# Patient Record
Sex: FEMALE | Race: BLACK OR AFRICAN AMERICAN | Employment: UNEMPLOYED | URBAN - METROPOLITAN AREA
[De-identification: names, ages, dates, MRNs, and addresses within clinical notes are randomized per-mention and may not be internally consistent; named-entity substitution may affect disease eponyms.]

---
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
## 2020-01-09 LAB
CREATININE URINE: 153 MG/DL (ref 20–320)
GLUCOSE BLD-MCNC: 146 MG/DL (ref 74–99)
PROTEIN/CREAT RATIO: 1.34 MG/MG CREAT (ref 0–0.1)
TOTAL PROTEIN, URINE: 205 MG/DL (ref 5–24)

## 2020-02-20 ENCOUNTER — HOSPITAL ENCOUNTER (EMERGENCY)
Age: 20
Discharge: HOME | End: 2020-02-20
Attending: EMERGENCY MEDICINE
Payer: COMMERCIAL

## 2020-02-20 VITALS
DIASTOLIC BLOOD PRESSURE: 88 MMHG | OXYGEN SATURATION: 99 % | HEIGHT: 68 IN | TEMPERATURE: 98.3 F | WEIGHT: 293 LBS | HEART RATE: 64 BPM | SYSTOLIC BLOOD PRESSURE: 137 MMHG | BODY MASS INDEX: 44.41 KG/M2 | RESPIRATION RATE: 18 BRPM

## 2020-02-20 PROCEDURE — 6370000000 HC RX 637 (ALT 250 FOR IP): Performed by: EMERGENCY MEDICINE

## 2020-02-20 PROCEDURE — 87070 CULTURE OTHR SPECIMN AEROBIC: CPT

## 2020-02-20 PROCEDURE — 87185 SC STD ENZYME DETCJ PER NZM: CPT

## 2020-02-20 PROCEDURE — 99283 EMERGENCY DEPT VISIT LOW MDM: CPT

## 2020-02-20 PROCEDURE — 87075 CULTR BACTERIA EXCEPT BLOOD: CPT

## 2020-02-20 PROCEDURE — 87205 SMEAR GRAM STAIN: CPT

## 2020-02-20 RX ORDER — NAPROXEN 500 MG/1
500 TABLET ORAL ONCE
Status: COMPLETED | OUTPATIENT
Start: 2020-02-20 | End: 2020-02-20

## 2020-02-20 RX ORDER — SULFAMETHOXAZOLE AND TRIMETHOPRIM 800; 160 MG/1; MG/1
1 TABLET ORAL 2 TIMES DAILY
Qty: 14 TABLET | Refills: 0 | Status: SHIPPED | OUTPATIENT
Start: 2020-02-20 | End: 2020-02-27

## 2020-02-20 RX ORDER — NAPROXEN 500 MG/1
500 TABLET ORAL 2 TIMES DAILY PRN
Qty: 20 TABLET | Refills: 0 | Status: SHIPPED | OUTPATIENT
Start: 2020-02-20 | End: 2020-04-14 | Stop reason: ALTCHOICE

## 2020-02-20 RX ADMIN — NAPROXEN 500 MG: 500 TABLET ORAL at 19:18

## 2020-02-20 ASSESSMENT — PAIN DESCRIPTION - LOCATION
LOCATION: ABDOMEN

## 2020-02-20 ASSESSMENT — ENCOUNTER SYMPTOMS
NAUSEA: 0
VOMITING: 0
EYE PAIN: 0
CHEST TIGHTNESS: 0
ABDOMINAL PAIN: 0
SHORTNESS OF BREATH: 0
SORE THROAT: 0

## 2020-02-20 ASSESSMENT — PAIN DESCRIPTION - DESCRIPTORS
DESCRIPTORS: SHARP
DESCRIPTORS: ACHING
DESCRIPTORS: ACHING

## 2020-02-20 ASSESSMENT — PAIN SCALES - GENERAL
PAINLEVEL_OUTOF10: 2
PAINLEVEL_OUTOF10: 2
PAINLEVEL_OUTOF10: 9
PAINLEVEL_OUTOF10: 2

## 2020-02-20 ASSESSMENT — PAIN DESCRIPTION - PAIN TYPE
TYPE: ACUTE PAIN

## 2020-02-20 ASSESSMENT — PAIN DESCRIPTION - FREQUENCY: FREQUENCY: CONTINUOUS

## 2020-02-20 NOTE — ED PROVIDER NOTES
[facility] ED  eMERGENCY dEPARTMENTeNCOUnter      Pt Name: Iraj Chandler  MRN: [id number]  [place] [address]  Date ofevaluation: 2020  Provider: [address]       Chief Complaint   Patient presents with    Post-op Problem     pt states she had a C section on , c/o pain and drainage from incision site         HISTORY OF PRESENT ILLNESS   (Location/Symptom, Timing/Onset,Context/Setting, Quality, Duration, Modifying Factors, Severity)  Note limiting factors. Iraj Chandler is a 23 y.o. female who presents to the emergency department . Patient comes in with infection at her  incision. Patient had the  . She did follow-up with the obstetrician once. Patient states that she put a swab inside and cultured it and then put her on 2 antibiotics. She cannot remember which antibiotics they were but believes 1 of them was Keflex. She does not remember where she picked it up. Patient states that she still has an open wound although smaller than it was. She also feels something sticking out of the wound. HPI    NursingNotes were reviewed. REVIEW OF SYSTEMS    (2-9 systems for level 4, 10 or more for level 5)     Review of Systems   Constitutional: Negative for activity change, appetite change and fatigue. HENT: Negative for congestion and sore throat. Eyes: Negative for pain and visual disturbance. Respiratory: Negative for chest tightness and shortness of breath. Cardiovascular: Negative for chest pain. Gastrointestinal: Negative for abdominal pain, nausea and vomiting. Endocrine: Negative for polydipsia. Genitourinary: Negative for flank pain and urgency. Musculoskeletal: Negative for gait problem and neck stiffness. Skin: Positive for wound. Negative for rash. Neurological: Negative for weakness, light-headedness and headaches. Psychiatric/Behavioral: Negative for confusion and sleep disturbance.        Except as noted above the remainder of the review of systems was reviewed and negative. PAST MEDICAL HISTORY     Past Medical History:   Diagnosis Date    Diabetes mellitus ([facility] [address]     GERD (gastroesophageal reflux disease)     Hypertension     Obesity affecting pregnancy, antepartum, third trimester          SURGICALHISTORY       Past Surgical History:   Procedure Laterality Date    UPPER GASTROINTESTINAL ENDOSCOPY           CURRENT MEDICATIONS       Previous Medications    DOXYLAMINE-PYRIDOXINE (DICLEGIS) 10-10 MG TBEC    Start: 2 tabs p.o. q.h.s.; if symptoms persist after 2 days increase to 1 tab p.o. q.a.m. and 2 tabs p.o. q.h.s.; may increase to 1 tab p.o. q.a.m., 1 tab p.o. q. midafternoon and 2 tabs p.o. q.h.s.  4 tabs per day. If unable to afford, instruct the patient about substituting the OTC components. INSULIN LISPRO (HUMALOG) 100 UNIT/ML INJECTION VIAL    Inject into the skin 3 times daily (before meals)    PRENATAL VIT-FE FUMARATE-FA (PRENATAL PLUS PO)    Take 1 tablet by mouth daily       ALLERGIES     Shellfish-derived products and Benzoyl peroxide    FAMILY HISTORY     History reviewed. No pertinent family history.        SOCIAL HISTORY       Social History     Socioeconomic History    Marital status: Single     Spouse name: None    Number of children: None    Years of education: None    Highest education level: None   Occupational History    None   Social Needs    Financial resource strain: None    Food insecurity:     Worry: None     Inability: None    Transportation needs:     Medical: None     Non-medical: None   Tobacco Use    Smoking status: Never Smoker    Smokeless tobacco: Never Used   Substance and Sexual Activity    Alcohol use: No    Drug use: No    Sexual activity: None   Lifestyle    Physical activity:     Days per week: None     Minutes per session: None    Stress: None   Relationships    Social connections:     Talks on phone: None     Gets together: None

## 2020-02-20 NOTE — ED TRIAGE NOTES
Pt states she had a C section on 1/12, c/o pain and drainage from incision site, Pt is A&OX3, calm, ambulatory, afebrile, breathes are equal and unlabored, last medicated for pain with ibuprofen at 1600 today, Pt also c/o abscesses in her groin

## 2020-02-21 NOTE — ED NOTES
Pt medicated per orders, pancho. Well. Pt a&ox4, skin w/d/tan, pulses palp, msp's intact, 0 distress, 0 N&v, steady gait noted, 0 problems.      Abraham Reese RN  02/20/20 2553

## 2020-02-21 NOTE — ED NOTES
This rn not primary nurse, helping with d/c. Pt c/o not being assessd by doctor appropriately and that she will have to go to different hospital, offered pt to call doctor to come back to room, pt refused it at this time. Pt a&ox4, skin w/d/tan, pulses palp. 0 distress.      Edgar Clements, BUDDY  02/20/20 9534 Length To Time In Minutes Device Was In Place: 10

## 2020-02-22 LAB
ANAEROBIC CULTURE: ABNORMAL
ANAEROBIC CULTURE: ABNORMAL
GRAM STAIN RESULT: ABNORMAL
ORGANISM: ABNORMAL
ORGANISM: ABNORMAL
WOUND/ABSCESS: ABNORMAL

## 2020-02-28 ENCOUNTER — APPOINTMENT (OUTPATIENT)
Dept: GENERAL RADIOLOGY | Age: 20
End: 2020-02-28
Payer: COMMERCIAL

## 2020-02-28 ENCOUNTER — HOSPITAL ENCOUNTER (EMERGENCY)
Age: 20
Discharge: HOME | End: 2020-02-28
Payer: COMMERCIAL

## 2020-02-28 VITALS
HEIGHT: 68 IN | RESPIRATION RATE: 16 BRPM | SYSTOLIC BLOOD PRESSURE: 146 MMHG | OXYGEN SATURATION: 98 % | HEART RATE: 70 BPM | WEIGHT: 290 LBS | DIASTOLIC BLOOD PRESSURE: 87 MMHG | TEMPERATURE: 98.3 F | BODY MASS INDEX: 43.95 KG/M2

## 2020-02-28 PROCEDURE — 10060 I&D ABSCESS SIMPLE/SINGLE: CPT

## 2020-02-28 PROCEDURE — 2500000003 HC RX 250 WO HCPCS: Performed by: NURSE PRACTITIONER

## 2020-02-28 PROCEDURE — 99283 EMERGENCY DEPT VISIT LOW MDM: CPT

## 2020-02-28 PROCEDURE — 6370000000 HC RX 637 (ALT 250 FOR IP): Performed by: NURSE PRACTITIONER

## 2020-02-28 PROCEDURE — 73630 X-RAY EXAM OF FOOT: CPT

## 2020-02-28 RX ORDER — IBUPROFEN 800 MG/1
800 TABLET ORAL EVERY 8 HOURS PRN
Qty: 20 TABLET | Refills: 0 | Status: SHIPPED | OUTPATIENT
Start: 2020-02-28 | End: 2020-10-28 | Stop reason: ALTCHOICE

## 2020-02-28 RX ORDER — LIDOCAINE HYDROCHLORIDE 20 MG/ML
5 INJECTION, SOLUTION INFILTRATION; PERINEURAL ONCE
Status: COMPLETED | OUTPATIENT
Start: 2020-02-28 | End: 2020-02-28

## 2020-02-28 RX ORDER — IBUPROFEN 800 MG/1
800 TABLET ORAL ONCE
Status: COMPLETED | OUTPATIENT
Start: 2020-02-28 | End: 2020-02-28

## 2020-02-28 RX ORDER — OXYCODONE HYDROCHLORIDE AND ACETAMINOPHEN 5; 325 MG/1; MG/1
1 TABLET ORAL ONCE
Status: COMPLETED | OUTPATIENT
Start: 2020-02-28 | End: 2020-02-28

## 2020-02-28 RX ORDER — TRAMADOL HYDROCHLORIDE 50 MG/1
50 TABLET ORAL EVERY 4 HOURS PRN
Qty: 10 TABLET | Refills: 0 | Status: SHIPPED | OUTPATIENT
Start: 2020-02-28 | End: 2020-03-04

## 2020-02-28 RX ORDER — SULFAMETHOXAZOLE AND TRIMETHOPRIM 800; 160 MG/1; MG/1
2 TABLET ORAL 2 TIMES DAILY
Qty: 56 TABLET | Refills: 0 | Status: SHIPPED | OUTPATIENT
Start: 2020-02-28 | End: 2020-03-13

## 2020-02-28 RX ORDER — SULFAMETHOXAZOLE AND TRIMETHOPRIM 800; 160 MG/1; MG/1
2 TABLET ORAL ONCE
Status: COMPLETED | OUTPATIENT
Start: 2020-02-28 | End: 2020-02-28

## 2020-02-28 RX ADMIN — SULFAMETHOXAZOLE AND TRIMETHOPRIM 2 TABLET: 800; 160 TABLET ORAL at 08:41

## 2020-02-28 RX ADMIN — IBUPROFEN 800 MG: 800 TABLET, FILM COATED ORAL at 09:35

## 2020-02-28 RX ADMIN — LIDOCAINE HYDROCHLORIDE 5 ML: 20 INJECTION, SOLUTION INFILTRATION; PERINEURAL at 10:18

## 2020-02-28 RX ADMIN — OXYCODONE HYDROCHLORIDE AND ACETAMINOPHEN 1 TABLET: 5; 325 TABLET ORAL at 10:13

## 2020-02-28 ASSESSMENT — PAIN SCALES - GENERAL
PAINLEVEL_OUTOF10: 10
PAINLEVEL_OUTOF10: 8

## 2020-02-28 ASSESSMENT — ENCOUNTER SYMPTOMS
DIARRHEA: 0
RHINORRHEA: 0
VOMITING: 0
PHOTOPHOBIA: 0
BACK PAIN: 0
EYE PAIN: 0
ABDOMINAL PAIN: 0
COUGH: 0
NAUSEA: 0
SHORTNESS OF BREATH: 0
SORE THROAT: 0

## 2020-02-28 ASSESSMENT — PAIN DESCRIPTION - LOCATION
LOCATION_2: OTHER (COMMENT)
LOCATION: TOE (COMMENT WHICH ONE)

## 2020-02-28 ASSESSMENT — PAIN DESCRIPTION - ORIENTATION
ORIENTATION_2: LEFT
ORIENTATION: RIGHT

## 2020-02-28 ASSESSMENT — PAIN DESCRIPTION - INTENSITY: RATING_2: 8

## 2020-02-28 NOTE — ED NOTES
Pt refusing to sign discharge instructions. PT states that she cannot take the pain. Pt was also advised that she needs to follow up with provider. Pt requests that Trey Bowden return to room.       Naeem Coates RN  02/28/20 BUDDY Bee  02/28/20 BUDDY Bee  02/28/20 8351

## 2020-02-28 NOTE — ED PROVIDER NOTES
[facility] ED  EMERGENCY DEPARTMENT ENCOUNTER      Pt Name: Bernard Zelaya  MRN: [id number]  [place] 2000  Date of evaluation: 2/28/2020  Provider: SHAQUILLE Sugn 6626       Chief Complaint   Patient presents with    Toe Injury     right foot, stubbed 2 toes    Abscess     left axillary         HISTORY OF PRESENT ILLNESS   (Location/Symptom, Timing/Onset,Context/Setting, Quality, Duration, Modifying Factors, Severity)  Note limiting factors. Bernard Zelaya is a 23 y.o. female who presents to the emergency department with complaints of stubbed toes (4&5) several times over the past few weeks. She also admits to recurrent left axillary hidradenitis supportiva. She admits to this axilla having drainage for the past week or so. No fever, change in motor or sensation. Location/Symptom - axilla abscess  Onset - acute on chronic for a week  Context/Setting - as above  Quality - tender, sore  Duration - constant  Modifying Factors - none  Severity - moderate to severe        Nursing Notes were reviewed. REVIEW OF SYSTEMS    (2-9 systems for level 4, 10 or more for level 5)     Review of Systems   Constitutional: Negative for chills, diaphoresis, fatigue and fever. HENT: Negative for congestion, rhinorrhea and sore throat. Eyes: Negative for photophobia and pain. Respiratory: Negative for cough and shortness of breath. Cardiovascular: Negative for chest pain and palpitations. Gastrointestinal: Negative for abdominal pain, diarrhea, nausea and vomiting. Genitourinary: Negative for dysuria and flank pain. Musculoskeletal: Positive for arthralgias. Negative for back pain. Skin: Positive for wound. Negative for rash. Neurological: Negative for dizziness, light-headedness and headaches. Psychiatric/Behavioral: Negative. All other systems reviewed and are negative.       Except as noted above the remainder of the review of systems was reviewed and negative. PAST MEDICAL HISTORY     Past Medical History:   Diagnosis Date    Diabetes mellitus ([address]     GERD (gastroesophageal reflux disease)     Hypertension     Obesity affecting pregnancy, antepartum, third trimester      Past Surgical History:   Procedure Laterality Date    UPPER GASTROINTESTINAL ENDOSCOPY       Social History     Socioeconomic History    Marital status: Single     Spouse name: None    Number of children: None    Years of education: None    Highest education level: None   Occupational History    None   Social Needs    Financial resource strain: None    Food insecurity:     Worry: None     Inability: None    Transportation needs:     Medical: None     Non-medical: None   Tobacco Use    Smoking status: Never Smoker    Smokeless tobacco: Never Used   Substance and Sexual Activity    Alcohol use: No    Drug use: No    Sexual activity: None   Lifestyle    Physical activity:     Days per week: None     Minutes per session: None    Stress: None   Relationships    Social connections:     Talks on phone: None     Gets together: None     Attends Quaker service: None     Active member of club or organization: None     Attends meetings of clubs or organizations: None     Relationship status: None    Intimate partner violence:     Fear of current or ex partner: None     Emotionally abused: None     Physically abused: None     Forced sexual activity: None   Other Topics Concern    None   Social History Narrative    None       SCREENINGS             PHYSICAL EXAM    (up to 7 for level 4, 8 or more for level 5)     ED Triage Vitals [02/28/20 0814]   BP Temp Temp Source Pulse Resp SpO2 Height Weight   (!) 146/87 98.3 °F (36.8 °C) Oral 70 16 98 % 5' 8\" (1.727 m) 290 lb (131.5 kg)       Physical Exam  Vitals signs and nursing note reviewed. Constitutional:       General: She is not in acute distress. Appearance: Normal appearance. She is well-developed.  She is not diaphoretic. HENT:      Head: Normocephalic and atraumatic. Mouth/Throat:      Mouth: Mucous membranes are moist.   Eyes:      General: Lids are normal.      Conjunctiva/sclera: Conjunctivae normal.      Pupils: Pupils are equal, round, and reactive to light. Neck:      Musculoskeletal: Normal range of motion and neck supple. Cardiovascular:      Rate and Rhythm: Normal rate and regular rhythm. Pulses: Normal pulses. Heart sounds: Normal heart sounds. Pulmonary:      Effort: Pulmonary effort is normal.      Breath sounds: Normal breath sounds. Abdominal:      General: Bowel sounds are normal.      Palpations: Abdomen is soft. Tenderness: There is no abdominal tenderness. Musculoskeletal:      Comments: Right foot grossly normal.    Lymphadenopathy:      Cervical: No cervical adenopathy. Skin:     General: Skin is warm and dry. Capillary Refill: Capillary refill takes less than 2 seconds. Findings: No rash. Comments: Left axilla with tenderness. Area of induration roughly 4jai3ty. No erythema. No central fluctuance. Neurological:      Mental Status: She is alert and oriented to person, place, and time. Psychiatric:         Thought Content: Thought content normal.         Judgment: Judgment normal.         RESULTS     EKG: All EKG's are interpreted by the Emergency Department Physician who either signs or Co-signsthis chart in the absence of a cardiologist.        RADIOLOGY:   Non-plain filmimages such as CT, Ultrasound and MRI are read by the radiologist. Plain radiographic images are visualized and preliminarily interpreted by the emergency physician with the below findings:    No fx, no disloc. Interpretation per the Radiologist below, if available at the time ofthis note:    XR FOOT RIGHT (MIN 3 VIEWS)   Final Result                                                                                      No acute osseous abnormality.                   ED then go to [facility] where they \"give me what I want. \"   review demonstrates that she has only been on opiate therapy 3x in past years. I have again assured her that I would provide additional analgesics but need a safe plan to be in place for pt and infant. She uses derogatory and explict language and wants to speak with attending. Pt wishes to later speak. I spoke with patient and father at bedside. Pt repeatedly threatens to provide \" bad survey\" if not provided percocet. I again informed her that she is still provided analgesia which I feel is medically appropriate. Father at bedside expresses understanding and seems to agree with rationale. He will drive pt and infant home to assure safety. CRITICAL CARE TIME       CONSULTS:  None    PROCEDURES:  Unless otherwise noted below, none     Incision/Drainage  Date/Time: 2/28/2020 10:10 AM  Performed by: SHAQUILLE Meza CNP  Authorized by: SHAQUILLE Meza CNP     Consent:     Consent obtained:  Verbal    Consent given by:  Patient    Risks discussed:  Bleeding, damage to other organs, infection, incomplete drainage and pain    Alternatives discussed:  No treatment, delayed treatment, alternative treatment, observation and referral  Location:     Type:  Abscess    Size:  2*3    Location:  Upper extremity    Upper extremity location:  Shoulder    Shoulder location:  L shoulder        FINAL IMPRESSION      1. Injury of toe on right foot, initial encounter    2.  Hidradenitis suppurativa of left axilla          DISPOSITION/PLAN   DISPOSITION Decision To Discharge 02/28/2020 09:24:53 AM      PATIENT REFERRED TO:  Aden Sargent MD  [address]  [address]  [address] [facility]  [phone]    Call in 1 day  For continued evaluation and management    DIGNA Mclain [address] [facility]  [address] [facility]  [phone]    Call in 1 day  For continued evaluation and management      DISCHARGE MEDICATIONS:  New Prescriptions IBUPROFEN (ADVIL;MOTRIN) 800 MG TABLET    Take 1 tablet by mouth every 8 hours as needed for Pain    SULFAMETHOXAZOLE-TRIMETHOPRIM (BACTRIM DS) 800-160 MG PER TABLET    Take 2 tablets by mouth 2 times daily for 14 days    TRAMADOL (ULTRAM) 50 MG TABLET    Take 1 tablet by mouth every 4 hours as needed for Pain for up to 5 days.           (Please notethat portions of this note were completed with a voice recognition program.  Efforts were made to edit the dictations but occasionally words are mis-transcribed.)    SHAQUILLE Chung CNP (electronically signed)  Attending Emergency Physician          SHAQUILLE Chung CNP  02/28/20 4965

## 2020-02-28 NOTE — ED NOTES
Pt on call light requesting pain med again. I asked if her father was coming in, she stated, \"he'll be here in a minute\". Told pt that when he arrives medication will be given. Pt again used several curse words. I explained that we could not medicated if we do not see that she does indeed have a ride home.        Romain Lee RN  02/28/20 8378

## 2020-03-16 ENCOUNTER — HOSPITAL ENCOUNTER (EMERGENCY)
Age: 20
Discharge: HOME | End: 2020-03-16
Attending: EMERGENCY MEDICINE
Payer: COMMERCIAL

## 2020-03-16 VITALS
OXYGEN SATURATION: 97 % | TEMPERATURE: 98.9 F | HEIGHT: 68 IN | BODY MASS INDEX: 43.95 KG/M2 | RESPIRATION RATE: 18 BRPM | HEART RATE: 72 BPM | WEIGHT: 290 LBS | SYSTOLIC BLOOD PRESSURE: 121 MMHG | DIASTOLIC BLOOD PRESSURE: 79 MMHG

## 2020-03-16 LAB
INFLUENZA A BY PCR: NEGATIVE
INFLUENZA B BY PCR: NEGATIVE
STREP GRP A PCR: NEGATIVE

## 2020-03-16 PROCEDURE — 99283 EMERGENCY DEPT VISIT LOW MDM: CPT

## 2020-03-16 PROCEDURE — 87651 STREP A DNA AMP PROBE: CPT

## 2020-03-16 PROCEDURE — 87502 INFLUENZA DNA AMP PROBE: CPT

## 2020-03-16 PROCEDURE — 6370000000 HC RX 637 (ALT 250 FOR IP): Performed by: EMERGENCY MEDICINE

## 2020-03-16 RX ORDER — GUAIFENESIN 600 MG/1
600 TABLET, EXTENDED RELEASE ORAL 2 TIMES DAILY
Status: DISCONTINUED | OUTPATIENT
Start: 2020-03-16 | End: 2020-03-16 | Stop reason: HOSPADM

## 2020-03-16 RX ORDER — ACETAMINOPHEN 500 MG
1000 TABLET ORAL ONCE
Status: COMPLETED | OUTPATIENT
Start: 2020-03-16 | End: 2020-03-16

## 2020-03-16 RX ADMIN — ACETAMINOPHEN 1000 MG: 500 TABLET ORAL at 21:09

## 2020-03-16 RX ADMIN — GUAIFENESIN 600 MG: 600 TABLET, EXTENDED RELEASE ORAL at 21:09

## 2020-03-16 ASSESSMENT — ENCOUNTER SYMPTOMS
SHORTNESS OF BREATH: 0
VOMITING: 0
FACIAL SWELLING: 0
WHEEZING: 0
EYE DISCHARGE: 0
ABDOMINAL DISTENTION: 0
COLOR CHANGE: 0
PHOTOPHOBIA: 0
ABDOMINAL PAIN: 0
RHINORRHEA: 0

## 2020-03-16 ASSESSMENT — PAIN DESCRIPTION - LOCATION: LOCATION: HEAD

## 2020-03-16 ASSESSMENT — PAIN DESCRIPTION - DESCRIPTORS: DESCRIPTORS: ACHING

## 2020-03-16 ASSESSMENT — PAIN DESCRIPTION - ONSET: ONSET: PROGRESSIVE

## 2020-03-16 ASSESSMENT — PAIN SCALES - GENERAL
PAINLEVEL_OUTOF10: 7
PAINLEVEL_OUTOF10: 7

## 2020-03-16 ASSESSMENT — PAIN DESCRIPTION - FREQUENCY: FREQUENCY: CONTINUOUS

## 2020-03-16 ASSESSMENT — PAIN DESCRIPTION - PAIN TYPE: TYPE: ACUTE PAIN

## 2020-03-17 NOTE — ED PROVIDER NOTES
[facility] ED  eMERGENCY dEPARTMENT eNCOUnter      Pt Name: Shahab Escobar  MRN: [id number]  [place] 2000  Date of evaluation: 3/16/2020  Provider: Sarah Mariscal, [address] [place] [address]       Chief Complaint   Patient presents with    Headache         HISTORY OF PRESENT ILLNESS   (Location/Symptom, Timing/Onset,Context/Setting, Quality, Duration, Modifying Factors, Severity)  Note limiting factors. Shahab Escobar is a 23 y.o. female who presents to the emergency department with a chief complaint of a reccuring frontal headache. She states that she gets it sometimes when her blood pressure is elevated and she was worried that her blood pressure was up now. She denies any medications and states she has had a little bit of nasal congestion. She denies any vision change or other complaints    HPI    NursingNotes were reviewed. REVIEW OF SYSTEMS    (2-9 systems for level 4, 10 or more for level 5)     Review of Systems   Constitutional: Negative for activity change and appetite change. HENT: Positive for congestion. Negative for facial swelling and rhinorrhea. Eyes: Negative for photophobia and discharge. Respiratory: Negative for shortness of breath and wheezing. Cardiovascular: Negative for chest pain. Gastrointestinal: Negative for abdominal distention, abdominal pain and vomiting. Endocrine: Negative for polydipsia and polyphagia. Genitourinary: Negative for difficulty urinating, frequency, vaginal bleeding and vaginal discharge. Musculoskeletal: Negative for gait problem. Skin: Negative for color change. Allergic/Immunologic: Negative for immunocompromised state. Neurological: Negative for dizziness, weakness and light-headedness. Hematological: Negative for adenopathy. Psychiatric/Behavioral: Negative for behavioral problems. Except as noted above the remainder of the review of systems was reviewed and negative.        PAST MEDICAL HISTORY     Past Medical History:   Diagnosis Date    Diabetes mellitus ([facility] [address]     GERD (gastroesophageal reflux disease)     Hypertension     Obesity affecting pregnancy, antepartum, third trimester          SURGICALHISTORY       Past Surgical History:   Procedure Laterality Date    UPPER GASTROINTESTINAL ENDOSCOPY           CURRENT MEDICATIONS       Previous Medications    DOXYLAMINE-PYRIDOXINE (DICLEGIS) 10-10 MG TBEC    Start: 2 tabs p.o. q.h.s.; if symptoms persist after 2 days increase to 1 tab p.o. q.a.m. and 2 tabs p.o. q.h.s.; may increase to 1 tab p.o. q.a.m., 1 tab p.o. q. midafternoon and 2 tabs p.o. q.h.s.  4 tabs per day. If unable to afford, instruct the patient about substituting the OTC components. IBUPROFEN (ADVIL;MOTRIN) 800 MG TABLET    Take 1 tablet by mouth every 8 hours as needed for Pain    INSULIN LISPRO (HUMALOG) 100 UNIT/ML INJECTION VIAL    Inject into the skin 3 times daily (before meals)    NAPROXEN (NAPROSYN) 500 MG TABLET    Take 1 tablet by mouth 2 times daily as needed for Pain    PRENATAL VIT-FE FUMARATE-FA (PRENATAL PLUS PO)    Take 1 tablet by mouth daily       ALLERGIES     Shellfish-derived products and Benzoyl peroxide    FAMILY HISTORY     No family history on file.        SOCIAL HISTORY       Social History     Socioeconomic History    Marital status: Single     Spouse name: Not on file    Number of children: Not on file    Years of education: Not on file    Highest education level: Not on file   Occupational History    Not on file   Social Needs    Financial resource strain: Not on file    Food insecurity     Worry: Not on file     Inability: Not on file    Transportation needs     Medical: Not on file     Non-medical: Not on file   Tobacco Use    Smoking status: Never Smoker    Smokeless tobacco: Never Used   Substance and Sexual Activity    Alcohol use: No    Drug use: No    Sexual activity: Not on file   Lifestyle    Physical activity     Days per week: Not on file radiographic images are visualized and preliminarily interpreted by the emergency physician with the below findings:        Interpretation per the Radiologist below, if available at the time ofthis note:    No orders to display         ED BEDSIDE ULTRASOUND:   Performed by ED Physician - none    LABS:  Labs Reviewed   RAPID STREP SCREEN   RAPID INFLUENZA A/B ANTIGENS       All other labs were within normal range or not returned as of this dictation. EMERGENCY DEPARTMENT COURSE and DIFFERENTIAL DIAGNOSIS/MDM:   Vitals:    Vitals:    03/16/20 2018   BP: 121/79   Pulse: 72   Resp: 18   Temp: 98.9 °F (37.2 °C)   TempSrc: Oral   SpO2: 97%   Weight: 290 lb (131.5 kg)   Height: 5' 8\" (1.727 m)       Patient was given mucinex and tylenol. She is discharged home in stable condition with a normal blood pressure. MDM    CRITICAL CARE TIME   Total Critical Care time was 0 minutes, excluding separately reportableprocedures. There was a high probability of clinicallysignificant/life threatening deterioration in the patient's condition which required my urgent intervention. CONSULTS:  None    PROCEDURES:  Unless otherwise noted below, none     Procedures    FINAL IMPRESSION      1. Other headache syndrome          DISPOSITION/PLAN   DISPOSITION Decision To Discharge 03/16/2020 08:57:06 PM      PATIENT REFERRED TO:  No follow-up provider specified.     DISCHARGE MEDICATIONS:  New Prescriptions    No medications on file          (Please note that portions of this note were completed with a voice recognition program.  Efforts were made to edit the dictations but occasionally words are mis-transcribed.)    Shankar Mckenna DO (electronically signed)  Attending Emergency Physician          Shankar Mckenna DO  03/16/20 4620

## 2020-03-17 NOTE — ED NOTES
Discharge instructions given to patient. Patient verbalizes understanding of PRN medications.       Asa Barrera RN  03/16/20 2367

## 2020-04-10 ENCOUNTER — HOSPITAL ENCOUNTER (EMERGENCY)
Age: 20
Discharge: HOME | End: 2020-04-10
Attending: EMERGENCY MEDICINE
Payer: COMMERCIAL

## 2020-04-10 VITALS
BODY MASS INDEX: 42.44 KG/M2 | TEMPERATURE: 99.1 F | SYSTOLIC BLOOD PRESSURE: 139 MMHG | DIASTOLIC BLOOD PRESSURE: 84 MMHG | WEIGHT: 280 LBS | HEART RATE: 77 BPM | RESPIRATION RATE: 16 BRPM | OXYGEN SATURATION: 99 % | HEIGHT: 68 IN

## 2020-04-10 PROCEDURE — 6370000000 HC RX 637 (ALT 250 FOR IP): Performed by: EMERGENCY MEDICINE

## 2020-04-10 PROCEDURE — 99282 EMERGENCY DEPT VISIT SF MDM: CPT

## 2020-04-10 PROCEDURE — 6360000002 HC RX W HCPCS: Performed by: EMERGENCY MEDICINE

## 2020-04-10 PROCEDURE — 96372 THER/PROPH/DIAG INJ SC/IM: CPT

## 2020-04-10 RX ORDER — FLUCONAZOLE 150 MG/1
150 TABLET ORAL ONCE
Qty: 1 TABLET | Refills: 0 | Status: SHIPPED | OUTPATIENT
Start: 2020-04-10 | End: 2020-04-10

## 2020-04-10 RX ORDER — SULFAMETHOXAZOLE AND TRIMETHOPRIM 800; 160 MG/1; MG/1
1 TABLET ORAL ONCE
Status: COMPLETED | OUTPATIENT
Start: 2020-04-10 | End: 2020-04-10

## 2020-04-10 RX ORDER — KETOROLAC TROMETHAMINE 30 MG/ML
30 INJECTION, SOLUTION INTRAMUSCULAR; INTRAVENOUS ONCE
Status: COMPLETED | OUTPATIENT
Start: 2020-04-10 | End: 2020-04-10

## 2020-04-10 RX ORDER — SULFAMETHOXAZOLE AND TRIMETHOPRIM 800; 160 MG/1; MG/1
1 TABLET ORAL 2 TIMES DAILY
Qty: 20 TABLET | Refills: 0 | Status: SHIPPED | OUTPATIENT
Start: 2020-04-10 | End: 2020-04-14 | Stop reason: ALTCHOICE

## 2020-04-10 RX ORDER — OXYCODONE HYDROCHLORIDE AND ACETAMINOPHEN 5; 325 MG/1; MG/1
1 TABLET ORAL EVERY 6 HOURS PRN
Qty: 12 TABLET | Refills: 0 | Status: SHIPPED | OUTPATIENT
Start: 2020-04-10 | End: 2020-04-13

## 2020-04-10 RX ORDER — OXYCODONE HYDROCHLORIDE AND ACETAMINOPHEN 5; 325 MG/1; MG/1
1 TABLET ORAL ONCE
Status: COMPLETED | OUTPATIENT
Start: 2020-04-10 | End: 2020-04-10

## 2020-04-10 RX ADMIN — KETOROLAC TROMETHAMINE 30 MG: 30 INJECTION, SOLUTION INTRAMUSCULAR at 13:45

## 2020-04-10 RX ADMIN — OXYCODONE HYDROCHLORIDE AND ACETAMINOPHEN 1 TABLET: 5; 325 TABLET ORAL at 13:45

## 2020-04-10 RX ADMIN — SULFAMETHOXAZOLE AND TRIMETHOPRIM 1 TABLET: 800; 160 TABLET ORAL at 13:45

## 2020-04-10 ASSESSMENT — PAIN DESCRIPTION - LOCATION: LOCATION: GROIN;LEG

## 2020-04-10 ASSESSMENT — ENCOUNTER SYMPTOMS
COUGH: 0
NAUSEA: 0
VOMITING: 0
BACK PAIN: 0
SORE THROAT: 0
DIARRHEA: 0
SHORTNESS OF BREATH: 0
ABDOMINAL PAIN: 0

## 2020-04-10 ASSESSMENT — PAIN SCALES - GENERAL
PAINLEVEL_OUTOF10: 8
PAINLEVEL_OUTOF10: 10

## 2020-04-10 ASSESSMENT — PAIN DESCRIPTION - PAIN TYPE: TYPE: ACUTE PAIN

## 2020-04-10 NOTE — ED PROVIDER NOTES
[facility] [facility] ED  eMERGENCYdEPARTMENT eNCOUnter      Pt Name: Eyad Galan  MRN: [id number]  [place] 2000  Date of evaluation: 4/10/2020  Lilli Kussmaul, MD    CHIEF COMPLAINT           HPI  Eyad Galan is a 21 y.o. female per chart review has a h/o GERD, DM II, HTN, Hpl, hydradenitis supportiva presents to the ED with arm pit pain, groin pain. Pt notes gradual onset, moderate, constant, throbbing, L arm pit and bilateral groin pain. Pt notes that she has frequent abscesses in her arm pits and groin. Pt denies fever, n/v, cp, sob, dysuria, diarrhea. ROS  Review of Systems   Constitutional: Negative for activity change, chills and fever. HENT: Negative for ear pain and sore throat. Eyes: Negative for visual disturbance. Respiratory: Negative for cough and shortness of breath. Cardiovascular: Negative for chest pain, palpitations and leg swelling. Gastrointestinal: Negative for abdominal pain, diarrhea, nausea and vomiting. Genitourinary: Negative for dysuria. Bilateral groin pain, L arm pain pain   Musculoskeletal: Negative for back pain. Skin: Negative for rash. Neurological: Negative for dizziness and weakness. Except as noted above the remainder of the review of systems was reviewed and negative. PAST MEDICAL HISTORY     Past Medical History:   Diagnosis Date    Diabetes mellitus ([address]     GERD (gastroesophageal reflux disease)     Hypertension     Obesity affecting pregnancy, antepartum, third trimester          SURGICAL HISTORY       Past Surgical History:   Procedure Laterality Date    UPPER GASTROINTESTINAL ENDOSCOPY           CURRENTMEDICATIONS       Previous Medications    DOXYLAMINE-PYRIDOXINE (DICLEGIS) 10-10 MG TBEC    Start: 2 tabs p.o. q.h.s.; if symptoms persist after 2 days increase to 1 tab p.o. q.a.m. and 2 tabs p.o. q.h.s.; may increase to 1 tab p.o. q.a.m., 1 tab p.o. q. midafternoon and 2 tabs p.o. q.h.s.  4 tabs per day.  If

## 2020-04-14 ENCOUNTER — HOSPITAL ENCOUNTER (EMERGENCY)
Age: 20
Discharge: HOME | End: 2020-04-14
Payer: COMMERCIAL

## 2020-04-14 VITALS
SYSTOLIC BLOOD PRESSURE: 138 MMHG | WEIGHT: 280 LBS | HEART RATE: 77 BPM | RESPIRATION RATE: 16 BRPM | OXYGEN SATURATION: 99 % | BODY MASS INDEX: 41.47 KG/M2 | HEIGHT: 69 IN | DIASTOLIC BLOOD PRESSURE: 85 MMHG | TEMPERATURE: 98.3 F

## 2020-04-14 LAB
CHP ED QC CHECK: YES
GLUCOSE BLD-MCNC: 288 MG/DL
GLUCOSE BLD-MCNC: 288 MG/DL (ref 60–115)
PERFORMED ON: ABNORMAL

## 2020-04-14 PROCEDURE — 99283 EMERGENCY DEPT VISIT LOW MDM: CPT

## 2020-04-14 PROCEDURE — 6370000000 HC RX 637 (ALT 250 FOR IP): Performed by: NURSE PRACTITIONER

## 2020-04-14 RX ORDER — OXYCODONE HYDROCHLORIDE AND ACETAMINOPHEN 5; 325 MG/1; MG/1
1 TABLET ORAL EVERY 6 HOURS PRN
Qty: 12 TABLET | Refills: 0 | Status: SHIPPED | OUTPATIENT
Start: 2020-04-14 | End: 2020-04-17

## 2020-04-14 RX ORDER — OXYCODONE HYDROCHLORIDE AND ACETAMINOPHEN 5; 325 MG/1; MG/1
1 TABLET ORAL ONCE
Status: COMPLETED | OUTPATIENT
Start: 2020-04-14 | End: 2020-04-14

## 2020-04-14 RX ORDER — FAMOTIDINE 20 MG/1
20 TABLET, FILM COATED ORAL DAILY
Qty: 30 TABLET | Refills: 0 | Status: SHIPPED | OUTPATIENT
Start: 2020-04-14 | End: 2020-05-27 | Stop reason: SDUPTHER

## 2020-04-14 RX ADMIN — OXYCODONE HYDROCHLORIDE AND ACETAMINOPHEN 1 TABLET: 5; 325 TABLET ORAL at 20:05

## 2020-04-14 ASSESSMENT — ENCOUNTER SYMPTOMS
RHINORRHEA: 0
SORE THROAT: 0
NAUSEA: 0
TROUBLE SWALLOWING: 0
SHORTNESS OF BREATH: 0
ABDOMINAL DISTENTION: 0
VOMITING: 0
CHEST TIGHTNESS: 0
WHEEZING: 0
CONSTIPATION: 0
ABDOMINAL PAIN: 0
DIARRHEA: 0
COUGH: 0
BACK PAIN: 0
COLOR CHANGE: 0

## 2020-04-14 ASSESSMENT — PAIN SCALES - GENERAL
PAINLEVEL_OUTOF10: 8
PAINLEVEL_OUTOF10: 8

## 2020-04-14 ASSESSMENT — PAIN DESCRIPTION - FREQUENCY: FREQUENCY: CONTINUOUS

## 2020-04-14 ASSESSMENT — PAIN DESCRIPTION - DESCRIPTORS: DESCRIPTORS: BURNING

## 2020-04-14 ASSESSMENT — PAIN DESCRIPTION - LOCATION: LOCATION: ARM

## 2020-04-14 ASSESSMENT — PAIN DESCRIPTION - PROGRESSION: CLINICAL_PROGRESSION: GRADUALLY WORSENING

## 2020-04-15 ENCOUNTER — CARE COORDINATION (OUTPATIENT)
Dept: CARE COORDINATION | Age: 20
End: 2020-04-15

## 2020-04-15 NOTE — ED PROVIDER NOTES
Not on file    Food insecurity     Worry: Not on file     Inability: Not on file    Transportation needs     Medical: Not on file     Non-medical: Not on file   Tobacco Use    Smoking status: Never Smoker    Smokeless tobacco: Never Used   Substance and Sexual Activity    Alcohol use: No    Drug use: No    Sexual activity: Not on file   Lifestyle    Physical activity     Days per week: Not on file     Minutes per session: Not on file    Stress: Not on file   Relationships    Social connections     Talks on phone: Not on file     Gets together: Not on file     Attends Hoahaoism service: Not on file     Active member of club or organization: Not on file     Attends meetings of clubs or organizations: Not on file     Relationship status: Not on file    Intimate partner violence     Fear of current or ex partner: Not on file     Emotionally abused: Not on file     Physically abused: Not on file     Forced sexual activity: Not on file   Other Topics Concern    Not on file   Social History Narrative    Not on file       SCREENINGS             PHYSICAL EXAM    (up to 7 for level 4, 8 or more for level 5)     ED Triage Vitals [04/14/20 1924]   BP Temp Temp Source Pulse Resp SpO2 Height Weight   138/85 98.3 °F (36.8 °C) Oral 77 16 99 % 5' 9\" (1.753 m) 280 lb (127 kg)       Physical Exam  Constitutional:       General: She is not in acute distress. Appearance: Normal appearance. She is obese. She is not ill-appearing, toxic-appearing or diaphoretic. HENT:      Head: Normocephalic and atraumatic. Right Ear: External ear normal.      Left Ear: External ear normal.      Nose: Nose normal.      Mouth/Throat:      Mouth: Mucous membranes are moist.   Eyes:      General:         Right eye: No discharge. Left eye: No discharge. Conjunctiva/sclera: Conjunctivae normal.   Neck:      Musculoskeletal: Normal range of motion and neck supple. No neck rigidity or muscular tenderness.    Cardiovascular:

## 2020-04-15 NOTE — CARE COORDINATION
you should use a separate bathroom, if available. Animals: You should restrict contact with pets and other animals while you are sick with COVID-19, just like you would around other people. Although there have not been reports of pets or other animals becoming sick with COVID-19, it is still recommended that people sick with COVID-19 limit contact with animals until more information is known about the virus. When possible, have another member of your household care for your animals while you are sick. If you are sick with COVID-19, avoid contact with your pet, including petting, snuggling, being kissed or licked, and sharing food. If you must care for your pet or be around animals while you are sick, wash your hands before and after you interact with pets and wear a facemask. Call ahead before visiting your doctor  If you have a medical appointment, call the healthcare provider and tell them that you have or may have COVID-19. This will help the healthcare providers office take steps to keep other people from getting infected or exposed. Wear a facemask  You should wear a facemask when you are around other people (e.g., sharing a room or vehicle) or pets and before you enter a healthcare providers office. If you are not able to wear a facemask (for example, because it causes trouble breathing), then people who live with you should not stay in the same room with you, or they should wear a facemask if they enter your room. Cover your coughs and sneezes  Cover your mouth and nose with a tissue when you cough or sneeze. Throw used tissues in a lined trash can. Immediately wash your hands with soap and water for at least 20 seconds or, if soap and water are not available, clean your hands with an alcohol-based hand  that contains at least 60% alcohol.   Clean your hands often  Wash your hands often with soap and water for at least 20 seconds, especially after blowing your nose, coughing, or sneezing; going

## 2020-04-19 ENCOUNTER — HOSPITAL ENCOUNTER (EMERGENCY)
Age: 20
Discharge: HOME | End: 2020-04-19
Payer: COMMERCIAL

## 2020-04-19 VITALS
WEIGHT: 280 LBS | DIASTOLIC BLOOD PRESSURE: 80 MMHG | TEMPERATURE: 98.7 F | BODY MASS INDEX: 42.44 KG/M2 | RESPIRATION RATE: 18 BRPM | OXYGEN SATURATION: 99 % | SYSTOLIC BLOOD PRESSURE: 146 MMHG | HEART RATE: 91 BPM | HEIGHT: 68 IN

## 2020-04-19 PROCEDURE — 6370000000 HC RX 637 (ALT 250 FOR IP): Performed by: PHYSICIAN ASSISTANT

## 2020-04-19 PROCEDURE — 99282 EMERGENCY DEPT VISIT SF MDM: CPT

## 2020-04-19 RX ORDER — HYDROCODONE BITARTRATE AND ACETAMINOPHEN 5; 325 MG/1; MG/1
1 TABLET ORAL EVERY 6 HOURS PRN
Qty: 12 TABLET | Refills: 0 | Status: SHIPPED | OUTPATIENT
Start: 2020-04-19 | End: 2020-04-21 | Stop reason: ALTCHOICE

## 2020-04-19 RX ORDER — HYDROCODONE BITARTRATE AND ACETAMINOPHEN 5; 325 MG/1; MG/1
1 TABLET ORAL ONCE
Status: COMPLETED | OUTPATIENT
Start: 2020-04-19 | End: 2020-04-19

## 2020-04-19 RX ADMIN — HYDROCODONE BITARTRATE AND ACETAMINOPHEN 1 TABLET: 5; 325 TABLET ORAL at 15:26

## 2020-04-19 ASSESSMENT — PAIN SCALES - GENERAL
PAINLEVEL_OUTOF10: 9
PAINLEVEL_OUTOF10: 6

## 2020-04-19 ASSESSMENT — PAIN DESCRIPTION - ORIENTATION: ORIENTATION: LEFT;RIGHT

## 2020-04-19 ASSESSMENT — PAIN DESCRIPTION - PAIN TYPE: TYPE: ACUTE PAIN

## 2020-04-19 ASSESSMENT — PAIN DESCRIPTION - FREQUENCY: FREQUENCY: CONTINUOUS

## 2020-04-19 ASSESSMENT — PAIN DESCRIPTION - DESCRIPTORS: DESCRIPTORS: CONSTANT;DISCOMFORT

## 2020-04-19 NOTE — ED PROVIDER NOTES
MG TABLET    Take 1 tablet by mouth daily    IBUPROFEN (ADVIL;MOTRIN) 800 MG TABLET    Take 1 tablet by mouth every 8 hours as needed for Pain    INSULIN LISPRO (HUMALOG) 100 UNIT/ML INJECTION VIAL    Inject into the skin 3 times daily (before meals)       ALLERGIES     Shellfish-derived products and Benzoyl peroxide    FAMILY HISTORY     History reviewed. No pertinent family history. SOCIAL HISTORY       Social History     Socioeconomic History    Marital status: Single     Spouse name: None    Number of children: None    Years of education: None    Highest education level: None   Occupational History    None   Social Needs    Financial resource strain: None    Food insecurity     Worry: None     Inability: None    Transportation needs     Medical: None     Non-medical: None   Tobacco Use    Smoking status: Never Smoker    Smokeless tobacco: Never Used   Substance and Sexual Activity    Alcohol use: No    Drug use: No    Sexual activity: None   Lifestyle    Physical activity     Days per week: None     Minutes per session: None    Stress: None   Relationships    Social connections     Talks on phone: None     Gets together: None     Attends Restoration service: None     Active member of club or organization: None     Attends meetings of clubs or organizations: None     Relationship status: None    Intimate partner violence     Fear of current or ex partner: None     Emotionally abused: None     Physically abused: None     Forced sexual activity: None   Other Topics Concern    None   Social History Narrative    None       SCREENINGS              PHYSICAL EXAM    (up to 7 for level 4, 8 or more for level 5)     ED Triage Vitals [04/19/20 1430]   BP Temp Temp Source Pulse Resp SpO2 Height Weight   (!) 146/80 98.7 °F (37.1 °C) Oral 91 18 99 % 5' 8\" (1.727 m) 280 lb (127 kg)       Physical Exam  Vitals signs and nursing note reviewed.    Constitutional:       General: She is not in acute with the below findings:        Interpretation per the Radiologist below, if available at the time ofthis note:    No orders to display         ED BEDSIDE ULTRASOUND:   Performed by ED Physician - none    LABS:  Labs Reviewed - No data to display    All other labs were within normal range or not returned as of this dictation. EMERGENCY DEPARTMENT COURSE and DIFFERENTIAL DIAGNOSIS/MDM:   Vitals:    Vitals:    04/19/20 1430   BP: (!) 146/80   Pulse: 91   Resp: 18   Temp: 98.7 °F (37.1 °C)   TempSrc: Oral   SpO2: 99%   Weight: 280 lb (127 kg)   Height: 5' 8\" (1.727 m)       20-year of age female being seen in this ED for the third time since the 10th for this same problem. I examined the patient and feel it is not prudent to I&D any of these vesicles. As this may cause an increased recurrence risk of infection. I believe the root of this problem is patient's uncontrolled sugar for which she has yet to follow-up with a primary care physician for. Will medicate patient, discharge patient home in stable condition with close follow-up with an endocrinologist and a family physician. I explained the importance of following up to the patient she understood. Patient is agreeable to this plan. MDM  Number of Diagnoses or Management Options  Folliculitis: minor  Hidradenitis: minor  Personal history of noncompliance with medical treatment: established and worsening        REASSESSMENT              CONSULTS:  None    PROCEDURES:  Unless otherwise noted below, none     Procedures    FINAL IMPRESSION      1. Folliculitis    2. Hidradenitis    3. Personal history of noncompliance with medical treatment          DISPOSITION/PLAN   DISPOSITION Decision To Discharge 04/19/2020 03:07:47 PM      PATIENT REFERREDTO:  Jessica Motley MD  [address] [place] [address]  [phone]    Call in 1 day      Yousif Garcia MD  [place] [address]  [phone]    Call in 1 day        DISCHARGEMEDICATIONS:  New Prescriptions    HYDROCODONE-ACETAMINOPHEN (NORCO) 5-325 MG PER TABLET    Take 1 tablet by mouth every 6 hours as needed for Pain for up to 3 days. Intended supply: 3 days. Take lowest dose possible to manage pain     Controlled Substances Monitoring:     No flowsheet data found.     (Please note that portions of this note were completed with a voice recognition program.  Efforts were made to edit the dictations but occasionally words are mis-transcribed.)    Lucia Knott PA-C (electronically signed)  Attending Emergency Physician         Lucia Knott PA-C  04/19/20 8115

## 2020-04-21 ENCOUNTER — OFFICE VISIT (OUTPATIENT)
Dept: SURGERY | Age: 20
End: 2020-04-21
Payer: COMMERCIAL

## 2020-04-21 VITALS
SYSTOLIC BLOOD PRESSURE: 128 MMHG | BODY MASS INDEX: 44.41 KG/M2 | TEMPERATURE: 97.5 F | WEIGHT: 293 LBS | DIASTOLIC BLOOD PRESSURE: 74 MMHG | HEIGHT: 68 IN

## 2020-04-21 PROCEDURE — G8417 CALC BMI ABV UP PARAM F/U: HCPCS | Performed by: COLON & RECTAL SURGERY

## 2020-04-21 PROCEDURE — 99213 OFFICE O/P EST LOW 20 MIN: CPT | Performed by: COLON & RECTAL SURGERY

## 2020-04-21 PROCEDURE — 1036F TOBACCO NON-USER: CPT | Performed by: COLON & RECTAL SURGERY

## 2020-04-21 PROCEDURE — G8428 CUR MEDS NOT DOCUMENT: HCPCS | Performed by: COLON & RECTAL SURGERY

## 2020-04-21 RX ORDER — BUPROPION HYDROCHLORIDE 150 MG/1
TABLET ORAL
COMMUNITY
Start: 2020-03-19

## 2020-04-21 ASSESSMENT — ENCOUNTER SYMPTOMS
COLOR CHANGE: 0
CHOKING: 0
ABDOMINAL PAIN: 0
SHORTNESS OF BREATH: 0
NAUSEA: 0

## 2020-04-21 NOTE — PROGRESS NOTES
Subjective:      Patient ID: Akash Ambrocio is a 21 y.o. female who presents for:  Chief Complaint   Patient presents with    Other     axillary and groin hidranitis       This is a 70-year-old female who I believe I have seen in the past although records are not present. She says I drained an abscess in her groin in the past.  I could not find those records. She has complaints regarding hidradenitis in her armpits in her groin and under her pannus. She is here because she wants all these areas excised to prevent future attacks. I have reviewed what was present in her chart. I believe I remember draining the small abscess in her right groin in the past.  She was quite upset that during her pregnancy I would not prescribe narcotics for her. I cannot find that note readily available so I cannot be sure that this is accurate.       Past Medical History:   Diagnosis Date    Diabetes mellitus ([facility] [address]     GERD (gastroesophageal reflux disease)     Hypertension     Obesity affecting pregnancy, antepartum, third trimester      Past Surgical History:   Procedure Laterality Date    UPPER GASTROINTESTINAL ENDOSCOPY       Social History     Socioeconomic History    Marital status: Single     Spouse name: Not on file    Number of children: Not on file    Years of education: Not on file    Highest education level: Not on file   Occupational History    Not on file   Social Needs    Financial resource strain: Not on file    Food insecurity     Worry: Not on file     Inability: Not on file    Transportation needs     Medical: Not on file     Non-medical: Not on file   Tobacco Use    Smoking status: Never Smoker    Smokeless tobacco: Never Used   Substance and Sexual Activity    Alcohol use: No    Drug use: No    Sexual activity: Not on file   Lifestyle    Physical activity     Days per week: Not on file     Minutes per session: Not on file    Stress: Not on file   Relationships    Social connections Talks on phone: Not on file     Gets together: Not on file     Attends Moravian service: Not on file     Active member of club or organization: Not on file     Attends meetings of clubs or organizations: Not on file     Relationship status: Not on file    Intimate partner violence     Fear of current or ex partner: Not on file     Emotionally abused: Not on file     Physically abused: Not on file     Forced sexual activity: Not on file   Other Topics Concern    Not on file   Social History Narrative    Not on file     No family history on file. Allergies:  Shellfish-derived products and Benzoyl peroxide    Review of Systems   Constitutional: Negative for activity change, appetite change and unexpected weight change. HENT: Negative for congestion. Respiratory: Negative for choking and shortness of breath. Cardiovascular: Negative for chest pain. Gastrointestinal: Negative for abdominal pain and nausea. Genitourinary: Negative for difficulty urinating. Skin: Negative for color change. Neurological: Negative for dizziness and headaches. Hematological: Does not bruise/bleed easily. Objective:    /74   Temp 97.5 °F (36.4 °C) (Temporal)   Ht 5' 8\" (1.727 m)   Wt (!) 301 lb (136.5 kg)   LMP  (LMP Unknown) Comment: no normal period since delivery in January  BMI 45.77 kg/m²     Physical Exam  Constitutional:       Appearance: She is well-developed. HENT:      Head: Normocephalic and atraumatic. Eyes:      Pupils: Pupils are equal, round, and reactive to light. Neck:      Musculoskeletal: Normal range of motion and neck supple. Cardiovascular:      Rate and Rhythm: Normal rate and regular rhythm. Heart sounds: Normal heart sounds. Pulmonary:      Effort: Pulmonary effort is normal. No respiratory distress. Breath sounds: Normal breath sounds. No wheezing. Abdominal:      General: There is no distension. Palpations: There is no mass. Tenderness:  There is no abdominal tenderness. There is no guarding or rebound. Musculoskeletal: Normal range of motion. Skin:     General: Skin is warm and dry. Coloration: Skin is not pale. Findings: No erythema or rash. Comments: She has areas of hidradenitis throughout the right left groin as well as under her pannus. There are areas of hidradenitis in both armpits as well as between her breasts. There are no active abscesses or current draining infections. Neurological:      Mental Status: She is alert and oriented to person, place, and time. Psychiatric:         Behavior: Behavior normal.         Judgment: Judgment normal.              Assessment/Plan:          Diagnosis Orders   1. Hidradenitis       I do not have the ability to excise all the areas of hidradenitis that she wishes to be excised. This is not something I regularly do in my practice. I explained all those issues with her. I discussed a referral to a plastic surgeon who might be of assistance. I discussed referral to a dermatologist who might have some nonoperative techniques to assist with her chronic hidradenitis. I talked about weight loss strategies that could be of use especially in the pannus and the groin areas. She is not interested in something long-term but says she just wants something quick to have all these areas cut out. I could not offer her these for her. I have made recommendations for referral.  She does not wish to have those referrals placed. I am not going to be helpful in her expectations. I would ask if she has still has issues she can take me up on those referrals or try another general surgeon as a second opinion. Please note this report has beenpartially produced using speech recognition software and may cause contain errors related to that system including grammar, punctuation and spelling as well as words and phrases that may seem inappropriate.  If there arequestions or concerns please

## 2020-04-28 ENCOUNTER — HOSPITAL ENCOUNTER (EMERGENCY)
Age: 20
Discharge: HOME | End: 2020-04-28
Payer: COMMERCIAL

## 2020-04-28 VITALS
DIASTOLIC BLOOD PRESSURE: 82 MMHG | OXYGEN SATURATION: 98 % | HEART RATE: 89 BPM | HEIGHT: 68 IN | TEMPERATURE: 99 F | SYSTOLIC BLOOD PRESSURE: 128 MMHG | RESPIRATION RATE: 18 BRPM | WEIGHT: 293 LBS | BODY MASS INDEX: 44.41 KG/M2

## 2020-04-28 PROCEDURE — 99283 EMERGENCY DEPT VISIT LOW MDM: CPT

## 2020-04-28 RX ORDER — TRAMADOL HYDROCHLORIDE 50 MG/1
50 TABLET ORAL EVERY 4 HOURS PRN
Qty: 10 TABLET | Refills: 0 | Status: SHIPPED | OUTPATIENT
Start: 2020-04-28 | End: 2020-05-01

## 2020-04-28 RX ORDER — FLUCONAZOLE 150 MG/1
150 TABLET ORAL ONCE
Qty: 2 TABLET | Refills: 0 | Status: SHIPPED | OUTPATIENT
Start: 2020-04-28 | End: 2020-04-28

## 2020-04-28 ASSESSMENT — ENCOUNTER SYMPTOMS
ABDOMINAL PAIN: 0
COUGH: 0
SHORTNESS OF BREATH: 0
BACK PAIN: 0

## 2020-04-28 ASSESSMENT — PAIN DESCRIPTION - LOCATION: LOCATION: OTHER (COMMENT)

## 2020-04-28 ASSESSMENT — PAIN DESCRIPTION - ORIENTATION: ORIENTATION: RIGHT

## 2020-04-28 ASSESSMENT — PAIN SCALES - GENERAL: PAINLEVEL_OUTOF10: 9

## 2020-04-28 ASSESSMENT — PAIN DESCRIPTION - PAIN TYPE: TYPE: ACUTE PAIN

## 2020-04-28 NOTE — ED PROVIDER NOTES
level 4, 8 or more for level 5)     ED Triage Vitals [04/28/20 1409]   BP Temp Temp Source Pulse Resp SpO2 Height Weight   128/82 99 °F (37.2 °C) Oral 89 18 98 % 5' 8\" (1.727 m) 300 lb (136.1 kg)       Physical Exam  Vitals signs and nursing note reviewed. Constitutional:       Appearance: She is well-developed. HENT:      Head: Normocephalic and atraumatic. Right Ear: External ear normal.      Left Ear: External ear normal.   Eyes:      Conjunctiva/sclera: Conjunctivae normal.      Pupils: Pupils are equal, round, and reactive to light. Neck:      Musculoskeletal: Normal range of motion and neck supple. Cardiovascular:      Rate and Rhythm: Normal rate and regular rhythm. Pulmonary:      Effort: Pulmonary effort is normal.      Breath sounds: Normal breath sounds. Abdominal:      General: Bowel sounds are normal. There is no distension. Palpations: Abdomen is soft. Tenderness: There is no abdominal tenderness. Musculoskeletal: Normal range of motion. Skin:     General: Skin is warm and dry. Neurological:      Mental Status: She is alert and oriented to person, place, and time. Deep Tendon Reflexes: Reflexes are normal and symmetric. Psychiatric:         Judgment: Judgment normal.           All other labs were within normal range or not returned as of this dictation. EMERGENCY DEPARTMENT COURSE and DIFFERENTIALDIAGNOSIS/MDM:   Vitals:    Vitals:    04/28/20 1409   BP: 128/82   Pulse: 89   Resp: 18   Temp: 99 °F (37.2 °C)   TempSrc: Oral   SpO2: 98%   Weight: 300 lb (136.1 kg)   Height: 5' 8\" (1.727 m)            21 yr old female with hidradenitis suppurativa and vaginal yeast infection. Prescriptions for Diflucan and Ultram were given to patient. F/U with dermatology as discussed. Patient verbalizes understadnigm      PROCEDURES:  Unless otherwise noted below, none     Procedures      FINAL IMPRESSION      1. Hidradenitis suppurativa    2.  Vaginal yeast infection DISPOSITION/PLAN   DISPOSITION Decision To Discharge 04/28/2020 02:42:42 PM          SHAQUILLE Gamboa CNP (electronically signed)  Attending Emergency Physician     SHAQUILLE Gamboa CNP  04/28/20 7926

## 2020-04-28 NOTE — ED NOTES
Pt still not in room. Checked bathroom as well. Pt does not appear to be here anymore. Attempted to call phone and it went right to voicemail.      Bautista Márquez RN  04/28/20 2637

## 2020-04-29 ENCOUNTER — CARE COORDINATION (OUTPATIENT)
Dept: CARE COORDINATION | Age: 20
End: 2020-04-29

## 2020-05-01 ENCOUNTER — OFFICE VISIT (OUTPATIENT)
Dept: PRIMARY CARE CLINIC | Age: 20
End: 2020-05-01
Payer: COMMERCIAL

## 2020-05-01 ENCOUNTER — TELEMEDICINE (OUTPATIENT)
Dept: FAMILY MEDICINE CLINIC | Age: 20
End: 2020-05-01
Payer: COMMERCIAL

## 2020-05-01 VITALS
OXYGEN SATURATION: 96 % | BODY MASS INDEX: 44.41 KG/M2 | WEIGHT: 293 LBS | TEMPERATURE: 98.3 F | HEART RATE: 78 BPM | HEIGHT: 68 IN

## 2020-05-01 PROBLEM — Z79.4 LONG TERM (CURRENT) USE OF INSULIN (HCC): Status: ACTIVE | Noted: 2018-11-03

## 2020-05-01 PROBLEM — N64.4 MASTODYNIA: Status: ACTIVE | Noted: 2018-10-19

## 2020-05-01 PROBLEM — Z22.39 CARRIER OF MDR ACINETOBACTER BAUMANNII: Status: ACTIVE | Noted: 2018-06-01

## 2020-05-01 PROBLEM — I51.7 LEFT VENTRICULAR HYPERTROPHY: Status: ACTIVE | Noted: 2020-05-01

## 2020-05-01 PROBLEM — F32.A DEPRESSIVE DISORDER: Status: ACTIVE | Noted: 2018-08-10

## 2020-05-01 PROBLEM — F17.200 NICOTINE DEPENDENCE, UNSPECIFIED, UNCOMPLICATED: Status: ACTIVE | Noted: 2018-10-19

## 2020-05-01 PROBLEM — E66.01 MORBID OBESITY (HCC): Status: ACTIVE | Noted: 2018-05-30

## 2020-05-01 PROBLEM — B35.4 TINEA CORPORIS: Status: ACTIVE | Noted: 2020-05-01

## 2020-05-01 PROBLEM — G56.00 CARPAL TUNNEL SYNDROME: Status: ACTIVE | Noted: 2020-05-01

## 2020-05-01 PROBLEM — F43.10 PTSD (POST-TRAUMATIC STRESS DISORDER): Status: ACTIVE | Noted: 2018-08-10

## 2020-05-01 PROBLEM — N18.1 STAGE 1 CHRONIC KIDNEY DISEASE: Status: ACTIVE | Noted: 2020-05-01

## 2020-05-01 PROBLEM — F41.9 ANXIETY: Status: ACTIVE | Noted: 2018-08-10

## 2020-05-01 PROBLEM — N92.6 IRREGULAR PERIODS: Status: ACTIVE | Noted: 2020-05-01

## 2020-05-01 PROBLEM — Z91.51 H/O: ATTEMPTED SUICIDE: Status: ACTIVE | Noted: 2020-05-01

## 2020-05-01 PROCEDURE — 99203 OFFICE O/P NEW LOW 30 MIN: CPT | Performed by: NURSE PRACTITIONER

## 2020-05-01 PROCEDURE — 1036F TOBACCO NON-USER: CPT | Performed by: NURSE PRACTITIONER

## 2020-05-01 PROCEDURE — 87880 STREP A ASSAY W/OPTIC: CPT | Performed by: NURSE PRACTITIONER

## 2020-05-01 PROCEDURE — 3046F HEMOGLOBIN A1C LEVEL >9.0%: CPT | Performed by: NURSE PRACTITIONER

## 2020-05-01 PROCEDURE — 2022F DILAT RTA XM EVC RTNOPTHY: CPT | Performed by: NURSE PRACTITIONER

## 2020-05-01 PROCEDURE — G8417 CALC BMI ABV UP PARAM F/U: HCPCS | Performed by: NURSE PRACTITIONER

## 2020-05-01 PROCEDURE — G8427 DOCREV CUR MEDS BY ELIG CLIN: HCPCS | Performed by: NURSE PRACTITIONER

## 2020-05-01 PROCEDURE — 99213 OFFICE O/P EST LOW 20 MIN: CPT | Performed by: INTERNAL MEDICINE

## 2020-05-01 RX ORDER — GLUCOSAMINE HCL/CHONDROITIN SU 500-400 MG
CAPSULE ORAL
Qty: 100 STRIP | Refills: 0 | Status: SHIPPED | OUTPATIENT
Start: 2020-05-01 | End: 2021-01-19 | Stop reason: SDUPTHER

## 2020-05-01 RX ORDER — INSULIN GLARGINE 100 [IU]/ML
10 INJECTION, SOLUTION SUBCUTANEOUS NIGHTLY
Qty: 5 PEN | Refills: 0 | Status: SHIPPED | OUTPATIENT
Start: 2020-05-01 | End: 2021-01-19 | Stop reason: SDUPTHER

## 2020-05-01 RX ORDER — INSULIN LISPRO 100 [IU]/ML
5 INJECTION, SOLUTION INTRAVENOUS; SUBCUTANEOUS
Qty: 1 PEN | Refills: 5 | Status: SHIPPED | OUTPATIENT
Start: 2020-05-01 | End: 2021-01-19 | Stop reason: SDUPTHER

## 2020-05-01 RX ORDER — ALBUTEROL SULFATE 90 UG/1
2 AEROSOL, METERED RESPIRATORY (INHALATION) EVERY 4 HOURS PRN
Qty: 18 G | Refills: 1 | Status: SHIPPED | OUTPATIENT
Start: 2020-05-01 | End: 2020-07-24 | Stop reason: SDUPTHER

## 2020-05-01 RX ORDER — LANCETS 30 GAUGE
EACH MISCELLANEOUS
Qty: 1 EACH | Refills: 5 | Status: SHIPPED | OUTPATIENT
Start: 2020-05-01

## 2020-05-01 RX ORDER — METHYLPREDNISOLONE 4 MG/1
TABLET ORAL
Qty: 1 KIT | Refills: 0 | Status: SHIPPED | OUTPATIENT
Start: 2020-05-01 | End: 2021-06-07

## 2020-05-01 RX ORDER — AZITHROMYCIN 250 MG/1
250 TABLET, FILM COATED ORAL SEE ADMIN INSTRUCTIONS
Qty: 6 TABLET | Refills: 0 | Status: SHIPPED | OUTPATIENT
Start: 2020-05-01 | End: 2020-05-06

## 2020-05-01 RX ORDER — FLUCONAZOLE 150 MG/1
150 TABLET ORAL DAILY
Qty: 3 TABLET | Refills: 0 | Status: SHIPPED | OUTPATIENT
Start: 2020-05-01 | End: 2020-05-04

## 2020-05-01 RX ORDER — BLOOD-GLUCOSE METER
1 KIT MISCELLANEOUS DAILY
Qty: 1 KIT | Refills: 0 | Status: SHIPPED | OUTPATIENT
Start: 2020-05-01 | End: 2020-07-02

## 2020-05-01 ASSESSMENT — ENCOUNTER SYMPTOMS
COUGH: 0
RECTAL PAIN: 0
PHOTOPHOBIA: 0
COLOR CHANGE: 0
VOMITING: 0
ABDOMINAL DISTENTION: 0
VOICE CHANGE: 0
NAUSEA: 0
EYE REDNESS: 0
WHEEZING: 0
SORE THROAT: 0
ABDOMINAL PAIN: 0
SHORTNESS OF BREATH: 0
CONSTIPATION: 0
RHINORRHEA: 0
SINUS PAIN: 0
TROUBLE SWALLOWING: 0
SINUS PRESSURE: 0
CHEST TIGHTNESS: 0
BLOOD IN STOOL: 0
DIARRHEA: 0
EYE ITCHING: 0
APNEA: 0
EYE DISCHARGE: 0
EYE PAIN: 0
BACK PAIN: 0
FACIAL SWELLING: 0

## 2020-05-01 ASSESSMENT — VISUAL ACUITY: OU: 1

## 2020-05-01 NOTE — PROGRESS NOTES
Bernardino Mortensen  2000    Chief Complaint   Patient presents with    Cough     x 3 days    Pharyngitis     x 3 days     Head Congestion     x 3 days     Generalized Body Aches     x 3 days        Respiratory Symptoms:  Patient complains of 3 day(s) history of fever: low grade, headache and ear congestion: bilaterally. Symptoms have been worsening with time. She denies nasal congestion. Patient reports Headache, sinus drainage, sore throat, cough, wheezing, abdominal pain, vomiting. NO body aches, chills, fever. Patient reports that she has been to the ER 6 times in the last month for her hidradenitis  She had a baby in January 2020, emergency C/S  She has a history of hypertension and diabetes and has not been on her medication since the birth  She does not have a PCP and needs to establish care  She has not seen an endocrinologist since her pregnancy    Smoking history:  She  reports that she has never smoked. She has never used smokeless tobacco.     She has had no known ill contacts. Treatment to date: none. Travel screen completed:  Yes    BPA for COVID triggered: No      Vitals:    05/01/20 0956   Pulse: 78   Temp: 98.3 °F (36.8 °C)   TempSrc: Oral   SpO2: 96%   Weight: 300 lb (136.1 kg)   Height: 5' 8\" (1.727 m)      Physical Exam  Constitutional:       General: She is not in acute distress. Appearance: Normal appearance. She is ill-appearing. HENT:      Head: Normocephalic and atraumatic. Mouth/Throat:      Lips: Pink. Mouth: Mucous membranes are moist.      Pharynx: Uvula midline. Posterior oropharyngeal erythema present. No oropharyngeal exudate. Eyes:      General: Lids are normal. Vision grossly intact. Extraocular Movements: Extraocular movements intact. Conjunctiva/sclera: Conjunctivae normal.      Pupils: Pupils are equal, round, and reactive to light. Cardiovascular:      Rate and Rhythm: Normal rate and regular rhythm.       Heart sounds: Normal

## 2020-05-01 NOTE — PROGRESS NOTES
2020    Constanza Khan (:  2000) is a 21 y.o. female, here for evaluation of the following medical concerns:    HPI  72-year-old type II diabetic presents to establish continuity with me as her primary care doctor. The patient has not been checking her blood sugars. The patient also reports a rash under her arms. She was evaluated today in the flu clinic and assessed for possible coronavirus. Review of Systems   Constitutional: Negative for chills, diaphoresis, fatigue and fever. HENT: Negative for congestion, dental problem, drooling, ear discharge, ear pain, facial swelling, hearing loss, mouth sores, nosebleeds, postnasal drip, rhinorrhea, sinus pressure, sinus pain, sneezing, sore throat, tinnitus, trouble swallowing and voice change. Eyes: Negative for photophobia, pain, discharge, redness, itching and visual disturbance. Respiratory: Negative for apnea, cough, chest tightness, shortness of breath and wheezing. Cardiovascular: Negative for chest pain, palpitations and leg swelling. Gastrointestinal: Negative for abdominal distention, abdominal pain, blood in stool, constipation, diarrhea, nausea, rectal pain and vomiting. Endocrine: Negative for cold intolerance, heat intolerance, polydipsia, polyphagia and polyuria. Genitourinary: Negative for decreased urine volume, difficulty urinating, dysuria, flank pain, frequency, genital sores, hematuria and urgency. Musculoskeletal: Negative for arthralgias, back pain, gait problem, joint swelling, myalgias, neck pain and neck stiffness. Skin: Negative for color change, rash and wound. Allergic/Immunologic: Negative for environmental allergies and food allergies. Neurological: Negative for dizziness, tremors, seizures, syncope, facial asymmetry, speech difficulty, weakness, light-headedness, numbness and headaches. Hematological: Negative for adenopathy. Does not bruise/bleed easily.    Psychiatric/Behavioral: Negative on file        No family history on file. There were no vitals filed for this visit. Estimated body mass index is 45.61 kg/m² as calculated from the following:    Height as of an earlier encounter on 5/1/20: 5' 8\" (1.727 m). Weight as of an earlier encounter on 5/1/20: 300 lb (136.1 kg). Physical Exam    ASSESSMENT/PLAN:  1. Type 2 diabetes mellitus without complication, with long-term current use of insulin ([facility] [address]         Return in about 3 days (around 5/4/2020). An  electronic signature was used to authenticate this note.     --Declan Fernandez MD on 5/1/2020 at 3:00 PM

## 2020-05-02 ENCOUNTER — HOSPITAL ENCOUNTER (INPATIENT)
Age: 20
LOS: 1 days | Discharge: LEFT BEFORE DISCHARGE | DRG: 638 | End: 2020-05-02
Attending: EMERGENCY MEDICINE | Admitting: INTERNAL MEDICINE
Payer: COMMERCIAL

## 2020-05-02 VITALS
SYSTOLIC BLOOD PRESSURE: 116 MMHG | TEMPERATURE: 98.3 F | RESPIRATION RATE: 18 BRPM | WEIGHT: 293 LBS | OXYGEN SATURATION: 100 % | HEIGHT: 68 IN | HEART RATE: 79 BPM | BODY MASS INDEX: 44.41 KG/M2 | DIASTOLIC BLOOD PRESSURE: 67 MMHG

## 2020-05-02 PROBLEM — R73.9 HYPERGLYCEMIA: Status: ACTIVE | Noted: 2020-05-02

## 2020-05-02 LAB
ALBUMIN SERPL-MCNC: 4.2 G/DL (ref 3.5–4.6)
ALP BLD-CCNC: 103 U/L (ref 40–130)
ALT SERPL-CCNC: 23 U/L (ref 0–33)
AMPHETAMINE SCREEN, URINE: ABNORMAL
ANION GAP SERPL CALCULATED.3IONS-SCNC: 20 MEQ/L (ref 9–15)
AST SERPL-CCNC: 14 U/L (ref 0–35)
BACTERIA: ABNORMAL /HPF
BARBITURATE SCREEN URINE: ABNORMAL
BASOPHILS ABSOLUTE: 0.1 K/UL (ref 0–0.2)
BASOPHILS RELATIVE PERCENT: 0.7 %
BENZODIAZEPINE SCREEN, URINE: ABNORMAL
BETA-HYDROXYBUTYRATE: 12.7 MG/DL (ref 0.2–2.8)
BILIRUB SERPL-MCNC: 0.8 MG/DL (ref 0.2–0.7)
BUN BLDV-MCNC: 11 MG/DL (ref 6–20)
CALCIUM SERPL-MCNC: 9.7 MG/DL (ref 8.5–9.9)
CANNABINOID SCREEN URINE: POSITIVE
CHLORIDE BLD-SCNC: 87 MEQ/L (ref 95–107)
CHP ED QC CHECK: NORMAL
CO2: 20 MEQ/L (ref 20–31)
COCAINE METABOLITE SCREEN URINE: ABNORMAL
CREAT SERPL-MCNC: 0.61 MG/DL (ref 0.5–0.9)
EOSINOPHILS ABSOLUTE: 0.1 K/UL (ref 0–0.7)
EOSINOPHILS RELATIVE PERCENT: 0.4 %
EPITHELIAL CELLS, UA: ABNORMAL /HPF (ref 0–5)
GFR AFRICAN AMERICAN: >60
GFR NON-AFRICAN AMERICAN: >60
GLOBULIN: 4.2 G/DL (ref 2.3–3.5)
GLUCOSE BLD-MCNC: 210 MG/DL
GLUCOSE BLD-MCNC: 210 MG/DL (ref 60–115)
GLUCOSE BLD-MCNC: 298 MG/DL
GLUCOSE BLD-MCNC: 298 MG/DL (ref 60–115)
GLUCOSE BLD-MCNC: 358 MG/DL
GLUCOSE BLD-MCNC: 358 MG/DL (ref 60–115)
GLUCOSE BLD-MCNC: 538 MG/DL
GLUCOSE BLD-MCNC: 538 MG/DL (ref 60–115)
GLUCOSE BLD-MCNC: 550 MG/DL (ref 70–99)
HCT VFR BLD CALC: 42.5 % (ref 37–47)
HEMOGLOBIN: 14.4 G/DL (ref 12–16)
HYALINE CASTS: ABNORMAL /HPF (ref 0–5)
LYMPHOCYTES ABSOLUTE: 1.8 K/UL (ref 1–4.8)
LYMPHOCYTES RELATIVE PERCENT: 13.8 %
Lab: ABNORMAL
MCH RBC QN AUTO: 28.2 PG (ref 27–31.3)
MCHC RBC AUTO-ENTMCNC: 33.9 % (ref 33–37)
MCV RBC AUTO: 83.4 FL (ref 82–100)
METHADONE SCREEN, URINE: ABNORMAL
MONOCYTES ABSOLUTE: 0.8 K/UL (ref 0.2–0.8)
MONOCYTES RELATIVE PERCENT: 5.8 %
NEUTROPHILS ABSOLUTE: 10.2 K/UL (ref 1.4–6.5)
NEUTROPHILS RELATIVE PERCENT: 79.3 %
OPIATE SCREEN URINE: ABNORMAL
OXYCODONE URINE: ABNORMAL
PDW BLD-RTO: 14.4 % (ref 11.5–14.5)
PERFORMED ON: ABNORMAL
PHENCYCLIDINE SCREEN URINE: ABNORMAL
PLATELET # BLD: 330 K/UL (ref 130–400)
POTASSIUM SERPL-SCNC: 4.4 MEQ/L (ref 3.4–4.9)
PREGNANCY TEST URINE, POC: NORMAL
PROPOXYPHENE SCREEN: ABNORMAL
RBC # BLD: 5.1 M/UL (ref 4.2–5.4)
RBC UA: ABNORMAL /HPF (ref 0–5)
SARS-COV-2: NOT DETECTED
SODIUM BLD-SCNC: 127 MEQ/L (ref 135–144)
SOURCE: NORMAL
TOTAL PROTEIN: 8.4 G/DL (ref 6.3–8)
WBC # BLD: 12.9 K/UL (ref 4.5–11)
WBC UA: ABNORMAL /HPF (ref 0–5)

## 2020-05-02 PROCEDURE — 85025 COMPLETE CBC W/AUTO DIFF WBC: CPT

## 2020-05-02 PROCEDURE — 1210000000 HC MED SURG R&B

## 2020-05-02 PROCEDURE — 87086 URINE CULTURE/COLONY COUNT: CPT

## 2020-05-02 PROCEDURE — 2580000003 HC RX 258: Performed by: EMERGENCY MEDICINE

## 2020-05-02 PROCEDURE — 82010 KETONE BODYS QUAN: CPT

## 2020-05-02 PROCEDURE — 81001 URINALYSIS AUTO W/SCOPE: CPT

## 2020-05-02 PROCEDURE — 80307 DRUG TEST PRSMV CHEM ANLYZR: CPT

## 2020-05-02 PROCEDURE — 80053 COMPREHEN METABOLIC PANEL: CPT

## 2020-05-02 PROCEDURE — 96376 TX/PRO/DX INJ SAME DRUG ADON: CPT

## 2020-05-02 PROCEDURE — 6370000000 HC RX 637 (ALT 250 FOR IP): Performed by: EMERGENCY MEDICINE

## 2020-05-02 PROCEDURE — 36415 COLL VENOUS BLD VENIPUNCTURE: CPT

## 2020-05-02 PROCEDURE — 99285 EMERGENCY DEPT VISIT HI MDM: CPT

## 2020-05-02 PROCEDURE — G0378 HOSPITAL OBSERVATION PER HR: HCPCS

## 2020-05-02 PROCEDURE — 96374 THER/PROPH/DIAG INJ IV PUSH: CPT

## 2020-05-02 RX ORDER — SODIUM CHLORIDE 0.9 % (FLUSH) 0.9 %
10 SYRINGE (ML) INJECTION PRN
Status: DISCONTINUED | OUTPATIENT
Start: 2020-05-02 | End: 2020-05-02 | Stop reason: HOSPADM

## 2020-05-02 RX ORDER — ACETAMINOPHEN 650 MG/1
650 SUPPOSITORY RECTAL EVERY 6 HOURS PRN
Status: DISCONTINUED | OUTPATIENT
Start: 2020-05-02 | End: 2020-05-02 | Stop reason: HOSPADM

## 2020-05-02 RX ORDER — INSULIN GLARGINE 100 [IU]/ML
20 INJECTION, SOLUTION SUBCUTANEOUS NIGHTLY
Status: DISCONTINUED | OUTPATIENT
Start: 2020-05-02 | End: 2020-05-02 | Stop reason: HOSPADM

## 2020-05-02 RX ORDER — PROMETHAZINE HYDROCHLORIDE 12.5 MG/1
12.5 TABLET ORAL EVERY 6 HOURS PRN
Status: DISCONTINUED | OUTPATIENT
Start: 2020-05-02 | End: 2020-05-02 | Stop reason: HOSPADM

## 2020-05-02 RX ORDER — POLYETHYLENE GLYCOL 3350 17 G/17G
17 POWDER, FOR SOLUTION ORAL DAILY PRN
Status: DISCONTINUED | OUTPATIENT
Start: 2020-05-02 | End: 2020-05-02 | Stop reason: HOSPADM

## 2020-05-02 RX ORDER — ACETAMINOPHEN 325 MG/1
650 TABLET ORAL EVERY 6 HOURS PRN
Status: DISCONTINUED | OUTPATIENT
Start: 2020-05-02 | End: 2020-05-02 | Stop reason: HOSPADM

## 2020-05-02 RX ORDER — NICOTINE POLACRILEX 4 MG
15 LOZENGE BUCCAL PRN
Status: DISCONTINUED | OUTPATIENT
Start: 2020-05-02 | End: 2020-05-02 | Stop reason: HOSPADM

## 2020-05-02 RX ORDER — SODIUM CHLORIDE 0.9 % (FLUSH) 0.9 %
10 SYRINGE (ML) INJECTION EVERY 12 HOURS SCHEDULED
Status: DISCONTINUED | OUTPATIENT
Start: 2020-05-02 | End: 2020-05-02 | Stop reason: HOSPADM

## 2020-05-02 RX ORDER — 0.9 % SODIUM CHLORIDE 0.9 %
1000 INTRAVENOUS SOLUTION INTRAVENOUS ONCE
Status: COMPLETED | OUTPATIENT
Start: 2020-05-02 | End: 2020-05-02

## 2020-05-02 RX ORDER — SODIUM CHLORIDE 9 MG/ML
INJECTION, SOLUTION INTRAVENOUS CONTINUOUS
Status: DISCONTINUED | OUTPATIENT
Start: 2020-05-02 | End: 2020-05-02 | Stop reason: HOSPADM

## 2020-05-02 RX ORDER — DEXTROSE MONOHYDRATE 25 G/50ML
12.5 INJECTION, SOLUTION INTRAVENOUS PRN
Status: DISCONTINUED | OUTPATIENT
Start: 2020-05-02 | End: 2020-05-02 | Stop reason: HOSPADM

## 2020-05-02 RX ORDER — ONDANSETRON 2 MG/ML
4 INJECTION INTRAMUSCULAR; INTRAVENOUS EVERY 6 HOURS PRN
Status: DISCONTINUED | OUTPATIENT
Start: 2020-05-02 | End: 2020-05-02 | Stop reason: HOSPADM

## 2020-05-02 RX ORDER — DEXTROSE MONOHYDRATE 50 MG/ML
100 INJECTION, SOLUTION INTRAVENOUS PRN
Status: DISCONTINUED | OUTPATIENT
Start: 2020-05-02 | End: 2020-05-02 | Stop reason: HOSPADM

## 2020-05-02 RX ADMIN — INSULIN HUMAN 15 UNITS: 100 INJECTION, SOLUTION PARENTERAL at 16:18

## 2020-05-02 RX ADMIN — SODIUM CHLORIDE 1000 ML: 9 INJECTION, SOLUTION INTRAVENOUS at 16:17

## 2020-05-02 RX ADMIN — INSULIN HUMAN 12 UNITS: 100 INJECTION, SOLUTION PARENTERAL at 18:02

## 2020-05-02 RX ADMIN — SODIUM CHLORIDE 1000 ML: 9 INJECTION, SOLUTION INTRAVENOUS at 15:55

## 2020-05-02 RX ADMIN — INSULIN HUMAN 12 UNITS: 100 INJECTION, SOLUTION PARENTERAL at 16:58

## 2020-05-02 ASSESSMENT — ENCOUNTER SYMPTOMS
EYE DISCHARGE: 0
BACK PAIN: 0
COUGH: 0
NAUSEA: 1
DIARRHEA: 0
STRIDOR: 0
PHOTOPHOBIA: 0
ABDOMINAL PAIN: 0
VOMITING: 1
BLOOD IN STOOL: 0
SORE THROAT: 0
SHORTNESS OF BREATH: 0
EYE REDNESS: 0
EYE PAIN: 0
WHEEZING: 0
CONSTIPATION: 0

## 2020-05-02 NOTE — ED PROVIDER NOTES
vomiting. Negative for abdominal pain, blood in stool, constipation and diarrhea. Endocrine: Negative for polydipsia. Genitourinary: Positive for dysuria. Negative for flank pain, hematuria and urgency. Musculoskeletal: Negative for back pain, myalgias and neck pain. Skin: Negative for rash. Allergic/Immunologic: Negative for environmental allergies. Neurological: Negative for dizziness, tremors, seizures, weakness and headaches. Hematological: Does not bruise/bleed easily. Psychiatric/Behavioral: Negative for hallucinations and suicidal ideas. The patient is not nervous/anxious. Except as noted above the remainder of the review of systems was reviewed and negative. PAST MEDICAL HISTORY     Past Medical History:   Diagnosis Date    Diabetes mellitus ([facility] [address]     GERD (gastroesophageal reflux disease)     Hidradenitis suppurativa     Hypertension     Obesity affecting pregnancy, antepartum, third trimester          SURGICAL HISTORY       Past Surgical History:   Procedure Laterality Date    UPPER GASTROINTESTINAL ENDOSCOPY           CURRENT MEDICATIONS       Previous Medications    ALBUTEROL SULFATE  (90 BASE) MCG/ACT INHALER    Inhale 2 puffs into the lungs every 4 hours as needed for Wheezing    AZITHROMYCIN (ZITHROMAX) 250 MG TABLET    Take 1 tablet by mouth See Admin Instructions for 5 days 500mg on day 1 followed by 250mg on days 2 - 5    BLOOD GLUCOSE MONITOR STRIPS    Test 3 times a day & as needed for symptoms of irregular blood glucose. BUPROPION (WELLBUTRIN XL) 150 MG EXTENDED RELEASE TABLET    TAKE 1 TABLET BY MOUTH ONCE DAILY IN THE MORNING    DOXYLAMINE-PYRIDOXINE (DICLEGIS) 10-10 MG TBEC    Start: 2 tabs p.o. q.h.s.; if symptoms persist after 2 days increase to 1 tab p.o. q.a.m. and 2 tabs p.o. q.h.s.; may increase to 1 tab p.o. q.a.m., 1 tab p.o. q. midafternoon and 2 tabs p.o. q.h.s.  4 tabs per day.  If unable to afford, instruct the patient about substituting the OTC components. FAMOTIDINE (PEPCID) 20 MG TABLET    Take 1 tablet by mouth daily    FLUCONAZOLE (DIFLUCAN) 150 MG TABLET    Take 1 tablet by mouth daily for 3 days    GLUCOSE MONITORING KIT (FREESTYLE) MONITORING KIT    1 kit by Does not apply route daily    IBUPROFEN (ADVIL;MOTRIN) 800 MG TABLET    Take 1 tablet by mouth every 8 hours as needed for Pain    INSULIN GLARGINE (LANTUS SOLOSTAR) 100 UNIT/ML INJECTION PEN    Inject 10 Units into the skin nightly    INSULIN LISPRO, 1 UNIT DIAL, (HUMALOG KWIKPEN) 100 UNIT/ML SOPN    Inject 5 Units into the skin 3 times daily (before meals)    LANCETS MISC    DX: diabetes mellitus. Use 1-3 time(s) daily - Ok to substitute per insurance (1 each = 1 box)    METHYLPREDNISOLONE (MEDROL, MANNY,) 4 MG TABLET    Take by mouth. ALLERGIES     Shellfish-derived products and Benzoyl peroxide    FAMILY HISTORY     History reviewed. No pertinent family history.        SOCIAL HISTORY       Social History     Socioeconomic History    Marital status: Single     Spouse name: None    Number of children: None    Years of education: None    Highest education level: None   Occupational History    None   Social Needs    Financial resource strain: None    Food insecurity     Worry: None     Inability: None    Transportation needs     Medical: None     Non-medical: None   Tobacco Use    Smoking status: Never Smoker    Smokeless tobacco: Never Used   Substance and Sexual Activity    Alcohol use: No    Drug use: No    Sexual activity: None   Lifestyle    Physical activity     Days per week: None     Minutes per session: None    Stress: None   Relationships    Social connections     Talks on phone: None     Gets together: None     Attends Sikhism service: None     Active member of club or organization: None     Attends meetings of clubs or organizations: None     Relationship status: None    Intimate partner violence     Fear of current or ex partner: None Notable for the following components:    Glucose, Ur >=1000 (*)     Ketones, Urine 15 (*)     Blood, Urine TRACE (*)     Protein,  (*)     All other components within normal limits   BETA-HYDROXYBUTYRATE - Abnormal; Notable for the following components:    Beta-Hydroxybutyrate 12.7 (*)     All other components within normal limits   MICROSCOPIC URINALYSIS - Abnormal; Notable for the following components:    Bacteria, UA FEW (*)     All other components within normal limits   POCT GLUCOSE - Abnormal; Notable for the following components:    POC Glucose 538 (*)     All other components within normal limits   POCT GLUCOSE - Abnormal; Notable for the following components:    POC Glucose 358 (*)     All other components within normal limits   POCT GLUCOSE - Abnormal; Notable for the following components:    POC Glucose 298 (*)     All other components within normal limits   POCT GLUCOSE - Abnormal; Notable for the following components:    POC Glucose 210 (*)     All other components within normal limits   POCT GLUCOSE - Normal   POCT URINE PREGNANCY - Normal   POCT GLUCOSE - Normal   POCT GLUCOSE - Normal   POCT GLUCOSE - Normal   CULTURE, URINE       All other labs were within normal range or not returned as of this dictation. EMERGENCY DEPARTMENT COURSE and DIFFERENTIAL DIAGNOSIS/MDM:   Vitals:    Vitals:    05/02/20 1506 05/02/20 1637 05/02/20 1730 05/02/20 1830   BP: 136/87 (!) 123/57 137/81 116/67   Pulse: 90 101 80 79   Resp: 20 20 18 18   Temp: 98.3 °F (36.8 °C)      TempSrc: Oral      SpO2: 97% 100% 100% 100%   Weight: 300 lb (136.1 kg)      Height: 5' 8\" (1.727 m)          His blood sugar was managed in the emergency department with close to 40 units of regular insulin IV. But brought her blood sugar down from 550 upon admission to 90. The patient does have ketones and does have cannabis in her drug screen.   The patient's past treatment would be admission to the hospital.  I gave the hospitalist call

## 2020-05-02 NOTE — ED NOTES
Explained to pt that I talked with security and they are not willing to  Escort her outside but if a family member would like to get what she wants from her car, we can make sure that she gets it      April [address] Andrea Thomas RN  05/02/20 1910

## 2020-05-02 NOTE — ED TRIAGE NOTES
Pt comes to er with c/o hyperglycemia. Pt  States that her meter just read high this morning. She then took 5 units of lantus.   Pt stopped  Taking insulin in January and this  Is her first day beginning  The insulin again

## 2020-05-02 NOTE — ED NOTES
Pt asking if she can  \"step outside for a minute. \"  Explained to pt that that really is not the safest practice or what we do. she asked \"even if she wore a mask? \" explained to pt that we do not have her covid test results back that she had done yesterday  And that I would talk to  Others about her need to go get something out of her car but it really isn't the safest practice to  Allow her to be moving around and she has an IV in her arm     Iman Colby RN  05/02/20 4379

## 2020-05-02 NOTE — H&P
sulfate  (90 Base) MCG/ACT inhaler Inhale 2 puffs into the lungs every 4 hours as needed for Wheezing 5/1/20 5/15/20  SHAQUILLE Timmons CNP   fluconazole (DIFLUCAN) 150 MG tablet Take 1 tablet by mouth daily for 3 days 5/1/20 5/4/20  SHAQUILLE Timmons CNP   insulin glargine (LANTUS SOLOSTAR) 100 UNIT/ML injection pen Inject 10 Units into the skin nightly 5/1/20   Ingrid Adame MD   glucose monitoring kit (FREESTYLE) monitoring kit 1 kit by Does not apply route daily 5/1/20   Ingrid Adame MD   Lancets MISC DX: diabetes mellitus. Use 1-3 time(s) daily - Ok to substitute per insurance (1 each = 1 box) 5/1/20   Ingrid Adame MD   blood glucose monitor strips Test 3 times a day & as needed for symptoms of irregular blood glucose. 5/1/20   Ingrid Adame MD   insulin lispro, 1 Unit Dial, ([facility]) 100 UNIT/ML SOPN Inject 5 Units into the skin 3 times daily (before meals) 5/1/20   Ingrid Adame MD   buPROPion (WELLBUTRIN XL) 150 MG extended release tablet TAKE 1 TABLET BY MOUTH ONCE DAILY IN THE MORNING 3/19/20   Historical Provider, MD   famotidine (PEPCID) 20 MG tablet Take 1 tablet by mouth daily 4/14/20   SHAQUILLE Hernandez CNP   ibuprofen (ADVIL;MOTRIN) 800 MG tablet Take 1 tablet by mouth every 8 hours as needed for Pain 2/28/20   SHAQUILLE Mccauley CNP   doxylamine-pyridoxine (DICLEGIS) 10-10 MG TBEC Start: 2 tabs p.o. q.h.s.; if symptoms persist after 2 days increase to 1 tab p.o. q.a.m. and 2 tabs p.o. q.h.s.; may increase to 1 tab p.o. q.a.m., 1 tab p.o. q. midafternoon and 2 tabs p.o. q.h.s.  4 tabs per day. If unable to afford, instruct the patient about substituting the OTC components. 8/1/19   Pradeep Archuleta PA-C       Allergies:  Shellfish-derived products and Benzoyl peroxide    Social History:      The patient currently lives@ home     TOBACCO:   reports that she has never smoked.  She has never used smokeless tobacco.  ETOH:   reports no history of alcohol use. Family History:       Reviewed in detail and negative for DM, CAD, Cancer, CVA. Positive as follows:    History reviewed. No pertinent family history. REVIEW OF SYSTEMS:   Pertinent positives as noted in the HPI. All other systems reviewed and negative. PHYSICAL EXAM:    /67   Pulse 79   Temp 98.3 °F (36.8 °C) (Oral)   Resp 18   Ht 5' 8\" (1.727 m)   Wt 300 lb (136.1 kg)   LMP  (LMP Unknown) Comment: no normal period since delivery in January  SpO2 100%   BMI 45.61 kg/m²     General appearance:  No apparent distress, appears stated age and cooperative. HEENT:  Normal cephalic, atraumatic without obvious deformity. Pupils equal, round, and reactive to light. Extra ocular muscles intact. Conjunctivae/corneas clear. Neck: Supple, with full range of motion. No jugular venous distention. Trachea midline. Respiratory:  Normal respiratory effort. Clear to auscultation, bilaterally without Rales/Wheezes/Rhonchi. Cardiovascular:  Regular rate and rhythm with normal S1/S2 without murmurs, rubs or gallops. Abdomen: Soft, non-tender, non-distended with normal bowel sounds. Musculoskeletal:  No clubbing, cyanosis or edema bilaterally. Full range of motion without deformity. Skin: Skin color, texture, turgor normal.  No rashes or lesions. Neurologic:  Neurovascularly intact without any focal sensory/motor deficits.  Cranial nerves: II-XII intact, grossly non-focal.  Psychiatric:  Alert and oriented, thought content appropriate, normal insight  Capillary Refill: Brisk,< 3 seconds   Peripheral Pulses: +2 palpable, equal bilaterally       Labs:     Recent Labs     05/01/20  1059 05/02/20  1545   WBC 8.0 12.9*   HGB 14.4 14.4   HCT 43.3 42.5    330     Recent Labs     05/01/20  1059 05/02/20  1545   * 127*   K 4.2 4.4   CL 92* 87*   CO2 21 20   BUN 6 11   CREATININE 0.51 0.61   CALCIUM 9.5 9.7     Recent Labs     05/01/20  1059 05/02/20  1545   AST 17 14   ALT 21

## 2020-05-03 ENCOUNTER — HOSPITAL ENCOUNTER (EMERGENCY)
Age: 20
Discharge: HOME | End: 2020-05-03
Attending: EMERGENCY MEDICINE
Payer: COMMERCIAL

## 2020-05-03 VITALS
WEIGHT: 293 LBS | SYSTOLIC BLOOD PRESSURE: 142 MMHG | BODY MASS INDEX: 45.61 KG/M2 | TEMPERATURE: 97.6 F | DIASTOLIC BLOOD PRESSURE: 83 MMHG | HEART RATE: 92 BPM | OXYGEN SATURATION: 99 % | RESPIRATION RATE: 19 BRPM

## 2020-05-03 LAB
ALBUMIN SERPL-MCNC: 3.8 G/DL (ref 3.5–4.6)
ALP BLD-CCNC: 97 U/L (ref 40–130)
ALT SERPL-CCNC: 28 U/L (ref 0–33)
ANION GAP SERPL CALCULATED.3IONS-SCNC: 14 MEQ/L (ref 9–15)
AST SERPL-CCNC: 24 U/L (ref 0–35)
BASE EXCESS VENOUS: -1 (ref -3–3)
BASOPHILS ABSOLUTE: 0.1 K/UL (ref 0–0.2)
BASOPHILS RELATIVE PERCENT: 1 %
BETA-HYDROXYBUTYRATE: 1.4 MG/DL (ref 0.2–2.8)
BILIRUB SERPL-MCNC: 0.5 MG/DL (ref 0.2–0.7)
BUN BLDV-MCNC: 10 MG/DL (ref 6–20)
CALCIUM IONIZED: 1.18 MMOL/L (ref 1.12–1.32)
CALCIUM SERPL-MCNC: 9.2 MG/DL (ref 8.5–9.9)
CHLORIDE BLD-SCNC: 95 MEQ/L (ref 95–107)
CHP ED QC CHECK: YES
CO2: 22 MEQ/L (ref 20–31)
CREAT SERPL-MCNC: 0.65 MG/DL (ref 0.5–0.9)
EOSINOPHILS ABSOLUTE: 0.2 K/UL (ref 0–0.7)
EOSINOPHILS RELATIVE PERCENT: 2.3 %
GFR AFRICAN AMERICAN: >60
GFR AFRICAN AMERICAN: >60
GFR NON-AFRICAN AMERICAN: >60
GFR NON-AFRICAN AMERICAN: >60
GLOBULIN: 3.6 G/DL (ref 2.3–3.5)
GLUCOSE BLD-MCNC: 217 MG/DL
GLUCOSE BLD-MCNC: 217 MG/DL (ref 60–115)
GLUCOSE BLD-MCNC: 440 MG/DL (ref 70–99)
GLUCOSE BLD-MCNC: 504 MG/DL (ref 60–115)
HCO3 VENOUS: 23.9 MMOL/L (ref 23–29)
HCT VFR BLD CALC: 40.2 % (ref 37–47)
HEMOGLOBIN: 13.7 G/DL (ref 12–16)
HEMOGLOBIN: 14.9 GM/DL (ref 12–16)
LACTATE: 1.97 MMOL/L (ref 0.4–2)
LYMPHOCYTES ABSOLUTE: 2.9 K/UL (ref 1–4.8)
LYMPHOCYTES RELATIVE PERCENT: 33.9 %
MCH RBC QN AUTO: 28.4 PG (ref 27–31.3)
MCHC RBC AUTO-ENTMCNC: 34 % (ref 33–37)
MCV RBC AUTO: 83.6 FL (ref 82–100)
MONOCYTES ABSOLUTE: 0.6 K/UL (ref 0.2–0.8)
MONOCYTES RELATIVE PERCENT: 7 %
NEUTROPHILS ABSOLUTE: 4.8 K/UL (ref 1.4–6.5)
NEUTROPHILS RELATIVE PERCENT: 55.8 %
O2 SAT, VEN: 91 %
PCO2, VEN: 39.1 MM HG (ref 40–50)
PDW BLD-RTO: 14.5 % (ref 11.5–14.5)
PERFORMED ON: ABNORMAL
PERFORMED ON: ABNORMAL
PH VENOUS: 7.39 (ref 7.35–7.45)
PLATELET # BLD: 317 K/UL (ref 130–400)
PO2, VEN: 62 MM HG
POC CHLORIDE: 99 MEQ/L (ref 99–110)
POC CREATININE: 0.8 MG/DL (ref 0.6–1.1)
POC HEMATOCRIT: 44 % (ref 36–48)
POC POTASSIUM: 3.8 MEQ/L (ref 3.5–5.1)
POC SAMPLE TYPE: ABNORMAL
POC SODIUM: 132 MEQ/L (ref 136–145)
POTASSIUM SERPL-SCNC: 4 MEQ/L (ref 3.4–4.9)
RBC # BLD: 4.81 M/UL (ref 4.2–5.4)
SODIUM BLD-SCNC: 131 MEQ/L (ref 135–144)
TCO2 CALC VENOUS: 25 MMOL/L
TOTAL PROTEIN: 7.4 G/DL (ref 6.3–8)
WBC # BLD: 8.7 K/UL (ref 4.5–11)

## 2020-05-03 PROCEDURE — 85014 HEMATOCRIT: CPT

## 2020-05-03 PROCEDURE — 82330 ASSAY OF CALCIUM: CPT

## 2020-05-03 PROCEDURE — 6370000000 HC RX 637 (ALT 250 FOR IP): Performed by: EMERGENCY MEDICINE

## 2020-05-03 PROCEDURE — 82010 KETONE BODYS QUAN: CPT

## 2020-05-03 PROCEDURE — 80053 COMPREHEN METABOLIC PANEL: CPT

## 2020-05-03 PROCEDURE — 85025 COMPLETE CBC W/AUTO DIFF WBC: CPT

## 2020-05-03 PROCEDURE — 82565 ASSAY OF CREATININE: CPT

## 2020-05-03 PROCEDURE — 36600 WITHDRAWAL OF ARTERIAL BLOOD: CPT

## 2020-05-03 PROCEDURE — 82948 REAGENT STRIP/BLOOD GLUCOSE: CPT

## 2020-05-03 PROCEDURE — 82803 BLOOD GASES ANY COMBINATION: CPT

## 2020-05-03 PROCEDURE — 2580000003 HC RX 258: Performed by: EMERGENCY MEDICINE

## 2020-05-03 PROCEDURE — 82435 ASSAY OF BLOOD CHLORIDE: CPT

## 2020-05-03 PROCEDURE — 99285 EMERGENCY DEPT VISIT HI MDM: CPT

## 2020-05-03 PROCEDURE — 36415 COLL VENOUS BLD VENIPUNCTURE: CPT

## 2020-05-03 PROCEDURE — 84132 ASSAY OF SERUM POTASSIUM: CPT

## 2020-05-03 PROCEDURE — 84295 ASSAY OF SERUM SODIUM: CPT

## 2020-05-03 PROCEDURE — 83605 ASSAY OF LACTIC ACID: CPT

## 2020-05-03 PROCEDURE — 96374 THER/PROPH/DIAG INJ IV PUSH: CPT

## 2020-05-03 RX ORDER — 0.9 % SODIUM CHLORIDE 0.9 %
2000 INTRAVENOUS SOLUTION INTRAVENOUS ONCE
Status: COMPLETED | OUTPATIENT
Start: 2020-05-03 | End: 2020-05-03

## 2020-05-03 RX ORDER — SULFAMETHOXAZOLE AND TRIMETHOPRIM 800; 160 MG/1; MG/1
1 TABLET ORAL 2 TIMES DAILY
Qty: 20 TABLET | Refills: 0 | Status: SHIPPED | OUTPATIENT
Start: 2020-05-03 | End: 2020-05-13

## 2020-05-03 RX ADMIN — INSULIN HUMAN 14 UNITS: 100 INJECTION, SOLUTION PARENTERAL at 17:13

## 2020-05-03 RX ADMIN — SODIUM CHLORIDE 2000 ML: 9 INJECTION, SOLUTION INTRAVENOUS at 15:36

## 2020-05-03 ASSESSMENT — ENCOUNTER SYMPTOMS
SHORTNESS OF BREATH: 0
VOMITING: 0
SORE THROAT: 0
NAUSEA: 0
COUGH: 0
DIARRHEA: 0
ABDOMINAL PAIN: 0
BACK PAIN: 0

## 2020-05-03 NOTE — ED NOTES
Patient requesting Dr Neli Sherman to [facility] at a bump\" in her groin. In room with Dr Neli Sherman during examination.       [place] Conor, BUDDY  05/03/20 7001

## 2020-05-03 NOTE — ED PROVIDER NOTES
TABLET    Take 1 tablet by mouth See Admin Instructions for 5 days 500mg on day 1 followed by 250mg on days 2 - 5    BLOOD GLUCOSE MONITOR STRIPS    Test 3 times a day & as needed for symptoms of irregular blood glucose. BUPROPION (WELLBUTRIN XL) 150 MG EXTENDED RELEASE TABLET    TAKE 1 TABLET BY MOUTH ONCE DAILY IN THE MORNING    DOXYLAMINE-PYRIDOXINE (DICLEGIS) 10-10 MG TBEC    Start: 2 tabs p.o. q.h.s.; if symptoms persist after 2 days increase to 1 tab p.o. q.a.m. and 2 tabs p.o. q.h.s.; may increase to 1 tab p.o. q.a.m., 1 tab p.o. q. midafternoon and 2 tabs p.o. q.h.s.  4 tabs per day. If unable to afford, instruct the patient about substituting the OTC components. FAMOTIDINE (PEPCID) 20 MG TABLET    Take 1 tablet by mouth daily    FLUCONAZOLE (DIFLUCAN) 150 MG TABLET    Take 1 tablet by mouth daily for 3 days    GLUCOSE MONITORING KIT (FREESTYLE) MONITORING KIT    1 kit by Does not apply route daily    IBUPROFEN (ADVIL;MOTRIN) 800 MG TABLET    Take 1 tablet by mouth every 8 hours as needed for Pain    INSULIN GLARGINE (LANTUS SOLOSTAR) 100 UNIT/ML INJECTION PEN    Inject 10 Units into the skin nightly    INSULIN LISPRO, 1 UNIT DIAL, (HUMALOG KWIKPEN) 100 UNIT/ML SOPN    Inject 5 Units into the skin 3 times daily (before meals)    LANCETS MISC    DX: diabetes mellitus. Use 1-3 time(s) daily - Ok to substitute per insurance (1 each = 1 box)    METHYLPREDNISOLONE (MEDROL, MANNY,) 4 MG TABLET    Take by mouth. ALLERGIES     Shellfish-derived products and Benzoyl peroxide    FAMILY HISTORY     History reviewed. No pertinent family history.        SOCIAL HISTORY       Social History     Socioeconomic History    Marital status: Single     Spouse name: None    Number of children: None    Years of education: None    Highest education level: None   Occupational History    None   Social Needs    Financial resource strain: None    Food insecurity     Worry: None     Inability: None    Transportation needs     Medical: None     Non-medical: None   Tobacco Use    Smoking status: Never Smoker    Smokeless tobacco: Never Used   Substance and Sexual Activity    Alcohol use: No    Drug use: No    Sexual activity: None   Lifestyle    Physical activity     Days per week: None     Minutes per session: None    Stress: None   Relationships    Social connections     Talks on phone: None     Gets together: None     Attends Rastafarian service: None     Active member of club or organization: None     Attends meetings of clubs or organizations: None     Relationship status: None    Intimate partner violence     Fear of current or ex partner: None     Emotionally abused: None     Physically abused: None     Forced sexual activity: None   Other Topics Concern    None   Social History Narrative    None         PHYSICAL EXAM       ED Triage Vitals [05/03/20 1510]   BP Temp Temp Source Pulse Resp SpO2 Height Weight   (!) 142/83 97.6 °F (36.4 °C) Temporal 92 19 99 % -- 300 lb (136.1 kg)       Physical Exam  Vitals signs and nursing note reviewed. Constitutional:       Appearance: She is well-developed. HENT:      Head: Normocephalic. Right Ear: External ear normal.      Left Ear: External ear normal.   Eyes:      Conjunctiva/sclera: Conjunctivae normal.      Pupils: Pupils are equal, round, and reactive to light. Neck:      Musculoskeletal: Normal range of motion and neck supple. Cardiovascular:      Rate and Rhythm: Normal rate and regular rhythm. Heart sounds: Normal heart sounds. Pulmonary:      Effort: Pulmonary effort is normal.      Breath sounds: Normal breath sounds. Abdominal:      General: Bowel sounds are normal. There is no distension. Palpations: Abdomen is soft. Tenderness: There is no abdominal tenderness. Musculoskeletal: Normal range of motion. Skin:     General: Skin is warm and dry.    Neurological:      Mental Status: She is alert and oriented to person, place, and

## 2020-05-04 LAB
BILIRUBIN URINE: NEGATIVE
BLOOD, URINE: ABNORMAL
CLARITY: CLEAR
COLOR: YELLOW
GLUCOSE URINE: >=1000 MG/DL
KETONES, URINE: 15 MG/DL
LEUKOCYTE ESTERASE, URINE: NEGATIVE
NITRITE, URINE: NEGATIVE
PH UA: 5.5 (ref 5–9)
PROTEIN UA: 100 MG/DL
SPECIFIC GRAVITY UA: 1.03 (ref 1–1.03)
URINE CULTURE, ROUTINE: NORMAL
URINE REFLEX TO CULTURE: ABNORMAL
UROBILINOGEN, URINE: 0.2 E.U./DL

## 2020-05-21 ENCOUNTER — TELEPHONE (OUTPATIENT)
Dept: FAMILY MEDICINE CLINIC | Age: 20
End: 2020-05-21

## 2020-05-27 ENCOUNTER — HOSPITAL ENCOUNTER (EMERGENCY)
Age: 20
Discharge: HOME | End: 2020-05-27
Attending: STUDENT IN AN ORGANIZED HEALTH CARE EDUCATION/TRAINING PROGRAM
Payer: COMMERCIAL

## 2020-05-27 VITALS
BODY MASS INDEX: 44.41 KG/M2 | OXYGEN SATURATION: 96 % | TEMPERATURE: 98.3 F | RESPIRATION RATE: 19 BRPM | HEIGHT: 68 IN | HEART RATE: 88 BPM | WEIGHT: 293 LBS | SYSTOLIC BLOOD PRESSURE: 128 MMHG | DIASTOLIC BLOOD PRESSURE: 78 MMHG

## 2020-05-27 LAB
CHP ED QC CHECK: YES
GLUCOSE BLD-MCNC: 468 MG/DL
GLUCOSE BLD-MCNC: 468 MG/DL (ref 60–115)
PERFORMED ON: ABNORMAL

## 2020-05-27 PROCEDURE — 87077 CULTURE AEROBIC IDENTIFY: CPT

## 2020-05-27 PROCEDURE — 6370000000 HC RX 637 (ALT 250 FOR IP): Performed by: PHYSICIAN ASSISTANT

## 2020-05-27 PROCEDURE — 64450 NJX AA&/STRD OTHER PN/BRANCH: CPT

## 2020-05-27 PROCEDURE — 87075 CULTR BACTERIA EXCEPT BLOOD: CPT

## 2020-05-27 PROCEDURE — 87205 SMEAR GRAM STAIN: CPT

## 2020-05-27 PROCEDURE — 99283 EMERGENCY DEPT VISIT LOW MDM: CPT

## 2020-05-27 PROCEDURE — 2500000003 HC RX 250 WO HCPCS: Performed by: PHYSICIAN ASSISTANT

## 2020-05-27 PROCEDURE — 87185 SC STD ENZYME DETCJ PER NZM: CPT

## 2020-05-27 PROCEDURE — 87070 CULTURE OTHR SPECIMN AEROBIC: CPT

## 2020-05-27 RX ORDER — CLINDAMYCIN HYDROCHLORIDE 300 MG/1
300 CAPSULE ORAL 4 TIMES DAILY
Qty: 40 CAPSULE | Refills: 0 | Status: SHIPPED | OUTPATIENT
Start: 2020-05-27 | End: 2020-06-06

## 2020-05-27 RX ORDER — FAMOTIDINE 20 MG/1
20 TABLET, FILM COATED ORAL DAILY
Qty: 30 TABLET | Refills: 0 | Status: SHIPPED | OUTPATIENT
Start: 2020-05-27 | End: 2022-04-17

## 2020-05-27 RX ORDER — LIDOCAINE HYDROCHLORIDE 10 MG/ML
5 INJECTION, SOLUTION EPIDURAL; INFILTRATION; INTRACAUDAL; PERINEURAL ONCE
Status: COMPLETED | OUTPATIENT
Start: 2020-05-27 | End: 2020-05-27

## 2020-05-27 RX ORDER — HYDROCODONE BITARTRATE AND ACETAMINOPHEN 5; 325 MG/1; MG/1
1 TABLET ORAL EVERY 6 HOURS PRN
Qty: 10 TABLET | Refills: 0 | Status: SHIPPED | OUTPATIENT
Start: 2020-05-27 | End: 2020-05-30

## 2020-05-27 RX ORDER — HYDROCODONE BITARTRATE AND ACETAMINOPHEN 5; 325 MG/1; MG/1
1 TABLET ORAL ONCE
Status: COMPLETED | OUTPATIENT
Start: 2020-05-27 | End: 2020-05-27

## 2020-05-27 RX ADMIN — HYDROCODONE BITARTRATE AND ACETAMINOPHEN 1 TABLET: 5; 325 TABLET ORAL at 19:37

## 2020-05-27 RX ADMIN — LIDOCAINE HYDROCHLORIDE 5 ML: 10 INJECTION, SOLUTION EPIDURAL; INFILTRATION; INTRACAUDAL; PERINEURAL at 19:40

## 2020-05-27 ASSESSMENT — ENCOUNTER SYMPTOMS
PHOTOPHOBIA: 0
VOICE CHANGE: 0
COUGH: 0
SHORTNESS OF BREATH: 0
ABDOMINAL DISTENTION: 0
EYE DISCHARGE: 0
APNEA: 0
COLOR CHANGE: 1
ANAL BLEEDING: 0

## 2020-05-27 ASSESSMENT — PAIN DESCRIPTION - ORIENTATION: ORIENTATION: RIGHT

## 2020-05-27 ASSESSMENT — PAIN DESCRIPTION - LOCATION: LOCATION: GROIN

## 2020-05-27 ASSESSMENT — PAIN SCALES - GENERAL
PAINLEVEL_OUTOF10: 9
PAINLEVEL_OUTOF10: 9

## 2020-05-27 NOTE — ED PROVIDER NOTES
[address] [facility] ED  eMERGENCY dEPARTMENT eNCOUnter      Pt Name: Ramon Hatfield  MRN: [id number]  [place] 2000  Date of evaluation: 5/27/2020  Provider: Ale Gonzalez Dr       Chief Complaint   Patient presents with    Groin Pain     right side         HISTORY OF PRESENT ILLNESS   (Location/Symptom, Timing/Onset,Context/Setting, Quality, Duration, Modifying Factors, Severity)  Note limiting factors. Ramon Hatfield is a 21 y.o. female who presents to the emergency department presents with flare of her hidradenitis in her right groin notes there is a large fluid-filled lesion. She also has some nauseousness states her blood sugars out of control. She denies fever chills vomiting chest pain. She does have another lesion in the middle of her abdomen underneath her pannus. She does have 2-week history of vaginal bleeding states she was on Depo Provera as of 12 January and has not had another dose. Symptoms moderate severity nothing improves symptoms touch or motion worsens symptoms    HPI    NursingNotes were reviewed. REVIEW OF SYSTEMS    (2-9 systems for level 4, 10 or more for level 5)     Review of Systems   Constitutional: Negative for activity change, appetite change, chills, fever and unexpected weight change. HENT: Negative for ear discharge, nosebleeds and voice change. Eyes: Negative for photophobia and discharge. Respiratory: Negative for apnea, cough and shortness of breath. Cardiovascular: Negative for chest pain. Gastrointestinal: Negative for abdominal distention and anal bleeding. Endocrine: Negative for cold intolerance, heat intolerance and polyphagia. Genitourinary: Positive for vaginal bleeding. Negative for dysuria and genital sores. Musculoskeletal: Negative for joint swelling. Skin: Positive for color change. Negative for pallor. Allergic/Immunologic: Negative for immunocompromised state.    Neurological: Negative for seizures refill    4. Nausea    5. Vaginal bleeding    6. History of noncompliance with medical treatment, presenting hazards to health          DISPOSITION/PLAN   DISPOSITION        PATIENT REFERRED TO:  Eileen Coleman MD  [id number] [phone]    Call in 1 day      Rosemarie Judge MD  [facility] [address] [facility]    Call in 1 day      [facility]) ED  [address]  [phone]    If symptoms worsen      DISCHARGE MEDICATIONS:  New Prescriptions    CLINDAMYCIN (CLEOCIN) 300 MG CAPSULE    Take 1 capsule by mouth 4 times daily for 10 days    HYDROCODONE-ACETAMINOPHEN (NORCO) 5-325 MG PER TABLET    Take 1 tablet by mouth every 6 hours as needed for Pain for up to 3 days.     INSULIN PEN NEEDLE 30G X 8 MM MISC    1 each by Does not apply route daily          (Please note that portions of this note were completed with a voice recognition program.  Efforts were made to edit the dictations but occasionally words are mis-transcribed.)    Jett Cabrera PA-C (electronically signed)  Attending Emergency Physician       Jett Cabrera PA-C  05/27/20 2102       Jett Cabrera PA-C  05/27/20 2105

## 2020-05-29 RX ORDER — TRAMADOL HYDROCHLORIDE 50 MG/1
50 TABLET ORAL EVERY 8 HOURS PRN
Qty: 21 TABLET | Refills: 0 | Status: SHIPPED | OUTPATIENT
Start: 2020-05-29 | End: 2020-06-05

## 2020-05-30 LAB
ANAEROBIC CULTURE: ABNORMAL
GRAM STAIN RESULT: ABNORMAL
ORGANISM: ABNORMAL
ORGANISM: ABNORMAL
WOUND/ABSCESS: ABNORMAL
WOUND/ABSCESS: ABNORMAL

## 2020-05-31 ENCOUNTER — HOSPITAL ENCOUNTER (EMERGENCY)
Age: 20
Discharge: HOME | End: 2020-05-31
Payer: COMMERCIAL

## 2020-05-31 VITALS
OXYGEN SATURATION: 98 % | TEMPERATURE: 98.3 F | HEIGHT: 68 IN | WEIGHT: 293 LBS | HEART RATE: 78 BPM | DIASTOLIC BLOOD PRESSURE: 81 MMHG | BODY MASS INDEX: 44.41 KG/M2 | SYSTOLIC BLOOD PRESSURE: 130 MMHG | RESPIRATION RATE: 18 BRPM

## 2020-05-31 LAB
ALBUMIN SERPL-MCNC: 4 G/DL (ref 3.5–4.6)
ALP BLD-CCNC: 133 U/L (ref 40–130)
ALT SERPL-CCNC: 19 U/L (ref 0–33)
ANION GAP SERPL CALCULATED.3IONS-SCNC: 14 MEQ/L (ref 9–15)
AST SERPL-CCNC: 19 U/L (ref 0–35)
BASOPHILS ABSOLUTE: 0.1 K/UL (ref 0–0.2)
BASOPHILS RELATIVE PERCENT: 1.5 %
BETA-HYDROXYBUTYRATE: 2.6 MG/DL (ref 0.2–2.8)
BILIRUB SERPL-MCNC: 0.4 MG/DL (ref 0.2–0.7)
BUN BLDV-MCNC: 13 MG/DL (ref 6–20)
CALCIUM SERPL-MCNC: 9.8 MG/DL (ref 8.5–9.9)
CHLORIDE BLD-SCNC: 92 MEQ/L (ref 95–107)
CO2: 23 MEQ/L (ref 20–31)
CREAT SERPL-MCNC: 0.66 MG/DL (ref 0.5–0.9)
EOSINOPHILS ABSOLUTE: 0.2 K/UL (ref 0–0.7)
EOSINOPHILS RELATIVE PERCENT: 2.6 %
GFR AFRICAN AMERICAN: >60
GFR NON-AFRICAN AMERICAN: >60
GLOBULIN: 3.1 G/DL (ref 2.3–3.5)
GLUCOSE BLD-MCNC: 389 MG/DL (ref 60–115)
GLUCOSE BLD-MCNC: 597 MG/DL (ref 60–115)
GLUCOSE BLD-MCNC: 627 MG/DL (ref 70–99)
HBA1C MFR BLD: 12.9 % (ref 4.8–5.9)
HCT VFR BLD CALC: 41.7 % (ref 37–47)
HEMOGLOBIN: 14.2 G/DL (ref 12–16)
LYMPHOCYTES ABSOLUTE: 2.8 K/UL (ref 1–4.8)
LYMPHOCYTES RELATIVE PERCENT: 37.7 %
MCH RBC QN AUTO: 29 PG (ref 27–31.3)
MCHC RBC AUTO-ENTMCNC: 34.1 % (ref 33–37)
MCV RBC AUTO: 85.1 FL (ref 82–100)
MONOCYTES ABSOLUTE: 0.5 K/UL (ref 0.2–0.8)
MONOCYTES RELATIVE PERCENT: 6.6 %
NEUTROPHILS ABSOLUTE: 3.9 K/UL (ref 1.4–6.5)
NEUTROPHILS RELATIVE PERCENT: 51.6 %
PDW BLD-RTO: 13.6 % (ref 11.5–14.5)
PERFORMED ON: ABNORMAL
PERFORMED ON: ABNORMAL
PLATELET # BLD: 293 K/UL (ref 130–400)
POTASSIUM SERPL-SCNC: 4.4 MEQ/L (ref 3.4–4.9)
RBC # BLD: 4.9 M/UL (ref 4.2–5.4)
SODIUM BLD-SCNC: 129 MEQ/L (ref 135–144)
TOTAL PROTEIN: 7.1 G/DL (ref 6.3–8)
WBC # BLD: 7.5 K/UL (ref 4.5–11)

## 2020-05-31 PROCEDURE — 6370000000 HC RX 637 (ALT 250 FOR IP): Performed by: PHYSICIAN ASSISTANT

## 2020-05-31 PROCEDURE — 10060 I&D ABSCESS SIMPLE/SINGLE: CPT

## 2020-05-31 PROCEDURE — 87075 CULTR BACTERIA EXCEPT BLOOD: CPT

## 2020-05-31 PROCEDURE — 36415 COLL VENOUS BLD VENIPUNCTURE: CPT

## 2020-05-31 PROCEDURE — 85025 COMPLETE CBC W/AUTO DIFF WBC: CPT

## 2020-05-31 PROCEDURE — 87070 CULTURE OTHR SPECIMN AEROBIC: CPT

## 2020-05-31 PROCEDURE — 80053 COMPREHEN METABOLIC PANEL: CPT

## 2020-05-31 PROCEDURE — 83036 HEMOGLOBIN GLYCOSYLATED A1C: CPT

## 2020-05-31 PROCEDURE — 99284 EMERGENCY DEPT VISIT MOD MDM: CPT

## 2020-05-31 PROCEDURE — 96361 HYDRATE IV INFUSION ADD-ON: CPT

## 2020-05-31 PROCEDURE — 82010 KETONE BODYS QUAN: CPT

## 2020-05-31 PROCEDURE — 87205 SMEAR GRAM STAIN: CPT

## 2020-05-31 PROCEDURE — 96374 THER/PROPH/DIAG INJ IV PUSH: CPT

## 2020-05-31 PROCEDURE — 2580000003 HC RX 258: Performed by: PHYSICIAN ASSISTANT

## 2020-05-31 RX ORDER — 0.9 % SODIUM CHLORIDE 0.9 %
1000 INTRAVENOUS SOLUTION INTRAVENOUS ONCE
Status: DISCONTINUED | OUTPATIENT
Start: 2020-05-31 | End: 2020-05-31

## 2020-05-31 RX ORDER — 0.9 % SODIUM CHLORIDE 0.9 %
1000 INTRAVENOUS SOLUTION INTRAVENOUS ONCE
Status: COMPLETED | OUTPATIENT
Start: 2020-05-31 | End: 2020-05-31

## 2020-05-31 RX ADMIN — SODIUM CHLORIDE 1000 ML: 9 INJECTION, SOLUTION INTRAVENOUS at 13:01

## 2020-05-31 RX ADMIN — INSULIN HUMAN 10 UNITS: 100 INJECTION, SOLUTION PARENTERAL at 13:01

## 2020-05-31 ASSESSMENT — PAIN DESCRIPTION - ORIENTATION: ORIENTATION: LEFT

## 2020-05-31 ASSESSMENT — ENCOUNTER SYMPTOMS
SORE THROAT: 0
EYE DISCHARGE: 0
RHINORRHEA: 0
DIARRHEA: 0
SHORTNESS OF BREATH: 0
COLOR CHANGE: 0
NAUSEA: 0
ABDOMINAL PAIN: 0
CONSTIPATION: 0
VOMITING: 0
ABDOMINAL DISTENTION: 0

## 2020-05-31 ASSESSMENT — PAIN SCALES - GENERAL: PAINLEVEL_OUTOF10: 8

## 2020-05-31 NOTE — ED NOTES
Dc instructions to pt states understands. Pt discharged awake alert oriented x 3 ambulatory status improved. Pain decreased under left axilla.       Sukhi Thacker RN  05/31/20 8781

## 2020-05-31 NOTE — ED PROVIDER NOTES
ONCE DAILY IN THE MORNING    CLINDAMYCIN (CLEOCIN) 300 MG CAPSULE    Take 1 capsule by mouth 4 times daily for 10 days    DOXYLAMINE-PYRIDOXINE (DICLEGIS) 10-10 MG TBEC    Start: 2 tabs p.o. q.h.s.; if symptoms persist after 2 days increase to 1 tab p.o. q.a.m. and 2 tabs p.o. q.h.s.; may increase to 1 tab p.o. q.a.m., 1 tab p.o. q. midafternoon and 2 tabs p.o. q.h.s.  4 tabs per day. If unable to afford, instruct the patient about substituting the OTC components. FAMOTIDINE (PEPCID) 20 MG TABLET    Take 1 tablet by mouth daily    GLUCOSE MONITORING KIT (FREESTYLE) MONITORING KIT    1 kit by Does not apply route daily    IBUPROFEN (ADVIL;MOTRIN) 800 MG TABLET    Take 1 tablet by mouth every 8 hours as needed for Pain    INSULIN GLARGINE (LANTUS SOLOSTAR) 100 UNIT/ML INJECTION PEN    Inject 10 Units into the skin nightly    INSULIN LISPRO, 1 UNIT DIAL, (HUMALOG KWIKPEN) 100 UNIT/ML SOPN    Inject 5 Units into the skin 3 times daily (before meals)    INSULIN PEN NEEDLE 30G X 8 MM MISC    1 each by Does not apply route daily    LANCETS MISC    DX: diabetes mellitus. Use 1-3 time(s) daily - Ok to substitute per insurance (1 each = 1 box)    METHYLPREDNISOLONE (MEDROL, MANNY,) 4 MG TABLET    Take by mouth. TRAMADOL (ULTRAM) 50 MG TABLET    Take 1 tablet by mouth every 8 hours as needed for Pain for up to 7 days. Intended supply: 7 days. Take lowest dose possible to manage pain       ALLERGIES     Shellfish-derived products and Benzoyl peroxide    FAMILY HISTORY     History reviewed. No pertinent family history.        SOCIAL HISTORY       Social History     Socioeconomic History    Marital status: Single     Spouse name: None    Number of children: None    Years of education: None    Highest education level: None   Occupational History    None   Social Needs    Financial resource strain: None    Food insecurity     Worry: None     Inability: None    Transportation needs     Medical: None     Non-medical: None dictation. EMERGENCY DEPARTMENT COURSE and DIFFERENTIAL DIAGNOSIS/MDM:   Vitals:    Vitals:    05/31/20 1207 05/31/20 1421   BP: 123/70 130/81   Pulse: 85 78   Resp: 19 18   Temp: 98.3 °F (36.8 °C)    TempSrc: Oral    SpO2: 97% 98%   Weight: 300 lb (136.1 kg)    Height: 5' 8\" (1.727 m)             MDM  Number of Diagnoses or Management Options  H/O noncompliance with medical treatment, presenting hazards to health:   Hidradenitis suppurativa of left axilla:   Hyperglycemia:   Diagnosis management comments: Patient presented to the ED with a complaint of elevated blood glucose level as well as abscess to her left axilla, she was seen in the emergency department 5/27/2020 for the same issues. She signed out [address] and did not want to be treated for her high blood sugars at that time. She was given a prescription for clindamycin for her abscess, as well as Norco for pain control. She had also contacted her regular family physician yesterday Dr. Nerissa Araiza and advised him that the [address] [facility] that she was using from her previous ER visit was causing swelling in her lips, she was then given a prescription for(21) Ultram tablets on 5/30/2020. After patient was given IV fluids, and insulin, her blood sugars did decline to approximately 389 mg/dL. Did discuss with the patient in length the need for strict compliance with her insulin medications as she seems to be not monitoring it well and managing it well, her blood sugars constantly run high and she has multiple visits to the emergency department for the same. She was given follow-up recommendation to Dr. Yonas Elise of endocrinology, advised to reestablish her evening dose of insulin, and monitor blood sugars at least once daily. She does currently have clindamycin which she was given on a previous visit for her abscess in her axilla, she was advised to continue this, she was given a prescription for [place] on her visit of 5/27/2020.   She had contacted

## 2020-06-01 RX ORDER — OXYCODONE HYDROCHLORIDE AND ACETAMINOPHEN 5; 325 MG/1; MG/1
1 TABLET ORAL EVERY 6 HOURS PRN
Qty: 20 TABLET | Refills: 0 | Status: SHIPPED | OUTPATIENT
Start: 2020-06-01 | End: 2020-06-06

## 2020-06-02 ENCOUNTER — VIRTUAL VISIT (OUTPATIENT)
Dept: FAMILY MEDICINE CLINIC | Age: 20
End: 2020-06-02
Payer: COMMERCIAL

## 2020-06-02 LAB
ANAEROBIC CULTURE: ABNORMAL
GRAM STAIN RESULT: ABNORMAL
ORGANISM: ABNORMAL
ORGANISM: ABNORMAL
WOUND/ABSCESS: ABNORMAL

## 2020-06-02 PROCEDURE — 3046F HEMOGLOBIN A1C LEVEL >9.0%: CPT | Performed by: INTERNAL MEDICINE

## 2020-06-02 PROCEDURE — 99213 OFFICE O/P EST LOW 20 MIN: CPT | Performed by: INTERNAL MEDICINE

## 2020-06-02 PROCEDURE — 2022F DILAT RTA XM EVC RTNOPTHY: CPT | Performed by: INTERNAL MEDICINE

## 2020-06-02 PROCEDURE — G8428 CUR MEDS NOT DOCUMENT: HCPCS | Performed by: INTERNAL MEDICINE

## 2020-06-02 ASSESSMENT — ENCOUNTER SYMPTOMS
EYE DISCHARGE: 0
FACIAL SWELLING: 0
DIARRHEA: 0
ABDOMINAL DISTENTION: 0
RHINORRHEA: 0
RECTAL PAIN: 0
EYE ITCHING: 0
PHOTOPHOBIA: 0
BACK PAIN: 0
BLOOD IN STOOL: 0
COUGH: 0
SORE THROAT: 0
SHORTNESS OF BREATH: 0
SINUS PAIN: 0
VOMITING: 0
ABDOMINAL PAIN: 0
EYE REDNESS: 0
TROUBLE SWALLOWING: 0
NAUSEA: 0
WHEEZING: 0
COLOR CHANGE: 0
CONSTIPATION: 0
VOICE CHANGE: 0
SINUS PRESSURE: 0
EYE PAIN: 0
CHEST TIGHTNESS: 0
APNEA: 0

## 2020-06-02 NOTE — PROGRESS NOTES
patient's risk of exposure to COVID-19 and provide necessary medical care. The patient (and/or legal guardian) has also been advised to contact this office for worsening conditions or problems, and seek emergency medical treatment and/or call 911 if deemed necessary. Services were provided through a video synchronous discussion virtually to substitute for in-person clinic visit. Type of encounter was _x_ Doxy __ MyChart ___Facetime    Patient was located at their home. Provider was located at their ___x home or        ____ office. --Aide Martinez MD on 6/4/2020 at 2:00 PM    An electronic signature was used to authenticate this note. An  electronic signature was used to authenticate this note.     --Aide Martinez MD on 6/2/2020 at 10:25 PM

## 2020-06-08 RX ORDER — OXYCODONE HYDROCHLORIDE AND ACETAMINOPHEN 5; 325 MG/1; MG/1
1 TABLET ORAL EVERY 8 HOURS PRN
Qty: 15 TABLET | Refills: 0 | Status: SHIPPED | OUTPATIENT
Start: 2020-06-08 | End: 2020-06-12 | Stop reason: SDUPTHER

## 2020-06-08 RX ORDER — GABAPENTIN 300 MG/1
300 CAPSULE ORAL 3 TIMES DAILY
Qty: 15 CAPSULE | Refills: 0 | Status: SHIPPED | OUTPATIENT
Start: 2020-06-08 | End: 2020-06-08 | Stop reason: ALTCHOICE

## 2020-06-12 RX ORDER — OXYCODONE HYDROCHLORIDE AND ACETAMINOPHEN 5; 325 MG/1; MG/1
1 TABLET ORAL EVERY 4 HOURS PRN
Qty: 30 TABLET | Refills: 0 | Status: SHIPPED | OUTPATIENT
Start: 2020-06-12 | End: 2020-06-16 | Stop reason: SDUPTHER

## 2020-06-13 ENCOUNTER — HOSPITAL ENCOUNTER (EMERGENCY)
Age: 20
Discharge: HOME | End: 2020-06-13
Attending: EMERGENCY MEDICINE
Payer: COMMERCIAL

## 2020-06-13 VITALS
OXYGEN SATURATION: 96 % | RESPIRATION RATE: 16 BRPM | BODY MASS INDEX: 43.35 KG/M2 | DIASTOLIC BLOOD PRESSURE: 88 MMHG | SYSTOLIC BLOOD PRESSURE: 127 MMHG | HEIGHT: 68 IN | HEART RATE: 85 BPM | WEIGHT: 286 LBS | TEMPERATURE: 97.6 F

## 2020-06-13 PROCEDURE — 10060 I&D ABSCESS SIMPLE/SINGLE: CPT

## 2020-06-13 PROCEDURE — 99282 EMERGENCY DEPT VISIT SF MDM: CPT

## 2020-06-13 PROCEDURE — 96372 THER/PROPH/DIAG INJ SC/IM: CPT

## 2020-06-13 PROCEDURE — 6370000000 HC RX 637 (ALT 250 FOR IP): Performed by: EMERGENCY MEDICINE

## 2020-06-13 PROCEDURE — 6360000002 HC RX W HCPCS: Performed by: EMERGENCY MEDICINE

## 2020-06-13 RX ORDER — SULFAMETHOXAZOLE AND TRIMETHOPRIM 800; 160 MG/1; MG/1
1 TABLET ORAL ONCE
Status: COMPLETED | OUTPATIENT
Start: 2020-06-13 | End: 2020-06-13

## 2020-06-13 RX ORDER — OXYCODONE HYDROCHLORIDE AND ACETAMINOPHEN 5; 325 MG/1; MG/1
1 TABLET ORAL ONCE
Status: COMPLETED | OUTPATIENT
Start: 2020-06-13 | End: 2020-06-13

## 2020-06-13 RX ORDER — SULFAMETHOXAZOLE AND TRIMETHOPRIM 800; 160 MG/1; MG/1
1 TABLET ORAL 2 TIMES DAILY
Qty: 20 TABLET | Refills: 0 | Status: SHIPPED | OUTPATIENT
Start: 2020-06-13 | End: 2020-06-23

## 2020-06-13 RX ORDER — KETOROLAC TROMETHAMINE 30 MG/ML
30 INJECTION, SOLUTION INTRAMUSCULAR; INTRAVENOUS ONCE
Status: COMPLETED | OUTPATIENT
Start: 2020-06-13 | End: 2020-06-13

## 2020-06-13 RX ADMIN — SULFAMETHOXAZOLE AND TRIMETHOPRIM 1 TABLET: 800; 160 TABLET ORAL at 07:57

## 2020-06-13 RX ADMIN — OXYCODONE HYDROCHLORIDE AND ACETAMINOPHEN 1 TABLET: 5; 325 TABLET ORAL at 07:57

## 2020-06-13 RX ADMIN — KETOROLAC TROMETHAMINE 30 MG: 30 INJECTION, SOLUTION INTRAMUSCULAR at 07:57

## 2020-06-13 ASSESSMENT — ENCOUNTER SYMPTOMS
COUGH: 0
SHORTNESS OF BREATH: 0
DIARRHEA: 0
SORE THROAT: 0
NAUSEA: 0
ABDOMINAL PAIN: 0
VOMITING: 0
BACK PAIN: 0

## 2020-06-13 ASSESSMENT — PAIN SCALES - GENERAL
PAINLEVEL_OUTOF10: 10
PAINLEVEL_OUTOF10: 10

## 2020-06-13 ASSESSMENT — PAIN DESCRIPTION - LOCATION: LOCATION: ABDOMEN;GROIN

## 2020-06-13 ASSESSMENT — PAIN DESCRIPTION - PAIN TYPE: TYPE: ACUTE PAIN
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
## 2020-06-16 RX ORDER — OXYCODONE HYDROCHLORIDE AND ACETAMINOPHEN 5; 325 MG/1; MG/1
1 TABLET ORAL EVERY 4 HOURS PRN
Qty: 42 TABLET | Refills: 0 | Status: SHIPPED | OUTPATIENT
Start: 2020-06-16 | End: 2020-06-23

## 2020-06-19 RX ORDER — PREDNISONE 10 MG/1
TABLET ORAL
Qty: 20 TABLET | Refills: 0 | Status: SHIPPED | OUTPATIENT
Start: 2020-06-19 | End: 2020-07-26

## 2020-06-19 RX ORDER — FLUCONAZOLE 150 MG/1
TABLET ORAL
Qty: 2 TABLET | Refills: 0 | Status: SHIPPED | OUTPATIENT
Start: 2020-06-19 | End: 2020-07-31

## 2020-06-20 ENCOUNTER — HOSPITAL ENCOUNTER (EMERGENCY)
Age: 20
Discharge: HOME | End: 2020-06-20
Payer: COMMERCIAL

## 2020-06-20 ENCOUNTER — APPOINTMENT (OUTPATIENT)
Dept: GENERAL RADIOLOGY | Age: 20
End: 2020-06-20
Payer: COMMERCIAL

## 2020-06-20 ENCOUNTER — HOSPITAL ENCOUNTER (EMERGENCY)
Age: 20
Discharge: HOME | End: 2020-06-20
Attending: EMERGENCY MEDICINE
Payer: COMMERCIAL

## 2020-06-20 ENCOUNTER — APPOINTMENT (OUTPATIENT)
Dept: CT IMAGING | Age: 20
End: 2020-06-20
Payer: COMMERCIAL

## 2020-06-20 VITALS
TEMPERATURE: 98.6 F | RESPIRATION RATE: 18 BRPM | BODY MASS INDEX: 43.65 KG/M2 | SYSTOLIC BLOOD PRESSURE: 101 MMHG | HEART RATE: 94 BPM | OXYGEN SATURATION: 98 % | HEIGHT: 68 IN | DIASTOLIC BLOOD PRESSURE: 57 MMHG | WEIGHT: 288 LBS

## 2020-06-20 VITALS
OXYGEN SATURATION: 98 % | BODY MASS INDEX: 42.44 KG/M2 | HEIGHT: 68 IN | RESPIRATION RATE: 18 BRPM | SYSTOLIC BLOOD PRESSURE: 130 MMHG | TEMPERATURE: 98.1 F | DIASTOLIC BLOOD PRESSURE: 76 MMHG | HEART RATE: 80 BPM | WEIGHT: 280 LBS

## 2020-06-20 LAB — GLUCOSE BLD-MCNC: NORMAL MG/DL

## 2020-06-20 PROCEDURE — 99283 EMERGENCY DEPT VISIT LOW MDM: CPT

## 2020-06-20 PROCEDURE — 72125 CT NECK SPINE W/O DYE: CPT

## 2020-06-20 PROCEDURE — 6370000000 HC RX 637 (ALT 250 FOR IP): Performed by: EMERGENCY MEDICINE

## 2020-06-20 PROCEDURE — 99284 EMERGENCY DEPT VISIT MOD MDM: CPT

## 2020-06-20 PROCEDURE — 73610 X-RAY EXAM OF ANKLE: CPT

## 2020-06-20 PROCEDURE — 6370000000 HC RX 637 (ALT 250 FOR IP): Performed by: STUDENT IN AN ORGANIZED HEALTH CARE EDUCATION/TRAINING PROGRAM

## 2020-06-20 PROCEDURE — 71046 X-RAY EXAM CHEST 2 VIEWS: CPT

## 2020-06-20 PROCEDURE — 70450 CT HEAD/BRAIN W/O DYE: CPT

## 2020-06-20 RX ORDER — CLINDAMYCIN HYDROCHLORIDE 300 MG/1
300 CAPSULE ORAL 3 TIMES DAILY
Qty: 15 CAPSULE | Refills: 0 | Status: SHIPPED | OUTPATIENT
Start: 2020-06-20 | End: 2020-06-25

## 2020-06-20 RX ORDER — NAPROXEN 500 MG/1
500 TABLET ORAL 2 TIMES DAILY WITH MEALS
Qty: 30 TABLET | Refills: 0 | Status: SHIPPED | OUTPATIENT
Start: 2020-06-20 | End: 2020-11-02 | Stop reason: ALTCHOICE

## 2020-06-20 RX ORDER — ACETAMINOPHEN 500 MG
1000 TABLET ORAL ONCE
Status: DISCONTINUED | OUTPATIENT
Start: 2020-06-20 | End: 2020-06-20

## 2020-06-20 RX ORDER — CYCLOBENZAPRINE HCL 5 MG
10 TABLET ORAL 2 TIMES DAILY PRN
Qty: 10 TABLET | Refills: 0 | Status: SHIPPED | OUTPATIENT
Start: 2020-06-20 | End: 2020-06-24

## 2020-06-20 RX ORDER — OXYCODONE HYDROCHLORIDE AND ACETAMINOPHEN 5; 325 MG/1; MG/1
1 TABLET ORAL ONCE
Status: COMPLETED | OUTPATIENT
Start: 2020-06-20 | End: 2020-06-20

## 2020-06-20 RX ORDER — NAPROXEN 500 MG/1
500 TABLET ORAL ONCE
Status: COMPLETED | OUTPATIENT
Start: 2020-06-20 | End: 2020-06-20

## 2020-06-20 RX ADMIN — NAPROXEN 500 MG: 500 TABLET ORAL at 10:22

## 2020-06-20 RX ADMIN — OXYCODONE HYDROCHLORIDE AND ACETAMINOPHEN 1 TABLET: 5; 325 TABLET ORAL at 16:18

## 2020-06-20 ASSESSMENT — PAIN SCALES - GENERAL
PAINLEVEL_OUTOF10: 9
PAINLEVEL_OUTOF10: 10
PAINLEVEL_OUTOF10: 8
PAINLEVEL_OUTOF10: 9

## 2020-06-20 ASSESSMENT — ENCOUNTER SYMPTOMS
SORE THROAT: 0
NAUSEA: 0
EYE PAIN: 0
ABDOMINAL PAIN: 0
SHORTNESS OF BREATH: 0
ABDOMINAL PAIN: 0
SHORTNESS OF BREATH: 0
VOMITING: 0
VOMITING: 0
WHEEZING: 0
COUGH: 0
NAUSEA: 0
CHEST TIGHTNESS: 0
PHOTOPHOBIA: 0

## 2020-06-20 ASSESSMENT — PAIN DESCRIPTION - DESCRIPTORS
DESCRIPTORS: ACHING
DESCRIPTORS: ACHING

## 2020-06-20 ASSESSMENT — PAIN DESCRIPTION - LOCATION
LOCATION: GROIN
LOCATION: BACK;NECK;ANKLE

## 2020-06-20 ASSESSMENT — PAIN DESCRIPTION - ORIENTATION
ORIENTATION: LEFT
ORIENTATION: RIGHT;LEFT

## 2020-06-20 ASSESSMENT — PAIN DESCRIPTION - PAIN TYPE
TYPE: ACUTE PAIN
TYPE: ACUTE PAIN

## 2020-06-20 ASSESSMENT — PAIN DESCRIPTION - FREQUENCY: FREQUENCY: CONTINUOUS

## 2020-06-20 NOTE — ED PROVIDER NOTES
mouth daily, Disp-30 tablet, R-0Print      Insulin Pen Needle 30G X 8 MM MISC DAILY Starting Wed 5/27/2020, Disp-100 each, R-0, Print      methylPREDNISolone (MEDROL, MANNY,) 4 MG tablet Take by mouth., Disp-1 kit, R-0Normal      albuterol sulfate  (90 Base) MCG/ACT inhaler Inhale 2 puffs into the lungs every 4 hours as needed for Wheezing, Disp-18 g, R-1Normal      insulin glargine (LANTUS SOLOSTAR) 100 UNIT/ML injection pen Inject 10 Units into the skin nightly, Disp-5 pen, R-0Normal      glucose monitoring kit (FREESTYLE) monitoring kit DAILY Starting Fri 5/1/2020, Disp-1 kit, R-0, Normal      Lancets MISC Disp-1 each, R-5, NormalDX: diabetes mellitus. Use 1-3 time(s) daily - Ok to substitute per insurance (1 each = 1 box)      blood glucose monitor strips Test 3 times a day & as needed for symptoms of irregular blood glucose., Disp-100 strip, R-0, Normal      insulin lispro, 1 Unit Dial, (HUMALOG KWIKPEN) 100 UNIT/ML SOPN Inject 5 Units into the skin 3 times daily (before meals), Disp-1 pen, R-5Normal      buPROPion (WELLBUTRIN XL) 150 MG extended release tablet TAKE 1 TABLET BY MOUTH ONCE DAILY IN THE MORNINGHistorical Med      ibuprofen (ADVIL;MOTRIN) 800 MG tablet Take 1 tablet by mouth every 8 hours as needed for Pain, Disp-20 tablet, R-0Print      doxylamine-pyridoxine (DICLEGIS) 10-10 MG TBEC Start: 2 tabs p.o. q.h.s.; if symptoms persist after 2 days increase to 1 tab p.o. q.a.m. and 2 tabs p.o. q.h.s.; may increase to 1 tab p.o. q.a.m., 1 tab p.o. q. midafternoon and 2 tabs p.o. q.h.s.  4 tabs per day. If unable to afford, instruct the vipin ent about substituting the OTC components. , Disp-60 tablet, R-1Print             ALLERGIES     Shellfish-derived products and Benzoyl peroxide    FAMILY HISTORY     History reviewed. No pertinent family history.        SOCIAL HISTORY       Social History     Socioeconomic History    Marital status: Single     Spouse name: None    Number of children: None   

## 2020-06-20 NOTE — ED PROVIDER NOTES
oriented to person, place, and time. Cranial Nerves: No cranial nerve deficit. Psychiatric:         Behavior: Behavior normal.         DIAGNOSTIC RESULTS     EKG: All EKG's are interpreted by the Emergency Department Physician who either signs or Co-signsthis chart in the absence of a cardiologist.        RADIOLOGY:   Spero Lime such as CT, Ultrasound and MRI are read by the radiologist. Plain radiographic images are visualized and preliminarily interpreted by the emergency physician with the below findings:        Interpretation per the Radiologist below, if available at the time ofthis note:    No orders to display         ED BEDSIDE ULTRASOUND:   Performed by ED Physician - none    LABS:  Labs Reviewed   POCT GLUCOSE - Normal       All other labs were within normal range or not returned as of this dictation. EMERGENCY DEPARTMENT COURSE and DIFFERENTIAL DIAGNOSIS/MDM:   Vitals:    Vitals:    06/20/20 0952   BP: 130/76   Pulse: 80   Resp: 18   Temp: 98.1 °F (36.7 °C)   TempSrc: Oral   SpO2: 98%   Weight: 280 lb (127 kg)   Height: 5' 8\" (1.727 m)       Patient came in for evaluation of an abscess. Abscess is already drained and there is nothing more to incise. She does not have a fever. She refused any glucose testing because she knows it will be high and she did not want us to keep her. She is noncompliant with her medication and her diet. Patient will be put on 5 days of clindamycin and told to follow-up with her doctor    MDM      [address] time was 0 minutes, excluding separatelyreportable procedures. There was a high probability ofclinically significant/life threatening deterioration in the patient's condition which required my urgent intervention. CONSULTS:  None    PROCEDURES:  Unless otherwise noted below, none     Procedures    FINAL IMPRESSION      1. Hidradenitis suppurativa    2.  Uncontrolled type 2 diabetes mellitus with

## 2020-06-21 ENCOUNTER — HOSPITAL ENCOUNTER (EMERGENCY)
Age: 20
Discharge: HOME | End: 2020-06-21
Attending: EMERGENCY MEDICINE
Payer: COMMERCIAL

## 2020-06-21 VITALS
RESPIRATION RATE: 16 BRPM | BODY MASS INDEX: 43.65 KG/M2 | SYSTOLIC BLOOD PRESSURE: 118 MMHG | DIASTOLIC BLOOD PRESSURE: 67 MMHG | WEIGHT: 288 LBS | HEIGHT: 68 IN | OXYGEN SATURATION: 98 % | TEMPERATURE: 98 F | HEART RATE: 75 BPM

## 2020-06-21 LAB
ALBUMIN SERPL-MCNC: 3.9 G/DL (ref 3.5–4.6)
ALP BLD-CCNC: 128 U/L (ref 40–130)
ALT SERPL-CCNC: 20 U/L (ref 0–33)
ANION GAP SERPL CALCULATED.3IONS-SCNC: 12 MEQ/L (ref 9–15)
AST SERPL-CCNC: 17 U/L (ref 0–35)
BASOPHILS ABSOLUTE: 0.1 K/UL (ref 0–0.2)
BASOPHILS RELATIVE PERCENT: 0.9 %
BILIRUB SERPL-MCNC: 0.5 MG/DL (ref 0.2–0.7)
BUN BLDV-MCNC: 12 MG/DL (ref 6–20)
CALCIUM SERPL-MCNC: 9 MG/DL (ref 8.5–9.9)
CHLORIDE BLD-SCNC: 96 MEQ/L (ref 95–107)
CO2: 20 MEQ/L (ref 20–31)
CREAT SERPL-MCNC: 0.59 MG/DL (ref 0.5–0.9)
EKG ATRIAL RATE: 73 BPM
EKG P AXIS: 48 DEGREES
EKG P-R INTERVAL: 182 MS
EKG Q-T INTERVAL: 368 MS
EKG QRS DURATION: 98 MS
EKG QTC CALCULATION (BAZETT): 405 MS
EKG R AXIS: 51 DEGREES
EKG T AXIS: 30 DEGREES
EKG VENTRICULAR RATE: 73 BPM
EOSINOPHILS ABSOLUTE: 0.2 K/UL (ref 0–0.7)
EOSINOPHILS RELATIVE PERCENT: 3 %
GFR AFRICAN AMERICAN: >60
GFR NON-AFRICAN AMERICAN: >60
GLOBULIN: 3.1 G/DL (ref 2.3–3.5)
GLUCOSE BLD-MCNC: 581 MG/DL (ref 70–99)
HCT VFR BLD CALC: 41.6 % (ref 37–47)
HEMOGLOBIN: 14.1 G/DL (ref 12–16)
LYMPHOCYTES ABSOLUTE: 1.9 K/UL (ref 1–4.8)
LYMPHOCYTES RELATIVE PERCENT: 30.4 %
MAGNESIUM: 2 MG/DL (ref 1.7–2.4)
MCH RBC QN AUTO: 29.1 PG (ref 27–31.3)
MCHC RBC AUTO-ENTMCNC: 33.8 % (ref 33–37)
MCV RBC AUTO: 85.9 FL (ref 82–100)
MONOCYTES ABSOLUTE: 0.5 K/UL (ref 0.2–0.8)
MONOCYTES RELATIVE PERCENT: 7.4 %
NEUTROPHILS ABSOLUTE: 3.7 K/UL (ref 1.4–6.5)
NEUTROPHILS RELATIVE PERCENT: 58.3 %
PDW BLD-RTO: 13.3 % (ref 11.5–14.5)
PLATELET # BLD: 258 K/UL (ref 130–400)
POTASSIUM SERPL-SCNC: 4.8 MEQ/L (ref 3.4–4.9)
RBC # BLD: 4.84 M/UL (ref 4.2–5.4)
SODIUM BLD-SCNC: 128 MEQ/L (ref 135–144)
TOTAL PROTEIN: 7 G/DL (ref 6.3–8)
TROPONIN: <0.01 NG/ML (ref 0–0.01)
WBC # BLD: 6.3 K/UL (ref 4.5–11)

## 2020-06-21 PROCEDURE — 99285 EMERGENCY DEPT VISIT HI MDM: CPT

## 2020-06-21 PROCEDURE — 80053 COMPREHEN METABOLIC PANEL: CPT

## 2020-06-21 PROCEDURE — 85025 COMPLETE CBC W/AUTO DIFF WBC: CPT

## 2020-06-21 PROCEDURE — 6360000002 HC RX W HCPCS: Performed by: EMERGENCY MEDICINE

## 2020-06-21 PROCEDURE — 36415 COLL VENOUS BLD VENIPUNCTURE: CPT

## 2020-06-21 PROCEDURE — 6370000000 HC RX 637 (ALT 250 FOR IP): Performed by: EMERGENCY MEDICINE

## 2020-06-21 PROCEDURE — 93005 ELECTROCARDIOGRAM TRACING: CPT | Performed by: EMERGENCY MEDICINE

## 2020-06-21 PROCEDURE — 84484 ASSAY OF TROPONIN QUANT: CPT

## 2020-06-21 PROCEDURE — 96374 THER/PROPH/DIAG INJ IV PUSH: CPT

## 2020-06-21 PROCEDURE — 83735 ASSAY OF MAGNESIUM: CPT

## 2020-06-21 RX ORDER — KETOROLAC TROMETHAMINE 30 MG/ML
30 INJECTION, SOLUTION INTRAMUSCULAR; INTRAVENOUS ONCE
Status: COMPLETED | OUTPATIENT
Start: 2020-06-21 | End: 2020-06-21

## 2020-06-21 RX ORDER — OXYCODONE HYDROCHLORIDE AND ACETAMINOPHEN 5; 325 MG/1; MG/1
1 TABLET ORAL ONCE
Status: COMPLETED | OUTPATIENT
Start: 2020-06-21 | End: 2020-06-21

## 2020-06-21 RX ADMIN — INSULIN HUMAN 20 UNITS: 100 INJECTION, SOLUTION PARENTERAL at 13:18

## 2020-06-21 RX ADMIN — OXYCODONE HYDROCHLORIDE AND ACETAMINOPHEN 1 TABLET: 5; 325 TABLET ORAL at 11:00

## 2020-06-21 RX ADMIN — KETOROLAC TROMETHAMINE 30 MG: 30 INJECTION, SOLUTION INTRAMUSCULAR at 11:01

## 2020-06-21 ASSESSMENT — ENCOUNTER SYMPTOMS
SORE THROAT: 0
DIARRHEA: 0
NAUSEA: 0
VOMITING: 0
COUGH: 0
BACK PAIN: 0
SHORTNESS OF BREATH: 0
ABDOMINAL PAIN: 0

## 2020-06-21 ASSESSMENT — PAIN SCALES - GENERAL
PAINLEVEL_OUTOF10: 9
PAINLEVEL_OUTOF10: 10
PAINLEVEL_OUTOF10: 9

## 2020-06-21 ASSESSMENT — PAIN DESCRIPTION - LOCATION: LOCATION: CHEST;ANKLE

## 2020-06-21 ASSESSMENT — PAIN DESCRIPTION - PAIN TYPE: TYPE: ACUTE PAIN

## 2020-06-21 ASSESSMENT — PAIN DESCRIPTION - ORIENTATION: ORIENTATION: LEFT
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
## 2020-06-23 ENCOUNTER — VIRTUAL VISIT (OUTPATIENT)
Dept: FAMILY MEDICINE CLINIC | Age: 20
End: 2020-06-23
Payer: COMMERCIAL

## 2020-06-23 PROCEDURE — 99214 OFFICE O/P EST MOD 30 MIN: CPT | Performed by: INTERNAL MEDICINE

## 2020-06-23 PROCEDURE — 93010 ELECTROCARDIOGRAM REPORT: CPT | Performed by: INTERNAL MEDICINE

## 2020-06-23 RX ORDER — OXYCODONE HYDROCHLORIDE AND ACETAMINOPHEN 5; 325 MG/1; MG/1
1 TABLET ORAL EVERY 6 HOURS PRN
Qty: 28 TABLET | Refills: 0 | Status: SHIPPED | OUTPATIENT
Start: 2020-06-23 | End: 2020-06-30 | Stop reason: SDUPTHER

## 2020-06-23 ASSESSMENT — ENCOUNTER SYMPTOMS
SINUS PAIN: 0
EYE DISCHARGE: 0
PHOTOPHOBIA: 0
BACK PAIN: 0
CONSTIPATION: 0
VOICE CHANGE: 0
RHINORRHEA: 0
ABDOMINAL PAIN: 0
VOMITING: 0
RECTAL PAIN: 0
EYE REDNESS: 0
SINUS PRESSURE: 0
COLOR CHANGE: 0
SORE THROAT: 0
FACIAL SWELLING: 0
DIARRHEA: 0
NAUSEA: 0
SHORTNESS OF BREATH: 0
BLOOD IN STOOL: 0
COUGH: 0
WHEEZING: 0
ABDOMINAL DISTENTION: 0
APNEA: 0
EYE ITCHING: 0
EYE PAIN: 0
TROUBLE SWALLOWING: 0
CHEST TIGHTNESS: 0

## 2020-06-23 NOTE — PROGRESS NOTES
(FREESTYLE) monitoring kit 1 kit by Does not apply route daily  Micheal Chamberlain MD   Lancets MISC DX: diabetes mellitus. Use 1-3 time(s) daily - Ok to substitute per insurance (1 each = 1 box)  Micheal Chamberlain MD   blood glucose monitor strips Test 3 times a day & as needed for symptoms of irregular blood glucose. Micheal Chamberlain MD   insulin lispro, 1 Unit Dial, (HUMALOG KWIKPEN) 100 UNIT/ML SOPN Inject 5 Units into the skin 3 times daily (before meals)  Micheal Chamberlain MD   buPROPion (WELLBUTRIN XL) 150 MG extended release tablet TAKE 1 TABLET BY MOUTH ONCE DAILY IN THE MORNING  Historical Provider, MD   ibuprofen (ADVIL;MOTRIN) 800 MG tablet Take 1 tablet by mouth every 8 hours as needed for Pain  SHAQUILLE Stockton - CNP   doxylamine-pyridoxine (DICLEGIS) 10-10 MG TBEC Start: 2 tabs p.o. q.h.s.; if symptoms persist after 2 days increase to 1 tab p.o. q.a.m. and 2 tabs p.o. q.h.s.; may increase to 1 tab p.o. q.a.m., 1 tab p.o. q. midafternoon and 2 tabs p.o. q.h.s.  4 tabs per day. If unable to afford, instruct the patient about substituting the OTC components.   Omer Hickey PA-C        Allergies   Allergen Reactions    Shellfish-Derived Products Anaphylaxis    Benzoyl Peroxide Rash       Past Medical History:   Diagnosis Date    Diabetes mellitus ([address]     GERD (gastroesophageal reflux disease)     Hidradenitis suppurativa     Hypertension     Obesity affecting pregnancy, antepartum, third trimester        Past Surgical History:   Procedure Laterality Date    UPPER GASTROINTESTINAL ENDOSCOPY         Social History     Socioeconomic History    Marital status: Single     Spouse name: Not on file    Number of children: Not on file    Years of education: Not on file    Highest education level: Not on file   Occupational History    Not on file   Social Needs    Financial resource strain: Not on file    Food insecurity     Worry: Not on file     Inability: Not on file   Forgame needs     Medical: Not on file     Non-medical: Not on file   Tobacco Use    Smoking status: Current Every Day Smoker     Packs/day: 0.50    Smokeless tobacco: Never Used   Substance and Sexual Activity    Alcohol use: No    Drug use: No    Sexual activity: Not on file   Lifestyle    Physical activity     Days per week: Not on file     Minutes per session: Not on file    Stress: Not on file   Relationships    Social connections     Talks on phone: Not on file     Gets together: Not on file     Attends Bahai service: Not on file     Active member of club or organization: Not on file     Attends meetings of clubs or organizations: Not on file     Relationship status: Not on file    Intimate partner violence     Fear of current or ex partner: Not on file     Emotionally abused: Not on file     Physically abused: Not on file     Forced sexual activity: Not on file   Other Topics Concern    Not on file   Social History Narrative    Not on file        No family history on file. There were no vitals filed for this visit. Estimated body mass index is 43.79 kg/m² as calculated from the following:    Height as of 6/21/20: 5' 8\" (1.727 m). Weight as of 6/21/20: 288 lb (130.6 kg). Physical Exam    ASSESSMENT/PLAN:  1. Type 2 diabetes mellitus without complication, with long-term current use of insulin ([address]  I have encouraged the patient to begin to comply with her anti-hyperglycemics to include Lantus and Humalog    2. Hidradenitis suppurativa/abscess- Percocet as needed for pain. I referred the patient to dermatology however she has had difficulty scheduling appointment. I have encouraged her to obtain a dermatology appointment. No follow-ups on file. TELEHEALTH EVALUATION -- Audio/Visual (During GSNVR-17 public health emergency)    -   Hannah Ludwig is a 21 y.o. female being evaluated by a Virtual Visit (video visit) encounter to address concerns as mentioned above.   A caregiver was hours.     Patient identification was verified at the start of the visit: Yes    Total Time: minutes: 21-30 minutes    Note: not billable if this call serves to triage the patient into an appointment for the relevant concern      [employer]

## 2020-06-24 RX ORDER — OXYCODONE HYDROCHLORIDE AND ACETAMINOPHEN 5; 325 MG/1; MG/1
1 TABLET ORAL EVERY 4 HOURS PRN
Qty: 42 TABLET | Refills: 0 | OUTPATIENT
Start: 2020-06-24 | End: 2020-07-01

## 2020-06-30 ENCOUNTER — VIRTUAL VISIT (OUTPATIENT)
Dept: FAMILY MEDICINE CLINIC | Age: 20
End: 2020-06-30
Payer: COMMERCIAL

## 2020-06-30 ENCOUNTER — HOSPITAL ENCOUNTER (EMERGENCY)
Age: 20
Discharge: LEFT BEFORE DISCHARGE | End: 2020-06-30
Attending: STUDENT IN AN ORGANIZED HEALTH CARE EDUCATION/TRAINING PROGRAM
Payer: COMMERCIAL

## 2020-06-30 VITALS
DIASTOLIC BLOOD PRESSURE: 78 MMHG | BODY MASS INDEX: 43.33 KG/M2 | TEMPERATURE: 97.3 F | WEIGHT: 285 LBS | RESPIRATION RATE: 19 BRPM | SYSTOLIC BLOOD PRESSURE: 125 MMHG | OXYGEN SATURATION: 97 % | HEART RATE: 99 BPM

## 2020-06-30 LAB
BACTERIA: NEGATIVE /HPF
BILIRUBIN URINE: NEGATIVE
BLOOD, URINE: ABNORMAL
CLARITY: ABNORMAL
COLOR: ABNORMAL
EPITHELIAL CELLS, UA: ABNORMAL /HPF (ref 0–5)
GLUCOSE URINE: >=1000 MG/DL
HCG, URINE, POC: NEGATIVE
HYALINE CASTS: ABNORMAL /HPF (ref 0–5)
KETONES, URINE: 15 MG/DL
LEUKOCYTE ESTERASE, URINE: ABNORMAL
Lab: NORMAL
NEGATIVE QC PASS/FAIL: NORMAL
NITRITE, URINE: NEGATIVE
PH UA: 5.5 (ref 5–9)
POSITIVE QC PASS/FAIL: NORMAL
PROTEIN UA: 100 MG/DL
RBC UA: >100 /HPF (ref 0–5)
SPECIFIC GRAVITY UA: 1.04 (ref 1–1.03)
URINE REFLEX TO CULTURE: ABNORMAL
UROBILINOGEN, URINE: 0.2 E.U./DL
WBC UA: ABNORMAL /HPF (ref 0–5)

## 2020-06-30 PROCEDURE — 2500000003 HC RX 250 WO HCPCS: Performed by: STUDENT IN AN ORGANIZED HEALTH CARE EDUCATION/TRAINING PROGRAM

## 2020-06-30 PROCEDURE — 6370000000 HC RX 637 (ALT 250 FOR IP): Performed by: STUDENT IN AN ORGANIZED HEALTH CARE EDUCATION/TRAINING PROGRAM

## 2020-06-30 PROCEDURE — G8428 CUR MEDS NOT DOCUMENT: HCPCS | Performed by: INTERNAL MEDICINE

## 2020-06-30 PROCEDURE — 87075 CULTR BACTERIA EXCEPT BLOOD: CPT

## 2020-06-30 PROCEDURE — 81001 URINALYSIS AUTO W/SCOPE: CPT

## 2020-06-30 PROCEDURE — 3046F HEMOGLOBIN A1C LEVEL >9.0%: CPT | Performed by: INTERNAL MEDICINE

## 2020-06-30 PROCEDURE — 10061 I&D ABSCESS COMP/MULTIPLE: CPT

## 2020-06-30 PROCEDURE — 87070 CULTURE OTHR SPECIMN AEROBIC: CPT

## 2020-06-30 PROCEDURE — 2022F DILAT RTA XM EVC RTNOPTHY: CPT | Performed by: INTERNAL MEDICINE

## 2020-06-30 PROCEDURE — 87205 SMEAR GRAM STAIN: CPT

## 2020-06-30 PROCEDURE — 99213 OFFICE O/P EST LOW 20 MIN: CPT | Performed by: INTERNAL MEDICINE

## 2020-06-30 PROCEDURE — 99283 EMERGENCY DEPT VISIT LOW MDM: CPT

## 2020-06-30 RX ORDER — OXYCODONE HYDROCHLORIDE AND ACETAMINOPHEN 5; 325 MG/1; MG/1
1 TABLET ORAL EVERY 8 HOURS PRN
Qty: 21 TABLET | Refills: 0 | Status: SHIPPED | OUTPATIENT
Start: 2020-06-30 | End: 2020-07-07

## 2020-06-30 RX ORDER — ACETAMINOPHEN 500 MG
1000 TABLET ORAL ONCE
Status: COMPLETED | OUTPATIENT
Start: 2020-06-30 | End: 2020-06-30

## 2020-06-30 RX ORDER — CLINDAMYCIN HYDROCHLORIDE 300 MG/1
300 CAPSULE ORAL 3 TIMES DAILY
Qty: 30 CAPSULE | Refills: 0 | Status: SHIPPED | OUTPATIENT
Start: 2020-06-30 | End: 2020-07-10

## 2020-06-30 RX ORDER — LIDOCAINE HYDROCHLORIDE AND EPINEPHRINE 10; 10 MG/ML; UG/ML
20 INJECTION, SOLUTION INFILTRATION; PERINEURAL ONCE
Status: COMPLETED | OUTPATIENT
Start: 2020-06-30 | End: 2020-06-30

## 2020-06-30 RX ORDER — CLINDAMYCIN HYDROCHLORIDE 150 MG/1
300 CAPSULE ORAL ONCE
Status: DISCONTINUED | OUTPATIENT
Start: 2020-06-30 | End: 2020-06-30 | Stop reason: HOSPADM

## 2020-06-30 RX ORDER — TRAMADOL HYDROCHLORIDE 50 MG/1
100 TABLET ORAL ONCE
Status: COMPLETED | OUTPATIENT
Start: 2020-06-30 | End: 2020-06-30

## 2020-06-30 RX ADMIN — LIDOCAINE HYDROCHLORIDE,EPINEPHRINE BITARTRATE 20 ML: 10; .01 INJECTION, SOLUTION INFILTRATION; PERINEURAL at 16:26

## 2020-06-30 RX ADMIN — TRAMADOL HYDROCHLORIDE 100 MG: 50 TABLET, FILM COATED ORAL at 16:25

## 2020-06-30 RX ADMIN — ACETAMINOPHEN 1000 MG: 500 TABLET ORAL at 15:25

## 2020-06-30 ASSESSMENT — ENCOUNTER SYMPTOMS
RECTAL PAIN: 0
EYE REDNESS: 0
DIARRHEA: 0
VOMITING: 0
APNEA: 0
BACK PAIN: 0
TROUBLE SWALLOWING: 0
SINUS PRESSURE: 0
SORE THROAT: 0
ABDOMINAL DISTENTION: 0
WHEEZING: 0
COLOR CHANGE: 0
EYE ITCHING: 0
ABDOMINAL PAIN: 0
SINUS PAIN: 0
DIARRHEA: 0
VOICE CHANGE: 0
FACIAL SWELLING: 0
RHINORRHEA: 0
NAUSEA: 0
CHEST TIGHTNESS: 0
COUGH: 0
SHORTNESS OF BREATH: 0
ABDOMINAL PAIN: 0
EYE PAIN: 0
EYE DISCHARGE: 0
PHOTOPHOBIA: 0
BLOOD IN STOOL: 0
VOMITING: 0
SHORTNESS OF BREATH: 0
SINUS PRESSURE: 0
TROUBLE SWALLOWING: 0
CHEST TIGHTNESS: 0
COUGH: 0
BACK PAIN: 0
CONSTIPATION: 0

## 2020-06-30 ASSESSMENT — PAIN DESCRIPTION - LOCATION: LOCATION: GROIN

## 2020-06-30 ASSESSMENT — PAIN SCALES - GENERAL: PAINLEVEL_OUTOF10: 10

## 2020-06-30 ASSESSMENT — PAIN DESCRIPTION - ORIENTATION: ORIENTATION: LEFT

## 2020-06-30 NOTE — ED NOTES
In room with Dr Amilcar Betancur to incise and drain abscess.      Aly Grace, RN  06/30/20 3442 [facility], RN  06/30/20 3575

## 2020-06-30 NOTE — ED NOTES
Patient refusing POCT glucose. Patient states \"I know it is high and you guys will try to keep me here. \"  Explained to patient that the doctor was attending to a critical patient and he would be in as soon as possible. Patient provided with a warm blanket.       Keira Anthony, RN  06/30/20 90 [facility] Yasir, BUDDY  06/30/20 1534

## 2020-06-30 NOTE — ED PROVIDER NOTES
[facility] [facility] ED  eMERGENCY dEPARTMENT eNCOUnter      Pt Name: Anastasiya Márquez  MRN: [id number]  [place] 2000  Date of evaluation: 6/30/2020  Provider: Luisa Renee, [address] [place] [address]       Chief Complaint   Patient presents with    Abscess     groin and left thigh         HISTORY OF PRESENT ILLNESS   (Location/Symptom, Timing/Onset,Context/Setting, Quality, Duration, Modifying Factors, Severity)  Note limiting factors. Anastasiya Márquez is a 21 y.o. female who presents to the emergency department with complaints of abscess to the left upper thigh. Patient denies any fever, chills or cough. Patient states is been there for 3 to 4 days. Patient states that she had a virtual visit with her family doctor and she decided to come to the emergency room so that she can have this drained. Patient states that she had pain medicine prescribed her via tele-audio conference with her family doctor. Patient states that her blood sugars tend to run high. The ED attending ordered an Accu-Chek but the patient refused stating I am not going to be admitted to the hospital when the sugar is high. Patient's last A1c was 12.9. She states that she had 1 done today but it has not resulted yet. Patient states that she only injects in a couple spots in her upper thigh. The ED attending explained that she needs deeper site rotation to prevent her sugars from going high. Patient denies any modifying factors making the abscess to her left medial thigh better. States touch or movement makes it worse. HPI    NursingNotes were reviewed. REVIEW OF SYSTEMS    (2-9 systems for level 4, 10 or more for level 5)     Review of Systems   Constitutional: Negative for activity change, appetite change, chills, fever and unexpected weight change. HENT: Negative for drooling, ear pain, nosebleeds, sinus pressure and trouble swallowing. Respiratory: Negative for cough, chest tightness and shortness of breath. Cardiovascular: Negative for chest pain and leg swelling. Gastrointestinal: Negative for abdominal pain, diarrhea and vomiting. Endocrine: Negative for polydipsia and polyphagia. Genitourinary: Negative for dysuria, flank pain and frequency. Musculoskeletal: Negative for back pain and myalgias. Skin: Positive for rash ( Abscess). Negative for pallor. Neurological: Negative for syncope, weakness and headaches. Hematological: Does not bruise/bleed easily. All other systems reviewed and are negative. Except as noted above the remainder of the review of systems was reviewed and negative. PAST MEDICAL HISTORY     Past Medical History:   Diagnosis Date    Diabetes mellitus [address]     GERD (gastroesophageal reflux disease)     Hidradenitis suppurativa     Hypertension     Obesity affecting pregnancy, antepartum, third trimester          SURGICALHISTORY       Past Surgical History:   Procedure Laterality Date    UPPER GASTROINTESTINAL ENDOSCOPY           CURRENT MEDICATIONS       Previous Medications    ALBUTEROL SULFATE  (90 BASE) MCG/ACT INHALER    Inhale 2 puffs into the lungs every 4 hours as needed for Wheezing    BLOOD GLUCOSE MONITOR STRIPS    Test 3 times a day & as needed for symptoms of irregular blood glucose. BUPROPION (WELLBUTRIN XL) 150 MG EXTENDED RELEASE TABLET    TAKE 1 TABLET BY MOUTH ONCE DAILY IN THE MORNING    DOXYLAMINE-PYRIDOXINE (DICLEGIS) 10-10 MG TBEC    Start: 2 tabs p.o. q.h.s.; if symptoms persist after 2 days increase to 1 tab p.o. q.a.m. and 2 tabs p.o. q.h.s.; may increase to 1 tab p.o. q.a.m., 1 tab p.o. q. midafternoon and 2 tabs p.o. q.h.s.  4 tabs per day. If unable to afford, instruct the patient about substituting the OTC components. FAMOTIDINE (PEPCID) 20 MG TABLET    Take 1 tablet by mouth daily    FLUCONAZOLE (DIFLUCAN) 150 MG TABLET    Take 1 tablet orally once. Then repeat in 3 days.     GLUCOSE MONITORING KIT (FREESTYLE) MONITORING KIT session: None    Stress: None   Relationships    Social connections     Talks on phone: None     Gets together: None     Attends Moravian service: None     Active member of club or organization: None     Attends meetings of clubs or organizations: None     Relationship status: None    Intimate partner violence     Fear of current or ex partner: None     Emotionally abused: None     Physically abused: None     Forced sexual activity: None   Other Topics Concern    None   Social History Narrative    None       SCREENINGS      @FLOW([id number])@      PHYSICAL EXAM    (up to 7 for level 4, 8 or more for level 5)     ED Triage Vitals [06/30/20 1415]   BP Temp Temp Source Pulse Resp SpO2 Height Weight   125/78 97.3 °F (36.3 °C) Temporal 99 19 97 % -- 285 lb (129.3 kg)       Physical Exam  Vitals signs and nursing note reviewed. Constitutional:       General: She is awake. She is not in acute distress. Appearance: She is well-developed. She is morbidly obese. She is not ill-appearing, toxic-appearing or diaphoretic. HENT:      Head: Normocephalic and atraumatic. No Gonzalez's sign. Right Ear: External ear normal.      Left Ear: External ear normal.      Nose: Nose normal.      Mouth/Throat:      Pharynx: No oropharyngeal exudate. Eyes:      General: No scleral icterus. Right eye: No foreign body. Conjunctiva/sclera: Conjunctivae normal.      Left eye: No exudate. Pupils: Pupils are equal, round, and reactive to light. Neck:      Musculoskeletal: Normal range of motion and neck supple. No neck rigidity. Vascular: No JVD. Trachea: No tracheal deviation. Cardiovascular:      Rate and Rhythm: Normal rate and regular rhythm. Pulses: Normal pulses. Heart sounds: Normal heart sounds. Heart sounds not distant. No murmur. No friction rub. No gallop. Pulmonary:      Effort: Pulmonary effort is normal. No respiratory distress. Breath sounds: Normal breath sounds.  No deloculated    Drainage:  Purulent    Drainage amount:  Scant    Packing materials:  1/2 in iodoform gauze    Amount 1/2\" iodoform:  4 cm  Post-procedure details:     Patient tolerance of procedure: Tolerated with difficulty  Comments:      She was given 2 tablets of Ultram per her request for pain medicine. FINAL IMPRESSION      1. Abscess of left thigh    2. Hyperglycemia due to type 1 diabetes mellitus [facility])          DISPOSITION/PLAN   DISPOSITION Discharge - Pending Orders Complete 06/30/2020 04:31:38 PM      PATIENT REFERRED TO:  Osmar Spring MD  [address] [facility] [address] [facility]  [phone]    Call in 1 day      [address]  [address] [facility]  [phone]  Call in 1 day  Call . [address] [facility] [facility] care center for follow up. They can do dressing changes and wound evaluation.       DISCHARGE MEDICATIONS:  New Prescriptions    CLINDAMYCIN (CLEOCIN) 300 MG CAPSULE    Take 1 capsule by mouth 3 times daily for 10 days          (Please note that portions of this note were completed with a voice recognition program.  Efforts were made to edit the dictations but occasionally words are mis-transcribed.)    Shaan Bergeron DO (electronically signed)  Attending Emergency Physician          Shaan Bergeron DO  06/30/20 5500

## 2020-06-30 NOTE — ED TRIAGE NOTES
Pt to ER with c/o abscess to left groin and thigh, pt states pain is 10/10, pt a&ox4, resp even and unlabored

## 2020-06-30 NOTE — ED NOTES
Tylenol brought to patient. Patient states \" I have been here an hour and my pain is a 10/10 and you're up here bringing me Tylenol. \" Attempted to explain to patient the reason POC glucose was needed and patient states \"just get the doctor in here so he can drain my abscess and I can go. \"     Gelacio Urbina RN  06/30/20 88 [facility] BUDDY Rosenberg  06/30/20 7116

## 2020-06-30 NOTE — ED NOTES
In room to give patient ordered antibiotic and discharge instructions.  Patient and patients belongings not in room     Jose Manuel Cotton RN  06/30/20 2116

## 2020-06-30 NOTE — PROGRESS NOTES
2020    Brad Aburto (:  2000) is a 21 y.o. female, here for evaluation of the following medical concerns:    HPI  57-year-old type II diabetic presents for follow-up visit. .  She continues to experience several micro abscesses and pain associated with hidradenitis suppurativa/abscess. The pain is tolerable via Percocet. At present he denies polyuria,  Polydipsia, constitutional, sinus, visual, cardiopulmonary, urologic, gastrointestinal, immunologic/hematologic, musculoskeletal, neurologic, additional dermatologic, or psychiatric complaints. Review of Systems   Constitutional: Negative for chills, diaphoresis, fatigue and fever. HENT: Negative for congestion, dental problem, drooling, ear discharge, ear pain, facial swelling, hearing loss, mouth sores, nosebleeds, postnasal drip, rhinorrhea, sinus pressure, sinus pain, sneezing, sore throat, tinnitus, trouble swallowing and voice change. Eyes: Negative for photophobia, pain, discharge, redness, itching and visual disturbance. Respiratory: Negative for apnea, cough, chest tightness, shortness of breath and wheezing. Cardiovascular: Negative for chest pain, palpitations and leg swelling. Gastrointestinal: Negative for abdominal distention, abdominal pain, blood in stool, constipation, diarrhea, nausea, rectal pain and vomiting. Endocrine: Negative for cold intolerance, heat intolerance, polydipsia, polyphagia and polyuria. Genitourinary: Negative for decreased urine volume, difficulty urinating, dysuria, flank pain, frequency, genital sores, hematuria and urgency. Musculoskeletal: Negative for arthralgias, back pain, gait problem, joint swelling, myalgias, neck pain and neck stiffness. Skin: Negative for color change, rash and wound. Allergic/Immunologic: Negative for environmental allergies and food allergies.    Neurological: Negative for dizziness, tremors, seizures, syncope, facial asymmetry, speech difficulty, weakness, light-headedness, numbness and headaches. Hematological: Negative for adenopathy. Does not bruise/bleed easily. Psychiatric/Behavioral: Negative for agitation, confusion, decreased concentration, hallucinations, self-injury, sleep disturbance and suicidal ideas. The patient is not nervous/anxious. Prior to Visit Medications    Medication Sig Taking? Authorizing Provider   oxyCODONE-acetaminophen (PERCOCET) 5-325 MG per tablet Take 1 tablet by mouth every 8 hours as needed for Pain for up to 7 days. Intended supply: 7 days. Yes Zack Willams MD   naproxen (NAPROSYN) 500 MG tablet Take 1 tablet by mouth 2 times daily (with meals)  Roxanne Vega,    predniSONE (DELTASONE) 10 MG tablet Take 4 tabs po daily for 2 days then 2 tabs po daily for 2 days then 1 tab po daily for 2 days then 0.5 tabs po daily for 2 days then stop. Zack Willams MD   Zinc Gluconate 15 MG TABS Take 3 tabs daily  Zack Willams MD   fluconazole (DIFLUCAN) 150 MG tablet Take 1 tablet orally once. Then repeat in 3 days. Zack Willams MD   famotidine (PEPCID) 20 MG tablet Take 1 tablet by mouth daily  Luz Marina Muñoz PA-C   Insulin Pen Needle 30G X 8 MM MISC 1 each by Does not apply route daily  Luz Marina Muñoz PA-C   methylPREDNISolone (MEDROL, MANNY,) 4 MG tablet Take by mouth. SHAQUILLE Farr CNP   albuterol sulfate  (90 Base) MCG/ACT inhaler Inhale 2 puffs into the lungs every 4 hours as needed for Wheezing  SHAQUILLE Farr CNP   insulin glargine (LANTUS SOLOSTAR) 100 UNIT/ML injection pen Inject 10 Units into the skin nightly  Zack Willams MD   glucose monitoring kit (FREESTYLE) monitoring kit 1 kit by Does not apply route daily  Zack Willams MD   Lancets MISC DX: diabetes mellitus.  Use 1-3 time(s) daily - Ok to substitute per insurance (1 each = 1 box)  Zack Willams MD   blood glucose monitor strips Test 3 times a day & as needed for symptoms of irregular blood glucose. Diann Colorado MD   insulin lispro, 1 Unit Dial, (HUMALOG KWIKPEN) 100 UNIT/ML SOPN Inject 5 Units into the skin 3 times daily (before meals)  Diann Colorado MD   buPROPion (WELLBUTRIN XL) 150 MG extended release tablet TAKE 1 TABLET BY MOUTH ONCE DAILY IN THE MORNING  Historical Provider, MD   ibuprofen (ADVIL;MOTRIN) 800 MG tablet Take 1 tablet by mouth every 8 hours as needed for Pain  SHAQUILLE Jean-Baptiste - CNP   doxylamine-pyridoxine (DICLEGIS) 10-10 MG TBEC Start: 2 tabs p.o. q.h.s.; if symptoms persist after 2 days increase to 1 tab p.o. q.a.m. and 2 tabs p.o. q.h.s.; may increase to 1 tab p.o. q.a.m., 1 tab p.o. q. midafternoon and 2 tabs p.o. q.h.s.  4 tabs per day. If unable to afford, instruct the patient about substituting the OTC components.   Omer Magdaleno PA-C        Allergies   Allergen Reactions    Shellfish-Derived Products Anaphylaxis    Benzoyl Peroxide Rash       Past Medical History:   Diagnosis Date    [address] mellitus ([facility] [address]     GERD (gastroesophageal reflux disease)     Hidradenitis suppurativa     Hypertension     Obesity affecting pregnancy, antepartum, third trimester        Past Surgical History:   Procedure Laterality Date    UPPER GASTROINTESTINAL ENDOSCOPY         Social History     Socioeconomic History    Marital status: Single     Spouse name: Not on file    Number of children: Not on file    Years of education: Not on file    Highest education level: Not on file   Occupational History    Not on file   Social Needs    Financial resource strain: Not on file    Food insecurity     Worry: Not on file     Inability: Not on file    Transportation needs     Medical: Not on file     Non-medical: Not on file   Tobacco Use    Smoking status: Current Every Day Smoker     Packs/day: 0.50    Smokeless tobacco: Never Used   Substance and Sexual Activity    Alcohol use: No    Drug use: No    Sexual activity: Not on file   Lifestyle    Physical activity National Emergencies Act, [facility] waiver authority and the [employer] Act, this Virtual Visit was conducted with patient's (and/or legal guardian's) consent, to reduce the patient's risk of exposure to COVID-19 and provide necessary medical care. The patient (and/or legal guardian) has also been advised to contact this office for worsening conditions or problems, and seek emergency medical treatment and/or call 911 if deemed necessary. Services were provided through a video synchronous discussion virtually to substitute for in-person clinic visit. Type of encounter was _x_ Doxy __ MyChart ___Facetime    Patient was located at their home. Provider was located at their ___x home or        ____ office. --Mae Agarwal MD on 6/30/2020 at 1:02 PM    An electronic signature was used to authenticate this note. An  electronic signature was used to authenticate this note. Peter Roebrts is a 21 y.o. female evaluated via telephone on 6/30/2020. Consent:  She and/or health care decision maker is aware that that she may receive a bill for this telephone service, depending on her insurance coverage, and has provided verbal consent to proceed: Yes      Documentation:  I communicated with the patient and/or health care decision maker about hidraadenitis suppurativa and type 2 diabetes. Details of this discussion including any medical advice provided: please refer to note fermín       I affirm this is a Patient Initiated Episode with a Patient who has not had a related appointment within my department in the past 7 days or scheduled within the next 24 hours.     Patient identification was verified at the start of the visit: Yes    Total Time: minutes: 21-30 minutes    Note: not billable if this call serves to triage the patient into an appointment for the relevant concern      Mae Agarwal

## 2020-06-30 NOTE — ED NOTES
Patient states \"I have never had these drained without pain medication. \" When asked what she is normally given patient states \"norco, vicodin or percocet. \" Dr Jesus Schwartz in room trying to explain procedure.       Hansel Jaimes RN  06/30/20 8589

## 2020-06-30 NOTE — ED NOTES
Patient states she is giving us \"15 minutes to get in here and drain this bump\" or she is leaving.       Eder Buckley RN  06/30/20 9730

## 2020-07-02 ENCOUNTER — HOSPITAL ENCOUNTER (EMERGENCY)
Age: 20
Discharge: HOME | End: 2020-07-03
Payer: COMMERCIAL

## 2020-07-02 VITALS
WEIGHT: 285 LBS | SYSTOLIC BLOOD PRESSURE: 141 MMHG | BODY MASS INDEX: 43.19 KG/M2 | DIASTOLIC BLOOD PRESSURE: 71 MMHG | HEIGHT: 68 IN | OXYGEN SATURATION: 99 % | HEART RATE: 96 BPM | TEMPERATURE: 98.6 F | RESPIRATION RATE: 18 BRPM

## 2020-07-02 LAB
ALBUMIN SERPL-MCNC: 3.9 G/DL (ref 3.5–4.6)
ALP BLD-CCNC: 153 U/L (ref 40–130)
ALT SERPL-CCNC: 17 U/L (ref 0–33)
ANION GAP SERPL CALCULATED.3IONS-SCNC: 16 MEQ/L (ref 9–15)
AST SERPL-CCNC: 23 U/L (ref 0–35)
BACTERIA: NEGATIVE /HPF
BASOPHILS ABSOLUTE: 0.1 K/UL (ref 0–0.2)
BASOPHILS RELATIVE PERCENT: 1 %
BILIRUB SERPL-MCNC: 0.3 MG/DL (ref 0.2–0.7)
BILIRUBIN URINE: ABNORMAL
BLOOD, URINE: ABNORMAL
BUN BLDV-MCNC: 11 MG/DL (ref 6–20)
CALCIUM SERPL-MCNC: 9.3 MG/DL (ref 8.5–9.9)
CHLORIDE BLD-SCNC: 90 MEQ/L (ref 95–107)
CLARITY: ABNORMAL
CO2: 22 MEQ/L (ref 20–31)
COLOR: ABNORMAL
CREAT SERPL-MCNC: 0.78 MG/DL (ref 0.5–0.9)
EOSINOPHILS ABSOLUTE: 0.1 K/UL (ref 0–0.7)
EOSINOPHILS RELATIVE PERCENT: 1.8 %
EPITHELIAL CELLS, UA: ABNORMAL /HPF (ref 0–5)
GFR AFRICAN AMERICAN: >60
GFR NON-AFRICAN AMERICAN: >60
GLOBULIN: 3.7 G/DL (ref 2.3–3.5)
GLUCOSE BLD-MCNC: 745 MG/DL (ref 70–99)
GLUCOSE URINE: >=1000 MG/DL
HCG, URINE, POC: NEGATIVE
HCT VFR BLD CALC: 41.7 % (ref 37–47)
HEMOGLOBIN: 13.8 G/DL (ref 12–16)
KETONES, URINE: NEGATIVE MG/DL
LEUKOCYTE ESTERASE, URINE: ABNORMAL
LYMPHOCYTES ABSOLUTE: 2.8 K/UL (ref 1–4.8)
LYMPHOCYTES RELATIVE PERCENT: 33.8 %
Lab: NORMAL
MCH RBC QN AUTO: 29 PG (ref 27–31.3)
MCHC RBC AUTO-ENTMCNC: 33.2 % (ref 33–37)
MCV RBC AUTO: 87.4 FL (ref 82–100)
MONOCYTES ABSOLUTE: 0.6 K/UL (ref 0.2–0.8)
MONOCYTES RELATIVE PERCENT: 7.3 %
NEGATIVE QC PASS/FAIL: NORMAL
NEUTROPHILS ABSOLUTE: 4.6 K/UL (ref 1.4–6.5)
NEUTROPHILS RELATIVE PERCENT: 56.1 %
NITRITE, URINE: POSITIVE
PDW BLD-RTO: 12.9 % (ref 11.5–14.5)
PH UA: 5.5 (ref 5–9)
PLATELET # BLD: 305 K/UL (ref 130–400)
POSITIVE QC PASS/FAIL: NORMAL
POTASSIUM SERPL-SCNC: 4.9 MEQ/L (ref 3.4–4.9)
PROTEIN UA: 100 MG/DL
RBC # BLD: 4.77 M/UL (ref 4.2–5.4)
RBC UA: >100 /HPF (ref 0–2)
SODIUM BLD-SCNC: 128 MEQ/L (ref 135–144)
SPECIFIC GRAVITY UA: 1.03 (ref 1–1.03)
STREP GRP A PCR: NEGATIVE
TOTAL PROTEIN: 7.6 G/DL (ref 6.3–8)
URINE REFLEX TO CULTURE: YES
UROBILINOGEN, URINE: 0.2 E.U./DL
WBC # BLD: 8.2 K/UL (ref 4.5–11)
WBC UA: ABNORMAL /HPF (ref 0–5)

## 2020-07-02 PROCEDURE — 81001 URINALYSIS AUTO W/SCOPE: CPT

## 2020-07-02 PROCEDURE — 2580000003 HC RX 258: Performed by: PERSONAL EMERGENCY RESPONSE ATTENDANT

## 2020-07-02 PROCEDURE — 96374 THER/PROPH/DIAG INJ IV PUSH: CPT

## 2020-07-02 PROCEDURE — 87651 STREP A DNA AMP PROBE: CPT

## 2020-07-02 PROCEDURE — 80053 COMPREHEN METABOLIC PANEL: CPT

## 2020-07-02 PROCEDURE — 6370000000 HC RX 637 (ALT 250 FOR IP): Performed by: PERSONAL EMERGENCY RESPONSE ATTENDANT

## 2020-07-02 PROCEDURE — 36415 COLL VENOUS BLD VENIPUNCTURE: CPT

## 2020-07-02 PROCEDURE — 87086 URINE CULTURE/COLONY COUNT: CPT

## 2020-07-02 PROCEDURE — 85025 COMPLETE CBC W/AUTO DIFF WBC: CPT

## 2020-07-02 PROCEDURE — 6360000002 HC RX W HCPCS: Performed by: PERSONAL EMERGENCY RESPONSE ATTENDANT

## 2020-07-02 PROCEDURE — 99283 EMERGENCY DEPT VISIT LOW MDM: CPT

## 2020-07-02 RX ORDER — SULFAMETHOXAZOLE AND TRIMETHOPRIM 800; 160 MG/1; MG/1
1 TABLET ORAL 2 TIMES DAILY
Qty: 6 TABLET | Refills: 0 | Status: SHIPPED | OUTPATIENT
Start: 2020-07-02 | End: 2020-07-02

## 2020-07-02 RX ORDER — 0.9 % SODIUM CHLORIDE 0.9 %
2000 INTRAVENOUS SOLUTION INTRAVENOUS ONCE
Status: COMPLETED | OUTPATIENT
Start: 2020-07-02 | End: 2020-07-03

## 2020-07-02 RX ORDER — CLOTRIMAZOLE 1 %
CREAM (GRAM) TOPICAL
Qty: 1 TUBE | Refills: 0 | Status: SHIPPED | OUTPATIENT
Start: 2020-07-02

## 2020-07-02 RX ORDER — 0.9 % SODIUM CHLORIDE 0.9 %
1000 INTRAVENOUS SOLUTION INTRAVENOUS ONCE
Status: DISCONTINUED | OUTPATIENT
Start: 2020-07-02 | End: 2020-07-02

## 2020-07-02 RX ORDER — BLOOD-GLUCOSE METER
1 KIT MISCELLANEOUS DAILY
Qty: 1 KIT | Refills: 0 | Status: SHIPPED | OUTPATIENT
Start: 2020-07-02 | End: 2022-06-25 | Stop reason: SDUPTHER

## 2020-07-02 RX ADMIN — INSULIN HUMAN 15 UNITS: 100 INJECTION, SOLUTION PARENTERAL at 23:05

## 2020-07-02 RX ADMIN — SODIUM CHLORIDE 2000 ML: 9 INJECTION, SOLUTION INTRAVENOUS at 23:05

## 2020-07-02 ASSESSMENT — ENCOUNTER SYMPTOMS
BLOOD IN STOOL: 0
CONSTIPATION: 1
DIARRHEA: 0
COUGH: 0
COLOR CHANGE: 0
VOMITING: 0
NAUSEA: 0
ABDOMINAL PAIN: 1
RHINORRHEA: 0
SHORTNESS OF BREATH: 0
SORE THROAT: 1

## 2020-07-03 LAB
ANAEROBIC CULTURE: ABNORMAL
GLUCOSE BLD-MCNC: 299 MG/DL (ref 60–115)
GLUCOSE BLD-MCNC: 380 MG/DL (ref 60–115)
GRAM STAIN RESULT: ABNORMAL
ORGANISM: ABNORMAL
PERFORMED ON: ABNORMAL
PERFORMED ON: ABNORMAL
WOUND/ABSCESS: ABNORMAL

## 2020-07-03 PROCEDURE — 6370000000 HC RX 637 (ALT 250 FOR IP): Performed by: PERSONAL EMERGENCY RESPONSE ATTENDANT

## 2020-07-03 PROCEDURE — 96376 TX/PRO/DX INJ SAME DRUG ADON: CPT

## 2020-07-03 RX ORDER — IBUPROFEN 600 MG/1
600 TABLET ORAL EVERY 6 HOURS PRN
Qty: 30 TABLET | Refills: 0 | Status: SHIPPED | OUTPATIENT
Start: 2020-07-03 | End: 2020-10-28 | Stop reason: ALTCHOICE

## 2020-07-03 RX ADMIN — INSULIN HUMAN 8 UNITS: 100 INJECTION, SOLUTION PARENTERAL at 00:10

## 2020-07-03 NOTE — ED PROVIDER NOTES
Surgical History:   Procedure Laterality Date    UPPER GASTROINTESTINAL ENDOSCOPY           CURRENT MEDICATIONS       Previous Medications    ALBUTEROL SULFATE  (90 BASE) MCG/ACT INHALER    Inhale 2 puffs into the lungs every 4 hours as needed for Wheezing    BLOOD GLUCOSE MONITOR STRIPS    Test 3 times a day & as needed for symptoms of irregular blood glucose. BUPROPION (WELLBUTRIN XL) 150 MG EXTENDED RELEASE TABLET    TAKE 1 TABLET BY MOUTH ONCE DAILY IN THE MORNING    CLINDAMYCIN (CLEOCIN) 300 MG CAPSULE    Take 1 capsule by mouth 3 times daily for 10 days    DOXYLAMINE-PYRIDOXINE (DICLEGIS) 10-10 MG TBEC    Start: 2 tabs p.o. q.h.s.; if symptoms persist after 2 days increase to 1 tab p.o. q.a.m. and 2 tabs p.o. q.h.s.; may increase to 1 tab p.o. q.a.m., 1 tab p.o. q. midafternoon and 2 tabs p.o. q.h.s.  4 tabs per day. If unable to afford, instruct the patient about substituting the OTC components. FAMOTIDINE (PEPCID) 20 MG TABLET    Take 1 tablet by mouth daily    FLUCONAZOLE (DIFLUCAN) 150 MG TABLET    Take 1 tablet orally once. Then repeat in 3 days. IBUPROFEN (ADVIL;MOTRIN) 800 MG TABLET    Take 1 tablet by mouth every 8 hours as needed for Pain    INSULIN GLARGINE (LANTUS SOLOSTAR) 100 UNIT/ML INJECTION PEN    Inject 10 Units into the skin nightly    INSULIN LISPRO, 1 UNIT DIAL, (HUMALOG KWIKPEN) 100 UNIT/ML SOPN    Inject 5 Units into the skin 3 times daily (before meals)    INSULIN PEN NEEDLE 30G X 8 MM MISC    1 each by Does not apply route daily    LANCETS MISC    DX: diabetes mellitus. Use 1-3 time(s) daily - Ok to substitute per insurance (1 each = 1 box)    METHYLPREDNISOLONE (MEDROL, MANNY,) 4 MG TABLET    Take by mouth. NAPROXEN (NAPROSYN) 500 MG TABLET    Take 1 tablet by mouth 2 times daily (with meals)    OXYCODONE-ACETAMINOPHEN (PERCOCET) 5-325 MG PER TABLET    Take 1 tablet by mouth every 8 hours as needed for Pain for up to 7 days. Intended supply: 7 days.     PREDNISONE Head: Normocephalic and atraumatic. Mouth/Throat:      Pharynx: No oropharyngeal exudate or posterior oropharyngeal erythema. Eyes:      Conjunctiva/sclera: Conjunctivae normal.      Pupils: Pupils are equal, round, and reactive to light. Neck:      Musculoskeletal: Normal range of motion and neck supple. Trachea: No tracheal deviation. Cardiovascular:      Heart sounds: Normal heart sounds. Pulmonary:      Effort: Pulmonary effort is normal. No respiratory distress. Breath sounds: Normal breath sounds. No stridor. Abdominal:      General: Bowel sounds are normal. There is no distension. Palpations: Abdomen is soft. There is no mass. Tenderness: There is no abdominal tenderness. There is no guarding or rebound. Genitourinary:     Comments: Erythema and excoriation throughout perineal and vaginal area externally. Musculoskeletal: Normal range of motion. Skin:     General: Skin is warm and dry. Capillary Refill: Capillary refill takes less than 2 seconds. Findings: No rash. Neurological:      Mental Status: She is alert and oriented to person, place, and time. Deep Tendon Reflexes: Reflexes are normal and symmetric. Psychiatric:         Behavior: Behavior normal.         Thought Content:  Thought content normal.         Judgment: Judgment normal.         DIAGNOSTIC RESULTS     EKG:All EKG's are interpreted by the Emergency Department Physician who either signs or Co-signs this chart in the absence of a cardiologist.        RADIOLOGY:   Non-plain film images such as CT, Ultrasound and MRI are read by theradiologist. Plain radiographic images are visualized and preliminarily interpreted by the emergency physician with the below findings:    Interpretation per theRadiologist below, if available at the time of this note:    No orders to display           LABS:  Labs Reviewed   URINE RT REFLEX TO CULTURE - Abnormal; Notable for the following components: Result Value    Color, UA RED (*)     Clarity, UA TURBID (*)     Glucose, Ur >=1000 (*)     Bilirubin Urine SMALL (*)     Blood, Urine LARGE (*)     Protein,  (*)     Nitrite, Urine POSITIVE (*)     Leukocyte Esterase, Urine MODERATE (*)     All other components within normal limits   COMPREHENSIVE METABOLIC PANEL - Abnormal; Notable for the following components:    Sodium 128 (*)     Chloride 90 (*)     Anion Gap 16 (*)     Glucose 745 (*)     Alkaline Phosphatase 153 (*)     Globulin 3.7 (*)     All other components within normal limits    Narrative:     CALL  [facility]  [facility] tel. [phone],  glucose results called to and read back by nils kaur, 07/02/2020 22:45, by  [address] - Abnormal; Notable for the following components:    RBC, UA >100 (*)     WBC, UA 10-20 (*)     All other components within normal limits   RAPID STREP SCREEN   CULTURE, URINE   CBC WITH AUTO DIFFERENTIAL   POC PREGNANCY UR-QUAL       All other labs were within normal range or not returned as of this dictation. EMERGENCY DEPARTMENT COURSE and DIFFERENTIAL DIAGNOSIS/MDM:   Vitals:    Vitals:    07/02/20 2149 07/02/20 2153   BP: (!) 141/71    Pulse: 96    Resp: 18    Temp: 98.6 °F (37 °C)    TempSrc: Oral    SpO2: 99%    Weight:  285 lb (129.3 kg)   Height:  5' 8\" (1.727 m)         MDM    Blood work reveals sodium 128, chloride 90, anion gap 16, glucose 745. Patient does have positive nitrites and moderate leukocytes in urine. Negative bacteria. Pt is on her menstrual cycle and does still need to  abx at pharmacy. Strep negative. Patient was given 2 L IV fluids and insulin IV for hyperglycemia. She will be sent home with clotrimazole and glucometer prescription. Standard anticipatory guidance given to patient upon discharge. Have given them a specific time frame in which to follow-up and who to follow-up with.  I have also advised them that they should return to the emergency department if they get worse, or not getting better or develop any new or concerning symptoms. Patient demonstrates understanding. CRITICAL CARE TIME   Total Critical Caretime was 0 minutes, excluding separately reportable procedures. There was a high probability of clinically significant/life threatening deterioration in the patient's condition which required my urgent intervention. Procedures    FINAL IMPRESSION      1. Yeast vaginitis    2. Hyperglycemia    3. Acute cystitis without hematuria          DISPOSITION/PLAN   DISPOSITION Discharge - Pending Orders Complete 07/02/2020 11:04:41 PM      PATIENT REFERRED TO:  Robert Suero MD  [address] [place] [address]  [phone]    In 1 week        DISCHARGE MEDICATIONS:  New Prescriptions    CLOTRIMAZOLE (LOTRIMIN) 1 % CREAM    Apply topically 2 times daily to lesions on skin for 3-4 weeks or for 1 week after lesion clears    GLUCOSE MONITORING KIT (FREESTYLE) MONITORING KIT    1 kit by Does not apply route daily          (Please notethat portions of this note were completed with a voice recognition program.  Efforts were made to edit the dictations but occasionally words are mis-transcribed. )    LOLY Jacobson (electronically signed)  Emergency Physician Assistant         [place], [place]  07/02/20 8787

## 2020-07-03 NOTE — ED NOTES
Patient given discharge instructions, medications, and follow-up care. Patient verbalized understanding. Patient does not have any questions or concerns at this time. Patient is a&o x4. Respirations are even and unlabored. Skin is warm, pink, and dry. Patient is ambulatory.        Elva Lopez RN  07/03/20 1855

## 2020-07-03 NOTE — ED NOTES
Patients POC glucose rechecked. Glucose is 380. Aly SALCIDO notified.      Keri Conrad RN  07/03/20 0272

## 2020-07-03 NOTE — ED TRIAGE NOTES
Pt presents to the ED from home with c/o sore throat and UTI. Pt states she has pus pockets in the back of her throat and also dysuria. Upon assessment, pt A/Ox4, skin p/w/d, resp even and unlabored, msp's intact.

## 2020-07-03 NOTE — ED NOTES
Patient states she feels like she might have a UTI. Patient ambulated to restroom and given a urine specimen cup.  Tolerated activity well     Tonja Yun RN  07/02/20 3838

## 2020-07-04 LAB — URINE CULTURE, ROUTINE: NORMAL

## 2020-07-05 ENCOUNTER — CARE COORDINATION (OUTPATIENT)
Dept: CARE COORDINATION | Age: 20
End: 2020-07-05

## 2020-07-05 NOTE — CARE COORDINATION
Patient contacted regarding Alejandra Artis. Discussed COVID-19 related testing which was available at this time. Test results were negative. Patient informed of results, if available? Completed 2020    Care Transition Nurse/ Ambulatory Care Manager contacted the patient by telephone to perform post discharge assessment. Verified name and  with patient as identifiers. Provided introduction to self, and explanation of the CTN/ACM role, and reason for call due to risk factors for infection and/or exposure to COVID-19. Symptoms reviewed with patient who verbalized the following symptoms: fatigue, no new symptoms and no worsening symptoms. Due to no new or worsening symptoms encounter was not routed to provider for escalation. Discussed follow-up appointments. If no appointment was previously scheduled, appointment scheduling offered: Yes  Maggie Nahid Quach follow up appointment(s): No future appointments. Non-[facility] follow up appointment(s):      Patient has following risk factors of: diabetes. CTN/ACM reviewed discharge instructions, medical action plan and red flags such as increased shortness of breath, increasing fever and signs of decompensation with patient who verbalized understanding. Discussed exposure protocols and quarantine with CDC Guidelines What to do if you are sick with coronavirus disease .  Patient was given an opportunity for questions and concerns. The patient agrees to contact the [facility] exposure line [phone], local [facility] department [place] Department of Health: ([phone]) and PCP office for questions related to their healthcare. CTN/ACM provided contact information for future needs. Reviewed and educated patient on any new and changed medications related to discharge diagnosis     Patient/family/caregiver given information for Inder Borrego and agrees to enroll yes  Patient's preferred e-mail: [email or web address]  Patient's preferred phone number: [phone]  Based on Loop alert triggers, patient will be contacted by nurse care manager for worsening symptoms. Pt will be further monitored by COVID Loop Team based on severity of symptoms and risk factors. Woody Tobias says that she is about the same. She has not picked up her medications ordered in the ER but she will try to get them picked up today. I have reviewed all symptoms related to COVID along with preventative protocols. I have provided her with the phone numbers for [address] [place] [address] for Flu Screening.   I discussed the [employer] Symptom Tracker and she is interested in this program.  She verbalizes understanding of the information discussed,

## 2020-07-08 RX ORDER — OXYCODONE HYDROCHLORIDE AND ACETAMINOPHEN 5; 325 MG/1; MG/1
1 TABLET ORAL EVERY 8 HOURS PRN
Qty: 28 TABLET | Refills: 0 | Status: SHIPPED | OUTPATIENT
Start: 2020-07-08 | End: 2020-07-15

## 2020-07-24 ENCOUNTER — HOSPITAL ENCOUNTER (EMERGENCY)
Age: 20
Discharge: HOME | End: 2020-07-24
Attending: EMERGENCY MEDICINE
Payer: COMMERCIAL

## 2020-07-24 VITALS
WEIGHT: 280 LBS | HEIGHT: 68 IN | RESPIRATION RATE: 20 BRPM | BODY MASS INDEX: 42.44 KG/M2 | HEART RATE: 108 BPM | TEMPERATURE: 99.4 F | OXYGEN SATURATION: 98 % | SYSTOLIC BLOOD PRESSURE: 162 MMHG | DIASTOLIC BLOOD PRESSURE: 91 MMHG

## 2020-07-24 PROCEDURE — 6370000000 HC RX 637 (ALT 250 FOR IP): Performed by: EMERGENCY MEDICINE

## 2020-07-24 PROCEDURE — U0003 INFECTIOUS AGENT DETECTION BY NUCLEIC ACID (DNA OR RNA); SEVERE ACUTE RESPIRATORY SYNDROME CORONAVIRUS 2 (SARS-COV-2) (CORONAVIRUS DISEASE [COVID-19]), AMPLIFIED PROBE TECHNIQUE, MAKING USE OF HIGH THROUGHPUT TECHNOLOGIES AS DESCRIBED BY CMS-2020-01-R: HCPCS

## 2020-07-24 PROCEDURE — 99283 EMERGENCY DEPT VISIT LOW MDM: CPT

## 2020-07-24 RX ORDER — ALBUTEROL SULFATE 90 UG/1
2 AEROSOL, METERED RESPIRATORY (INHALATION) EVERY 4 HOURS PRN
Qty: 18 G | Refills: 1 | Status: SHIPPED | OUTPATIENT
Start: 2020-07-24 | End: 2021-06-07 | Stop reason: SDUPTHER

## 2020-07-24 RX ORDER — ONDANSETRON 4 MG/1
4 TABLET, ORALLY DISINTEGRATING ORAL ONCE
Status: COMPLETED | OUTPATIENT
Start: 2020-07-24 | End: 2020-07-24

## 2020-07-24 RX ORDER — GUAIFENESIN AND CODEINE PHOSPHATE 100; 10 MG/5ML; MG/5ML
5 SOLUTION ORAL 3 TIMES DAILY PRN
Qty: 1 BOTTLE | Refills: 0 | Status: SHIPPED | OUTPATIENT
Start: 2020-07-24 | End: 2020-07-29

## 2020-07-24 RX ORDER — IBUPROFEN 800 MG/1
800 TABLET ORAL ONCE
Status: COMPLETED | OUTPATIENT
Start: 2020-07-24 | End: 2020-07-24

## 2020-07-24 RX ORDER — AZITHROMYCIN 250 MG/1
TABLET, FILM COATED ORAL
Qty: 1 PACKET | Refills: 0 | Status: SHIPPED | OUTPATIENT
Start: 2020-07-24 | End: 2020-08-03

## 2020-07-24 RX ADMIN — ONDANSETRON 4 MG: 4 TABLET, ORALLY DISINTEGRATING ORAL at 21:01

## 2020-07-24 RX ADMIN — IBUPROFEN 800 MG: 800 TABLET, FILM COATED ORAL at 21:01

## 2020-07-24 ASSESSMENT — PAIN SCALES - GENERAL
PAINLEVEL_OUTOF10: 7
PAINLEVEL_OUTOF10: 7

## 2020-07-24 ASSESSMENT — PAIN DESCRIPTION - FREQUENCY: FREQUENCY: CONTINUOUS

## 2020-07-24 ASSESSMENT — PAIN DESCRIPTION - PAIN TYPE: TYPE: ACUTE PAIN

## 2020-07-24 ASSESSMENT — PAIN DESCRIPTION - LOCATION: LOCATION: HEAD

## 2020-07-25 ENCOUNTER — CARE COORDINATION (OUTPATIENT)
Dept: CARE COORDINATION | Age: 20
End: 2020-07-25

## 2020-07-25 RX ORDER — DEXTROMETHORPHAN HYDROBROMIDE AND PROMETHAZINE HYDROCHLORIDE 15; 6.25 MG/5ML; MG/5ML
5 SYRUP ORAL 4 TIMES DAILY PRN
Qty: 120 ML | Refills: 0 | Status: SHIPPED | OUTPATIENT
Start: 2020-07-25 | End: 2020-07-31

## 2020-07-25 NOTE — ED PROVIDER NOTES
HPI:  7/24/20, Time: 8:26 PM EDT         Charlotte Saldivar is a 21 y.o. female presenting to the ED for cough, beginning one week ago. The complaint has been intermittent, mild in severity, and worsened by nothing. Cough is been productive of yellow mucus there has been shortness of breath and wheezing she has a history of asthma she has been using her inhaler which helps. No fever no chills. No body aches. No nausea no vomiting    ROS:   Pertinent positives and negatives are stated within HPI, all other systems reviewed and are negative.  --------------------------------------------- PAST HISTORY ---------------------------------------------  Past Medical History:  has a past medical history of Diabetes mellitus ([address] GERD (gastroesophageal reflux disease), Hidradenitis suppurativa, Hypertension, and Obesity affecting pregnancy, antepartum, third trimester. Past Surgical History:  has a past surgical history that includes Upper gastrointestinal endoscopy. Social History:  reports that she has been smoking. She has been smoking about 0.50 packs per day. She has never used smokeless tobacco. She reports that she does not drink alcohol or use drugs. Family History: family history is not on file. The patients home medications have been reviewed.     Allergies: Shellfish-derived products and Benzoyl peroxide    ---------------------------------------------------PHYSICAL EXAM--------------------------------------     Constitutional/General: Alert and oriented x3, well appearing, non toxic in NAD  Head: Normocephalic and atraumatic  Eyes: PERRL, EOMI  Ears reveal no erythema no effusion  Nares reveal nasal congestion  Mouth: Oropharynx clear, handling secretions, no trismus no erythema no exudate  Neck: Supple, full ROM, non tender to palpation in the midline, no stridor, no crepitus, no meningeal signs  Pulmonary: Lungs revealed diminished breath sounds with end expiratory wheezing bilaterally that clears with coughing. No rales no rhonchi   cardiovascular:  Regular rate. Regular rhythm. No murmurs, gallops, or rubs. 2+ distal pulses  Chest: no chest wall tenderness  Abdomen: Soft. Non tender. Non distended. +BS. No rebound, guarding, or rigidity. No pulsatile masses appreciated. Musculoskeletal: Moves all extremities x 4. Warm and well perfused, no clubbing, cyanosis, or edema. Capillary refill <3 seconds  Skin: warm and dry. No rashes. Neurologic: GCS 15, CN 2-12 grossly intact, no focal deficits, symmetric strength 5/5 in the upper and lower extremities bilaterally  Psych: Normal Affect    -------------------------------------------------- RESULTS -------------------------------------------------  I have personally reviewed all laboratory and imaging results for this patient. Results are listed below. LABS:  No results found for this visit on 07/24/20. RADIOLOGY:  Interpreted by Radiologist.  No orders to display           ------------------------- NURSING NOTES AND VITALS REVIEWED ---------------------------   The nursing notes within the ED encounter and vital signs as below have been reviewed by myself. BP (!) 162/91   Pulse 108   Temp 99.4 °F (37.4 °C)   Resp 20   Ht 5' 8\" (1.727 m)   Wt 280 lb (127 kg)   SpO2 98%   BMI 42.57 kg/m²   Oxygen Saturation Interpretation: Normal    The patients available past medical records and past encounters were reviewed. ------------------------------ ED COURSE/MEDICAL DECISION MAKING----------------------  Medications - No data to display          Medical Decision Making:    The patient has a history of diabetes so I could not put her on any steroids because she states she has poor glycemic control she was given a pro-air inhaler Zithromax and guaifenesin with codeine  Patient educated about their test results which include potential coronavirus.   Given that the patient has fever/chills, cough, n/v, myalgias, sob coronavirus was listed as one of her potential etiologies. Patient informed that the current [place] [facility] recommendations are that if you symptoms are mild to go home and self quarantine for 2 weeks. Patient understands this and is in agreement of this plan. Patient given prescription for Z-[place], given infection warning signs and will go home and self quarantine. Follow up with pcp. This patient's ED course included: a personal history and physicial eaxmination    This patient has remained hemodynamically stable during their ED course. Counseling: The emergency provider has spoken with the patient and discussed todays results, in addition to providing specific details for the plan of care and counseling regarding the diagnosis and prognosis. Questions are answered at this time and they are agreeable with the plan.       --------------------------------- IMPRESSION AND DISPOSITION ---------------------------------    IMPRESSION  1. Bronchitis    2. Suspected COVID-19 virus infection        DISPOSITION  Disposition: Discharge to home  Patient condition is good        NOTE: This report was transcribed using voice recognition software.  Every effort was made to ensure accuracy; however, inadvertent computerized transcription errors may be present          Erica Hernandez MD  07/24/20 2042

## 2020-07-25 NOTE — ED PROVIDER NOTES
Special notation on patient recent recent visit of 7/24/2020. Patient was unable to fill prescription of antitussive agents as written by Dr. Joyce Finn. I did review the chart. I do have the patient's original prescription and will destroy it. Have provided patient with prescription for Promethazine DM. Please see remainder of doctor's charting for medical care and treatment strategy.      Erik Willams MD  07/25/20 4355

## 2020-07-25 NOTE — CARE COORDINATION
Patient contacted regarding COVID-19 risk and screening. Discussed COVID-19 related testing which was pending at this time. Test results were pending. Patient informed of results, if available? Pending results. Care Transition Nurse/ Ambulatory Care Manager contacted the patient by telephone to perform follow-up assessment. Verified name and  with patient as identifiers. Patient has following risk factors of: asthma, diabetes and chronic kidney disease. Symptoms reviewed with patient who verbalized the following symptoms: fever, pain or aching joints, cough, shortness of breath, chills or shaking, nausea and vomiting. Due to new onset of symptoms encounter was routed to provider for escalation. Education provided regarding infection prevention, and signs and symptoms of COVID-19 and when to seek medical attention with patient who verbalized understanding. Discussed exposure protocols and quarantine from [address] you at higher risk for severe illness  and given an opportunity for questions and concerns. From CDC: Are you at higher risk for severe illness?  Wash your hands often.  Avoid close contact (6 feet, which is about two arm lengths) with people who are sick.  Put distance between yourself and other people if COVID-19 is spreading in your community.  Clean and disinfect frequently touched surfaces.  Avoid all cruise travel and non-essential air travel.  Call your healthcare professional if you have concerns about COVID-19 and your underlying condition or if you are sick. For more information on steps you can take to protect yourself, see CDC's How to Protect Yourself      Pt will be further monitored by COVID Loop Team based on severity of symptoms and risk factors. Call to sriram due to recent er visit. She reports symptoms as yesterday with no improvement. She has dropped off rx for antibiotic and was not able to leave rx for cough medicine.  She

## 2020-07-26 ENCOUNTER — HOSPITAL ENCOUNTER (EMERGENCY)
Age: 20
Discharge: HOME | End: 2020-07-26
Payer: COMMERCIAL

## 2020-07-26 ENCOUNTER — APPOINTMENT (OUTPATIENT)
Dept: GENERAL RADIOLOGY | Age: 20
End: 2020-07-26
Payer: COMMERCIAL

## 2020-07-26 VITALS
SYSTOLIC BLOOD PRESSURE: 123 MMHG | RESPIRATION RATE: 18 BRPM | DIASTOLIC BLOOD PRESSURE: 99 MMHG | OXYGEN SATURATION: 99 % | BODY MASS INDEX: 41.47 KG/M2 | HEART RATE: 72 BPM | HEIGHT: 69 IN | WEIGHT: 280 LBS | TEMPERATURE: 97.8 F

## 2020-07-26 LAB
CHP ED QC CHECK: NORMAL
GLUCOSE BLD-MCNC: 426 MG/DL
GLUCOSE BLD-MCNC: 426 MG/DL (ref 60–115)
PERFORMED ON: ABNORMAL
SARS-COV-2: NOT DETECTED
SOURCE: NORMAL

## 2020-07-26 PROCEDURE — 94761 N-INVAS EAR/PLS OXIMETRY MLT: CPT

## 2020-07-26 PROCEDURE — 6370000000 HC RX 637 (ALT 250 FOR IP): Performed by: PHYSICIAN ASSISTANT

## 2020-07-26 PROCEDURE — 99284 EMERGENCY DEPT VISIT MOD MDM: CPT

## 2020-07-26 PROCEDURE — 71046 X-RAY EXAM CHEST 2 VIEWS: CPT

## 2020-07-26 PROCEDURE — 94640 AIRWAY INHALATION TREATMENT: CPT

## 2020-07-26 RX ORDER — PROMETHAZINE HYDROCHLORIDE AND CODEINE PHOSPHATE 6.25; 1 MG/5ML; MG/5ML
5 SYRUP ORAL 4 TIMES DAILY PRN
Qty: 120 ML | Refills: 0 | Status: SHIPPED | OUTPATIENT
Start: 2020-07-26 | End: 2020-07-31

## 2020-07-26 RX ORDER — IPRATROPIUM BROMIDE AND ALBUTEROL SULFATE 2.5; .5 MG/3ML; MG/3ML
1 SOLUTION RESPIRATORY (INHALATION)
Status: DISCONTINUED | OUTPATIENT
Start: 2020-07-26 | End: 2020-07-26 | Stop reason: HOSPADM

## 2020-07-26 RX ORDER — ALBUTEROL SULFATE 2.5 MG/3ML
2.5 SOLUTION RESPIRATORY (INHALATION) EVERY 6 HOURS PRN
Qty: 120 EACH | Refills: 1 | Status: SHIPPED | OUTPATIENT
Start: 2020-07-26

## 2020-07-26 RX ORDER — PREDNISONE 20 MG/1
20 TABLET ORAL DAILY
Qty: 5 TABLET | Refills: 0 | Status: SHIPPED | OUTPATIENT
Start: 2020-07-26 | End: 2020-07-31

## 2020-07-26 RX ADMIN — IPRATROPIUM BROMIDE AND ALBUTEROL SULFATE 1 AMPULE: .5; 3 SOLUTION RESPIRATORY (INHALATION) at 07:14

## 2020-07-26 RX ADMIN — IPRATROPIUM BROMIDE AND ALBUTEROL SULFATE 1 AMPULE: .5; 3 SOLUTION RESPIRATORY (INHALATION) at 07:19

## 2020-07-26 ASSESSMENT — ENCOUNTER SYMPTOMS
COUGH: 1
ABDOMINAL DISTENTION: 0
ABDOMINAL PAIN: 0
VOMITING: 0
SHORTNESS OF BREATH: 1
WHEEZING: 1
CONSTIPATION: 0
RHINORRHEA: 0
DIARRHEA: 0
NAUSEA: 0
EYE DISCHARGE: 0
SORE THROAT: 0
COLOR CHANGE: 0

## 2020-07-26 NOTE — ED NOTES
One touch 426 reported to juan gallardo. Pt has insp and exp wheezing throughout ,   juan aware.   Pt awake alert has occasional dry cough     Josiah Moore RN  07/26/20 5688

## 2020-07-26 NOTE — ED NOTES
Dc instructions to pt with prescription and nebulizer machine states understands Pt discharged awake alert oriented x 3 ambulatory status slightly improved.         De Johnson RN  07/26/20 6398

## 2020-07-26 NOTE — ED PROVIDER NOTES
[facility] ED  eMERGENCY dEPARTMENT eNCOUnter      Pt Name: Tate Naidu  MRN: [id number]  [place] 2000  Date of evaluation: 7/26/2020  Provider: Kimberly Nicolas PA-C    CHIEF COMPLAINT       Chief Complaint   Patient presents with    Other     bronchitis is not getting better. medications is not working per patient. HISTORY OF PRESENT ILLNESS   (Location/Symptom, Timing/Onset,Context/Setting, Quality, Duration, Modifying Factors, Severity)  Note limiting factors. Tate Naidu is a 21 y.o. female who presents to the emergency department with a complaint of cough, and chest tightness which patient states been ongoing for approximately 1 week. She states she was seen by her regular family physician, as well as seen in the emerge department yesterday 7/25/2020, she was given a prescription for Zithromax, promethazine with codeine, and albuterol inhaler. She states she is using these medications without significant relief. Denies any past medical history of respiratory issues. No fevers, cough is dry nonproductive. No recent contact with anybody known to have the coronavirus. HPI    NursingNotes were reviewed. REVIEW OF SYSTEMS    (2-9 systems for level 4, 10 or more for level 5)     Review of Systems   Constitutional: Negative for activity change and appetite change. HENT: Negative for congestion, ear discharge, ear pain, nosebleeds, rhinorrhea and sore throat. Eyes: Negative for discharge. Respiratory: Positive for cough, shortness of breath and wheezing. Cardiovascular: Negative for chest pain, palpitations and leg swelling. Gastrointestinal: Negative for abdominal distention, abdominal pain, constipation, diarrhea, nausea and vomiting. Genitourinary: Negative for difficulty urinating and dysuria. Musculoskeletal: Negative for arthralgias. Skin: Negative for color change, pallor, rash and wound.    Neurological: Negative for dizziness, tremors, syncope, needed for Cough for up to 5 days. IBUPROFEN (ADVIL;MOTRIN) 600 MG TABLET    Take 1 tablet by mouth every 6 hours as needed for Pain    IBUPROFEN (ADVIL;MOTRIN) 800 MG TABLET    Take 1 tablet by mouth every 8 hours as needed for Pain    INSULIN GLARGINE (LANTUS SOLOSTAR) 100 UNIT/ML INJECTION PEN    Inject 10 Units into the skin nightly    INSULIN LISPRO, 1 UNIT DIAL, (HUMALOG KWIKPEN) 100 UNIT/ML SOPN    Inject 5 Units into the skin 3 times daily (before meals)    INSULIN PEN NEEDLE 30G X 8 MM MISC    1 each by Does not apply route daily    LANCETS MISC    DX: diabetes mellitus. Use 1-3 time(s) daily - Ok to substitute per insurance (1 each = 1 box)    METHYLPREDNISOLONE (MEDROL, MANNY,) 4 MG TABLET    Take by mouth. NAPROXEN (NAPROSYN) 500 MG TABLET    Take 1 tablet by mouth 2 times daily (with meals)    PROMETHAZINE-DEXTROMETHORPHAN (PROMETHAZINE-DM) 6.25-15 MG/5ML SYRUP    Take 5 mLs by mouth 4 times daily as needed for Cough    ZINC GLUCONATE 15 MG TABS    Take 3 tabs daily       ALLERGIES     Shellfish-derived products and Benzoyl peroxide    FAMILY HISTORY     History reviewed. No pertinent family history.        SOCIAL HISTORY       Social History     Socioeconomic History    Marital status: Single     Spouse name: None    Number of children: None    Years of education: None    Highest education level: None   Occupational History    None   Social Needs    Financial resource strain: None    Food insecurity     Worry: None     Inability: None    Transportation needs     Medical: None     Non-medical: None   Tobacco Use    Smoking status: Current Every Day Smoker     Packs/day: 0.50    Smokeless tobacco: Never Used   Substance and Sexual Activity    Alcohol use: No    Drug use: No    Sexual activity: None   Lifestyle    Physical activity     Days per week: None     Minutes per session: None    Stress: None   Relationships    Social connections     Talks on phone: None     Gets together: None Attends Jainism service: None     Active member of club or organization: None     Attends meetings of clubs or organizations: None     Relationship status: None    Intimate partner violence     Fear of current or ex partner: None     Emotionally abused: None     Physically abused: None     Forced sexual activity: None   Other Topics Concern    None   Social History Narrative    None       SCREENINGS      @FLOW([id number])@      PHYSICAL EXAM    (up to 7 for level 4, 8 or more for level 5)     ED Triage Vitals   BP Temp Temp src Pulse Resp SpO2 Height Weight   -- -- -- -- -- -- -- --       Physical Exam  Vitals signs and nursing note reviewed. Constitutional:       General: She is not in acute distress. Appearance: She is well-developed. She is not ill-appearing, toxic-appearing or diaphoretic. HENT:      Head: Normocephalic. Right Ear: Tympanic membrane normal.      Left Ear: Tympanic membrane normal.      Nose: No congestion. Mouth/Throat:      Mouth: Mucous membranes are moist.      Pharynx: No oropharyngeal exudate or posterior oropharyngeal erythema. Eyes:      Extraocular Movements: Extraocular movements intact. Conjunctiva/sclera: Conjunctivae normal.      Pupils: Pupils are equal, round, and reactive to light. Neck:      Musculoskeletal: Normal range of motion and neck supple. No neck rigidity. Vascular: No JVD. Trachea: No tracheal deviation. Cardiovascular:      Rate and Rhythm: Normal rate. Pulses: Normal pulses. Heart sounds: Normal heart sounds. No murmur. No friction rub. No gallop. Pulmonary:      Effort: Pulmonary effort is normal. No tachypnea, accessory muscle usage, respiratory distress or retractions. Breath sounds: No stridor. No wheezing, rhonchi or rales.       Comments: Patient has expiratory wheezes noted in left lower lobe, and right upper lobe, no rales or rhonchi, no excessive muscle use, no retractions, patient appears in no acute distress. Chest:      Chest wall: No tenderness. Abdominal:      General: Abdomen is flat. Bowel sounds are normal. There is no distension or abdominal bruit. Palpations: There is no shifting dullness, fluid wave, hepatomegaly, splenomegaly, mass or pulsatile mass. Tenderness: There is no abdominal tenderness. There is no right CVA tenderness, left CVA tenderness, guarding or rebound. Negative signs include Martinez's sign, Rovsing's sign and McBurney's sign. Musculoskeletal: Normal range of motion. General: No deformity. Skin:     General: Skin is warm and dry. Capillary Refill: Capillary refill takes less than 2 seconds. Coloration: Skin is not jaundiced. Neurological:      General: No focal deficit present. Mental Status: She is alert and oriented to person, place, and time. Mental status is at baseline. Cranial Nerves: No cranial nerve deficit. Sensory: No sensory deficit. Motor: No weakness. Coordination: Coordination normal.   Psychiatric:         Mood and Affect: Mood normal.         DIAGNOSTIC RESULTS     EKG: All EKG's are interpreted by the Emergency Department Physician who either signs or Co-signsthis chart in the absence of a cardiologist.        RADIOLOGY:   Caryn Heir such as CT, Ultrasound and MRI are read by the radiologist. Plain radiographic images are visualized and preliminarily interpreted by the emergency physician with the below findings:    Chest x-ray shows no acute pulmonary process    Interpretation per the Radiologist below, if available at the time ofthis note:    XR CHEST (2 VW)    (Results Pending)         ED BEDSIDE ULTRASOUND:   Performed by ED Physician - none    LABS:  Labs Reviewed   POCT GLUCOSE - Normal       All other labs were within normal range or not returned as of this dictation.     EMERGENCY DEPARTMENT COURSE and DIFFERENTIAL DIAGNOSIS/MDM:   Vitals:    Vitals:    07/26/20 0708 07/26/20 4775 07/26/20 0716 07/26/20 0813   BP:  (!) 123/99     Pulse: 64   72   Resp: 18   18   Temp: 97.8 °F (36.6 °C)      TempSrc: Oral      SpO2: 99%  99% 99%   Weight: 280 lb (127 kg)      Height: 5' 9\" (1.753 m)             MDM  Number of Diagnoses or Management Options  Bronchitis:   Wheezing:   Diagnosis management comments: Patient presents emergency department with complaint of cough, shortness of breath, and difficulty sleeping due to ongoing cough. She was seen in the emerge department yesterday for the same thing. She was given a prescription for Zithromax, albuterol metered-dose inhaler, and cough medications. She is wheezy on examination, with normal chest x-ray. She was given a respiratory treatment in the ER, and seems more comfortable at this time. She was advised to continue the use of the Zithromax Z-MANNY, and was dispensed a albuterol nebulizer from the emergency department, as well as given prescriptions for albuterol. She was given a prescription for prednisone, and advised to monitor her glucose levels closely as she states they are poorly controlled. She was advised if her blood sugars continue to run high she should discontinue the use of the prednisone. She was also given a prescription for Phenergan with codeine to help suppress her cough, and allow her to get some rest.  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 Patient is aware of the plan of care, and advised to follow-up with her regular family physician in the next 48 hours for reevaluation. She is also advised if she has any worsening or changes symptoms return to the ER. CRITICAL CARE TIME   Total Critical Care time was 0 minutes, excluding separately reportableprocedures. There was a high probability of clinicallysignificant/life threatening deterioration in the patient's condition which required my urgent intervention.       CONSULTS:  None    PROCEDURES:  Unless otherwise noted below, none     Procedures    FINAL IMPRESSION      1. Bronchitis    2. Wheezing          DISPOSITION/PLAN   DISPOSITION Decision To Discharge 07/26/2020 07:57:47 AM      PATIENT REFERRED TO:  Trevor Caceres MD  [address] [place] [address]  [phone]    In 2 days        DISCHARGE MEDICATIONS:  New Prescriptions    ALBUTEROL (PROVENTIL) (2.5 MG/3ML) 0.083% NEBULIZER SOLUTION    Take 3 mLs by nebulization every 6 hours as needed for Wheezing    PREDNISONE (DELTASONE) 20 MG TABLET    Take 1 tablet by mouth daily for 5 doses    PROMETHAZINE-CODEINE (PHENERGAN WITH CODEINE) 6.25-10 MG/5ML SYRUP    Take 5 mLs by mouth 4 times daily as needed for Cough for up to 7 days.           (Please note that portions of this note were completed with a voice recognition program.  Efforts were made to edit the dictations but occasionally words are mis-transcribed.)    Gay Mtz PA-C (electronically signed)  Attending Emergency Physician         Gay Mtz PA-C  07/26/20 9414

## 2020-07-26 NOTE — ED TRIAGE NOTES
Patient was seen a couple of days ago for bronchitis. She had filled the medication but does not feel that the medication is working. She is alert and orientated x 4. Pink, warm and dry. Abc's are intact. VSS. She does have a a cough. Will continue to monitor.

## 2020-07-31 ENCOUNTER — TELEPHONE (OUTPATIENT)
Dept: FAMILY MEDICINE CLINIC | Age: 20
End: 2020-07-31

## 2020-07-31 ENCOUNTER — VIRTUAL VISIT (OUTPATIENT)
Dept: FAMILY MEDICINE CLINIC | Age: 20
End: 2020-07-31
Payer: COMMERCIAL

## 2020-07-31 PROCEDURE — 99443 PR PHYS/QHP TELEPHONE EVALUATION 21-30 MIN: CPT | Performed by: NURSE PRACTITIONER

## 2020-07-31 RX ORDER — BENZONATATE 100 MG/1
100 CAPSULE ORAL 3 TIMES DAILY PRN
Qty: 30 CAPSULE | Refills: 0 | Status: SHIPPED | OUTPATIENT
Start: 2020-07-31 | End: 2020-08-10

## 2020-07-31 RX ORDER — IPRATROPIUM BROMIDE AND ALBUTEROL SULFATE 2.5; .5 MG/3ML; MG/3ML
1 SOLUTION RESPIRATORY (INHALATION) EVERY 6 HOURS PRN
Qty: 360 ML | Refills: 0 | Status: SHIPPED | OUTPATIENT
Start: 2020-07-31 | End: 2022-10-27

## 2020-07-31 RX ORDER — FLUCONAZOLE 150 MG/1
150 TABLET ORAL ONCE
Qty: 2 TABLET | Refills: 0 | Status: SHIPPED | OUTPATIENT
Start: 2020-07-31 | End: 2020-07-31

## 2020-07-31 RX ORDER — PROMETHAZINE HYDROCHLORIDE AND CODEINE PHOSPHATE 6.25; 1 MG/5ML; MG/5ML
5 SYRUP ORAL 4 TIMES DAILY PRN
Qty: 100 ML | Refills: 0 | Status: SHIPPED | OUTPATIENT
Start: 2020-07-31 | End: 2020-07-31 | Stop reason: SDUPTHER

## 2020-07-31 RX ORDER — LEVOFLOXACIN 500 MG/1
500 TABLET, FILM COATED ORAL DAILY
Qty: 10 TABLET | Refills: 0 | Status: SHIPPED | OUTPATIENT
Start: 2020-07-31 | End: 2020-08-10

## 2020-07-31 ASSESSMENT — ENCOUNTER SYMPTOMS
SORE THROAT: 0
WHEEZING: 1
COUGH: 1
SHORTNESS OF BREATH: 1
CHEST TIGHTNESS: 1
RHINORRHEA: 0

## 2020-07-31 NOTE — PATIENT INSTRUCTIONS
Patient Education        Asthma in Adults: Care Instructions  Your Care Instructions     During an asthma attack, your airways swell and narrow as a reaction to certain things (triggers). This makes it hard to breathe. You may be able to prevent asthma attacks if you avoid the things that set off your asthma symptoms. Keeping your asthma under control and treating symptoms before they get bad can help you avoid severe attacks. If you can control your asthma, you may be able to do all of your normal daily activities. You may also avoid asthma attacks and trips to the hospital.  Follow-up care is a key part of your treatment and safety. Be sure to make and go to all appointments, and call your doctor if you are having problems. It's also a good idea to know your test results and keep a list of the medicines you take. How can you care for yourself at home? · Follow your asthma action plan so you can manage your symptoms at home. An asthma action plan will help you prevent and control airway reactions and will tell you what to do during an asthma attack. If you do not have an asthma action plan, work with your doctor to build one. · Take your asthma medicine exactly as prescribed. Medicine plays an important role in controlling asthma. Talk to your doctor right away if you have any questions about what to take and how to take it. ? Use your quick-relief medicine when you have symptoms of an attack. Quick-relief medicine often is an albuterol inhaler. Some people need to use quick-relief medicine before they exercise. ? Take your controller medicine every day, not just when you have symptoms. Controller medicine is usually an inhaled corticosteroid. The goal is to prevent problems before they occur. Do not use your controller medicine to try to treat an attack that has already started. It does not work fast enough to help.   ? If your doctor prescribed corticosteroid pills to use during an attack, take them as directed. They may take hours to work, but they may shorten the attack and help you breathe better. ? Keep your quick-relief medicine with you at all times. · Talk to your doctor before using other medicines. Some medicines, such as aspirin, can cause asthma attacks in some people. · Check yourself for asthma symptoms to know which step to follow in your action plan. Watch for things like being short of breath, having chest tightness, coughing, and wheezing. Also notice if symptoms wake you up at night or if you get tired quickly when you exercise. · If you have a peak flow meter, use it to check how well you are breathing. This can help you predict when an asthma attack is going to occur. Then you can take medicine to prevent the asthma attack or make it less severe. · See your doctor regularly. These visits will help you learn more about asthma and what you can do to control it. Your doctor will monitor your treatment to make sure the medicine is helping you. · Keep track of your asthma attacks and your treatment. After you have had an attack, write down what triggered it, what helped end it, and any concerns you have about your asthma action plan. Take your diary when you see your doctor. You can then review your asthma action plan and decide if it is working. · Do not smoke or allow others to smoke around you. Avoid smoky places. Smoking makes asthma worse. If you need help quitting, talk to your doctor about stop-smoking programs and medicines. These can increase your chances of quitting for good. · Learn what triggers an asthma attack for you, and avoid the triggers when you can. Common triggers include colds, smoke, air pollution, dust, pollen, mold, pets, cockroaches, stress, and cold air. · Avoid colds and the flu. Get a pneumococcal vaccine shot. If you have had one before, ask your doctor whether you need a second dose. Get a flu vaccine every fall.  If you must be around people with colds or the flu, wash your hands often. When should you call for help? [id number] anytime you think you may need emergency care. For example, call if:  · You have severe trouble breathing. Call your doctor now or seek immediate medical care if:  · Your symptoms do not get better after you have followed your asthma action plan. · You cough up yellow, dark brown, or bloody mucus (sputum). Watch closely for changes in your health, and be sure to contact your doctor if:  · Your coughing and wheezing get worse. · You need to use quick-relief medicine on more than 2 days a week (unless it is just for exercise). · You need help figuring out what is triggering your asthma attacks. Where can you learn more? Go to https://[employer]. org and sign in to your [employer] account. Enter P597 in the [employer] box to learn more about \"Asthma in Adults: Care Instructions. \"     If you do not have an account, please click on the \"Sign Up Now\" link. Current as of: February 24, 2020               Content Version: 12.5  © 0970-8745 [employer]wise, Incorporated. Care instructions adapted under license by [facility] ([facility]). If you have questions about a medical condition or this instruction, always ask your healthcare professional. [address] any warranty or liability for your use of this information.

## 2020-07-31 NOTE — TELEPHONE ENCOUNTER
Patient states she has a appt with you today. she was prescribed cough syrup and soon as she took it she threw it all up. She is requesting to have a something else that will help with her cough.      Pharmacy: [address]

## 2020-07-31 NOTE — PROGRESS NOTES
Drug use: No    Sexual activity: Not on file   Lifestyle    Physical activity     Days per week: Not on file     Minutes per session: Not on file    Stress: Not on file   Relationships    Social connections     Talks on phone: Not on file     Gets together: Not on file     Attends Anabaptist service: Not on file     Active member of club or organization: Not on file     Attends meetings of clubs or organizations: Not on file     Relationship status: Not on file    Intimate partner violence     Fear of current or ex partner: Not on file     Emotionally abused: Not on file     Physically abused: Not on file     Forced sexual activity: Not on file   Other Topics Concern    Not on file   Social History Narrative    Not on file     Current Outpatient Medications on File Prior to Visit   Medication Sig Dispense Refill    albuterol (PROVENTIL) (2.5 MG/3ML) 0.083% nebulizer solution Take 3 mLs by nebulization every 6 hours as needed for Wheezing 120 each 1    predniSONE (DELTASONE) 20 MG tablet Take 1 tablet by mouth daily for 5 doses 5 tablet 0    azithromycin (ZITHROMAX Z-MANNY) 250 MG tablet TAKE 500MG PO DAY ONE. ..  250MG PO DAY TWO THROUGH FIVE   DISPENSE 6 TABS  NO REFILLS 1 packet 0    albuterol sulfate  (90 Base) MCG/ACT inhaler Inhale 2 puffs into the lungs every 4 hours as needed for Wheezing 18 g 1    ibuprofen (ADVIL;MOTRIN) 600 MG tablet Take 1 tablet by mouth every 6 hours as needed for Pain 30 tablet 0    clotrimazole (LOTRIMIN) 1 % cream Apply topically 2 times daily to lesions on skin for 3-4 weeks or for 1 week after lesion clears 1 Tube 0    glucose monitoring kit (FREESTYLE) monitoring kit 1 kit by Does not apply route daily 1 kit 0    naproxen (NAPROSYN) 500 MG tablet Take 1 tablet by mouth 2 times daily (with meals) 30 tablet 0    Zinc Gluconate 15 MG TABS Take 3 tabs daily 30 tablet 0    famotidine (PEPCID) 20 MG tablet Take 1 tablet by mouth daily 30 tablet 0    Insulin Pen sentences without sounding winded  Neurological:      Mental Status: She is alert. Psychiatric:         Mood and Affect: Mood normal.         Speech: Speech normal.         Behavior: Behavior is cooperative. Assessment:          Diagnosis Orders   1. Exacerbation of asthma, unspecified asthma severity, unspecified whether persistent  ipratropium-albuterol (DUONEB) 0.5-2.5 (3) MG/3ML SOLN nebulizer solution    levoFLOXacin (LEVAQUIN) 500 MG tablet   2. Cough  promethazine-codeine (PHENERGAN WITH CODEINE) 6.25-10 MG/5ML syrup   3. Antibiotic-induced yeast infection  fluconazole (DIFLUCAN) 150 MG tablet   4. Bronchitis  ipratropium-albuterol (DUONEB) 0.5-2.5 (3) MG/3ML SOLN nebulizer solution    levoFLOXacin (LEVAQUIN) 500 MG tablet    promethazine-codeine (PHENERGAN WITH CODEINE) 6.25-10 MG/5ML syrup       Plan:      No orders of the defined types were placed in this encounter. Orders Placed This Encounter   Medications    ipratropium-albuterol (DUONEB) 0.5-2.5 (3) MG/3ML SOLN nebulizer solution     Sig: Inhale 3 mLs into the lungs every 6 hours as needed for Shortness of Breath     Dispense:  360 mL     Refill:  0    levoFLOXacin (LEVAQUIN) 500 MG tablet     Sig: Take 1 tablet by mouth daily for 10 days     Dispense:  10 tablet     Refill:  0    promethazine-codeine (PHENERGAN WITH CODEINE) 6.25-10 MG/5ML syrup     Sig: Take 5 mLs by mouth 4 times daily as needed for Cough for up to 5 days. Dispense:  100 mL     Refill:  0     Reduce doses taken as pain becomes manageable    fluconazole (DIFLUCAN) 150 MG tablet     Sig: Take 1 tablet by mouth once for 1 dose May repeat in 3 days if symptoms persist     Dispense:  2 tablet     Refill:  0       Return in about 1 week (around 8/7/2020) for evaluation with PCP. Approximately 22minutes spent reviewing previous visits to Er, imaging and blood work along with treatment and discussing with patient.     Discussed that this assessment was limited, but per review of most recent ER visits and imaging I would start treatment as prescribed and f/u if not improving. Any worsening she should go to Er as she is not tolerating prednisone well outpatient and may need some IV medications and closer monitoring. Encouraged plenty of fluids and rest. She was agreeable. Reviewed with the patient: current clinicalstatus, medications, activities and diet. Side effects, adverse effects of the medication prescribedtoday, as well as treatment plan/ rationale and result expectations have been discussedwith the patient who expresses understanding and desires to proceed. Close follow upto evaluate treatment results and for coordination of care. I have reviewedthe patient's medical history in detail and updated the computerized patient record. Annie Mazariegos is a 21 y.o. female evaluated via telephone on 7/31/2020. Consent:  She and/or health care decision maker is aware that that she may receive a bill for this telephone service, depending on her insurance coverage, and has provided verbal consent to proceed: Yes      This visit was a telephone encounter. Patient was located at their home. Provider was located at their _X__ home or        ____ office. I affirm this is a Patient Initiated Episode with an Established Patient who has not had a related appointment within my department in the past 7 days or scheduled within the next 24 hours.     Total Time: minutes: 21-30 minutes    Note: not billable if this call serves to triage the patient into an appointment for the relevant concern        Carissa Quijano, SHAQUILLE - CNP

## 2020-08-03 ENCOUNTER — TELEPHONE (OUTPATIENT)
Dept: FAMILY MEDICINE CLINIC | Age: 20
End: 2020-08-03

## 2020-08-03 RX ORDER — PROMETHAZINE HYDROCHLORIDE AND CODEINE PHOSPHATE 6.25; 1 MG/5ML; MG/5ML
5 SYRUP ORAL 4 TIMES DAILY PRN
Qty: 100 ML | Refills: 0 | Status: SHIPPED | OUTPATIENT
Start: 2020-08-03 | End: 2020-08-08

## 2020-08-03 RX ORDER — PROMETHAZINE HYDROCHLORIDE AND CODEINE PHOSPHATE 6.25; 1 MG/5ML; MG/5ML
5 SYRUP ORAL 4 TIMES DAILY PRN
Qty: 100 ML | Refills: 0 | Status: CANCELLED | OUTPATIENT
Start: 2020-08-03 | End: 2020-08-08

## 2020-08-31 ENCOUNTER — HOSPITAL ENCOUNTER (EMERGENCY)
Age: 20
Discharge: HOME | End: 2020-08-31
Payer: COMMERCIAL

## 2020-08-31 VITALS
RESPIRATION RATE: 18 BRPM | BODY MASS INDEX: 43.95 KG/M2 | OXYGEN SATURATION: 96 % | TEMPERATURE: 97.5 F | WEIGHT: 290 LBS | SYSTOLIC BLOOD PRESSURE: 122 MMHG | DIASTOLIC BLOOD PRESSURE: 78 MMHG | HEART RATE: 95 BPM | HEIGHT: 68 IN

## 2020-08-31 PROCEDURE — 99282 EMERGENCY DEPT VISIT SF MDM: CPT

## 2020-08-31 RX ORDER — OXYCODONE HYDROCHLORIDE AND ACETAMINOPHEN 5; 325 MG/1; MG/1
1 TABLET ORAL EVERY 6 HOURS PRN
Qty: 12 TABLET | Refills: 0 | Status: SHIPPED | OUTPATIENT
Start: 2020-08-31 | End: 2020-09-03

## 2020-08-31 ASSESSMENT — ENCOUNTER SYMPTOMS
ABDOMINAL PAIN: 0
VOMITING: 0
SHORTNESS OF BREATH: 0
PHOTOPHOBIA: 0
COUGH: 0
WHEEZING: 0
NAUSEA: 0

## 2020-08-31 ASSESSMENT — PAIN SCALES - GENERAL: PAINLEVEL_OUTOF10: 10

## 2020-08-31 ASSESSMENT — PAIN DESCRIPTION - LOCATION: LOCATION: GROIN

## 2020-08-31 NOTE — ED PROVIDER NOTES
[address] [facility] ED  EMERGENCY DEPARTMENT ENCOUNTER      Pt Name: Surya Gautam  MRN: [id number]  [place] [address]  Date of evaluation: 8/31/2020  Provider: Pari Locke PA-C    [address] [place] [address]       Chief Complaint   Patient presents with    Abscess     in pubic area         HISTORY OF PRESENT ILLNESS   (Location/Symptom, Timing/Onset, Context/Setting, Quality, Duration, Modifying Factors, Severity)  Note limiting factors. Surya Gautam is a 21 y.o. female who per chart review has pmhx of diabetes, GERD, hidradenitis suppurativa, HTN, stage 1 CKD presents to the emergency department with abscesses to the R groin x several months. Per patient, she has hydradenitis suppurativa and current sx are similar. She was seen in our ED on the 27th and given pain medication and bactrim. She has been compliant with the bactrim. She is using warm compresses with some relief as well. There was drainage yesterday. Pt states overall improvement but continued pain. She came to the ED today for pain relief only. She has a dermatologist and has an appt with them in September. She denies abd pain, nausea, vomiting, fever, chills, warmth, streaking. HPI    Nursing Notes were reviewed. REVIEW OF SYSTEMS    (2-9 systems for level 4, 10 or more for level 5)     Review of Systems   Constitutional: Negative for chills and fever. HENT: Negative for congestion. Eyes: Negative for photophobia. Respiratory: Negative for cough, shortness of breath and wheezing. Cardiovascular: Negative for chest pain and palpitations. Gastrointestinal: Negative for abdominal pain, nausea and vomiting. Genitourinary: Negative for dysuria, frequency and hematuria. Musculoskeletal: Negative for myalgias. Skin: Positive for wound. Allergic/Immunologic: Negative for immunocompromised state. Neurological: Negative for dizziness, weakness and headaches. All other systems reviewed and are negative.       Except as noted above the remainder of the review of systems was reviewed and negative.        PAST MEDICAL HISTORY     Past Medical History:   Diagnosis Date    Diabetes mellitus ([facility] [address]     GERD (gastroesophageal reflux disease)     Hidradenitis suppurativa     Hypertension     Obesity affecting pregnancy, antepartum, third trimester          SURGICAL HISTORY       Past Surgical History:   Procedure Laterality Date    UPPER GASTROINTESTINAL ENDOSCOPY           CURRENT MEDICATIONS       Discharge Medication List as of 8/31/2020  4:49 PM      CONTINUE these medications which have NOT CHANGED    Details   ipratropium-albuterol (DUONEB) 0.5-2.5 (3) MG/3ML SOLN nebulizer solution Inhale 3 mLs into the lungs every 6 hours as needed for Shortness of Breath, Disp-360 mL,R-0Normal      albuterol (PROVENTIL) (2.5 MG/3ML) 0.083% nebulizer solution Take 3 mLs by nebulization every 6 hours as needed for Wheezing, Disp-120 each,R-1Print      albuterol sulfate  (90 Base) MCG/ACT inhaler Inhale 2 puffs into the lungs every 4 hours as needed for Wheezing, Disp-18 g,R-1Print      !! ibuprofen (ADVIL;MOTRIN) 600 MG tablet Take 1 tablet by mouth every 6 hours as needed for Pain, Disp-30 tablet, R-0Print      clotrimazole (LOTRIMIN) 1 % cream Apply topically 2 times daily to lesions on skin for 3-4 weeks or for 1 week after lesion clears, Disp-1 Tube, R-0, Print      glucose monitoring kit (FREESTYLE) monitoring kit DAILY Starting Thu 7/2/2020, Disp-1 kit, R-0, Print      naproxen (NAPROSYN) 500 MG tablet Take 1 tablet by mouth 2 times daily (with meals), Disp-30 tablet, R-0Print      Zinc Gluconate 15 MG TABS Take 3 tabs daily, Disp-30 tablet, R-0Normal      famotidine (PEPCID) 20 MG tablet Take 1 tablet by mouth daily, Disp-30 tablet, R-0Print      Insulin Pen Needle 30G X 8 MM MISC DAILY Starting Wed 5/27/2020, Disp-100 each, R-0, Print      methylPREDNISolone (MEDROL, MANNY,) 4 MG tablet Take by mouth., Disp-1 kit, R-0Normal      insulin glargine (LANTUS SOLOSTAR) 100 UNIT/ML injection pen Inject 10 Units into the skin nightly, Disp-5 pen, R-0Normal      Lancets MISC Disp-1 each, R-5, NormalDX: diabetes mellitus. Use 1-3 time(s) daily - Ok to substitute per insurance (1 each = 1 box)      blood glucose monitor strips Test 3 times a day & as needed for symptoms of irregular blood glucose., Disp-100 strip, R-0, Normal      insulin lispro, 1 Unit Dial, (HUMALOG KWIKPEN) 100 UNIT/ML SOPN Inject 5 Units into the skin 3 times daily (before meals), Disp-1 pen, R-5Normal      buPROPion (WELLBUTRIN XL) 150 MG extended release tablet TAKE 1 TABLET BY MOUTH ONCE DAILY IN THE MORNINGHistorical Med      !! ibuprofen (ADVIL;MOTRIN) 800 MG tablet Take 1 tablet by mouth every 8 hours as needed for Pain, Disp-20 tablet, R-0Print      doxylamine-pyridoxine (DICLEGIS) 10-10 MG TBEC Start: 2 tabs p.o. q.h.s.; if symptoms persist after 2 days increase to 1 tab p.o. q.a.m. and 2 tabs p.o. q.h.s.; may increase to 1 tab p.o. q.a.m., 1 tab p.o. q. midafternoon and 2 tabs p.o. q.h.s.  4 tabs per day. If unable to afford, instruct the vipin ent about substituting the OTC components. , Disp-60 tablet, R-1Print       !! - Potential duplicate medications found. Please discuss with provider. ALLERGIES     Shellfish-derived products and Benzoyl peroxide    FAMILY HISTORY     History reviewed. No pertinent family history.        SOCIAL HISTORY       Social History     Socioeconomic History    Marital status: Single     Spouse name: None    Number of children: None    Years of education: None    Highest education level: None   Occupational History    None   Social Needs    Financial resource strain: None    Food insecurity     Worry: None     Inability: None    Transportation needs     Medical: None     Non-medical: None   Tobacco Use    Smoking status: Current Every Day Smoker     Packs/day: 0.50    Smokeless tobacco: Never Used   Substance and Sexual Activity    Alcohol use: No    Drug use: No    Sexual activity: None   Lifestyle    Physical activity     Days per week: None     Minutes per session: None    Stress: None   Relationships    Social connections     Talks on phone: None     Gets together: None     Attends Restorationist service: None     Active member of club or organization: None     Attends meetings of clubs or organizations: None     Relationship status: None    Intimate partner violence     Fear of current or ex partner: None     Emotionally abused: None     Physically abused: None     Forced sexual activity: None   Other Topics Concern    None   Social History Narrative    None       SCREENINGS                        PHYSICAL EXAM    (up to 7 for level 4, 8 or more for level 5)     ED Triage Vitals [08/31/20 1623]   BP Temp Temp Source Pulse Resp SpO2 Height Weight   122/78 97.5 °F (36.4 °C) Temporal 95 18 96 % 5' 8\" (1.727 m) 290 lb (131.5 kg)       Physical Exam  Constitutional:       General: She is not in acute distress. Appearance: She is well-developed. She is not toxic-appearing. HENT:      Head: Normocephalic and atraumatic. Nose: Nose normal.      Mouth/Throat:      Mouth: Mucous membranes are moist.   Eyes:      Pupils: Pupils are equal, round, and reactive to light. Neck:      Musculoskeletal: Normal range of motion. Cardiovascular:      Rate and Rhythm: Normal rate and regular rhythm. Heart sounds: No murmur. No friction rub. No gallop. Pulmonary:      Effort: Pulmonary effort is normal.      Breath sounds: Normal breath sounds. No wheezing, rhonchi or rales. Abdominal:      General: Bowel sounds are normal. There is no distension. Palpations: Abdomen is soft. Tenderness: There is no abdominal tenderness. Musculoskeletal:         General: No swelling. Skin:     General: Skin is warm and dry. Capillary Refill: Capillary refill takes less than 2 seconds.              Comments: Several non fluctuant indurated abscesses of the R groin. No drainage, warmth, streaking. Femoral pulses 2+. Neurological:      General: No focal deficit present. Mental Status: She is alert and oriented to person, place, and time. DIAGNOSTIC RESULTS     EKG: All EKG's are interpreted by the Emergency Department Physician who either signs or Co-signs this chart in the absence of a cardiologist.        RADIOLOGY:   Non-plain film images such as CT, Ultrasound and MRI are read by the radiologist. Plain radiographic images are visualized and preliminarily interpreted by the emergency physician with the below findings:        Interpretation per the Radiologist below, if available at the time of this note:    No orders to display         ED BEDSIDE ULTRASOUND:   Performed by ED Physician - none    LABS:  Labs Reviewed - No data to display    All other labs were within normal range or not returned as of this dictation. EMERGENCY DEPARTMENT COURSE and DIFFERENTIAL DIAGNOSIS/MDM:   Vitals:    Vitals:    08/31/20 1623   BP: 122/78   Pulse: 95   Resp: 18   Temp: 97.5 °F (36.4 °C)   TempSrc: Temporal   SpO2: 96%   Weight: 290 lb (131.5 kg)   Height: 5' 8\" (1.727 m)         MDM     Pt is a 21year old F who presents to the ED for pain relief from chronic hydradenitis suppurativa. She is afebrile and hemodynamically stable. On exam, pt with several non fluctuant abscesses to the R groin. None requiring drainage in the ED today. Appear to have opened and drained on their own yesterday. No evidence of streaking, warmth. Neurovascularly intact. Pt non toxic appearing and stable for discharge. Given script for percocet. She will follow up with her dermatologist in 1 day or return to the ED for worsening sx. Encouraged to take full course of abx and continue with warm compresses. Given infection warning signs. Pt understands and agrees to plan, all questions answered.        REASSESSMENT          CRITICAL CARE TIME   Total Critical Care

## 2020-08-31 NOTE — ED TRIAGE NOTES
Pt to ER with c/o abscess to groin area, pt states its under her abd above her vagina, pt states pain is 10/10, pt a&ox4, resp even and unlabored

## 2020-09-03 ENCOUNTER — HOSPITAL ENCOUNTER (EMERGENCY)
Age: 20
Discharge: HOME | End: 2020-09-03
Payer: COMMERCIAL

## 2020-09-03 VITALS
WEIGHT: 280 LBS | DIASTOLIC BLOOD PRESSURE: 71 MMHG | BODY MASS INDEX: 42.44 KG/M2 | RESPIRATION RATE: 16 BRPM | SYSTOLIC BLOOD PRESSURE: 120 MMHG | TEMPERATURE: 98.7 F | HEART RATE: 90 BPM | HEIGHT: 68 IN | OXYGEN SATURATION: 100 %

## 2020-09-03 PROCEDURE — 99282 EMERGENCY DEPT VISIT SF MDM: CPT

## 2020-09-03 PROCEDURE — 96372 THER/PROPH/DIAG INJ SC/IM: CPT

## 2020-09-03 RX ORDER — TRAMADOL HYDROCHLORIDE 50 MG/1
50 TABLET ORAL 2 TIMES DAILY PRN
Qty: 4 TABLET | Refills: 0 | Status: SHIPPED | OUTPATIENT
Start: 2020-09-03 | End: 2020-09-06

## 2020-09-03 RX ORDER — KETOROLAC TROMETHAMINE 30 MG/ML
60 INJECTION, SOLUTION INTRAMUSCULAR; INTRAVENOUS ONCE
Status: DISCONTINUED | OUTPATIENT
Start: 2020-09-03 | End: 2020-09-03 | Stop reason: HOSPADM

## 2020-09-03 RX ORDER — IBUPROFEN 800 MG/1
800 TABLET ORAL EVERY 8 HOURS PRN
Qty: 20 TABLET | Refills: 0 | Status: SHIPPED | OUTPATIENT
Start: 2020-09-03 | End: 2020-10-28 | Stop reason: ALTCHOICE

## 2020-09-03 RX ORDER — CLINDAMYCIN HYDROCHLORIDE 300 MG/1
300 CAPSULE ORAL 2 TIMES DAILY
Qty: 14 CAPSULE | Refills: 0 | Status: SHIPPED | OUTPATIENT
Start: 2020-09-03 | End: 2020-09-10

## 2020-09-03 RX ORDER — OXYCODONE HYDROCHLORIDE AND ACETAMINOPHEN 5; 325 MG/1; MG/1
1 TABLET ORAL ONCE
Status: DISCONTINUED | OUTPATIENT
Start: 2020-09-03 | End: 2020-09-03

## 2020-09-03 ASSESSMENT — ENCOUNTER SYMPTOMS
EYE PAIN: 0
DIARRHEA: 0
NAUSEA: 0
RHINORRHEA: 0
PHOTOPHOBIA: 0
COUGH: 0
ABDOMINAL PAIN: 0
SHORTNESS OF BREATH: 0
VOMITING: 0
SORE THROAT: 0
BACK PAIN: 0

## 2020-09-03 ASSESSMENT — PAIN DESCRIPTION - FREQUENCY
FREQUENCY: CONTINUOUS
FREQUENCY: CONTINUOUS

## 2020-09-03 ASSESSMENT — PAIN DESCRIPTION - DESCRIPTORS: DESCRIPTORS: ACHING

## 2020-09-03 ASSESSMENT — PAIN DESCRIPTION - PAIN TYPE
TYPE: ACUTE PAIN
TYPE: ACUTE PAIN

## 2020-09-03 ASSESSMENT — PAIN DESCRIPTION - LOCATION
LOCATION: ABDOMEN
LOCATION: GENERALIZED

## 2020-09-03 NOTE — ED NOTES
This RN made aware by Nurse Maynor Juárez that pt is not satisfied with her treatment, refusing to depart the ED until she received percocets in the ED and request this RN to speak with the pt. . The pt is in the room with an infant child and is escalating. This RN notifies [facility] PD for situation awareness ans ask for their presence in the vacinity but to maintain distance due to child be present in the room and a desire to not upset patient or make a scene in front of child. . This RN inquires with the pt regarding her concerns and refusal of d/c. Pt states \" I always get percocets when I come here. Why would I come here to get something I can buy over the counter. I'm not leaving until I see the doctor. I want to see Dr Cady Gray" . This RN agrees to notify Dr Hue Yo regarding the matter. This RN speaks with Dr Hue Yo. and PA [facility]. Dr Hue Yo aware that pt is requesting to see her and requesting percocets prior to d/c. Dr Hue Yo advises this RN that she is in agreement with LOLY Salas's plan of care and feels no need to see the pt and the pt has a [facility] report that indicates report. The pt needs to follow up with Dr Jessee Robledo. Information was then relayed to the pt. Pt encouraged to follow up with Dr Jessee Robledo. Pt states she has an appt with dermatology but not nay time this week and has attempted to contact Dr Jessee Robledo but Bernice Eduardo doesn't answer\" This RN inquired with the pt as to how she was contacted Dr Jessee Robledo. Pt states that she leave s him text messages and has his personal cell phone number but he does not call back. Pt encouraged to call the office and questioned if she has the number to Dr Jessee Robledo' office. Pt states she does. Have the number. This RN ask the patient is there is anything else she can do for her. Pt states no. Pt asked if she wants her d/c paper (thery are on the floor). Pt states she not she doesn't need them and departs. As pt departs she states she is just going to keep signing back in. Genie Junior RN  09/03/20 [id number]

## 2020-09-03 NOTE — ED TRIAGE NOTES
Pt c/o multiple abscesses in her inner thighs and under her ABD, Pt was recently seen and treated here for the same symptoms with no improvement, unable to follow up with her PCP,

## 2020-09-03 NOTE — ED PROVIDER NOTES
[address] [facility] ED  EMERGENCY DEPARTMENT ENCOUNTER      Pt Name: Cyndra Aase  MRN: [id number]  [place] [address]  Date of evaluation: 9/3/2020  Provider: Baljinder Kumar PA-C      HISTORY OF PRESENT ILLNESS    Cyndra Aase is a 21 y.o. female who presents to the Emergency Department with multiple abscesses. Patient states that she has had this for months but the pain is unbearable she sees her dermatologist in 1 week. She finishes antibiotics tomorrow she thinks Keflex. She has drainage on its own. Patient tells me she is just here for pain control. Denies fevers chills nausea vomiting. REVIEW OF SYSTEMS       Review of Systems   Constitutional: Negative for chills, diaphoresis, fatigue and fever. HENT: Negative for congestion, rhinorrhea and sore throat. Eyes: Negative for photophobia and pain. Respiratory: Negative for cough and shortness of breath. Cardiovascular: Negative for chest pain and palpitations. Gastrointestinal: Negative for abdominal pain, diarrhea, nausea and vomiting. Genitourinary: Negative for dysuria and flank pain. Musculoskeletal: Negative for back pain. Skin: Positive for wound. Negative for rash. Neurological: Negative for dizziness, light-headedness and headaches. Psychiatric/Behavioral: Negative. All other systems reviewed and are negative.         PAST MEDICAL HISTORY     Past Medical History:   Diagnosis Date    Diabetes mellitus ([address]     GERD (gastroesophageal reflux disease)     Hidradenitis suppurativa     Hypertension     Obesity affecting pregnancy, antepartum, third trimester          SURGICAL HISTORY       Past Surgical History:   Procedure Laterality Date    UPPER GASTROINTESTINAL ENDOSCOPY           CURRENT MEDICATIONS       Previous Medications    ALBUTEROL (PROVENTIL) (2.5 MG/3ML) 0.083% NEBULIZER SOLUTION    Take 3 mLs by nebulization every 6 hours as needed for Wheezing    ALBUTEROL SULFATE  (90 BASE) MCG/ACT INHALER    Inhale 2 puffs into the lungs every 4 hours as needed for Wheezing    BLOOD GLUCOSE MONITOR STRIPS    Test 3 times a day & as needed for symptoms of irregular blood glucose. BUPROPION (WELLBUTRIN XL) 150 MG EXTENDED RELEASE TABLET    TAKE 1 TABLET BY MOUTH ONCE DAILY IN THE MORNING    CLOTRIMAZOLE (LOTRIMIN) 1 % CREAM    Apply topically 2 times daily to lesions on skin for 3-4 weeks or for 1 week after lesion clears    DOXYLAMINE-PYRIDOXINE (DICLEGIS) 10-10 MG TBEC    Start: 2 tabs p.o. q.h.s.; if symptoms persist after 2 days increase to 1 tab p.o. q.a.m. and 2 tabs p.o. q.h.s.; may increase to 1 tab p.o. q.a.m., 1 tab p.o. q. midafternoon and 2 tabs p.o. q.h.s.  4 tabs per day. If unable to afford, instruct the patient about substituting the OTC components. FAMOTIDINE (PEPCID) 20 MG TABLET    Take 1 tablet by mouth daily    GLUCOSE MONITORING KIT (FREESTYLE) MONITORING KIT    1 kit by Does not apply route daily    IBUPROFEN (ADVIL;MOTRIN) 600 MG TABLET    Take 1 tablet by mouth every 6 hours as needed for Pain    IBUPROFEN (ADVIL;MOTRIN) 800 MG TABLET    Take 1 tablet by mouth every 8 hours as needed for Pain    INSULIN GLARGINE (LANTUS SOLOSTAR) 100 UNIT/ML INJECTION PEN    Inject 10 Units into the skin nightly    INSULIN LISPRO, 1 UNIT DIAL, (HUMALOG KWIKPEN) 100 UNIT/ML SOPN    Inject 5 Units into the skin 3 times daily (before meals)    INSULIN PEN NEEDLE 30G X 8 MM MISC    1 each by Does not apply route daily    IPRATROPIUM-ALBUTEROL (DUONEB) 0.5-2.5 (3) MG/3ML SOLN NEBULIZER SOLUTION    Inhale 3 mLs into the lungs every 6 hours as needed for Shortness of Breath    LANCETS MISC    DX: diabetes mellitus. Use 1-3 time(s) daily - Ok to substitute per insurance (1 each = 1 box)    METHYLPREDNISOLONE (MEDROL, MANNY,) 4 MG TABLET    Take by mouth.     NAPROXEN (NAPROSYN) 500 MG TABLET    Take 1 tablet by mouth 2 times daily (with meals)    OXYCODONE-ACETAMINOPHEN (PERCOCET) 5-325 MG PER TABLET    Take 1 tablet by mouth every 6 hours as needed for Pain for up to 3 days. Intended supply: 3 days. Take lowest dose possible to manage pain    ZINC GLUCONATE 15 MG TABS    Take 3 tabs daily       ALLERGIES     Shellfish-derived products and Benzoyl peroxide    FAMILY HISTORY     History reviewed. No pertinent family history. SOCIAL HISTORY       Social History     Socioeconomic History    Marital status: Single     Spouse name: None    Number of children: None    Years of education: None    Highest education level: None   Occupational History    None   Social Needs    Financial resource strain: None    Food insecurity     Worry: None     Inability: None    Transportation needs     Medical: None     Non-medical: None   Tobacco Use    Smoking status: Current Every Day Smoker     Packs/day: 0.50    Smokeless tobacco: Never Used   Substance and Sexual Activity    Alcohol use: No    Drug use: No    Sexual activity: None   Lifestyle    Physical activity     Days per week: None     Minutes per session: None    Stress: None   Relationships    Social connections     Talks on phone: None     Gets together: None     Attends Congregation service: None     Active member of club or organization: None     Attends meetings of clubs or organizations: None     Relationship status: None    Intimate partner violence     Fear of current or ex partner: None     Emotionally abused: None     Physically abused: None     Forced sexual activity: None   Other Topics Concern    None   Social History Narrative    None       SCREENINGS             PHYSICAL EXAM    (up to 7 for level 4, 8 or more for level 5)     ED Triage Vitals [09/03/20 1404]   BP Temp Temp Source Pulse Resp SpO2 Height Weight   120/71 98.7 °F (37.1 °C) Oral 90 16 100 % 5' 8\" (1.727 m) 280 lb (127 kg)       Physical Exam  Vitals signs and nursing note reviewed. Constitutional:       General: She is not in acute distress. Appearance: Normal appearance.  She is well-developed. She is not diaphoretic. HENT:      Head: Normocephalic and atraumatic. Eyes:      General: Lids are normal.      Conjunctiva/sclera: Conjunctivae normal.   Neck:      Musculoskeletal: Normal range of motion and neck supple. Cardiovascular:      Rate and Rhythm: Normal rate and regular rhythm. Pulses: Normal pulses. Heart sounds: Normal heart sounds. Pulmonary:      Effort: Pulmonary effort is normal.      Breath sounds: Normal breath sounds. Abdominal:      General: Bowel sounds are normal.      Palpations: Abdomen is soft. Tenderness: There is no abdominal tenderness. Lymphadenopathy:      Cervical: No cervical adenopathy. Skin:     General: Skin is warm and dry. Capillary Refill: Capillary refill takes less than 2 seconds. Findings: No rash. Comments: Obese abdomen with multiple small indurated abscess in fold under pannus. One is draining yellow fluid. Neurological:      Mental Status: She is alert and oriented to person, place, and time. Psychiatric:         Thought Content: Thought content normal.         Judgment: Judgment normal.           All other labs were within normal range or not returned as of this dictation. EMERGENCY DEPARTMENT COURSE and DIFFERENTIALDIAGNOSIS/MDM:   Vitals:    Vitals:    09/03/20 1404   BP: 120/71   Pulse: 90   Resp: 16   Temp: 98.7 °F (37.1 °C)   TempSrc: Oral   SpO2: 100%   Weight: 280 lb (127 kg)   Height: 5' 8\" (1.727 m)            Patient presents as described. He is afebrile and hemodynamically stable. She has multiple small indurated abscess with no fluctuance to indicate for I&D. She has appointment with derm in 1 week. At length discussion on supportive care for HS. She has had multiple narcotic scripts in the last few months. I offered to call her dermatologist to start their recommendation but patient states that she just wanted something for pain and to leave.  Pt drove her with her child so I explained that I could not provide her with anything stronger than tylenol or toradol but she declined this. Pt was given script for tramadol outpatient and instructed to f/u with her pcp and dermatologist.  Pt became very upset. I went to speak to the patient and she asked to be treated by someone else, I advised Dr. Perry Liz of this. She is aware and does not want to alter treatment plan. Nursing supervisor Jeronimo Montelongo RN in to speak with patient. Patient was instructed to follow-up with her family physician and scheduled appointment with dermatology and to return to the ED for any new worsening or concerning symptoms. PROCEDURES:  Unless otherwise noted below, none     Procedures      FINAL IMPRESSION      1.  Hidradenitis suppurativa          DISPOSITION/PLAN   DISPOSITION Decision To Discharge 09/03/2020 02:42:07 PM          Melissa Simon PA-C (electronically signed)  Attending Emergency Physician       Melissa Simon PA-C  09/03/20 0860 [address]  09/03/20 4067

## 2020-09-03 NOTE — ED NOTES
Pt refusing toradol and D/C instructions to she sees another provider, Boni SALCIDO and solo charge nurse aware.      Leopold Eye, RN  09/03/20 [id number]

## 2020-09-03 NOTE — ED NOTES
Pt given D/C instructions, refusing to leave ER, Sathish Carney ER supervisor and [facility] PD informed.      Joann Malcolm RN  09/03/20 3202

## 2020-09-03 NOTE — ED NOTES
Maritza SALCIDO had ordered percocet for pt. It was understood that pt had driven here and had a baby with her. Maritza SALCIDO had ordered IM toradol. Pt upset that she was not given anything for pain to take home with her. She asked what she is supposed to do until she follows up with her doctor. I had explained to her the antibiotic should help clear up infection, which would help with the pain. She states she has already been on clindamycin for 10 days now. Maritza SALCIDO aware.      Laure Thorne RN  09/03/20 7874

## 2020-09-08 ENCOUNTER — HOSPITAL ENCOUNTER (EMERGENCY)
Age: 20
Discharge: HOME | End: 2020-09-08
Attending: STUDENT IN AN ORGANIZED HEALTH CARE EDUCATION/TRAINING PROGRAM
Payer: COMMERCIAL

## 2020-09-08 ENCOUNTER — TELEPHONE (OUTPATIENT)
Dept: FAMILY MEDICINE CLINIC | Age: 20
End: 2020-09-08

## 2020-09-08 VITALS
HEART RATE: 78 BPM | TEMPERATURE: 98.5 F | BODY MASS INDEX: 42.44 KG/M2 | DIASTOLIC BLOOD PRESSURE: 84 MMHG | SYSTOLIC BLOOD PRESSURE: 129 MMHG | RESPIRATION RATE: 18 BRPM | WEIGHT: 280 LBS | HEIGHT: 68 IN | OXYGEN SATURATION: 100 %

## 2020-09-08 PROCEDURE — 6370000000 HC RX 637 (ALT 250 FOR IP): Performed by: PHYSICIAN ASSISTANT

## 2020-09-08 PROCEDURE — 2500000003 HC RX 250 WO HCPCS: Performed by: PHYSICIAN ASSISTANT

## 2020-09-08 PROCEDURE — 10060 I&D ABSCESS SIMPLE/SINGLE: CPT

## 2020-09-08 PROCEDURE — 99282 EMERGENCY DEPT VISIT SF MDM: CPT

## 2020-09-08 RX ORDER — LIDOCAINE HYDROCHLORIDE AND EPINEPHRINE 10; 10 MG/ML; UG/ML
20 INJECTION, SOLUTION INFILTRATION; PERINEURAL ONCE
Status: COMPLETED | OUTPATIENT
Start: 2020-09-08 | End: 2020-09-08

## 2020-09-08 RX ORDER — SULFAMETHOXAZOLE AND TRIMETHOPRIM 800; 160 MG/1; MG/1
1 TABLET ORAL ONCE
Status: COMPLETED | OUTPATIENT
Start: 2020-09-08 | End: 2020-09-08

## 2020-09-08 RX ORDER — SULFAMETHOXAZOLE AND TRIMETHOPRIM 800; 160 MG/1; MG/1
1 TABLET ORAL 2 TIMES DAILY
Qty: 20 TABLET | Refills: 0 | Status: SHIPPED | OUTPATIENT
Start: 2020-09-08 | End: 2020-09-16

## 2020-09-08 RX ORDER — HYDROCODONE BITARTRATE AND ACETAMINOPHEN 5; 325 MG/1; MG/1
1 TABLET ORAL ONCE
Status: COMPLETED | OUTPATIENT
Start: 2020-09-08 | End: 2020-09-08

## 2020-09-08 RX ORDER — HYDROCODONE BITARTRATE AND ACETAMINOPHEN 5; 325 MG/1; MG/1
1 TABLET ORAL EVERY 6 HOURS PRN
Qty: 10 TABLET | Refills: 0 | Status: SHIPPED | OUTPATIENT
Start: 2020-09-08 | End: 2020-09-11

## 2020-09-08 RX ADMIN — LIDOCAINE HYDROCHLORIDE,EPINEPHRINE BITARTRATE 20 ML: 10; .01 INJECTION, SOLUTION INFILTRATION; PERINEURAL at 10:43

## 2020-09-08 RX ADMIN — SULFAMETHOXAZOLE AND TRIMETHOPRIM 1 TABLET: 800; 160 TABLET ORAL at 11:12

## 2020-09-08 RX ADMIN — HYDROCODONE BITARTRATE AND ACETAMINOPHEN 1 TABLET: 5; 325 TABLET ORAL at 11:12

## 2020-09-08 ASSESSMENT — ENCOUNTER SYMPTOMS
SHORTNESS OF BREATH: 0
COLOR CHANGE: 0
TROUBLE SWALLOWING: 0
ALLERGIC/IMMUNOLOGIC NEGATIVE: 1
ABDOMINAL PAIN: 0
APNEA: 0
EYE PAIN: 0

## 2020-09-08 ASSESSMENT — PAIN DESCRIPTION - LOCATION: LOCATION: LEG

## 2020-09-08 ASSESSMENT — PAIN DESCRIPTION - ORIENTATION: ORIENTATION: UPPER

## 2020-09-08 ASSESSMENT — PAIN SCALES - GENERAL
PAINLEVEL_OUTOF10: 10

## 2020-09-08 ASSESSMENT — PAIN DESCRIPTION - PAIN TYPE: TYPE: ACUTE PAIN

## 2020-09-08 NOTE — TELEPHONE ENCOUNTER
Patient was just discharged from the hospital. She had an abscess drained and they gave her [facility] for the patient. She sts that this medication does not do much for pain for her. She is asking if there is something else that she could get. Please advise.

## 2020-09-08 NOTE — ED PROVIDER NOTES
[facility] ED  eMERGENCYdEPARTMENT eNCOUnter      Pt Name: Payam Velasquez  MRN: [id number]  [place] [address]  Date of evaluation: 9/8/2020  Provider:Charanjit Santamaria PA-C    CHIEF COMPLAINT       Chief Complaint   Patient presents with    Abscess         HISTORY OF PRESENT ILLNESS  (Location/Symptom, Timing/Onset, Context/Setting, Quality, Duration, Modifying Factors, Severity.)   Payam Velasquez is a 21 y.o. female who presents to the emergency department with complaints of right medial thigh abscess that is been present for the past 2 to 3 days. Patient has a history of hidradenitis with frequent abscess formations in the past.  Patient is scheduled to see dermatologist tomorrow however she states that she can no longer take the pain. Patient denies any drainage from the area. Patient denies any recent fevers. Patient rates the pain at a 10 out of 10    HPI    Nursing Notes were reviewed and I agree. REVIEW OF SYSTEMS    (2-9 systems for level 4, 10 or more for level 5)     Review of Systems   Constitutional: Negative for diaphoresis and fever. HENT: Negative for hearing loss and trouble swallowing. Eyes: Negative for pain. Respiratory: Negative for apnea and shortness of breath. Cardiovascular: Negative for chest pain. Gastrointestinal: Negative for abdominal pain. Endocrine: Negative. Genitourinary: Negative for hematuria. Musculoskeletal: Negative for neck pain and neck stiffness. Skin: Positive for wound. Negative for color change. Allergic/Immunologic: Negative. Neurological: Negative for dizziness and numbness. Hematological: Negative. Psychiatric/Behavioral: Negative. All other systems reviewed and are negative. Except as noted above the remainder of the review of systems was reviewed and negative.        PAST MEDICAL HISTORY     Past Medical History:   Diagnosis Date    Diabetes mellitus ([address]     GERD (gastroesophageal reflux disease)     Hidradenitis suppurativa     Hypertension     Obesity affecting pregnancy, antepartum, third trimester          SURGICAL HISTORY       Past Surgical History:   Procedure Laterality Date    UPPER GASTROINTESTINAL ENDOSCOPY           CURRENT MEDICATIONS       Previous Medications    ALBUTEROL (PROVENTIL) (2.5 MG/3ML) 0.083% NEBULIZER SOLUTION    Take 3 mLs by nebulization every 6 hours as needed for Wheezing    ALBUTEROL SULFATE  (90 BASE) MCG/ACT INHALER    Inhale 2 puffs into the lungs every 4 hours as needed for Wheezing    BLOOD GLUCOSE MONITOR STRIPS    Test 3 times a day & as needed for symptoms of irregular blood glucose. BUPROPION (WELLBUTRIN XL) 150 MG EXTENDED RELEASE TABLET    TAKE 1 TABLET BY MOUTH ONCE DAILY IN THE MORNING    CLINDAMYCIN (CLEOCIN) 300 MG CAPSULE    Take 1 capsule by mouth 2 times daily for 7 days May sub Generic and 150 mg capsules and 2x the amount    CLOTRIMAZOLE (LOTRIMIN) 1 % CREAM    Apply topically 2 times daily to lesions on skin for 3-4 weeks or for 1 week after lesion clears    DOXYLAMINE-PYRIDOXINE (DICLEGIS) 10-10 MG TBEC    Start: 2 tabs p.o. q.h.s.; if symptoms persist after 2 days increase to 1 tab p.o. q.a.m. and 2 tabs p.o. q.h.s.; may increase to 1 tab p.o. q.a.m., 1 tab p.o. q. midafternoon and 2 tabs p.o. q.h.s.  4 tabs per day. If unable to afford, instruct the patient about substituting the OTC components.     FAMOTIDINE (PEPCID) 20 MG TABLET    Take 1 tablet by mouth daily    GLUCOSE MONITORING KIT (FREESTYLE) MONITORING KIT    1 kit by Does not apply route daily    IBUPROFEN (ADVIL;MOTRIN) 600 MG TABLET    Take 1 tablet by mouth every 6 hours as needed for Pain    IBUPROFEN (ADVIL;MOTRIN) 800 MG TABLET    Take 1 tablet by mouth every 8 hours as needed for Pain    IBUPROFEN (ADVIL;MOTRIN) 800 MG TABLET    Take 1 tablet by mouth every 8 hours as needed for Pain    INSULIN GLARGINE (LANTUS SOLOSTAR) 100 UNIT/ML INJECTION PEN    Inject 10 Units into the skin nightly    INSULIN LISPRO, 1 UNIT DIAL, (HUMALOG KWIKPEN) 100 UNIT/ML SOPN    Inject 5 Units into the skin 3 times daily (before meals)    INSULIN PEN NEEDLE 30G X 8 MM MISC    1 each by Does not apply route daily    IPRATROPIUM-ALBUTEROL (DUONEB) 0.5-2.5 (3) MG/3ML SOLN NEBULIZER SOLUTION    Inhale 3 mLs into the lungs every 6 hours as needed for Shortness of Breath    LANCETS MISC    DX: diabetes mellitus. Use 1-3 time(s) daily - Ok to substitute per insurance (1 each = 1 box)    METHYLPREDNISOLONE (MEDROL, MANNY,) 4 MG TABLET    Take by mouth. NAPROXEN (NAPROSYN) 500 MG TABLET    Take 1 tablet by mouth 2 times daily (with meals)    ZINC GLUCONATE 15 MG TABS    Take 3 tabs daily       ALLERGIES     Shellfish-derived products and Benzoyl peroxide    FAMILY HISTORY     History reviewed. No pertinent family history.        SOCIAL HISTORY       Social History     Socioeconomic History    Marital status: Single     Spouse name: None    Number of children: None    Years of education: None    Highest education level: None   Occupational History    None   Social Needs    Financial resource strain: None    Food insecurity     Worry: None     Inability: None    Transportation needs     Medical: None     Non-medical: None   Tobacco Use    Smoking status: Current Every Day Smoker     Packs/day: 0.50    Smokeless tobacco: Never Used   Substance and Sexual Activity    Alcohol use: No    Drug use: No    Sexual activity: None   Lifestyle    Physical activity     Days per week: None     Minutes per session: None    Stress: None   Relationships    Social connections     Talks on phone: None     Gets together: None     Attends Synagogue service: None     Active member of club or organization: None     Attends meetings of clubs or organizations: None     Relationship status: None    Intimate partner violence     Fear of current or ex partner: None     Emotionally abused: None     Physically abused: None     Forced sexual activity: None   Other Topics Concern    None   Social History Narrative    None       SCREENINGS    [place] Coma Scale  Eye Opening: Spontaneous  Best Verbal Response: Oriented  Best Motor Response: Obeys commands  [place] Coma Scale Score: 15      PHYSICAL EXAM    (up to 7 forlevel 4, 8 or more for level 5)     ED Triage Vitals [09/08/20 0927]   BP Temp Temp Source Pulse Resp SpO2 Height Weight   137/87 98.5 °F (36.9 °C) Oral 76 16 99 % 5' 8\" (1.727 m) 280 lb (127 kg)       Physical Exam  Vitals signs and nursing note reviewed. Constitutional:       General: She is not in acute distress. Appearance: She is well-developed. She is not diaphoretic. HENT:      Head: Normocephalic and atraumatic. Mouth/Throat:      Pharynx: No oropharyngeal exudate. Eyes:      General: No scleral icterus. Conjunctiva/sclera: Conjunctivae normal.      Pupils: Pupils are equal, round, and reactive to light. Neck:      Musculoskeletal: Normal range of motion and neck supple. Trachea: No tracheal deviation. Cardiovascular:      Rate and Rhythm: Normal rate. Heart sounds: Normal heart sounds. Pulmonary:      Effort: Pulmonary effort is normal. No respiratory distress. Breath sounds: Normal breath sounds. Abdominal:      General: Bowel sounds are normal. There is no distension. Palpations: Abdomen is soft. Musculoskeletal: Normal range of motion. Skin:     General: Skin is warm and dry. Findings: Abscess present. No erythema or rash. Neurological:      Mental Status: She is alert and oriented to person, place, and time. Cranial Nerves: No cranial nerve deficit. Motor: No abnormal muscle tone. Psychiatric:         Behavior: Behavior normal.         Thought Content:  Thought content normal.         Judgment: Judgment normal.           DIAGNOSTIC RESULTS     RADIOLOGY:   Non-plain film images such as CT, Ultrasound and MRI are read by the radiologist. Negrito Webber radiographic images are visualized and preliminarilyinterpreted by Chino Haley PA-C with the below findings:      Interpretation per the Radiologist below, if available at the time of this note:    No orders to display       LABS:  Labs Reviewed - No data to display    All other labs were within normal range or not returnedas of this dictation. EMERGENCYDEPARTMENT COURSE and DIFFERENTIAL DIAGNOSIS/MDM:   Vitals:    Vitals:    09/08/20 0927 09/08/20 1040   BP: 137/87 127/85   Pulse: 76 80   Resp: 16 18   Temp: 98.5 °F (36.9 °C)    TempSrc: Oral    SpO2: 99% 100%   Weight: 280 lb (127 kg)    Height: 5' 8\" (1.727 m)        REASSESSMENT          MDM    PROCEDURES:    Incision/Drainage    Date/Time: 9/8/2020 11:05 AM  Performed by: Chino Haley PA-C  Authorized by: Jose Enrique Sargent DO     Consent:     Consent obtained:  Verbal    Consent given by:  Patient    Risks discussed:  Incomplete drainage, pain and bleeding  Location:     Type:  Abscess    Size:  1.5    Location:  Lower extremity    Lower extremity location:  Leg    Leg location:  R upper leg  Pre-procedure details:     Skin preparation:  Chloraprep  Anesthesia (see MAR for exact dosages): Anesthesia method:  Local infiltration    Local anesthetic:  Lidocaine 1% WITH epi  Procedure type:     Complexity:  Simple  Procedure details:     Incision types:  Single straight    Incision depth:  Subcutaneous    Scalpel blade:  11    Drainage:  Purulent and bloody    Drainage amount: Moderate    Wound treatment:  Wound left open    Packing materials:  None  Post-procedure details:     Patient tolerance of procedure: Tolerated well, no immediate complications          FINAL IMPRESSION      1.  Abscess          DISPOSITION/PLAN   DISPOSITION Decision To Discharge 09/08/2020 11:06:21 AM      PATIENT REFERRED TO:  Ivan Lozano MD  [id number] [id number]  [phone]            DISCHARGE MEDICATIONS:  New Prescriptions    HYDROCODONE-ACETAMINOPHEN (NORCO) 5-325 MG PER TABLET    Take 1 tablet by mouth every 6 hours as needed for Pain for up to 3 days. Intended supply: 3 days.  Take lowest dose possible to manage pain    SULFAMETHOXAZOLE-TRIMETHOPRIM (BACTRIM DS) 800-160 MG PER TABLET    Take 1 tablet by mouth 2 times daily for 10 days       (Please note that portions of this note were completed with a voice recognition program.  Efforts were made to edit the dictations but occasionally words are mis-transcribed.)    DIGNA Molina PA-C  09/08/20 1106

## 2020-09-08 NOTE — ED TRIAGE NOTES
Pt presents to the ER with complaints of right upper thigh abscess  Patient states that she gets abscesses frequently  Denies fever  Afebrile now

## 2020-09-14 ENCOUNTER — HOSPITAL ENCOUNTER (EMERGENCY)
Age: 20
Discharge: HOME | End: 2020-09-14
Attending: EMERGENCY MEDICINE
Payer: COMMERCIAL

## 2020-09-14 VITALS
SYSTOLIC BLOOD PRESSURE: 111 MMHG | HEIGHT: 68 IN | OXYGEN SATURATION: 100 % | TEMPERATURE: 98.5 F | WEIGHT: 280 LBS | BODY MASS INDEX: 42.44 KG/M2 | DIASTOLIC BLOOD PRESSURE: 62 MMHG | RESPIRATION RATE: 20 BRPM | HEART RATE: 82 BPM

## 2020-09-14 LAB
HCG, URINE, POC: NEGATIVE
Lab: NORMAL
NEGATIVE QC PASS/FAIL: NORMAL
POSITIVE QC PASS/FAIL: NORMAL

## 2020-09-14 PROCEDURE — 10060 I&D ABSCESS SIMPLE/SINGLE: CPT

## 2020-09-14 PROCEDURE — 2500000003 HC RX 250 WO HCPCS: Performed by: EMERGENCY MEDICINE

## 2020-09-14 PROCEDURE — 99283 EMERGENCY DEPT VISIT LOW MDM: CPT

## 2020-09-14 PROCEDURE — 6370000000 HC RX 637 (ALT 250 FOR IP): Performed by: EMERGENCY MEDICINE

## 2020-09-14 RX ORDER — CEPHALEXIN 500 MG/1
500 CAPSULE ORAL 4 TIMES DAILY
Qty: 28 CAPSULE | Refills: 0 | Status: SHIPPED | OUTPATIENT
Start: 2020-09-14 | End: 2020-09-21

## 2020-09-14 RX ORDER — OXYCODONE HYDROCHLORIDE AND ACETAMINOPHEN 5; 325 MG/1; MG/1
1-2 TABLET ORAL EVERY 6 HOURS PRN
Qty: 10 TABLET | Refills: 0 | Status: SHIPPED | OUTPATIENT
Start: 2020-09-14 | End: 2020-09-17

## 2020-09-14 RX ORDER — OXYCODONE HYDROCHLORIDE AND ACETAMINOPHEN 5; 325 MG/1; MG/1
1 TABLET ORAL ONCE
Status: COMPLETED | OUTPATIENT
Start: 2020-09-14 | End: 2020-09-14

## 2020-09-14 RX ORDER — LIDOCAINE HYDROCHLORIDE 20 MG/ML
5 INJECTION, SOLUTION INFILTRATION; PERINEURAL ONCE
Status: COMPLETED | OUTPATIENT
Start: 2020-09-14 | End: 2020-09-14

## 2020-09-14 RX ORDER — FLUCONAZOLE 150 MG/1
150 TABLET ORAL ONCE
Qty: 1 TABLET | Refills: 2 | Status: SHIPPED | OUTPATIENT
Start: 2020-09-14 | End: 2020-09-14

## 2020-09-14 RX ADMIN — OXYCODONE HYDROCHLORIDE AND ACETAMINOPHEN 1 TABLET: 5; 325 TABLET ORAL at 14:06

## 2020-09-14 RX ADMIN — LIDOCAINE HYDROCHLORIDE 5 ML: 20 INJECTION, SOLUTION INFILTRATION; PERINEURAL at 14:06

## 2020-09-14 ASSESSMENT — PAIN DESCRIPTION - ONSET: ONSET: ON-GOING

## 2020-09-14 ASSESSMENT — PAIN DESCRIPTION - PAIN TYPE: TYPE: ACUTE PAIN

## 2020-09-14 ASSESSMENT — PAIN SCALES - GENERAL
PAINLEVEL_OUTOF10: 7
PAINLEVEL_OUTOF10: 7

## 2020-09-14 ASSESSMENT — PAIN DESCRIPTION - PROGRESSION: CLINICAL_PROGRESSION: GRADUALLY WORSENING

## 2020-09-14 ASSESSMENT — PAIN DESCRIPTION - LOCATION: LOCATION: OTHER (COMMENT)

## 2020-09-14 ASSESSMENT — PAIN DESCRIPTION - FREQUENCY: FREQUENCY: INTERMITTENT

## 2020-09-14 NOTE — ED PROVIDER NOTES
[address] [facility] ED  eMERGENCY dEPARTMENT eNCOUnter      Pt Name: Anjelica Irizarry  MRN: [id number]  [place] [address]  Date of evaluation: 9/14/2020  Provider: Silvino Ward [address] [place] [address]       Chief Complaint   Patient presents with    Abscess     1-2 days under right arm pit         HISTORY OF PRESENT ILLNESS   (Location/Symptom, Timing/Onset,Context/Setting, Quality, Duration, Modifying Factors, Severity)  Note limiting factors. Anjelica Irizarry is a 21 y.o. female who presents to the emergency department with a chief complaint of her hidradenitis suppurativa acting up under her right armpit. She denies any fevers or chills. She has not been on antibiotics for this though she has been on many courses of antibiotics in the past for similar. She has been using hot compresses but the pain and swelling was too intense to bear today    HPI    NursingNotes were reviewed. REVIEW OF SYSTEMS    (2-9 systems for level 4, 10 or more for level 5)     Review of Systems   Constitutional: Negative for activity change and appetite change. HENT: Negative for congestion, facial swelling and rhinorrhea. Eyes: Negative for photophobia and discharge. Respiratory: Negative for shortness of breath and wheezing. Cardiovascular: Negative for chest pain. Gastrointestinal: Negative for abdominal distention, abdominal pain and vomiting. Endocrine: Negative for polydipsia and polyphagia. Genitourinary: Negative for difficulty urinating, frequency, vaginal bleeding and vaginal discharge. Musculoskeletal: Negative for gait problem. Skin: Positive for wound. Negative for color change. Allergic/Immunologic: Negative for immunocompromised state. Neurological: Negative for dizziness, weakness and light-headedness. Hematological: Negative for adenopathy. Psychiatric/Behavioral: Negative for behavioral problems.        Except as noted above the remainder of the review of systems was reviewed and negative. PAST MEDICAL HISTORY     Past Medical History:   Diagnosis Date    Diabetes mellitus ([facility] [address]     GERD (gastroesophageal reflux disease)     Hidradenitis suppurativa     Hypertension     Obesity affecting pregnancy, antepartum, third trimester          SURGICALHISTORY       Past Surgical History:   Procedure Laterality Date    UPPER GASTROINTESTINAL ENDOSCOPY           CURRENT MEDICATIONS       Previous Medications    ALBUTEROL (PROVENTIL) (2.5 MG/3ML) 0.083% NEBULIZER SOLUTION    Take 3 mLs by nebulization every 6 hours as needed for Wheezing    ALBUTEROL SULFATE  (90 BASE) MCG/ACT INHALER    Inhale 2 puffs into the lungs every 4 hours as needed for Wheezing    BLOOD GLUCOSE MONITOR STRIPS    Test 3 times a day & as needed for symptoms of irregular blood glucose. BUPROPION (WELLBUTRIN XL) 150 MG EXTENDED RELEASE TABLET    TAKE 1 TABLET BY MOUTH ONCE DAILY IN THE MORNING    CLOTRIMAZOLE (LOTRIMIN) 1 % CREAM    Apply topically 2 times daily to lesions on skin for 3-4 weeks or for 1 week after lesion clears    DOXYLAMINE-PYRIDOXINE (DICLEGIS) 10-10 MG TBEC    Start: 2 tabs p.o. q.h.s.; if symptoms persist after 2 days increase to 1 tab p.o. q.a.m. and 2 tabs p.o. q.h.s.; may increase to 1 tab p.o. q.a.m., 1 tab p.o. q. midafternoon and 2 tabs p.o. q.h.s.  4 tabs per day. If unable to afford, instruct the patient about substituting the OTC components.     FAMOTIDINE (PEPCID) 20 MG TABLET    Take 1 tablet by mouth daily    GLUCOSE MONITORING KIT (FREESTYLE) MONITORING KIT    1 kit by Does not apply route daily    IBUPROFEN (ADVIL;MOTRIN) 600 MG TABLET    Take 1 tablet by mouth every 6 hours as needed for Pain    IBUPROFEN (ADVIL;MOTRIN) 800 MG TABLET    Take 1 tablet by mouth every 8 hours as needed for Pain    IBUPROFEN (ADVIL;MOTRIN) 800 MG TABLET    Take 1 tablet by mouth every 8 hours as needed for Pain    INSULIN GLARGINE (LANTUS SOLOSTAR) 100 UNIT/ML INJECTION PEN    Inject 10 Units into the skin nightly    INSULIN LISPRO, 1 UNIT DIAL, (HUMALOG KWIKPEN) 100 UNIT/ML SOPN    Inject 5 Units into the skin 3 times daily (before meals)    INSULIN PEN NEEDLE 30G X 8 MM MISC    1 each by Does not apply route daily    IPRATROPIUM-ALBUTEROL (DUONEB) 0.5-2.5 (3) MG/3ML SOLN NEBULIZER SOLUTION    Inhale 3 mLs into the lungs every 6 hours as needed for Shortness of Breath    LANCETS MISC    DX: diabetes mellitus. Use 1-3 time(s) daily - Ok to substitute per insurance (1 each = 1 box)    METHYLPREDNISOLONE (MEDROL, MANNY,) 4 MG TABLET    Take by mouth. NAPROXEN (NAPROSYN) 500 MG TABLET    Take 1 tablet by mouth 2 times daily (with meals)    SULFAMETHOXAZOLE-TRIMETHOPRIM (BACTRIM DS) 800-160 MG PER TABLET    Take 1 tablet by mouth 2 times daily for 10 days    ZINC GLUCONATE 15 MG TABS    Take 3 tabs daily       ALLERGIES     Shellfish-derived products and Benzoyl peroxide    FAMILY HISTORY     History reviewed. No pertinent family history.        SOCIAL HISTORY       Social History     Socioeconomic History    Marital status: Single     Spouse name: None    Number of children: None    Years of education: None    Highest education level: None   Occupational History    None   Social Needs    Financial resource strain: None    Food insecurity     Worry: None     Inability: None    Transportation needs     Medical: None     Non-medical: None   Tobacco Use    Smoking status: Current Every Day Smoker     Packs/day: 0.50    Smokeless tobacco: Never Used   Substance and Sexual Activity    Alcohol use: No    Drug use: No    Sexual activity: None   Lifestyle    Physical activity     Days per week: None     Minutes per session: None    Stress: None   Relationships    Social connections     Talks on phone: None     Gets together: None     Attends Christian service: None     Active member of club or organization: None     Attends meetings of clubs or organizations: None     Relationship status: None    the time ofthis note:    No orders to display         ED BEDSIDE ULTRASOUND:   Performed by ED Physician - none    LABS:  Labs Reviewed   POC PREGNANCY UR-QUAL       All other labs were within normal range or not returned as of this dictation. EMERGENCY DEPARTMENT COURSE and DIFFERENTIAL DIAGNOSIS/MDM:   Vitals:    Vitals:    09/14/20 1315   BP: 111/62   Pulse: 82   Resp: 20   Temp: 98.5 °F (36.9 °C)   TempSrc: Oral   SpO2: 100%   Weight: 280 lb (127 kg)   Height: 5' 8\" (1.727 m)       Patient has an obvious abscess and tolerates drainage well. She prefers not to take an antibiotic as she has had so many courses and they always give her a yeast infection. I give her a prescription in case she wants to fill it as well as Diflucan. She is instructed to use hot compresses and given verbal instructions for pain control. She is discharged home in stable condition with a referral to general surgery on-call. Signs and symptoms which should prompt a return to the emergency department are reviewed and patient states her understanding of these at time of disposition. MDM    CRITICAL CARE TIME   Total Critical Care time was 0 minutes, excluding separately reportableprocedures. There was a high probability of clinicallysignificant/life threatening deterioration in the patient's condition which required my urgent intervention.       CONSULTS:  None    PROCEDURES:  Unless otherwise noted below, none     Incision/Drainage    Date/Time: 9/21/2020 1:50 PM  Performed by: [facility],   Authorized by: [facility],      Consent:     Consent obtained:  Verbal    Consent given by:  Patient    Risks discussed:  Bleeding and infection    Alternatives discussed:  No treatment  Location:     Type:  Abscess    Size:  3cm    Location:  Upper extremity    Upper extremity location:  Arm    Arm location:  R upper arm  Pre-procedure details:     Skin preparation:  Chloraprep  Anesthesia (see MAR for exact dosages): Anesthesia method:  Local infiltration    Local anesthetic:  Lidocaine 1% WITH epi  Procedure details:     Needle aspiration: no      Incision types:  Cruciate    Incision depth:  Subcutaneous    Scalpel blade:  11    Wound management:  Probed and deloculated and irrigated with saline    Drainage:  Bloody and purulent    Drainage amount: Moderate    Wound treatment:  Wound left open    Packing materials:  None  Post-procedure details:     Patient tolerance of procedure: Tolerated well, no immediate complications        FINAL IMPRESSION      1. Abscess          DISPOSITION/PLAN   DISPOSITION Decision To Discharge 09/14/2020 03:10:01 PM      PATIENT REFERRED TO:  Abdi Villafuerte MD  [address]  [phone]      As needed      DISCHARGE MEDICATIONS:  New Prescriptions    CEPHALEXIN (KEFLEX) 500 MG CAPSULE    Take 1 capsule by mouth 4 times daily for 7 days    FLUCONAZOLE (DIFLUCAN) 150 MG TABLET    Take 1 tablet by mouth once for 1 dose    OXYCODONE-ACETAMINOPHEN (PERCOCET) 5-325 MG PER TABLET    Take 1-2 tablets by mouth every 6 hours as needed for Pain for up to 3 days. WARNING:  May cause drowsiness. May impair ability to operate vehicles or machinery. Do not use in combination with alcohol.           (Please note that portions of this note were completed with a voice recognition program.  Efforts were made to edit the dictations but occasionally words are mis-transcribed.)    Hakan Robison DO (electronically signed)  Attending Emergency Physician          Hakan Robison DO  09/21/20 [id number]

## 2020-09-16 ENCOUNTER — APPOINTMENT (OUTPATIENT)
Dept: GENERAL RADIOLOGY | Age: 20
End: 2020-09-16
Payer: COMMERCIAL

## 2020-09-16 ENCOUNTER — HOSPITAL ENCOUNTER (EMERGENCY)
Age: 20
Discharge: HOME | End: 2020-09-16
Attending: EMERGENCY MEDICINE
Payer: COMMERCIAL

## 2020-09-16 VITALS
DIASTOLIC BLOOD PRESSURE: 90 MMHG | WEIGHT: 280 LBS | HEART RATE: 96 BPM | HEIGHT: 69 IN | OXYGEN SATURATION: 98 % | TEMPERATURE: 98.7 F | RESPIRATION RATE: 16 BRPM | BODY MASS INDEX: 41.47 KG/M2 | SYSTOLIC BLOOD PRESSURE: 136 MMHG

## 2020-09-16 LAB
BACTERIA: ABNORMAL /HPF
BILIRUBIN URINE: NEGATIVE
BLOOD, URINE: NEGATIVE
CLARITY: ABNORMAL
COLOR: YELLOW
EPITHELIAL CELLS, UA: ABNORMAL /HPF (ref 0–5)
GLUCOSE URINE: >=1000 MG/DL
HCG, URINE, POC: NEGATIVE
HYALINE CASTS: ABNORMAL /HPF (ref 0–5)
KETONES, URINE: NEGATIVE MG/DL
LEUKOCYTE ESTERASE, URINE: NEGATIVE
Lab: NORMAL
NEGATIVE QC PASS/FAIL: NORMAL
NITRITE, URINE: NEGATIVE
PH UA: 6 (ref 5–9)
POSITIVE QC PASS/FAIL: NORMAL
PROTEIN UA: >=300 MG/DL
RBC UA: ABNORMAL /HPF (ref 0–2)
SPECIFIC GRAVITY UA: 1.04 (ref 1–1.03)
URINE REFLEX TO CULTURE: YES
UROBILINOGEN, URINE: 1 E.U./DL
WBC UA: ABNORMAL /HPF (ref 0–5)
YEAST: PRESENT /HPF

## 2020-09-16 PROCEDURE — 81001 URINALYSIS AUTO W/SCOPE: CPT

## 2020-09-16 PROCEDURE — 6370000000 HC RX 637 (ALT 250 FOR IP): Performed by: EMERGENCY MEDICINE

## 2020-09-16 PROCEDURE — 72110 X-RAY EXAM L-2 SPINE 4/>VWS: CPT

## 2020-09-16 PROCEDURE — 99284 EMERGENCY DEPT VISIT MOD MDM: CPT

## 2020-09-16 PROCEDURE — 87086 URINE CULTURE/COLONY COUNT: CPT

## 2020-09-16 RX ORDER — IBUPROFEN 800 MG/1
800 TABLET ORAL ONCE
Status: COMPLETED | OUTPATIENT
Start: 2020-09-16 | End: 2020-09-16

## 2020-09-16 RX ORDER — HYDROCODONE BITARTRATE AND ACETAMINOPHEN 5; 325 MG/1; MG/1
1 TABLET ORAL ONCE
Status: COMPLETED | OUTPATIENT
Start: 2020-09-16 | End: 2020-09-16

## 2020-09-16 RX ORDER — FLUCONAZOLE 150 MG/1
150 TABLET ORAL ONCE
Qty: 1 TABLET | Refills: 0 | Status: SHIPPED | OUTPATIENT
Start: 2020-09-16 | End: 2020-09-16

## 2020-09-16 RX ORDER — CIPROFLOXACIN 500 MG/1
500 TABLET, FILM COATED ORAL ONCE
Status: COMPLETED | OUTPATIENT
Start: 2020-09-16 | End: 2020-09-16

## 2020-09-16 RX ORDER — CIPROFLOXACIN 500 MG/1
500 TABLET, FILM COATED ORAL 2 TIMES DAILY
Qty: 14 TABLET | Refills: 0 | Status: SHIPPED | OUTPATIENT
Start: 2020-09-16 | End: 2020-09-23

## 2020-09-16 RX ORDER — FLUCONAZOLE 100 MG/1
200 TABLET ORAL ONCE
Status: COMPLETED | OUTPATIENT
Start: 2020-09-16 | End: 2020-09-16

## 2020-09-16 RX ORDER — CYCLOBENZAPRINE HCL 10 MG
10 TABLET ORAL 3 TIMES DAILY PRN
Qty: 21 TABLET | Refills: 0 | Status: SHIPPED | OUTPATIENT
Start: 2020-09-16 | End: 2020-09-26

## 2020-09-16 RX ADMIN — IBUPROFEN 800 MG: 800 TABLET, FILM COATED ORAL at 20:35

## 2020-09-16 RX ADMIN — CIPROFLOXACIN 500 MG: 500 TABLET, FILM COATED ORAL at 22:08

## 2020-09-16 RX ADMIN — HYDROCODONE BITARTRATE AND ACETAMINOPHEN 1 TABLET: 5; 325 TABLET ORAL at 22:08

## 2020-09-16 RX ADMIN — FLUCONAZOLE 200 MG: 100 TABLET ORAL at 22:09

## 2020-09-16 ASSESSMENT — ENCOUNTER SYMPTOMS
WHEEZING: 0
BACK PAIN: 1
PHOTOPHOBIA: 0
EYE DISCHARGE: 0
CHEST TIGHTNESS: 0
VOMITING: 0
ABDOMINAL PAIN: 0
SHORTNESS OF BREATH: 0
ABDOMINAL DISTENTION: 0
COUGH: 0
SORE THROAT: 0
NAUSEA: 0

## 2020-09-16 ASSESSMENT — PAIN SCALES - GENERAL
PAINLEVEL_OUTOF10: 9
PAINLEVEL_OUTOF10: 10
PAINLEVEL_OUTOF10: 10

## 2020-09-16 ASSESSMENT — PAIN DESCRIPTION - ORIENTATION: ORIENTATION: MID

## 2020-09-16 ASSESSMENT — PAIN DESCRIPTION - FREQUENCY: FREQUENCY: CONTINUOUS

## 2020-09-16 ASSESSMENT — PAIN DESCRIPTION - LOCATION: LOCATION: BACK

## 2020-09-16 ASSESSMENT — PAIN DESCRIPTION - DESCRIPTORS: DESCRIPTORS: ACHING

## 2020-09-16 ASSESSMENT — PAIN DESCRIPTION - PAIN TYPE: TYPE: ACUTE PAIN
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
## 2020-09-17 ENCOUNTER — TELEPHONE (OUTPATIENT)
Dept: FAMILY MEDICINE CLINIC | Age: 20
End: 2020-09-17

## 2020-09-17 ENCOUNTER — VIRTUAL VISIT (OUTPATIENT)
Dept: FAMILY MEDICINE CLINIC | Age: 20
End: 2020-09-17
Payer: COMMERCIAL

## 2020-09-17 ENCOUNTER — HOSPITAL ENCOUNTER (EMERGENCY)
Age: 20
Discharge: HOME | End: 2020-09-17
Payer: COMMERCIAL

## 2020-09-17 VITALS
TEMPERATURE: 99.2 F | RESPIRATION RATE: 16 BRPM | DIASTOLIC BLOOD PRESSURE: 98 MMHG | WEIGHT: 293 LBS | HEART RATE: 91 BPM | HEIGHT: 69 IN | BODY MASS INDEX: 43.4 KG/M2 | OXYGEN SATURATION: 100 % | SYSTOLIC BLOOD PRESSURE: 141 MMHG

## 2020-09-17 PROCEDURE — 99282 EMERGENCY DEPT VISIT SF MDM: CPT

## 2020-09-17 PROCEDURE — 4004F PT TOBACCO SCREEN RCVD TLK: CPT | Performed by: INTERNAL MEDICINE

## 2020-09-17 PROCEDURE — 6360000002 HC RX W HCPCS: Performed by: NURSE PRACTITIONER

## 2020-09-17 PROCEDURE — 6370000000 HC RX 637 (ALT 250 FOR IP): Performed by: NURSE PRACTITIONER

## 2020-09-17 PROCEDURE — 2580000003 HC RX 258

## 2020-09-17 PROCEDURE — G8428 CUR MEDS NOT DOCUMENT: HCPCS | Performed by: INTERNAL MEDICINE

## 2020-09-17 PROCEDURE — 99213 OFFICE O/P EST LOW 20 MIN: CPT | Performed by: INTERNAL MEDICINE

## 2020-09-17 PROCEDURE — 96372 THER/PROPH/DIAG INJ SC/IM: CPT

## 2020-09-17 PROCEDURE — G8417 CALC BMI ABV UP PARAM F/U: HCPCS | Performed by: INTERNAL MEDICINE

## 2020-09-17 RX ORDER — OXYCODONE HYDROCHLORIDE AND ACETAMINOPHEN 5; 325 MG/1; MG/1
1 TABLET ORAL EVERY 6 HOURS PRN
Qty: 28 TABLET | Refills: 0 | Status: SHIPPED | OUTPATIENT
Start: 2020-09-17 | End: 2020-09-24

## 2020-09-17 RX ORDER — NITROFURANTOIN 25; 75 MG/1; MG/1
100 CAPSULE ORAL 2 TIMES DAILY
Qty: 10 CAPSULE | Refills: 0 | Status: SHIPPED | OUTPATIENT
Start: 2020-09-17 | End: 2020-09-22

## 2020-09-17 RX ORDER — KETOROLAC TROMETHAMINE 10 MG/1
10 TABLET, FILM COATED ORAL EVERY 6 HOURS PRN
Qty: 20 TABLET | Refills: 0 | Status: SHIPPED | OUTPATIENT
Start: 2020-09-17 | End: 2020-11-02 | Stop reason: ALTCHOICE

## 2020-09-17 RX ORDER — METRONIDAZOLE 500 MG/1
2000 TABLET ORAL ONCE
Status: COMPLETED | OUTPATIENT
Start: 2020-09-17 | End: 2020-09-17

## 2020-09-17 RX ORDER — ACYCLOVIR 400 MG/1
800 TABLET ORAL ONCE
Status: COMPLETED | OUTPATIENT
Start: 2020-09-17 | End: 2020-09-17

## 2020-09-17 RX ORDER — ACYCLOVIR 800 MG/1
400 TABLET ORAL
Qty: 25 TABLET | Refills: 0 | Status: SHIPPED | OUTPATIENT
Start: 2020-09-17 | End: 2020-09-27

## 2020-09-17 RX ORDER — CEFTRIAXONE SODIUM 250 MG/1
250 INJECTION, POWDER, FOR SOLUTION INTRAMUSCULAR; INTRAVENOUS ONCE
Status: COMPLETED | OUTPATIENT
Start: 2020-09-17 | End: 2020-09-17

## 2020-09-17 RX ORDER — AZITHROMYCIN 500 MG/1
1000 TABLET, FILM COATED ORAL ONCE
Status: COMPLETED | OUTPATIENT
Start: 2020-09-17 | End: 2020-09-17

## 2020-09-17 RX ADMIN — METRONIDAZOLE 2000 MG: 500 TABLET ORAL at 06:40

## 2020-09-17 RX ADMIN — WATER 1 ML: 1 INJECTION INTRAMUSCULAR; INTRAVENOUS; SUBCUTANEOUS at 06:40

## 2020-09-17 RX ADMIN — CEFTRIAXONE SODIUM 250 MG: 250 INJECTION, POWDER, FOR SOLUTION INTRAMUSCULAR; INTRAVENOUS at 06:40

## 2020-09-17 RX ADMIN — AZITHROMYCIN MONOHYDRATE 1000 MG: 500 TABLET ORAL at 06:40

## 2020-09-17 RX ADMIN — ACYCLOVIR 800 MG: 400 TABLET ORAL at 06:40

## 2020-09-17 ASSESSMENT — ENCOUNTER SYMPTOMS
APNEA: 0
WHEEZING: 0
CONSTIPATION: 0
NAUSEA: 0
RHINORRHEA: 0
EYE REDNESS: 0
EYE DISCHARGE: 0
DIARRHEA: 0
BACK PAIN: 0
VOMITING: 0
SORE THROAT: 0
TROUBLE SWALLOWING: 0
BLOOD IN STOOL: 0
TROUBLE SWALLOWING: 0
COUGH: 0
CONSTIPATION: 0
VOMITING: 0
EYE DISCHARGE: 0
BACK PAIN: 0
WHEEZING: 0
VOICE CHANGE: 0
NAUSEA: 0
EYE REDNESS: 0
RHINORRHEA: 0
COLOR CHANGE: 0
EYE PAIN: 0
FACIAL SWELLING: 0
SINUS PRESSURE: 0
SHORTNESS OF BREATH: 0
ABDOMINAL DISTENTION: 0
EYE ITCHING: 0
SHORTNESS OF BREATH: 0
CHEST TIGHTNESS: 0
PHOTOPHOBIA: 0
SORE THROAT: 0
COUGH: 0
DIARRHEA: 0
COLOR CHANGE: 0
SINUS PAIN: 0
EYE PAIN: 0
BLOOD IN STOOL: 0
ABDOMINAL PAIN: 0
RECTAL PAIN: 0
ABDOMINAL PAIN: 0

## 2020-09-17 ASSESSMENT — PAIN DESCRIPTION - LOCATION: LOCATION: VAGINA

## 2020-09-17 ASSESSMENT — PAIN DESCRIPTION - FREQUENCY: FREQUENCY: INTERMITTENT

## 2020-09-17 ASSESSMENT — PAIN DESCRIPTION - DESCRIPTORS: DESCRIPTORS: BURNING

## 2020-09-17 ASSESSMENT — PAIN SCALES - GENERAL: PAINLEVEL_OUTOF10: 10

## 2020-09-17 ASSESSMENT — PAIN DESCRIPTION - PAIN TYPE: TYPE: ACUTE PAIN

## 2020-09-17 NOTE — ED PROVIDER NOTES
[address] [facility] ED  EMERGENCY DEPARTMENT ENCOUNTER      Pt Name: Ramses Wetzel  MRN: [id number]  [address]  Date of evaluation: 9/17/2020  Provider: SHAQUILLE Roldan [id number]       Chief Complaint   Patient presents with    Vaginal Itching         HISTORY OF PRESENT ILLNESS   (Location/Symptom, Timing/Onset,Context/Setting, Quality, Duration, Modifying Factors, Severity)  Note limiting factors. Ramses Wetzel is a 21 y.o. female who presents to the emergency department with a chart reviewed past medical history of PTSD, anxiety, attempted suicide, hyperglycemia, GERD, hidradenitis, and hypertension for chief complaint of worsening vaginal itching. Patient reports that for the past week she has been experiencing symptoms of vaginal itching and extension of the itching to her buttocks region. She states that there is a burning sensation and that she feels superficial burning when she urinates once the urine touches her skin. She states that she was treated with Diflucan for this condition 3 days ago with no success to alleviate her symptoms. She denies noting any hematuria or vaginal discharge. She denies any vaginal bleeding. She reports possibility of STDs as she sexually active. Denies nausea, vomiting, diarrhea, or abdominal pain. Nursing Notes were reviewed. REVIEW OF SYSTEMS    (2-9 systems for level 4, 10 or more for level 5)     Review of Systems   Constitutional: Negative for activity change, appetite change, fatigue and fever. HENT: Negative for congestion, ear pain, rhinorrhea, sore throat and trouble swallowing. Eyes: Negative for pain, discharge and redness. Respiratory: Negative for cough, shortness of breath and wheezing. Cardiovascular: Negative for chest pain and palpitations. Gastrointestinal: Negative for abdominal pain, blood in stool, constipation, diarrhea, nausea and vomiting. Endocrine: Negative for polydipsia and polyuria. Genitourinary: Positive for vaginal pain. Negative for decreased urine volume, dysuria, flank pain and hematuria. Musculoskeletal: Negative for arthralgias, back pain and myalgias. Skin: Negative for color change, rash and wound. Neurological: Negative for dizziness, syncope, weakness, light-headedness and headaches. Psychiatric/Behavioral: Negative for behavioral problems. All other systems reviewed and are negative. Except as noted above the remainder of the review of systems was reviewed and negative.        PAST MEDICAL HISTORY     Past Medical History:   Diagnosis Date    Diabetes mellitus ([facility] [address]     GERD (gastroesophageal reflux disease)     Hidradenitis suppurativa     Hypertension     Obesity affecting pregnancy, antepartum, third trimester      Past Surgical History:   Procedure Laterality Date    UPPER GASTROINTESTINAL ENDOSCOPY       Social History     Socioeconomic History    Marital status: Single     Spouse name: None    Number of children: None    Years of education: None    Highest education level: None   Occupational History    None   Social Needs    Financial resource strain: None    Food insecurity     Worry: None     Inability: None    Transportation needs     Medical: None     Non-medical: None   Tobacco Use    Smoking status: Current Every Day Smoker     Packs/day: 0.50    Smokeless tobacco: Never Used   Substance and Sexual Activity    Alcohol use: No    Drug use: No    Sexual activity: None   Lifestyle    Physical activity     Days per week: None     Minutes per session: None    Stress: None   Relationships    Social connections     Talks on phone: None     Gets together: None     Attends [employer] service: None     Active member of club or organization: None     Attends meetings of clubs or organizations: None     Relationship status: None    Intimate partner violence     Fear of current or ex partner: None     Emotionally abused: None     Physically such as CT, Ultrasound and MRI are read by the radiologist. Plain radiographic images are visualized and preliminarily interpreted by the emergency physician with the below findings:        Interpretation per the Radiologist below, if available at the time ofthis note:    No orders to display         ED BEDSIDE ULTRASOUND:   Performed by ED Physician - none    LABS:  Labs Reviewed - No data to display    All other labs were within normal range or not returned as of this dictation. EMERGENCY DEPARTMENT COURSE and DIFFERENTIAL DIAGNOSIS/MDM:   Vitals:    Vitals:    09/17/20 0555   BP: (!) 141/98   Pulse: 91   Resp: 16   Temp: 99.2 °F (37.3 °C)   TempSrc: Temporal   SpO2: 100%   Weight: 300 lb (136.1 kg)   Height: 5' 9\" (1.753 m)            MDM   Patient is a 70-year-old female presenting to the ER with a chief complaint of itching in the vagina and pain. Patient is hemodynamically stable nontoxic-appearing. On physical exam does appear to have visualized lesions consistent with herpes on the labia extending down to the anal area. Patient not convinced that its herpes or genital warts. Patient wants STD treatment. Ordered azithromycin, Flagyl, Rocephin IM, and first dose of acyclovir. Patient referred to OB/GYN for follow-up. Patient given a prescription for Toradol and 4 acyclovir. Patient also given prescription for Lotrimin  vaginal cream help with itching. Discharged in stable condition stable vital signs. CRITICAL CARE TIME       CONSULTS:  None    PROCEDURES:  Unless otherwise noted below, none     Procedures    FINAL IMPRESSION      1.  Genital herpes simplex, unspecified site          DISPOSITION/PLAN   DISPOSITION Decision To Discharge 09/17/2020 06:46:41 AM      PATIENT REFERRED TO:  Algis Homans, DO  [facility]  [address]  [phone]    Schedule an appointment as soon as possible for a visit in 1 day        DISCHARGE MEDICATIONS:  New Prescriptions ACYCLOVIR (ZOVIRAX) 800 MG TABLET    Take 0.5 tablets by mouth 5 times daily for 10 days    CLOTRIMAZOLE (LOTRIMIN) 2 % CREA VAGINAL CREAM    Place 1 actuation vaginally daily for 3 days    KETOROLAC (TORADOL) 10 MG TABLET    Take 1 tablet by mouth every 6 hours as needed for Pain          (Please notethat portions of this note were completed with a voice recognition program.  Efforts were made to edit the dictations but occasionally words are mis-transcribed.)    SHAQUILLE Ortiz - CNP (electronically signed)  Attending Emergency Physician          [facility], SHAQUILLE - CNP  09/17/20 4202

## 2020-09-17 NOTE — ED NOTES
Patient medicated as per orders. D/c instructions explained to patient who voices understanding. Patient is ambulatory from ED with no distress observed.        Little Lundberg RN  09/16/20 7867

## 2020-09-17 NOTE — ED TRIAGE NOTES
Pt arrives with c/o anal and vaginal pain and itching. Pt states pain is unbearable. Pt states she has scratched herself so much that she has open sores in her perineal area. C/o pain with sitting, urinating, coughing, showering, etc.  Pt states itching is worse at night. Pt was treated in this ED last night. States she needs something that will work faster than diflucan. Pt alert and oriented x 4. Skin warm, dry. Respirations even and unlabored. No distress noted at this time.

## 2020-09-17 NOTE — TELEPHONE ENCOUNTER
Patient called asking if she could speak to you sooner than her 1:00 appt today. She is miserable and has open sores in the perineal area that is extremely painful. She cannot sit down, use the bathroom or sleep. Patient is very upset and was crying during the phone call. She has been seen twice in the ED and she doesn't feel like they have done anything to help her.   Her number is [phone]

## 2020-09-17 NOTE — PROGRESS NOTES
Subjective:      Patient ID: Elana Rainey is a 21 y.o. female who presents today for: Vaginal sores     Chief Complaint   Patient presents with    Skin Problem       66-year-old female presents with vaginal sores in her markedly painful. Patient was recently diagnosed with herpes genitalis. She is out of pain medication at this time. She request pain medications to address this medical problem. Past Medical History:   Diagnosis Date    Diabetes mellitus ([address]     GERD (gastroesophageal reflux disease)     Hidradenitis suppurativa     Hypertension     Obesity affecting pregnancy, antepartum, third trimester      Past Surgical History:   Procedure Laterality Date    UPPER GASTROINTESTINAL ENDOSCOPY       No family history on file.   Social History     Socioeconomic History    Marital status: Single     Spouse name: Not on file    Number of children: Not on file    Years of education: Not on file    Highest education level: Not on file   Occupational History    Not on file   Social Needs    Financial resource strain: Not on file    Food insecurity     Worry: Not on file     Inability: Not on file    Transportation needs     Medical: Not on file     Non-medical: Not on file   Tobacco Use    Smoking status: Current Every Day Smoker     Packs/day: 0.50    Smokeless tobacco: Never Used   Substance and Sexual Activity    Alcohol use: No    Drug use: No    Sexual activity: Not on file   Lifestyle    Physical activity     Days per week: Not on file     Minutes per session: Not on file    Stress: Not on file   Relationships    Social connections     Talks on phone: Not on file     Gets together: Not on file     Attends Congregational service: Not on file     Active member of club or organization: Not on file     Attends meetings of clubs or organizations: Not on file     Relationship status: Not on file    Intimate partner violence     Fear of current or ex partner: Not on file     Emotionally abused: Not on file     Physically abused: Not on file     Forced sexual activity: Not on file   Other Topics Concern    Not on file   Social History Narrative    Not on file     Current Outpatient Medications on File Prior to Visit   Medication Sig Dispense Refill    acyclovir (ZOVIRAX) 800 MG tablet Take 0.5 tablets by mouth 5 times daily for 10 days 25 tablet 0    ketorolac (TORADOL) 10 MG tablet Take 1 tablet by mouth every 6 hours as needed for Pain 20 tablet 0    clotrimazole (LOTRIMIN) 2 % CREA vaginal cream Place 1 actuation vaginally daily for 3 days 21 g 0    ciprofloxacin (CIPRO) 500 MG tablet Take 1 tablet by mouth 2 times daily for 7 days 14 tablet 0    cyclobenzaprine (FLEXERIL) 10 MG tablet Take 1 tablet by mouth 3 times daily as needed for Muscle spasms 21 tablet 0    cephALEXin (KEFLEX) 500 MG capsule Take 1 capsule by mouth 4 times daily for 7 days 28 capsule 0    ibuprofen (ADVIL;MOTRIN) 800 MG tablet Take 1 tablet by mouth every 8 hours as needed for Pain 20 tablet 0    ipratropium-albuterol (DUONEB) 0.5-2.5 (3) MG/3ML SOLN nebulizer solution Inhale 3 mLs into the lungs every 6 hours as needed for Shortness of Breath 360 mL 0    albuterol (PROVENTIL) (2.5 MG/3ML) 0.083% nebulizer solution Take 3 mLs by nebulization every 6 hours as needed for Wheezing 120 each 1    albuterol sulfate  (90 Base) MCG/ACT inhaler Inhale 2 puffs into the lungs every 4 hours as needed for Wheezing 18 g 1    ibuprofen (ADVIL;MOTRIN) 600 MG tablet Take 1 tablet by mouth every 6 hours as needed for Pain 30 tablet 0    clotrimazole (LOTRIMIN) 1 % cream Apply topically 2 times daily to lesions on skin for 3-4 weeks or for 1 week after lesion clears 1 Tube 0    glucose monitoring kit (FREESTYLE) monitoring kit 1 kit by Does not apply route daily 1 kit 0    naproxen (NAPROSYN) 500 MG tablet Take 1 tablet by mouth 2 times daily (with meals) 30 tablet 0    Zinc Gluconate 15 MG TABS Take 3 tabs daily 30 tablet 0  famotidine (PEPCID) 20 MG tablet Take 1 tablet by mouth daily 30 tablet 0    Insulin Pen Needle 30G X 8 MM MISC 1 each by Does not apply route daily 100 each 0    methylPREDNISolone (MEDROL, MANNY,) 4 MG tablet Take by mouth. 1 kit 0    insulin glargine (LANTUS SOLOSTAR) 100 UNIT/ML injection pen Inject 10 Units into the skin nightly 5 pen 0    Lancets MISC DX: diabetes mellitus. Use 1-3 time(s) daily - Ok to substitute per insurance (1 each = 1 box) 1 each 5    blood glucose monitor strips Test 3 times a day & as needed for symptoms of irregular blood glucose. 100 strip 0    insulin lispro, 1 Unit Dial, (HUMALOG KWIKPEN) 100 UNIT/ML SOPN Inject 5 Units into the skin 3 times daily (before meals) 1 pen 5    buPROPion (WELLBUTRIN XL) 150 MG extended release tablet TAKE 1 TABLET BY MOUTH ONCE DAILY IN THE MORNING      ibuprofen (ADVIL;MOTRIN) 800 MG tablet Take 1 tablet by mouth every 8 hours as needed for Pain 20 tablet 0    doxylamine-pyridoxine (DICLEGIS) 10-10 MG TBEC Start: 2 tabs p.o. q.h.s.; if symptoms persist after 2 days increase to 1 tab p.o. q.a.m. and 2 tabs p.o. q.h.s.; may increase to 1 tab p.o. q.a.m., 1 tab p.o. q. midafternoon and 2 tabs p.o. q.h.s.  4 tabs per day. If unable to afford, instruct the patient about substituting the OTC components. 60 tablet 1     No current facility-administered medications on file prior to visit. Allergies:  Shellfish-derived products and Benzoyl peroxide    Review of Systems   Constitutional: Negative for chills, diaphoresis, fatigue and fever (subjective). HENT: Negative for congestion, dental problem, drooling, ear discharge, ear pain, facial swelling, hearing loss, mouth sores, nosebleeds, postnasal drip, rhinorrhea, sinus pressure, sinus pain, sneezing, sore throat, tinnitus, trouble swallowing and voice change. Eyes: Negative for photophobia, pain, discharge, redness, itching and visual disturbance.    Respiratory: Negative for apnea, cough, with treatment and discussing with patient. Discussed that this assessment was limited, but per review of most recent ER visits and imaging I would start treatment as prescribed and f/u if not improving. Any worsening she should go to Er as she is not tolerating prednisone well outpatient and may need some IV medications and closer monitoring. Encouraged plenty of fluids and rest. She was agreeable. Reviewed with the patient: current clinicalstatus, medications, activities and diet. Side effects, adverse effects of the medication prescribedtoday, as well as treatment plan/ rationale and result expectations have been discussedwith the patient who expresses understanding and desires to proceed. Close follow upto evaluate treatment results and for coordination of care. I have reviewedthe patient's medical history in detail and updated the computerized patient record. Inés Cano is a 21 y.o. female evaluated via telephone on 9/17/2020. Consent:  She and/or health care decision maker is aware that that she may receive a bill for this telephone service, depending on her insurance coverage, and has provided verbal consent to proceed: Yes      This visit was a telephone encounter. Patient was located at their home. Provider was located at their _X__ home or        ____ office. I affirm this is a Patient Initiated Episode with an Established Patient who has not had a related appointment within my department in the past 7 days or scheduled within the next 24 hours.     Total Time:6 mins  Note: not billable if this call serves to triage the patient into an appointment for the relevant concern        Brad Price MD

## 2020-09-17 NOTE — ED PROVIDER NOTES
Psychiatric/Behavioral: Negative for agitation and hallucinations. All other systems reviewed and are negative. Except as noted above the remainder of the review of systems was reviewed and negative. PAST MEDICAL HISTORY     Past Medical History:   Diagnosis Date    Diabetes mellitus ([address]     GERD (gastroesophageal reflux disease)     Hidradenitis suppurativa     Hypertension     Obesity affecting pregnancy, antepartum, third trimester          SURGICALHISTORY       Past Surgical History:   Procedure Laterality Date    UPPER GASTROINTESTINAL ENDOSCOPY           CURRENT MEDICATIONS       Previous Medications    ALBUTEROL (PROVENTIL) (2.5 MG/3ML) 0.083% NEBULIZER SOLUTION    Take 3 mLs by nebulization every 6 hours as needed for Wheezing    ALBUTEROL SULFATE  (90 BASE) MCG/ACT INHALER    Inhale 2 puffs into the lungs every 4 hours as needed for Wheezing    BLOOD GLUCOSE MONITOR STRIPS    Test 3 times a day & as needed for symptoms of irregular blood glucose. BUPROPION (WELLBUTRIN XL) 150 MG EXTENDED RELEASE TABLET    TAKE 1 TABLET BY MOUTH ONCE DAILY IN THE MORNING    CEPHALEXIN (KEFLEX) 500 MG CAPSULE    Take 1 capsule by mouth 4 times daily for 7 days    CLOTRIMAZOLE (LOTRIMIN) 1 % CREAM    Apply topically 2 times daily to lesions on skin for 3-4 weeks or for 1 week after lesion clears    DOXYLAMINE-PYRIDOXINE (DICLEGIS) 10-10 MG TBEC    Start: 2 tabs p.o. q.h.s.; if symptoms persist after 2 days increase to 1 tab p.o. q.a.m. and 2 tabs p.o. q.h.s.; may increase to 1 tab p.o. q.a.m., 1 tab p.o. q. midafternoon and 2 tabs p.o. q.h.s.  4 tabs per day. If unable to afford, instruct the patient about substituting the OTC components.     FAMOTIDINE (PEPCID) 20 MG TABLET    Take 1 tablet by mouth daily    GLUCOSE MONITORING KIT (FREESTYLE) MONITORING KIT    1 kit by Does not apply route daily    IBUPROFEN (ADVIL;MOTRIN) 600 MG TABLET    Take 1 tablet by mouth every 6 hours as needed for Pain IBUPROFEN (ADVIL;MOTRIN) 800 MG TABLET    Take 1 tablet by mouth every 8 hours as needed for Pain    IBUPROFEN (ADVIL;MOTRIN) 800 MG TABLET    Take 1 tablet by mouth every 8 hours as needed for Pain    INSULIN GLARGINE (LANTUS SOLOSTAR) 100 UNIT/ML INJECTION PEN    Inject 10 Units into the skin nightly    INSULIN LISPRO, 1 UNIT DIAL, (HUMALOG KWIKPEN) 100 UNIT/ML SOPN    Inject 5 Units into the skin 3 times daily (before meals)    INSULIN PEN NEEDLE 30G X 8 MM MISC    1 each by Does not apply route daily    IPRATROPIUM-ALBUTEROL (DUONEB) 0.5-2.5 (3) MG/3ML SOLN NEBULIZER SOLUTION    Inhale 3 mLs into the lungs every 6 hours as needed for Shortness of Breath    LANCETS MISC    DX: diabetes mellitus. Use 1-3 time(s) daily - Ok to substitute per insurance (1 each = 1 box)    METHYLPREDNISOLONE (MEDROL, MANNY,) 4 MG TABLET    Take by mouth. NAPROXEN (NAPROSYN) 500 MG TABLET    Take 1 tablet by mouth 2 times daily (with meals)    OXYCODONE-ACETAMINOPHEN (PERCOCET) 5-325 MG PER TABLET    Take 1-2 tablets by mouth every 6 hours as needed for Pain for up to 3 days. WARNING:  May cause drowsiness. May impair ability to operate vehicles or machinery. Do not use in combination with alcohol. ZINC GLUCONATE 15 MG TABS    Take 3 tabs daily       ALLERGIES     Shellfish-derived products and Benzoyl peroxide    FAMILY HISTORY     No family history on file.        SOCIAL HISTORY       Social History     Socioeconomic History    Marital status: Single     Spouse name: Not on file    Number of children: Not on file    Years of education: Not on file    Highest education level: Not on file   Occupational History    Not on file   Social Needs    Financial resource strain: Not on file    Food insecurity     Worry: Not on file     Inability: Not on file    Transportation needs     Medical: Not on file     Non-medical: Not on file   Tobacco Use    Smoking status: Current Every Day Smoker     Packs/day: 0.50    Smokeless tobacco: Never Used   Substance and Sexual Activity    Alcohol use: No    Drug use: No    Sexual activity: Not on file   Lifestyle    Physical activity     Days per week: Not on file     Minutes per session: Not on file    Stress: Not on file   Relationships    Social connections     Talks on phone: Not on file     Gets together: Not on file     Attends Adventism service: Not on file     Active member of club or organization: Not on file     Attends meetings of clubs or organizations: Not on file     Relationship status: Not on file    Intimate partner violence     Fear of current or ex partner: Not on file     Emotionally abused: Not on file     Physically abused: Not on file     Forced sexual activity: Not on file   Other Topics Concern    Not on file   Social History Narrative    Not on file       SCREENINGS    [place] Coma Scale  Eye Opening: Spontaneous  Best Verbal Response: Oriented  Best Motor Response: Obeys commands  [place] Coma Scale Score: 15 @FLOW([id number])@      PHYSICAL EXAM    (up to 7 for level 4, 8 or more for level 5)     ED Triage Vitals [09/16/20 1940]   BP Temp Temp Source Pulse Resp SpO2 Height Weight   (!) 136/90 98.7 °F (37.1 °C) Temporal 96 16 98 % 5' 9\" (1.753 m) 280 lb (127 kg)       Physical Exam  Vitals signs and nursing note reviewed. Constitutional:       Appearance: She is well-developed. HENT:      Head: Normocephalic. Right Ear: Tympanic membrane normal.      Left Ear: Tympanic membrane normal.      Nose: Nose normal.      Mouth/Throat:      Mouth: Mucous membranes are moist.   Eyes:      Conjunctiva/sclera: Conjunctivae normal.      Pupils: Pupils are equal, round, and reactive to light. Neck:      Musculoskeletal: Normal range of motion and neck supple. Cardiovascular:      Rate and Rhythm: Normal rate and regular rhythm. Heart sounds: Normal heart sounds. Pulmonary:      Effort: Pulmonary effort is normal.      Breath sounds: Normal breath sounds. Abdominal:      General: Bowel sounds are normal.      Palpations: Abdomen is soft. Tenderness: There is no abdominal tenderness. There is no guarding. Musculoskeletal: Normal range of motion. Lumbar back: She exhibits tenderness. She exhibits no deformity. Back:    Skin:     General: Skin is warm and dry. Capillary Refill: Capillary refill takes less than 2 seconds. Neurological:      Mental Status: She is alert and oriented to person, place, and time. DIAGNOSTIC RESULTS     EKG: All EKG's are interpreted by the Emergency Department Physician who either signs or Co-signsthis chart in the absence of a cardiologist.      RADIOLOGY:   Ladean Bi such as CT, Ultrasound and MRI are read by the radiologist. Aracelinicolás Lieberman radiographic images are visualized and preliminarily interpreted by the emergency physician with the below findings:      Interpretation per the Radiologist below, if available at the time ofthis note:    XR LUMBAR SPINE (MIN 4 VIEWS)    (Results Pending)         ED BEDSIDE ULTRASOUND:   Performed by ED Physician - none    LABS:  Labs Reviewed   URINE RT REFLEX TO CULTURE - Abnormal; Notable for the following components:       Result Value    Clarity, UA CLOUDY (*)     Glucose, Ur >=1000 (*)     Protein, UA >=300 (*)     All other components within normal limits   MICROSCOPIC URINALYSIS - Abnormal; Notable for the following components:    Bacteria, UA MODERATE (*)     Yeast, UA Present (*)     WBC, UA 10-20 (*)     All other components within normal limits   CULTURE, URINE   URINE RT REFLEX TO CULTURE   POC PREGNANCY UR-QUAL       All other labs were within normal range or not returned as of this dictation.     EMERGENCY DEPARTMENT COURSE and DIFFERENTIAL DIAGNOSIS/MDM:   Vitals:    Vitals:    09/16/20 1940   BP: (!) 136/90   Pulse: 96   Resp: 16   Temp: 98.7 °F (37.1 °C)   TempSrc: Temporal   SpO2: 98%   Weight: 280 lb (127 kg)   Height: 5' 9\" (1.753 m)        MDM

## 2020-09-18 LAB — URINE CULTURE, ROUTINE: NORMAL

## 2020-09-21 ASSESSMENT — ENCOUNTER SYMPTOMS
COLOR CHANGE: 0
FACIAL SWELLING: 0
PHOTOPHOBIA: 0
ABDOMINAL PAIN: 0
EYE DISCHARGE: 0
ABDOMINAL DISTENTION: 0
WHEEZING: 0
SHORTNESS OF BREATH: 0
RHINORRHEA: 0
VOMITING: 0

## 2020-09-23 RX ORDER — ACYCLOVIR 400 MG/1
400 TABLET ORAL
Qty: 50 TABLET | Refills: 0 | Status: SHIPPED | OUTPATIENT
Start: 2020-09-23 | End: 2020-10-03

## 2020-09-23 RX ORDER — OXYCODONE HYDROCHLORIDE AND ACETAMINOPHEN 5; 325 MG/1; MG/1
1 TABLET ORAL EVERY 6 HOURS PRN
Qty: 28 TABLET | Refills: 0 | OUTPATIENT
Start: 2020-09-23 | End: 2020-09-30

## 2020-09-24 ENCOUNTER — TELEPHONE (OUTPATIENT)
Dept: FAMILY MEDICINE CLINIC | Age: 20
End: 2020-09-24

## 2020-09-24 ENCOUNTER — HOSPITAL ENCOUNTER (EMERGENCY)
Age: 20
Discharge: HOME | End: 2020-09-24
Payer: COMMERCIAL

## 2020-09-24 VITALS
DIASTOLIC BLOOD PRESSURE: 90 MMHG | TEMPERATURE: 98.3 F | OXYGEN SATURATION: 99 % | WEIGHT: 274 LBS | BODY MASS INDEX: 41.52 KG/M2 | HEART RATE: 93 BPM | SYSTOLIC BLOOD PRESSURE: 140 MMHG | RESPIRATION RATE: 18 BRPM | HEIGHT: 68 IN

## 2020-09-24 PROCEDURE — 99282 EMERGENCY DEPT VISIT SF MDM: CPT

## 2020-09-24 PROCEDURE — 99283 EMERGENCY DEPT VISIT LOW MDM: CPT

## 2020-09-24 RX ORDER — NAPROXEN 500 MG/1
500 TABLET ORAL 2 TIMES DAILY
Qty: 20 TABLET | Refills: 0 | Status: SHIPPED | OUTPATIENT
Start: 2020-09-24 | End: 2020-11-02 | Stop reason: ALTCHOICE

## 2020-09-24 RX ORDER — GABAPENTIN 300 MG/1
300 CAPSULE ORAL 3 TIMES DAILY
Qty: 21 CAPSULE | Refills: 0 | Status: SHIPPED | OUTPATIENT
Start: 2020-09-24 | End: 2022-10-27

## 2020-09-24 ASSESSMENT — ENCOUNTER SYMPTOMS
DIARRHEA: 0
COLOR CHANGE: 0
SHORTNESS OF BREATH: 0
COUGH: 0
WHEEZING: 0
SORE THROAT: 0
CONSTIPATION: 0
ABDOMINAL PAIN: 0
TROUBLE SWALLOWING: 0
RHINORRHEA: 0
BLOOD IN STOOL: 0
EYE DISCHARGE: 0
NAUSEA: 0
VOMITING: 0
EYE REDNESS: 0
BACK PAIN: 0
EYE PAIN: 0

## 2020-09-24 ASSESSMENT — PAIN DESCRIPTION - DESCRIPTORS: DESCRIPTORS: ACHING;BURNING

## 2020-09-24 ASSESSMENT — PAIN DESCRIPTION - LOCATION: LOCATION: VAGINA

## 2020-09-24 ASSESSMENT — PAIN DESCRIPTION - PAIN TYPE: TYPE: CHRONIC PAIN

## 2020-09-24 ASSESSMENT — PAIN DESCRIPTION - FREQUENCY: FREQUENCY: CONTINUOUS

## 2020-09-24 NOTE — TELEPHONE ENCOUNTER
I spoke to the pt and told her that there will not be anymore meds she is requesting something else for pain beside gabapentin even if its not an opioid pt also requested a call from Dr. Annette Davis

## 2020-09-24 NOTE — ED PROVIDER NOTES
[facility] ED  EMERGENCY DEPARTMENT ENCOUNTER      Pt Name: Earline Moses  MRN: [id number]  [place] 2000  Date of evaluation: 9/24/2020  Provider: SHAQUILLE Tirado CNP    CHIEF COMPLAINT       Chief Complaint   Patient presents with    Vaginal Pain     Pt states she is having a herpes out break          HISTORY OF PRESENT ILLNESS   (Location/Symptom, Timing/Onset,Context/Setting, Quality, Duration, Modifying Factors, Severity)  Note limiting factors. Earline Moses is a 21 y.o. female who presents to the emergency department with a chart reviewed past medical history of carpal tunnel syndrome, PTSD, depressive disorder, chronic kidney disease, hyperglycemia, diabetes, anxiety, and obesity for chief complaint of pain from herpes outbreak. Patient ports that she was diagnosed with herpes and placed on acyclovir. She states she followed up with OB/GYN and confirm diagnosis of herpes. She states that she was placed on Percocet for the pain and her pain is coming back because she ran out of Percocet. She denies having any alleviating modifying factors. She denies fevers or chills. Nursing Notes were reviewed. REVIEW OF SYSTEMS    (2-9 systems for level 4, 10 or more for level 5)     Review of Systems   Constitutional: Negative for activity change, appetite change, fatigue and fever. HENT: Negative for congestion, ear pain, rhinorrhea, sore throat and trouble swallowing. Eyes: Negative for pain, discharge and redness. Respiratory: Negative for cough, shortness of breath and wheezing. Cardiovascular: Negative for chest pain and palpitations. Gastrointestinal: Negative for abdominal pain, blood in stool, constipation, diarrhea, nausea and vomiting. Endocrine: Negative for polydipsia and polyuria. Genitourinary: Positive for vaginal pain. Negative for decreased urine volume, dysuria, flank pain and hematuria.    Musculoskeletal: Negative for arthralgias, back pain and myalgias. Skin: Negative for color change, rash and wound. Neurological: Negative for dizziness, syncope, weakness, light-headedness and headaches. Psychiatric/Behavioral: Negative for behavioral problems. All other systems reviewed and are negative. Except as noted above the remainder of the review of systems was reviewed and negative.        PAST MEDICAL HISTORY     Past Medical History:   Diagnosis Date    Diabetes mellitus ([address]     Genital herpes     GERD (gastroesophageal reflux disease)     Hidradenitis suppurativa     Hypertension     Obesity affecting pregnancy, antepartum, third trimester      Past Surgical History:   Procedure Laterality Date    UPPER GASTROINTESTINAL ENDOSCOPY       Social History     Socioeconomic History    Marital status: Single     Spouse name: None    Number of children: None    Years of education: None    Highest education level: None   Occupational History    None   Social Needs    Financial resource strain: None    Food insecurity     Worry: None     Inability: None    Transportation needs     Medical: None     Non-medical: None   Tobacco Use    Smoking status: Current Every Day Smoker     Packs/day: 0.50    Smokeless tobacco: Never Used   Substance and Sexual Activity    Alcohol use: No    Drug use: No    Sexual activity: Yes     Partners: Male   Lifestyle    Physical activity     Days per week: None     Minutes per session: None    Stress: None   Relationships    Social connections     Talks on phone: None     Gets together: None     Attends Scientologist service: None     Active member of club or organization: None     Attends meetings of clubs or organizations: None     Relationship status: None    Intimate partner violence     Fear of current or ex partner: None     Emotionally abused: None     Physically abused: None     Forced sexual activity: None   Other Topics Concern    None   Social History Narrative    None       SCREENINGS findings:        Interpretation per the Radiologist below, if available at the time ofthis note:    No orders to display         ED BEDSIDE ULTRASOUND:   Performed by ED Physician - none    LABS:  Labs Reviewed - No data to display    All other labs were within normal range or not returned as of this dictation. EMERGENCY DEPARTMENT COURSE and DIFFERENTIAL DIAGNOSIS/MDM:   Vitals:    Vitals:    09/24/20 1855   BP: (!) 140/90   Pulse: 93   Resp: 18   Temp: 98.3 °F (36.8 °C)   TempSrc: Temporal   SpO2: 99%   Weight: 274 lb (124.3 kg)   Height: 5' 8\" (1.727 m)            MDM   Patient is a 19-year-old female presenting to the ER with a chief complaint of pain due to diagnoses of herpes. She is hemodynamically stable nontoxic-appearing. I explained to the patient that her recurrent visits to the emergency department will not help her herpes resolve any faster. I explained to the patient that she does need to continue following up with OB/GYN. I explained to the patient that I am not going to write her Percocet to help with the pain. I wrote patient a prescription for Naprosyn and instructed her to follow-up with OB/GYN and return back to the ER only if her symptoms are truly worsening. Discharged in a stable condition with stable vital signs. Patient tried to check into the emergency department again for the same complaint because she would like a different pain medication. She states that she would like Percocet. I explained to the patient that she needs to seek primary care versus OB/GYN to receive this medication that I am not going to write her Percocet. I also discussed the case with LOLY Meyer Cea and Dr. Angie Raymond both of which agreed that patient should not be receiving Percocet and should not be reevaluated again by another provider just to receive another medication that I did not write her. The patient is clearly demonstrating behavior of drug-seeking.   She has visited the emergency department 6 times within the past 1 month. Her ORRAS is 600. I continue to feel strong about my decision to not give her Percocet and so does my attending physician. CRITICAL CARE TIME       CONSULTS:  None    PROCEDURES:  Unless otherwise noted below, none     Procedures    FINAL IMPRESSION      1. Herpes simplex    2. Non-compliant behavior    3. Anxiety state          DISPOSITION/PLAN   DISPOSITION Decision To Discharge 09/24/2020 07:20:16 PM      PATIENT REFERRED TO:  Damian Henderson MD  [address] [facility] [address] [facility]  [phone]    Schedule an appointment as soon as possible for a visit in 1 day      [address] [place] [address]  [phone]    Schedule an appointment as soon as possible for a visit in 1 day        DISCHARGE MEDICATIONS:  Discharge Medication List as of 9/24/2020  7:21 PM      START taking these medications    Details   !! naproxen (NAPROSYN) 500 MG tablet Take 1 tablet by mouth 2 times daily for 20 doses, Disp-20 tablet,R-0Print       !! - Potential duplicate medications found. Please discuss with provider.              (Please notethat portions of this note were completed with a voice recognition program.  Efforts were made to edit the dictations but occasionally words are mis-transcribed.)    SHAQUILLE Tirado - CNP (electronically signed)  Attending Emergency Physician          SHAQUILLE Orozco CNP  09/24/20 [address], APRN - CNP  09/24/20 1948

## 2020-09-25 ENCOUNTER — TELEPHONE (OUTPATIENT)
Dept: FAMILY MEDICINE CLINIC | Age: 20
End: 2020-09-25

## 2020-09-29 ENCOUNTER — VIRTUAL VISIT (OUTPATIENT)
Dept: FAMILY MEDICINE CLINIC | Age: 20
End: 2020-09-29
Payer: COMMERCIAL

## 2020-09-29 PROCEDURE — 99441 PR PHYS/QHP TELEPHONE EVALUATION 5-10 MIN: CPT | Performed by: INTERNAL MEDICINE

## 2020-09-29 ASSESSMENT — ENCOUNTER SYMPTOMS
NAUSEA: 0
WHEEZING: 0
ABDOMINAL DISTENTION: 0
CHEST TIGHTNESS: 0
SINUS PRESSURE: 0
RECTAL PAIN: 0
EYE DISCHARGE: 0
CONSTIPATION: 0
SINUS PAIN: 0
VOICE CHANGE: 0
RHINORRHEA: 0
APNEA: 0
BLOOD IN STOOL: 0
ABDOMINAL PAIN: 0
EYE REDNESS: 0
TROUBLE SWALLOWING: 0
COLOR CHANGE: 0
COUGH: 0
VOMITING: 0
SHORTNESS OF BREATH: 0
BACK PAIN: 0
EYE PAIN: 0
SORE THROAT: 0
EYE ITCHING: 0
PHOTOPHOBIA: 0
DIARRHEA: 0
FACIAL SWELLING: 0

## 2020-09-29 NOTE — PROGRESS NOTES
Subjective:      Patient ID: Heber Rinaldi is a 21 y.o. female who presents today for: Vaginal sores     Chief Complaint   Patient presents with    Skin Problem       59-year-old female presents for follow-up of herpes genitalis. The patient is compliant with acyclovir. She has noted some improvement in her symptoms but not observed complete resolution of the lesions or pain. She has an appointment with OB/GYN on Wed October 6. She continues to take Neurontin as needed. Past Medical History:   Diagnosis Date    Diabetes mellitus ([facility] [address]     Genital herpes     GERD (gastroesophageal reflux disease)     Hidradenitis suppurativa     Hypertension     Obesity affecting pregnancy, antepartum, third trimester      Past Surgical History:   Procedure Laterality Date    UPPER GASTROINTESTINAL ENDOSCOPY       No family history on file.   Social History     Socioeconomic History    Marital status: Single     Spouse name: Not on file    Number of children: Not on file    Years of education: Not on file    Highest education level: Not on file   Occupational History    Not on file   Social Needs    Financial resource strain: Not on file    Food insecurity     Worry: Not on file     Inability: Not on file    Transportation needs     Medical: Not on file     Non-medical: Not on file   Tobacco Use    Smoking status: Current Every Day Smoker     Packs/day: 0.50    Smokeless tobacco: Never Used   Substance and Sexual Activity    Alcohol use: No    Drug use: No    Sexual activity: Yes     Partners: Male   Lifestyle    Physical activity     Days per week: Not on file     Minutes per session: Not on file    Stress: Not on file   Relationships    Social connections     Talks on phone: Not on file     Gets together: Not on file     Attends Cheondoism service: Not on file     Active member of club or organization: Not on file     Attends meetings of clubs or organizations: Not on file     Relationship status: Not on file    Intimate partner violence     Fear of current or ex partner: Not on file     Emotionally abused: Not on file     Physically abused: Not on file     Forced sexual activity: Not on file   Other Topics Concern    Not on file   Social History Narrative    Not on file     Current Outpatient Medications on File Prior to Visit   Medication Sig Dispense Refill    gabapentin (NEURONTIN) 300 MG capsule Take 1 capsule by mouth 3 times daily for 7 days. Intended supply: 7 days.  21 capsule 0    naproxen (NAPROSYN) 500 MG tablet Take 1 tablet by mouth 2 times daily for 20 doses 20 tablet 0    acyclovir (ZOVIRAX) 400 MG tablet Take 1 tablet by mouth 5 times daily for 10 days 50 tablet 0    ketorolac (TORADOL) 10 MG tablet Take 1 tablet by mouth every 6 hours as needed for Pain 20 tablet 0    ibuprofen (ADVIL;MOTRIN) 800 MG tablet Take 1 tablet by mouth every 8 hours as needed for Pain 20 tablet 0    ipratropium-albuterol (DUONEB) 0.5-2.5 (3) MG/3ML SOLN nebulizer solution Inhale 3 mLs into the lungs every 6 hours as needed for Shortness of Breath 360 mL 0    albuterol (PROVENTIL) (2.5 MG/3ML) 0.083% nebulizer solution Take 3 mLs by nebulization every 6 hours as needed for Wheezing 120 each 1    albuterol sulfate  (90 Base) MCG/ACT inhaler Inhale 2 puffs into the lungs every 4 hours as needed for Wheezing 18 g 1    ibuprofen (ADVIL;MOTRIN) 600 MG tablet Take 1 tablet by mouth every 6 hours as needed for Pain 30 tablet 0    clotrimazole (LOTRIMIN) 1 % cream Apply topically 2 times daily to lesions on skin for 3-4 weeks or for 1 week after lesion clears 1 Tube 0    glucose monitoring kit (FREESTYLE) monitoring kit 1 kit by Does not apply route daily 1 kit 0    naproxen (NAPROSYN) 500 MG tablet Take 1 tablet by mouth 2 times daily (with meals) 30 tablet 0    Zinc Gluconate 15 MG TABS Take 3 tabs daily 30 tablet 0    famotidine (PEPCID) 20 MG tablet Take 1 tablet by mouth daily 30 tablet 0    Insulin Pen Needle 30G X 8 MM MISC 1 each by Does not apply route daily 100 each 0    methylPREDNISolone (MEDROL, MANNY,) 4 MG tablet Take by mouth. 1 kit 0    insulin glargine (LANTUS SOLOSTAR) 100 UNIT/ML injection pen Inject 10 Units into the skin nightly 5 pen 0    Lancets MISC DX: diabetes mellitus. Use 1-3 time(s) daily - Ok to substitute per insurance (1 each = 1 box) 1 each 5    blood glucose monitor strips Test 3 times a day & as needed for symptoms of irregular blood glucose. 100 strip 0    insulin lispro, 1 Unit Dial, (HUMALOG KWIKPEN) 100 UNIT/ML SOPN Inject 5 Units into the skin 3 times daily (before meals) 1 pen 5    buPROPion (WELLBUTRIN XL) 150 MG extended release tablet TAKE 1 TABLET BY MOUTH ONCE DAILY IN THE MORNING      ibuprofen (ADVIL;MOTRIN) 800 MG tablet Take 1 tablet by mouth every 8 hours as needed for Pain 20 tablet 0    doxylamine-pyridoxine (DICLEGIS) 10-10 MG TBEC Start: 2 tabs p.o. q.h.s.; if symptoms persist after 2 days increase to 1 tab p.o. q.a.m. and 2 tabs p.o. q.h.s.; may increase to 1 tab p.o. q.a.m., 1 tab p.o. q. midafternoon and 2 tabs p.o. q.h.s.  4 tabs per day. If unable to afford, instruct the patient about substituting the OTC components. 60 tablet 1     No current facility-administered medications on file prior to visit. Allergies:  Shellfish-derived products and Benzoyl peroxide    Review of Systems   Constitutional: Negative for chills, diaphoresis, fatigue and fever (subjective). HENT: Negative for congestion, dental problem, drooling, ear discharge, ear pain, facial swelling, hearing loss, mouth sores, nosebleeds, postnasal drip, rhinorrhea, sinus pressure, sinus pain, sneezing, sore throat, tinnitus, trouble swallowing and voice change. Eyes: Negative for photophobia, pain, discharge, redness, itching and visual disturbance. Respiratory: Negative for apnea, cough, chest tightness, shortness of breath and wheezing.     Cardiovascular: Negative for chest pain, palpitations and leg swelling. Gastrointestinal: Negative for abdominal distention, abdominal pain, blood in stool, constipation, diarrhea, nausea, rectal pain and vomiting. Endocrine: Negative for cold intolerance, heat intolerance, polydipsia, polyphagia and polyuria. Genitourinary: Negative for decreased urine volume, difficulty urinating, dysuria, flank pain, frequency, genital sores, hematuria and urgency. Musculoskeletal: Negative for arthralgias, back pain, gait problem, joint swelling, myalgias, neck pain and neck stiffness. Skin: Negative for color change, rash and wound. Vaginal sore. Allergic/Immunologic: Negative for environmental allergies and food allergies. Neurological: Negative for dizziness, tremors, seizures, syncope, facial asymmetry, speech difficulty, weakness, light-headedness, numbness and headaches. Hematological: Negative for adenopathy. Does not bruise/bleed easily. Psychiatric/Behavioral: Negative for agitation, confusion, decreased concentration, hallucinations, self-injury, sleep disturbance and suicidal ideas. The patient is not nervous/anxious. Objective:   [facility] 09/22/2020         Assessment:          Diagnosis Orders   1. Genital herpes simplex, unspecified site         Plan:      No orders of the defined types were placed in this encounter. Continue acyclovir and Neurontin. Follow-up with OB/GYN on October 6..     No orders of the defined types were placed in this encounter. No follow-ups on file. TELEHEALTH EVALUATION -- Audio/Visual (During HWAZD-63 public health emergency)    -   Cyndra Aase is a 21 y.o. female being evaluated by a Virtual Visit (video visit) encounter to address concerns as mentioned above. A caregiver was present when appropriate.  Due to this being a TeleHealth encounter (During BRGLS-21 public health emergency), evaluation of the following organ systems was limited: Vitals/Constitutional/EENT/Resp/CV/GI//MS/Neuro/Skin/Heme-Lymph-Imm. Pursuant to the emergency declaration under the [facility] [place]note 2[place], [address] [place] [address] and the [employer] and [employer] Act, this Virtual Visit was conducted with patient's (and/or legal guardian's) consent, to reduce the patient's risk of exposure to COVID-19 and provide necessary medical care. The patient (and/or legal guardian) has also been advised to contact this office for worsening conditions or problems, and seek emergency medical treatment and/or call 911 if deemed necessary. Services were provided through a video synchronous discussion virtually to substitute for in-person clinic visit. Type of encounter was __ Doxy __ MyChart ___Facetime    Patient was located at their home. Provider was located at their ___ home or        ____ office. Anjelica Irizarry is a 21 y.o. female evaluated via telephone on 9/29/2020. Consent:  She and/or health care decision maker is aware that that she may receive a bill for this telephone service, depending on her insurance coverage, and has provided verbal consent to proceed: Yes      Documentation:  I communicated with the patient and/or health care decision maker about herpes genitalis. Details of this discussion including any medical advice provided: Please refer to note above      I affirm this is a Patient Initiated Episode with a Patient who has not had a related appointment within my department in the past 7 days or scheduled within the next 24 hours. Patient identification was verified at the start of the visit: Yes    Total Time: minutes: 5-10 minutes    Note: not billable if this call serves to triage the patient into an appointment for the relevant concern      Payal Riley MD on 9/29/2020 at 9:40 AM    An electronic signature was used to authenticate this note.     Approximately 22minutes spent reviewing previous visits to Er, imaging and blood work along with treatment and discussing with patient. Discussed that this assessment was limited, but per review of most recent ER visits and imaging I would start treatment as prescribed and f/u if not improving. Any worsening she should go to Er as she is not tolerating prednisone well outpatient and may need some IV medications and closer monitoring. Encouraged plenty of fluids and rest. She was agreeable. Reviewed with the patient: current clinicalstatus, medications, activities and diet. Side effects, adverse effects of the medication prescribedtoday, as well as treatment plan/ rationale and result expectations have been discussedwith the patient who expresses understanding and desires to proceed. Close follow upto evaluate treatment results and for coordination of care. I have reviewedthe patient's medical history in detail and updated the computerized patient record. Nickolas Zheng is a 21 y.o. female evaluated via telephone on 9/29/2020. Consent:  She and/or health care decision maker is aware that that she may receive a bill for this telephone service, depending on her insurance coverage, and has provided verbal consent to proceed: Yes      This visit was a telephone encounter. Patient was located at their home. Provider was located at their ___ home or        ___x_ office. I affirm this is a Patient Initiated Episode with an Established Patient who has not had a related appointment within my department in the past 7 days or scheduled within the next 24 hours.     Total Time:6 mins  Note: not billable if this call serves to triage the patient into an appointment for the relevant concern        Sandy Cantor MD

## 2020-10-28 ENCOUNTER — HOSPITAL ENCOUNTER (EMERGENCY)
Age: 20
Discharge: HOME | End: 2020-10-28
Attending: EMERGENCY MEDICINE
Payer: COMMERCIAL

## 2020-10-28 VITALS
DIASTOLIC BLOOD PRESSURE: 76 MMHG | SYSTOLIC BLOOD PRESSURE: 126 MMHG | HEART RATE: 79 BPM | RESPIRATION RATE: 16 BRPM | WEIGHT: 270 LBS | BODY MASS INDEX: 40.92 KG/M2 | HEIGHT: 68 IN | OXYGEN SATURATION: 99 % | TEMPERATURE: 98.1 F

## 2020-10-28 PROCEDURE — 10060 I&D ABSCESS SIMPLE/SINGLE: CPT

## 2020-10-28 PROCEDURE — 87070 CULTURE OTHR SPECIMN AEROBIC: CPT

## 2020-10-28 PROCEDURE — 87075 CULTR BACTERIA EXCEPT BLOOD: CPT

## 2020-10-28 PROCEDURE — 87077 CULTURE AEROBIC IDENTIFY: CPT

## 2020-10-28 PROCEDURE — 99283 EMERGENCY DEPT VISIT LOW MDM: CPT

## 2020-10-28 PROCEDURE — 6370000000 HC RX 637 (ALT 250 FOR IP): Performed by: NURSE PRACTITIONER

## 2020-10-28 PROCEDURE — 87205 SMEAR GRAM STAIN: CPT

## 2020-10-28 RX ORDER — DOXYCYCLINE 100 MG/1
100 TABLET ORAL 2 TIMES DAILY
Qty: 20 TABLET | Refills: 0 | Status: SHIPPED | OUTPATIENT
Start: 2020-10-28 | End: 2020-11-07

## 2020-10-28 RX ORDER — IBUPROFEN 800 MG/1
800 TABLET ORAL ONCE
Status: COMPLETED | OUTPATIENT
Start: 2020-10-28 | End: 2020-10-28

## 2020-10-28 RX ORDER — FLUCONAZOLE 150 MG/1
150 TABLET ORAL ONCE
Qty: 1 TABLET | Refills: 0 | Status: SHIPPED | OUTPATIENT
Start: 2020-10-28 | End: 2020-10-28

## 2020-10-28 RX ORDER — ACYCLOVIR 400 MG/1
400 TABLET ORAL 3 TIMES DAILY
Qty: 30 TABLET | Refills: 1 | Status: SHIPPED | OUTPATIENT
Start: 2020-10-28 | End: 2020-11-07

## 2020-10-28 RX ORDER — IBUPROFEN 800 MG/1
800 TABLET ORAL EVERY 8 HOURS PRN
Qty: 20 TABLET | Refills: 0 | Status: SHIPPED | OUTPATIENT
Start: 2020-10-28 | End: 2020-11-02 | Stop reason: ALTCHOICE

## 2020-10-28 RX ADMIN — IBUPROFEN 800 MG: 800 TABLET ORAL at 11:26

## 2020-10-28 ASSESSMENT — PAIN SCALES - GENERAL: PAINLEVEL_OUTOF10: 9

## 2020-10-28 ASSESSMENT — PAIN DESCRIPTION - LOCATION: LOCATION: GROIN

## 2020-10-28 ASSESSMENT — ENCOUNTER SYMPTOMS
COUGH: 0
BACK PAIN: 0
SHORTNESS OF BREATH: 0
ABDOMINAL PAIN: 0

## 2020-10-28 ASSESSMENT — PAIN DESCRIPTION - ORIENTATION: ORIENTATION: RIGHT

## 2020-10-28 ASSESSMENT — PAIN DESCRIPTION - PAIN TYPE: TYPE: ACUTE PAIN;CHRONIC PAIN

## 2020-10-28 NOTE — ED PROVIDER NOTES
[address] [facility] ED  eMERGENCY dEPARTMENT eNCOUnter      Pt Name: Katerina Brown  MRN: [id number]  [place] [address]  Date of evaluation: 10/28/2020  Provider: SHAQUILLE Chadwick CNP      HISTORY OF PRESENT ILLNESS    Katerina Brown is a 21 y.o. female who presents to the Emergency Department with R groin abscess x 1 week. Patient denies any drainage, fever or chills. Patient is eating and drinking well. Pain is moderate. REVIEW OF SYSTEMS       Review of Systems   Constitutional: Negative for fever. HENT: Negative for congestion. Respiratory: Negative for cough and shortness of breath. Cardiovascular: Negative for chest pain. Gastrointestinal: Negative for abdominal pain. Genitourinary: Negative for dysuria. Musculoskeletal: Negative for arthralgias and back pain. Skin: Negative for rash. R groin abscess     All other systems reviewed and are negative. PAST MEDICAL HISTORY     Past Medical History:   Diagnosis Date    Diabetes mellitus ([address]     Genital herpes     GERD (gastroesophageal reflux disease)     Hidradenitis suppurativa     Hypertension     Obesity affecting pregnancy, antepartum, third trimester          SURGICAL HISTORY       Past Surgical History:   Procedure Laterality Date    UPPER GASTROINTESTINAL ENDOSCOPY           CURRENT MEDICATIONS       Previous Medications    ALBUTEROL (PROVENTIL) (2.5 MG/3ML) 0.083% NEBULIZER SOLUTION    Take 3 mLs by nebulization every 6 hours as needed for Wheezing    ALBUTEROL SULFATE  (90 BASE) MCG/ACT INHALER    Inhale 2 puffs into the lungs every 4 hours as needed for Wheezing    BLOOD GLUCOSE MONITOR STRIPS    Test 3 times a day & as needed for symptoms of irregular blood glucose.     BUPROPION (WELLBUTRIN XL) 150 MG EXTENDED RELEASE TABLET    TAKE 1 TABLET BY MOUTH ONCE DAILY IN THE MORNING    CLOTRIMAZOLE (LOTRIMIN) 1 % CREAM    Apply topically 2 times daily to lesions on skin for 3-4 weeks or for 1 week after lesion clears    DOXYLAMINE-PYRIDOXINE (DICLEGIS) 10-10 MG TBEC    Start: 2 tabs p.o. q.h.s.; if symptoms persist after 2 days increase to 1 tab p.o. q.a.m. and 2 tabs p.o. q.h.s.; may increase to 1 tab p.o. q.a.m., 1 tab p.o. q. midafternoon and 2 tabs p.o. q.h.s.  4 tabs per day. If unable to afford, instruct the patient about substituting the OTC components. FAMOTIDINE (PEPCID) 20 MG TABLET    Take 1 tablet by mouth daily    GABAPENTIN (NEURONTIN) 300 MG CAPSULE    Take 1 capsule by mouth 3 times daily for 7 days. Intended supply: 7 days. GLUCOSE MONITORING KIT (FREESTYLE) MONITORING KIT    1 kit by Does not apply route daily    INSULIN GLARGINE (LANTUS SOLOSTAR) 100 UNIT/ML INJECTION PEN    Inject 10 Units into the skin nightly    INSULIN LISPRO, 1 UNIT DIAL, (HUMALOG KWIKPEN) 100 UNIT/ML SOPN    Inject 5 Units into the skin 3 times daily (before meals)    INSULIN PEN NEEDLE 30G X 8 MM MISC    1 each by Does not apply route daily    IPRATROPIUM-ALBUTEROL (DUONEB) 0.5-2.5 (3) MG/3ML SOLN NEBULIZER SOLUTION    Inhale 3 mLs into the lungs every 6 hours as needed for Shortness of Breath    KETOROLAC (TORADOL) 10 MG TABLET    Take 1 tablet by mouth every 6 hours as needed for Pain    LANCETS MISC    DX: diabetes mellitus. Use 1-3 time(s) daily - Ok to substitute per insurance (1 each = 1 box)    METHYLPREDNISOLONE (MEDROL, MANNY,) 4 MG TABLET    Take by mouth. NAPROXEN (NAPROSYN) 500 MG TABLET    Take 1 tablet by mouth 2 times daily (with meals)    NAPROXEN (NAPROSYN) 500 MG TABLET    Take 1 tablet by mouth 2 times daily for 20 doses    ZINC GLUCONATE 15 MG TABS    Take 3 tabs daily       ALLERGIES     Shellfish-derived products and Benzoyl peroxide    FAMILY HISTORY     History reviewed. No pertinent family history.        SOCIAL HISTORY       Social History     Socioeconomic History    Marital status: Single     Spouse name: None    Number of children: None    Years of education: None    Highest education level: None   Occupational History    None   Social Needs    Financial resource strain: None    Food insecurity     Worry: None     Inability: None    Transportation needs     Medical: None     Non-medical: None   Tobacco Use    Smoking status: Current Every Day Smoker     Packs/day: 0.50    Smokeless tobacco: Never Used   Substance and Sexual Activity    Alcohol use: No    Drug use: No    Sexual activity: Yes     Partners: Male   Lifestyle    Physical activity     Days per week: None     Minutes per session: None    Stress: None   Relationships    Social connections     Talks on phone: None     Gets together: None     Attends Confucianism service: None     Active member of club or organization: None     Attends meetings of clubs or organizations: None     Relationship status: None    Intimate partner violence     Fear of current or ex partner: None     Emotionally abused: None     Physically abused: None     Forced sexual activity: None   Other Topics Concern    None   Social History Narrative    None       SCREENINGS      @FLOW([id number])@      PHYSICAL EXAM    (up to 7 for level 4, 8 or more for level 5)     ED Triage Vitals   BP Temp Temp src Pulse Resp SpO2 Height Weight   10/28/20 1030 10/28/20 1030 -- 10/28/20 1030 10/28/20 1030 10/28/20 1030 10/28/20 1054 10/28/20 1054   126/76 98.1 °F (36.7 °C)  79 16 99 % 5' 8\" (1.727 m) 270 lb (122.5 kg)       Physical Exam  Vitals signs and nursing note reviewed. Constitutional:       Appearance: She is well-developed. HENT:      Head: Normocephalic and atraumatic. Right Ear: External ear normal.      Left Ear: External ear normal.   Eyes:      Conjunctiva/sclera: Conjunctivae normal.      Pupils: Pupils are equal, round, and reactive to light. Neck:      Musculoskeletal: Normal range of motion and neck supple. Cardiovascular:      Rate and Rhythm: Normal rate and regular rhythm.    Pulmonary:      Effort: Pulmonary effort is normal.      Breath sounds: Normal breath sounds. Abdominal:      General: Bowel sounds are normal. There is no distension. Palpations: Abdomen is soft. Tenderness: There is no abdominal tenderness. Musculoskeletal: Normal range of motion. Right hip: She exhibits tenderness. Legs:    Skin:     General: Skin is warm and dry. Neurological:      Mental Status: She is alert and oriented to person, place, and time. Deep Tendon Reflexes: Reflexes are normal and symmetric. Psychiatric:         Judgment: Judgment normal.           All other labs were within normal range or not returned as of this dictation. EMERGENCY DEPARTMENT COURSE and DIFFERENTIALDIAGNOSIS/MDM:   Vitals:    Vitals:    10/28/20 1030 10/28/20 1054   BP: 126/76    Pulse: 79    Resp: 16    Temp: 98.1 °F (36.7 °C)    SpO2: 99%    Weight:  270 lb (122.5 kg)   Height:  5' 8\" (1.727 m)            21 yr old female with R groin abscess. Prescriptions for Motrin, Doxy, Acyclovir (refill) and Diflucan were given to the patient. F/U With surgeon in 3 days. Patient verbalizes understanding. PROCEDURES:  Unless otherwise noted below, none     Incision/Drainage    Date/Time: 10/28/2020 11:17 AM  Performed by: SHAQUILLE Roldan - CNP  Authorized by: Kristin Mullins DO     Consent:     Consent obtained:  Verbal    Consent given by:  Patient    Risks discussed:  Incomplete drainage, infection, pain and bleeding    Alternatives discussed:  No treatment  Location:     Type:  Abscess    Size:  2 cm    Location:  Lower extremity    Lower extremity location:  Leg    Leg location:  R upper leg  Pre-procedure details:     Skin preparation:  Betadine  Anesthesia (see MAR for exact dosages):      Anesthesia method:  Local infiltration    Local anesthetic:  Lidocaine 1% w/o epi  Procedure type:     Complexity:  Simple  Procedure details:     Needle aspiration: no      Incision types:  Stab incision    Incision depth:  Dermal Scalpel blade:  11    Wound management:  Probed and deloculated    Drainage:  Serosanguinous and purulent    Drainage amount: Moderate    Wound treatment:  Wound left open    Packing materials:  None  Post-procedure details:     Patient tolerance of procedure: Tolerated well, no immediate complications          FINAL IMPRESSION      1. Abscess    2.  Prescription refill          DISPOSITION/PLAN   DISPOSITION Decision To Discharge 10/28/2020 11:19:07 AM          SHAQUILLE Russell CNP (electronically signed)  Attending Emergency Physician     SHAQUILLE Russell CNP  10/28/20 1121

## 2020-10-28 NOTE — ED NOTES
Pt ambulated out of the ER without distress. Pt received discharge and stated understanding.        Paulo Sage RN  10/28/20 7738

## 2020-10-30 ENCOUNTER — HOSPITAL ENCOUNTER (EMERGENCY)
Age: 20
Discharge: HOME | End: 2020-10-30
Payer: COMMERCIAL

## 2020-10-30 VITALS
TEMPERATURE: 97.9 F | RESPIRATION RATE: 18 BRPM | SYSTOLIC BLOOD PRESSURE: 126 MMHG | BODY MASS INDEX: 40.92 KG/M2 | HEIGHT: 68 IN | WEIGHT: 270 LBS | HEART RATE: 86 BPM | OXYGEN SATURATION: 100 % | DIASTOLIC BLOOD PRESSURE: 67 MMHG

## 2020-10-30 LAB
ANAEROBIC CULTURE: ABNORMAL
GRAM STAIN RESULT: ABNORMAL
ORGANISM: ABNORMAL
WOUND/ABSCESS: ABNORMAL

## 2020-10-30 PROCEDURE — 10160 PNXR ASPIR ABSC HMTMA BULLA: CPT

## 2020-10-30 PROCEDURE — 99283 EMERGENCY DEPT VISIT LOW MDM: CPT

## 2020-10-30 PROCEDURE — 6370000000 HC RX 637 (ALT 250 FOR IP): Performed by: PHYSICIAN ASSISTANT

## 2020-10-30 RX ORDER — HYDROCODONE BITARTRATE AND ACETAMINOPHEN 5; 325 MG/1; MG/1
1 TABLET ORAL ONCE
Status: COMPLETED | OUTPATIENT
Start: 2020-10-30 | End: 2020-10-30

## 2020-10-30 RX ORDER — HYDROCODONE BITARTRATE AND ACETAMINOPHEN 5; 325 MG/1; MG/1
1 TABLET ORAL EVERY 6 HOURS PRN
Qty: 10 TABLET | Refills: 0 | Status: SHIPPED | OUTPATIENT
Start: 2020-10-30 | End: 2020-11-02

## 2020-10-30 RX ADMIN — HYDROCODONE BITARTRATE AND ACETAMINOPHEN 1 TABLET: 5; 325 TABLET ORAL at 20:47

## 2020-10-30 ASSESSMENT — ENCOUNTER SYMPTOMS
APNEA: 0
SHORTNESS OF BREATH: 0
ABDOMINAL PAIN: 0
TROUBLE SWALLOWING: 0
ALLERGIC/IMMUNOLOGIC NEGATIVE: 1
COLOR CHANGE: 0
EYE PAIN: 0

## 2020-10-30 ASSESSMENT — PAIN DESCRIPTION - ORIENTATION: ORIENTATION: RIGHT

## 2020-10-30 ASSESSMENT — PAIN DESCRIPTION - LOCATION: LOCATION: GROIN

## 2020-10-30 ASSESSMENT — PAIN SCALES - GENERAL
PAINLEVEL_OUTOF10: 10
PAINLEVEL_OUTOF10: 10

## 2020-10-30 ASSESSMENT — PAIN DESCRIPTION - PAIN TYPE: TYPE: ACUTE PAIN

## 2020-10-30 ASSESSMENT — PAIN DESCRIPTION - FREQUENCY: FREQUENCY: CONTINUOUS

## 2020-10-30 ASSESSMENT — PAIN DESCRIPTION - DESCRIPTORS: DESCRIPTORS: BURNING;SHARP;SORE

## 2020-10-31 NOTE — ED TRIAGE NOTES
Patient arrived from home with c/o right groin abscess. States she was seen in this ER  Two days ago. States the abscess was drained during that visit and she was started on  Antibiotics and motrin. States the abscess has gotten larger and pain has increased.

## 2020-10-31 NOTE — ED PROVIDER NOTES
[address] [facility] ED  eMERGENCYdEPARTMENT eNCOUnter      Pt Name: Althea Mooney  MRN: [id number]  [place] [address]  Date of evaluation: 10/30/2020  Provider:Charanjit Dash PA-C    CHIEF COMPLAINT       Chief Complaint   Patient presents with    Abscess         HISTORY OF PRESENT ILLNESS  (Location/Symptom, Timing/Onset, Context/Setting, Quality, Duration, Modifying Factors, Severity.)   Althea Mooney is a 21 y.o. female who presents to the emergency department with complaints of ongoing abscess to the right pubis region. Patient was seen in the emergency department for the same complaints 2 days ago and had the area drained. Patient states that she has been using the pain medicine and antibiotic that was prescribed however he did not put packing material in it and she feels as if the area is swollen back up again. Patient denies any fevers. Patient rates her pain at a 10 out of 10. Patient has a history of hidradenitis suppurativa with similar presentation. HPI    Nursing Notes were reviewed and I agree. REVIEW OF SYSTEMS    (2-9 systems for level 4, 10 or more for level 5)     Review of Systems   Constitutional: Negative for diaphoresis and fever. HENT: Negative for hearing loss and trouble swallowing. Eyes: Negative for pain. Respiratory: Negative for apnea and shortness of breath. Cardiovascular: Negative for chest pain. Gastrointestinal: Negative for abdominal pain. Endocrine: Negative. Genitourinary: Negative for hematuria. Musculoskeletal: Negative for neck pain and neck stiffness. Skin: Positive for wound. Negative for color change. Allergic/Immunologic: Negative. Neurological: Negative for dizziness and numbness. Hematological: Negative. Psychiatric/Behavioral: Negative. All other systems reviewed and are negative. Except as noted above the remainder of the review of systems was reviewed and negative.        PAST MEDICAL HISTORY     Past Medical History:   Diagnosis Date    Diabetes mellitus ([facility] [address]     Genital herpes     GERD (gastroesophageal reflux disease)     Hidradenitis suppurativa     Hypertension     Obesity affecting pregnancy, antepartum, third trimester          SURGICAL HISTORY       Past Surgical History:   Procedure Laterality Date    UPPER GASTROINTESTINAL ENDOSCOPY           CURRENT MEDICATIONS       Previous Medications    ACYCLOVIR (ZOVIRAX) 400 MG TABLET    Take 1 tablet by mouth 3 times daily for 10 days If you have repeat flareup, take 400 mg 3 times daily for 5 days    ALBUTEROL (PROVENTIL) (2.5 MG/3ML) 0.083% NEBULIZER SOLUTION    Take 3 mLs by nebulization every 6 hours as needed for Wheezing    ALBUTEROL SULFATE  (90 BASE) MCG/ACT INHALER    Inhale 2 puffs into the lungs every 4 hours as needed for Wheezing    BLOOD GLUCOSE MONITOR STRIPS    Test 3 times a day & as needed for symptoms of irregular blood glucose. BUPROPION (WELLBUTRIN XL) 150 MG EXTENDED RELEASE TABLET    TAKE 1 TABLET BY MOUTH ONCE DAILY IN THE MORNING    CLOTRIMAZOLE (LOTRIMIN) 1 % CREAM    Apply topically 2 times daily to lesions on skin for 3-4 weeks or for 1 week after lesion clears    DOXYCYCLINE MONOHYDRATE (ADOXA) 100 MG TABLET    Take 1 tablet by mouth 2 times daily for 10 days May substitute another form of Doxycycline if insurance requires. DOXYLAMINE-PYRIDOXINE (DICLEGIS) 10-10 MG TBEC    Start: 2 tabs p.o. q.h.s.; if symptoms persist after 2 days increase to 1 tab p.o. q.a.m. and 2 tabs p.o. q.h.s.; may increase to 1 tab p.o. q.a.m., 1 tab p.o. q. midafternoon and 2 tabs p.o. q.h.s.  4 tabs per day. If unable to afford, instruct the patient about substituting the OTC components. FAMOTIDINE (PEPCID) 20 MG TABLET    Take 1 tablet by mouth daily    GABAPENTIN (NEURONTIN) 300 MG CAPSULE    Take 1 capsule by mouth 3 times daily for 7 days. Intended supply: 7 days.     GLUCOSE MONITORING KIT (FREESTYLE) MONITORING KIT    1 kit by Does not apply route daily    IBUPROFEN (IBU) 800 MG TABLET    Take 1 tablet by mouth every 8 hours as needed for Pain    INSULIN GLARGINE (LANTUS SOLOSTAR) 100 UNIT/ML INJECTION PEN    Inject 10 Units into the skin nightly    INSULIN LISPRO, 1 UNIT DIAL, (HUMALOG KWIKPEN) 100 UNIT/ML SOPN    Inject 5 Units into the skin 3 times daily (before meals)    INSULIN PEN NEEDLE 30G X 8 MM MISC    1 each by Does not apply route daily    IPRATROPIUM-ALBUTEROL (DUONEB) 0.5-2.5 (3) MG/3ML SOLN NEBULIZER SOLUTION    Inhale 3 mLs into the lungs every 6 hours as needed for Shortness of Breath    KETOROLAC (TORADOL) 10 MG TABLET    Take 1 tablet by mouth every 6 hours as needed for Pain    LANCETS MISC    DX: diabetes mellitus. Use 1-3 time(s) daily - Ok to substitute per insurance (1 each = 1 box)    METHYLPREDNISOLONE (MEDROL, MANNY,) 4 MG TABLET    Take by mouth. NAPROXEN (NAPROSYN) 500 MG TABLET    Take 1 tablet by mouth 2 times daily (with meals)    NAPROXEN (NAPROSYN) 500 MG TABLET    Take 1 tablet by mouth 2 times daily for 20 doses    ZINC GLUCONATE 15 MG TABS    Take 3 tabs daily       ALLERGIES     Shellfish-derived products and Benzoyl peroxide    FAMILY HISTORY     History reviewed. No pertinent family history.        SOCIAL HISTORY       Social History     Socioeconomic History    Marital status: Single     Spouse name: None    Number of children: None    Years of education: None    Highest education level: None   Occupational History    None   Social Needs    Financial resource strain: None    Food insecurity     Worry: None     Inability: None    Transportation needs     Medical: None     Non-medical: None   Tobacco Use    Smoking status: Current Every Day Smoker     Packs/day: 0.50    Smokeless tobacco: Never Used   Substance and Sexual Activity    Alcohol use: No    Drug use: No    Sexual activity: Yes     Partners: Male   Lifestyle    Physical activity     Days per week: None     Minutes per session: None  Stress: None   Relationships    Social connections     Talks on phone: None     Gets together: None     Attends Hoahaoism service: None     Active member of club or organization: None     Attends meetings of clubs or organizations: None     Relationship status: None    Intimate partner violence     Fear of current or ex partner: None     Emotionally abused: None     Physically abused: None     Forced sexual activity: None   Other Topics Concern    None   Social History Narrative    None       SCREENINGS           PHYSICAL EXAM    (up to 7 forlevel 4, 8 or more for level 5)     ED Triage Vitals [10/30/20 2020]   BP Temp Temp Source Pulse Resp SpO2 Height Weight   126/67 97.9 °F (36.6 °C) Temporal 86 18 100 % 5' 8\" (1.727 m) 270 lb (122.5 kg)       Physical Exam  Vitals signs and nursing note reviewed. Exam conducted with a chaperone present. Constitutional:       General: She is not in acute distress. Appearance: She is well-developed. She is not diaphoretic. HENT:      Head: Normocephalic and atraumatic. Mouth/Throat:      Pharynx: No oropharyngeal exudate. Eyes:      General: No scleral icterus. Conjunctiva/sclera: Conjunctivae normal.      Pupils: Pupils are equal, round, and reactive to light. Neck:      Musculoskeletal: Normal range of motion and neck supple. Trachea: No tracheal deviation. Cardiovascular:      Rate and Rhythm: Normal rate. Heart sounds: Normal heart sounds. Pulmonary:      Effort: Pulmonary effort is normal. No respiratory distress. Breath sounds: Normal breath sounds. Abdominal:      General: Bowel sounds are normal. There is no distension. Palpations: Abdomen is soft. Genitourinary:      Musculoskeletal: Normal range of motion. Skin:     General: Skin is warm and dry. Findings: No erythema or rash. Neurological:      Mental Status: She is alert and oriented to person, place, and time.       Cranial Nerves: No cranial nerve deficit. Motor: No abnormal muscle tone. Psychiatric:         Behavior: Behavior normal.         Thought Content: Thought content normal.         Judgment: Judgment normal.           DIAGNOSTIC RESULTS     RADIOLOGY:   Non-plain film images such as CT, Ultrasound and MRI are read by the radiologist. Plain radiographic images are visualized and preliminarilyinterpreted by Bradley Macedo PA-C with the below findings:      Interpretation per the Radiologist below, if available at the time of this note:    No orders to display       LABS:  Labs Reviewed - No data to display    All other labs were within normal range or not returnedas of this dictation. EMERGENCYDEPARTMENT COURSE and DIFFERENTIAL DIAGNOSIS/MDM:   Vitals:    Vitals:    10/30/20 2020   BP: 126/67   Pulse: 86   Resp: 18   Temp: 97.9 °F (36.6 °C)   TempSrc: Temporal   SpO2: 100%   Weight: 270 lb (122.5 kg)   Height: 5' 8\" (1.727 m)       REASSESSMENT      Gauge needle aspiration performed of the fluctuant region with approximately 3 cc of pus discharge. Patient is already on antibiotics. We will have the patient use warm compresses. Patient was unhappy with the general surgeon we referred her to last time so I will provide her with information for a different general surgeon to follow-up with. Continue antibiotics, use of supportive therapy for pain and follow-up    MDM    PROCEDURES:    Procedures      FINAL IMPRESSION      1. Abscess          DISPOSITION/PLAN   DISPOSITION Discharge - Pending Orders Complete 10/30/2020 08:47:39 PM      PATIENT REFERRED TO:  Peewee Ma MD  [address]  [phone]    Call in 3 days      Ihsa Martinez MD  [address] [facility]  [phone]    Call         DISCHARGE MEDICATIONS:  New Prescriptions    HYDROCODONE-ACETAMINOPHEN (NORCO) 5-325 MG PER TABLET    Take 1 tablet by mouth every 6 hours as needed for Pain for up to 3 days. Intended supply: 3 days. Take lowest dose possible to manage pain       (Please note that portions of this note were completed with a voice recognition program.  Efforts were made to edit the dictations but occasionally words are mis-transcribed.)    Diana Jeans, PA-C Diana Jeans, PA-C  10/30/20 2053

## 2020-11-02 ENCOUNTER — HOSPITAL ENCOUNTER (EMERGENCY)
Age: 20
Discharge: HOME | End: 2020-11-02
Payer: COMMERCIAL

## 2020-11-02 VITALS
HEIGHT: 69 IN | DIASTOLIC BLOOD PRESSURE: 65 MMHG | WEIGHT: 270 LBS | SYSTOLIC BLOOD PRESSURE: 108 MMHG | OXYGEN SATURATION: 98 % | TEMPERATURE: 98.5 F | HEART RATE: 72 BPM | BODY MASS INDEX: 39.99 KG/M2 | RESPIRATION RATE: 15 BRPM

## 2020-11-02 PROCEDURE — 6360000002 HC RX W HCPCS: Performed by: NURSE PRACTITIONER

## 2020-11-02 PROCEDURE — 96372 THER/PROPH/DIAG INJ SC/IM: CPT

## 2020-11-02 PROCEDURE — 99283 EMERGENCY DEPT VISIT LOW MDM: CPT

## 2020-11-02 PROCEDURE — 6370000000 HC RX 637 (ALT 250 FOR IP): Performed by: NURSE PRACTITIONER

## 2020-11-02 RX ORDER — BACITRACIN ZINC AND POLYMYXIN B SULFATE 500; 1000 [USP'U]/G; [USP'U]/G
OINTMENT TOPICAL
Qty: 1 TUBE | Refills: 0 | Status: SHIPPED | OUTPATIENT
Start: 2020-11-02

## 2020-11-02 RX ORDER — OXYCODONE HYDROCHLORIDE AND ACETAMINOPHEN 5; 325 MG/1; MG/1
1 TABLET ORAL ONCE
Status: COMPLETED | OUTPATIENT
Start: 2020-11-02 | End: 2020-11-02

## 2020-11-02 RX ORDER — ETODOLAC 400 MG/1
400 TABLET, EXTENDED RELEASE ORAL DAILY
Qty: 30 TABLET | Refills: 3 | Status: SHIPPED | OUTPATIENT
Start: 2020-11-02 | End: 2021-07-09

## 2020-11-02 RX ORDER — KETOROLAC TROMETHAMINE 30 MG/ML
30 INJECTION, SOLUTION INTRAMUSCULAR; INTRAVENOUS ONCE
Status: COMPLETED | OUTPATIENT
Start: 2020-11-02 | End: 2020-11-02

## 2020-11-02 RX ADMIN — OXYCODONE HYDROCHLORIDE AND ACETAMINOPHEN 1 TABLET: 5; 325 TABLET ORAL at 18:17

## 2020-11-02 RX ADMIN — KETOROLAC TROMETHAMINE 30 MG: 30 INJECTION, SOLUTION INTRAMUSCULAR; INTRAVENOUS at 17:53

## 2020-11-02 ASSESSMENT — ENCOUNTER SYMPTOMS
ABDOMINAL PAIN: 0
COUGH: 0
BACK PAIN: 0
SHORTNESS OF BREATH: 0

## 2020-11-02 ASSESSMENT — PAIN SCALES - GENERAL
PAINLEVEL_OUTOF10: 10

## 2020-11-02 ASSESSMENT — PAIN DESCRIPTION - PAIN TYPE: TYPE: ACUTE PAIN

## 2020-11-02 NOTE — ED NOTES
Pt discharged in stable condition with safe ride after percocet administration  All questions answered and education provided.        Ivette Ballard RN  11/02/20 1530

## 2020-11-02 NOTE — ED TRIAGE NOTES
Pt here with complaints of pain from an abscess. She reports an abscess in her right groin that was drained not too long ago, and reports three abscesses in that area now. She had an abscess drained in her right armpit, which she reports has not healed. She reports worsening pain.

## 2020-11-02 NOTE — ED PROVIDER NOTES
[address] [facility] ED  eMERGENCY dEPARTMENT eNCOUnter      Pt Name: Abebe Maldonado  MRN: [id number]  [place] [address]  Date of evaluation: 11/2/2020  Provider: SHAQUILLE Alexander CNP      HISTORY OF PRESENT ILLNESS    Abebe Maldonado is a 21 y.o. female who presents to the Emergency Department with tenderness and burning to R groin and pain to R axilla. Patient has a long history of abscess to the R groin and R axilla. She is currently on Doxycyline. She has been here twice in the last week for abscess drainage. R groin abscess is smaller in size then when I last saw her and is actively draining beckie g with the R axilla. She still states pain is moderate to severe. She has been taking Motrin at home. REVIEW OF SYSTEMS       Review of Systems   Constitutional: Negative for fever. HENT: Negative for congestion. Respiratory: Negative for cough and shortness of breath. Cardiovascular: Negative for chest pain. Gastrointestinal: Negative for abdominal pain. Genitourinary: Negative for dysuria. Musculoskeletal: Negative for arthralgias and back pain. Skin: Negative for rash. R axilla and R groin abscess. All other systems reviewed and are negative.         PAST MEDICAL HISTORY     Past Medical History:   Diagnosis Date    Diabetes mellitus ([address]     Genital herpes     GERD (gastroesophageal reflux disease)     Hidradenitis suppurativa     Hypertension     Obesity affecting pregnancy, antepartum, third trimester          SURGICAL HISTORY       Past Surgical History:   Procedure Laterality Date    UPPER GASTROINTESTINAL ENDOSCOPY           CURRENT MEDICATIONS       Previous Medications    ACYCLOVIR (ZOVIRAX) 400 MG TABLET    Take 1 tablet by mouth 3 times daily for 10 days If you have repeat flareup, take 400 mg 3 times daily for 5 days    ALBUTEROL (PROVENTIL) (2.5 MG/3ML) 0.083% NEBULIZER SOLUTION    Take 3 mLs by nebulization every 6 hours as needed for Wheezing ALBUTEROL SULFATE  (90 BASE) MCG/ACT INHALER    Inhale 2 puffs into the lungs every 4 hours as needed for Wheezing    BLOOD GLUCOSE MONITOR STRIPS    Test 3 times a day & as needed for symptoms of irregular blood glucose. BUPROPION (WELLBUTRIN XL) 150 MG EXTENDED RELEASE TABLET    TAKE 1 TABLET BY MOUTH ONCE DAILY IN THE MORNING    CLOTRIMAZOLE (LOTRIMIN) 1 % CREAM    Apply topically 2 times daily to lesions on skin for 3-4 weeks or for 1 week after lesion clears    DOXYCYCLINE MONOHYDRATE (ADOXA) 100 MG TABLET    Take 1 tablet by mouth 2 times daily for 10 days May substitute another form of Doxycycline if insurance requires. DOXYLAMINE-PYRIDOXINE (DICLEGIS) 10-10 MG TBEC    Start: 2 tabs p.o. q.h.s.; if symptoms persist after 2 days increase to 1 tab p.o. q.a.m. and 2 tabs p.o. q.h.s.; may increase to 1 tab p.o. q.a.m., 1 tab p.o. q. midafternoon and 2 tabs p.o. q.h.s.  4 tabs per day. If unable to afford, instruct the patient about substituting the OTC components. FAMOTIDINE (PEPCID) 20 MG TABLET    Take 1 tablet by mouth daily    GABAPENTIN (NEURONTIN) 300 MG CAPSULE    Take 1 capsule by mouth 3 times daily for 7 days. Intended supply: 7 days. GLUCOSE MONITORING KIT (FREESTYLE) MONITORING KIT    1 kit by Does not apply route daily    HYDROCODONE-ACETAMINOPHEN (NORCO) 5-325 MG PER TABLET    Take 1 tablet by mouth every 6 hours as needed for Pain for up to 3 days. Intended supply: 3 days.  Take lowest dose possible to manage pain    INSULIN GLARGINE (LANTUS SOLOSTAR) 100 UNIT/ML INJECTION PEN    Inject 10 Units into the skin nightly    INSULIN LISPRO, 1 UNIT DIAL, (HUMALOG KWIKPEN) 100 UNIT/ML SOPN    Inject 5 Units into the skin 3 times daily (before meals)    INSULIN PEN NEEDLE 30G X 8 MM MISC    1 each by Does not apply route daily    IPRATROPIUM-ALBUTEROL (DUONEB) 0.5-2.5 (3) MG/3ML SOLN NEBULIZER SOLUTION    Inhale 3 mLs into the lungs every 6 hours as needed for Shortness of Breath LANCETS MISC    DX: diabetes mellitus. Use 1-3 time(s) daily - Ok to substitute per insurance (1 each = 1 box)    METHYLPREDNISOLONE (MEDROL, MANNY,) 4 MG TABLET    Take by mouth. ZINC GLUCONATE 15 MG TABS    Take 3 tabs daily       ALLERGIES     Shellfish-derived products and Benzoyl peroxide    FAMILY HISTORY     History reviewed. No pertinent family history.        SOCIAL HISTORY       Social History     Socioeconomic History    Marital status: Single     Spouse name: None    Number of children: None    Years of education: None    Highest education level: None   Occupational History    None   Social Needs    Financial resource strain: None    Food insecurity     Worry: None     Inability: None    Transportation needs     Medical: None     Non-medical: None   Tobacco Use    Smoking status: Current Every Day Smoker     Packs/day: 0.50    Smokeless tobacco: Never Used   Substance and Sexual Activity    Alcohol use: No    Drug use: No    Sexual activity: Yes     Partners: Male   Lifestyle    Physical activity     Days per week: None     Minutes per session: None    Stress: None   Relationships    Social connections     Talks on phone: None     Gets together: None     Attends Pentecostalism service: None     Active member of club or organization: None     Attends meetings of clubs or organizations: None     Relationship status: None    Intimate partner violence     Fear of current or ex partner: None     Emotionally abused: None     Physically abused: None     Forced sexual activity: None   Other Topics Concern    None   Social History Narrative    None       SCREENINGS      @FLOW([id number])@      PHYSICAL EXAM    (up to 7 for level 4, 8 or more for level 5)     ED Triage Vitals   BP Temp Temp Source Pulse Resp SpO2 Height Weight   11/02/20 1708 11/02/20 1707 11/02/20 1707 11/02/20 1707 11/02/20 1707 11/02/20 1707 11/02/20 1707 11/02/20 1707   121/71 98.5 °F (36.9 °C) Oral 82 15 97 % 5' 9\" (1.753 m) 270 lb (122.5 kg)       Physical Exam  Vitals signs and nursing note reviewed. Constitutional:       Appearance: She is well-developed. HENT:      Head: Normocephalic and atraumatic. Right Ear: External ear normal.      Left Ear: External ear normal.   Eyes:      Conjunctiva/sclera: Conjunctivae normal.      Pupils: Pupils are equal, round, and reactive to light. Neck:      Musculoskeletal: Normal range of motion and neck supple. Cardiovascular:      Rate and Rhythm: Normal rate and regular rhythm. Pulmonary:      Effort: Pulmonary effort is normal.      Breath sounds: Normal breath sounds. Abdominal:      General: Bowel sounds are normal. There is no distension. Palpations: Abdomen is soft. Tenderness: There is no abdominal tenderness. Musculoskeletal: Normal range of motion. Skin:     General: Skin is warm and dry. Neurological:      Mental Status: She is alert and oriented to person, place, and time. Deep Tendon Reflexes: Reflexes are normal and symmetric. Psychiatric:         Judgment: Judgment normal.           All other labs were within normal range or not returned as of this dictation. EMERGENCY DEPARTMENT COURSE and DIFFERENTIALDIAGNOSIS/MDM:   Vitals:    Vitals:    11/02/20 1707 11/02/20 1708 11/02/20 1825   BP:  121/71 108/65   Pulse: 82  72   Resp: 15  15   Temp: 98.5 °F (36.9 °C)     TempSrc: Oral     SpO2: 97%  98%   Weight: 270 lb (122.5 kg)     Height: 5' 9\" (1.753 m)              21 yr old female with Multiple abscesses. Patient to continue on Doxycyline as prescribed. She was also given prescriptions for Bacitracin ointment and Lodine. She to F/U with her [facility] surgeon on Thursday as scheduled. Patient verbalizes understanding,. PROCEDURES:  Unless otherwise noted below, none     Procedures      FINAL IMPRESSION      1. Axillary abscess    2.  Abscess of groin, right          DISPOSITION/PLAN   DISPOSITION Decision To Discharge 11/02/2020 06:18:54 PM          Agustin Moritz, APRN - CNP (electronically signed)  Attending Emergency Physician     Agustin Moritz, APRN - CNP  11/02/20 4518

## 2020-11-02 NOTE — ED NOTES
Pt pushed call light @ [phone] this RN in room. Pt relates she is in a lot of pain and that she has been waiting for over two hours. Pt noted to be in no distress at this time. Explained to pt that she has been here for 1 hour and that I would notify provider.  Maritza SALCIDO notified of pt request for pain medication     Martha Treviño RN  11/02/20 [id number]

## 2020-11-16 ENCOUNTER — HOSPITAL ENCOUNTER (EMERGENCY)
Age: 20
Discharge: HOME | End: 2020-11-16
Payer: COMMERCIAL

## 2020-11-16 VITALS
RESPIRATION RATE: 14 BRPM | OXYGEN SATURATION: 99 % | SYSTOLIC BLOOD PRESSURE: 141 MMHG | TEMPERATURE: 98.3 F | HEART RATE: 88 BPM | DIASTOLIC BLOOD PRESSURE: 84 MMHG

## 2020-11-16 PROCEDURE — 99282 EMERGENCY DEPT VISIT SF MDM: CPT

## 2020-11-16 PROCEDURE — 10060 I&D ABSCESS SIMPLE/SINGLE: CPT

## 2020-11-16 RX ORDER — IBUPROFEN 800 MG/1
800 TABLET ORAL EVERY 8 HOURS PRN
Qty: 20 TABLET | Refills: 0 | Status: SHIPPED | OUTPATIENT
Start: 2020-11-16 | End: 2021-01-19 | Stop reason: SDUPTHER

## 2020-11-16 ASSESSMENT — PAIN DESCRIPTION - PAIN TYPE: TYPE: CHRONIC PAIN;ACUTE PAIN

## 2020-11-16 ASSESSMENT — ENCOUNTER SYMPTOMS
COUGH: 0
SHORTNESS OF BREATH: 0
ABDOMINAL PAIN: 0
BACK PAIN: 0

## 2020-11-16 ASSESSMENT — PAIN SCALES - GENERAL: PAINLEVEL_OUTOF10: 10

## 2020-11-17 NOTE — ED PROVIDER NOTES
[address] [facility] ED  eMERGENCY dEPARTMENT eNCOUnter      Pt Name: Dana Townsend  MRN: [id number]  [place] [address]  Date of evaluation: 11/16/2020  Provider: SHAQUILLE Lizarraga CNP      HISTORY OF PRESENT ILLNESS    Dana Townsend is a 21 y.o. female who presents to the Emergency Department with abscess to chest.  Patient states it has been there for 3 days. She denies any drainage. Pain is moderate. She has been washing with Cerve soap as recommended by dermatology. She has a F/U With dermatology in 1 month. REVIEW OF SYSTEMS       Review of Systems   Constitutional: Negative for fever. HENT: Negative for congestion. Respiratory: Negative for cough and shortness of breath. Cardiovascular: Negative for chest pain. Gastrointestinal: Negative for abdominal pain. Genitourinary: Negative for dysuria. Musculoskeletal: Negative for arthralgias and back pain. Skin: Negative for rash. Chest abscess   All other systems reviewed and are negative. PAST MEDICAL HISTORY     Past Medical History:   Diagnosis Date    Diabetes mellitus ([address]     Genital herpes     GERD (gastroesophageal reflux disease)     Hidradenitis suppurativa     Hypertension     Obesity affecting pregnancy, antepartum, third trimester          SURGICAL HISTORY       Past Surgical History:   Procedure Laterality Date    UPPER GASTROINTESTINAL ENDOSCOPY           CURRENT MEDICATIONS       Previous Medications    ALBUTEROL (PROVENTIL) (2.5 MG/3ML) 0.083% NEBULIZER SOLUTION    Take 3 mLs by nebulization every 6 hours as needed for Wheezing    ALBUTEROL SULFATE  (90 BASE) MCG/ACT INHALER    Inhale 2 puffs into the lungs every 4 hours as needed for Wheezing    BACITRACIN-POLYMYXIN B (POLYSPORIN) 500-38441 UNIT/GM OINTMENT    Apply topically 2 times daily. BLOOD GLUCOSE MONITOR STRIPS    Test 3 times a day & as needed for symptoms of irregular blood glucose.     BUPROPION (WELLBUTRIN XL) 150 MG EXTENDED RELEASE TABLET    TAKE 1 TABLET BY MOUTH ONCE DAILY IN THE MORNING    CLOTRIMAZOLE (LOTRIMIN) 1 % CREAM    Apply topically 2 times daily to lesions on skin for 3-4 weeks or for 1 week after lesion clears    DOXYLAMINE-PYRIDOXINE (DICLEGIS) 10-10 MG TBEC    Start: 2 tabs p.o. q.h.s.; if symptoms persist after 2 days increase to 1 tab p.o. q.a.m. and 2 tabs p.o. q.h.s.; may increase to 1 tab p.o. q.a.m., 1 tab p.o. q. midafternoon and 2 tabs p.o. q.h.s.  4 tabs per day. If unable to afford, instruct the patient about substituting the OTC components. ETODOLAC (LODINE XL) 400 MG EXTENDED RELEASE TABLET    Take 1 tablet by mouth daily    FAMOTIDINE (PEPCID) 20 MG TABLET    Take 1 tablet by mouth daily    GABAPENTIN (NEURONTIN) 300 MG CAPSULE    Take 1 capsule by mouth 3 times daily for 7 days. Intended supply: 7 days. GLUCOSE MONITORING KIT (FREESTYLE) MONITORING KIT    1 kit by Does not apply route daily    INSULIN GLARGINE (LANTUS SOLOSTAR) 100 UNIT/ML INJECTION PEN    Inject 10 Units into the skin nightly    INSULIN LISPRO, 1 UNIT DIAL, (HUMALOG KWIKPEN) 100 UNIT/ML SOPN    Inject 5 Units into the skin 3 times daily (before meals)    INSULIN PEN NEEDLE 30G X 8 MM MISC    1 each by Does not apply route daily    IPRATROPIUM-ALBUTEROL (DUONEB) 0.5-2.5 (3) MG/3ML SOLN NEBULIZER SOLUTION    Inhale 3 mLs into the lungs every 6 hours as needed for Shortness of Breath    LANCETS MISC    DX: diabetes mellitus. Use 1-3 time(s) daily - Ok to substitute per insurance (1 each = 1 box)    METHYLPREDNISOLONE (MEDROL, MANNY,) 4 MG TABLET    Take by mouth. ZINC GLUCONATE 15 MG TABS    Take 3 tabs daily       ALLERGIES     Shellfish-derived products and Benzoyl peroxide    FAMILY HISTORY     No family history on file.        SOCIAL HISTORY       Social History     Socioeconomic History    Marital status: Single     Spouse name: Not on file    Number of children: Not on file    Years of education: Not on file    Drainage amount:  Scant    Wound treatment:  Wound left open    Packing materials:  None  Post-procedure details:     Patient tolerance of procedure: Tolerated well, no immediate complications          FINAL IMPRESSION      1.  Abscess          DISPOSITION/PLAN   DISPOSITION Decision To Discharge 11/16/2020 08:18:59 PM          SHAQUILLE Alexander CNP (electronically signed)  Attending Emergency Physician     SHAQUILLE Alexander CNP  11/16/20 2022

## 2020-11-17 NOTE — ED NOTES
Patient left after abscess was drained by Laura Evans NP but before d/c instructions could be given.         James Roberts RN  11/16/20 2030

## 2020-11-17 NOTE — ED NOTES
Louie Lau NP at bedside to drain abscess. Patient tolerated well.      Carlota Branham RN  11/16/20 2008

## 2020-11-30 ENCOUNTER — HOSPITAL ENCOUNTER (EMERGENCY)
Age: 20
Discharge: HOME | End: 2020-11-30
Payer: COMMERCIAL

## 2020-11-30 ENCOUNTER — APPOINTMENT (OUTPATIENT)
Dept: GENERAL RADIOLOGY | Age: 20
End: 2020-11-30
Payer: COMMERCIAL

## 2020-11-30 VITALS
BODY MASS INDEX: 40.58 KG/M2 | HEIGHT: 69 IN | SYSTOLIC BLOOD PRESSURE: 141 MMHG | RESPIRATION RATE: 16 BRPM | OXYGEN SATURATION: 98 % | WEIGHT: 274 LBS | HEART RATE: 103 BPM | TEMPERATURE: 98.9 F | DIASTOLIC BLOOD PRESSURE: 82 MMHG

## 2020-11-30 PROCEDURE — 73562 X-RAY EXAM OF KNEE 3: CPT

## 2020-11-30 PROCEDURE — 6370000000 HC RX 637 (ALT 250 FOR IP): Performed by: NURSE PRACTITIONER

## 2020-11-30 PROCEDURE — 99285 EMERGENCY DEPT VISIT HI MDM: CPT

## 2020-11-30 PROCEDURE — 72110 X-RAY EXAM L-2 SPINE 4/>VWS: CPT

## 2020-11-30 PROCEDURE — 72040 X-RAY EXAM NECK SPINE 2-3 VW: CPT

## 2020-11-30 RX ORDER — ACETAMINOPHEN 500 MG
1000 TABLET ORAL ONCE
Status: COMPLETED | OUTPATIENT
Start: 2020-11-30 | End: 2020-11-30

## 2020-11-30 RX ADMIN — ACETAMINOPHEN 1000 MG: 500 TABLET ORAL at 17:36

## 2020-11-30 ASSESSMENT — ENCOUNTER SYMPTOMS
COUGH: 0
RHINORRHEA: 0
BACK PAIN: 1
ABDOMINAL PAIN: 0
BLOOD IN STOOL: 0
CONSTIPATION: 0
VOMITING: 0
EYE PAIN: 0
NAUSEA: 0
COLOR CHANGE: 0
SORE THROAT: 0
EYE DISCHARGE: 0
SHORTNESS OF BREATH: 0
TROUBLE SWALLOWING: 0
DIARRHEA: 0
EYE REDNESS: 0
WHEEZING: 0

## 2020-11-30 ASSESSMENT — PAIN DESCRIPTION - ORIENTATION: ORIENTATION: LEFT;RIGHT

## 2020-11-30 ASSESSMENT — PAIN SCALES - GENERAL: PAINLEVEL_OUTOF10: 10

## 2020-11-30 ASSESSMENT — PAIN DESCRIPTION - DESCRIPTORS: DESCRIPTORS: ACHING

## 2020-11-30 ASSESSMENT — PAIN DESCRIPTION - FREQUENCY: FREQUENCY: CONTINUOUS

## 2020-11-30 ASSESSMENT — PAIN DESCRIPTION - PAIN TYPE: TYPE: ACUTE PAIN

## 2020-11-30 ASSESSMENT — PAIN DESCRIPTION - LOCATION: LOCATION: BACK;NECK;KNEE

## 2020-11-30 NOTE — ED PROVIDER NOTES
Negative for color change, rash and wound. Neurological: Negative for dizziness, syncope, weakness, light-headedness and headaches. Psychiatric/Behavioral: Negative for behavioral problems. All other systems reviewed and are negative. Except as noted above the remainder of the review of systems was reviewed and negative.        PAST MEDICAL HISTORY     Past Medical History:   Diagnosis Date    Diabetes mellitus ([facility] [address]     Genital herpes     GERD (gastroesophageal reflux disease)     Hidradenitis suppurativa     Hypertension     Obesity affecting pregnancy, antepartum, third trimester      Past Surgical History:   Procedure Laterality Date    UPPER GASTROINTESTINAL ENDOSCOPY       Social History     Socioeconomic History    Marital status: Single     Spouse name: None    Number of children: None    Years of education: None    Highest education level: None   Occupational History    None   Social Needs    Financial resource strain: None    Food insecurity     Worry: None     Inability: None    Transportation needs     Medical: None     Non-medical: None   Tobacco Use    Smoking status: Current Every Day Smoker     Packs/day: 0.50    Smokeless tobacco: Never Used   Substance and Sexual Activity    Alcohol use: No    Drug use: No    Sexual activity: Yes     Partners: Male   Lifestyle    Physical activity     Days per week: None     Minutes per session: None    Stress: None   Relationships    Social connections     Talks on phone: None     Gets together: None     Attends Sabianist service: None     Active member of club or organization: None     Attends meetings of clubs or organizations: None     Relationship status: None    Intimate partner violence     Fear of current or ex partner: None     Emotionally abused: None     Physically abused: None     Forced sexual activity: None   Other Topics Concern    None   Social History Narrative    None       SCREENINGS    Lulu Coma Scale  Eye Opening: Spontaneous  Best Verbal Response: Oriented  Best Motor Response: Obeys commands  Lulu Coma Scale Score: 15        PHYSICAL EXAM    (up to 7 for level 4, 8 or more for level 5)     ED Triage Vitals [11/30/20 1636]   BP Temp Temp Source Pulse Resp SpO2 Height Weight   (!) 141/82 98.9 °F (37.2 °C) Oral 103 16 98 % 5' 9\" (1.753 m) 274 lb (124.3 kg)       Physical Exam  Vitals signs and nursing note reviewed. Constitutional:       General: She is not in acute distress. Appearance: She is well-developed. She is not diaphoretic. HENT:      Head: Normocephalic and atraumatic. Nose: Nose normal.   Eyes:      Conjunctiva/sclera: Conjunctivae normal.      Pupils: Pupils are equal, round, and reactive to light. Neck:      Musculoskeletal: Normal range of motion and neck supple. Cardiovascular:      Rate and Rhythm: Normal rate and regular rhythm. Heart sounds: Normal heart sounds. Pulmonary:      Effort: Pulmonary effort is normal. No respiratory distress. Breath sounds: Normal breath sounds. No wheezing. Abdominal:      General: Bowel sounds are normal.      Palpations: Abdomen is soft. Tenderness: There is no abdominal tenderness. Skin:     General: Skin is warm and dry. Capillary Refill: Capillary refill takes less than 2 seconds. Findings: No rash. Neurological:      Mental Status: She is alert and oriented to person, place, and time. Cranial Nerves: No cranial nerve deficit.    Psychiatric:         Behavior: Behavior normal.         RESULTS     EKG: All EKG's are interpreted by the Emergency Department Physician who either signs or Co-signsthis chart in the absence of a cardiologist.        RADIOLOGY:   Non-plain filmimages such as CT, Ultrasound and MRI are read by the radiologist. Plain radiographic images are visualized and preliminarily interpreted by the emergency physician with the below findings:    NAD    Interpretation per the Radiologist below, if

## 2021-01-01 ENCOUNTER — HOSPITAL ENCOUNTER (EMERGENCY)
Age: 21
Discharge: HOME | End: 2021-01-01
Payer: COMMERCIAL

## 2021-01-01 VITALS
RESPIRATION RATE: 18 BRPM | HEIGHT: 69 IN | HEART RATE: 78 BPM | TEMPERATURE: 96.7 F | WEIGHT: 276 LBS | DIASTOLIC BLOOD PRESSURE: 75 MMHG | OXYGEN SATURATION: 100 % | BODY MASS INDEX: 40.88 KG/M2 | SYSTOLIC BLOOD PRESSURE: 117 MMHG

## 2021-01-01 DIAGNOSIS — B37.9 CANDIDIASIS: ICD-10-CM

## 2021-01-01 DIAGNOSIS — L73.2 HIDRADENITIS SUPPURATIVA: Primary | ICD-10-CM

## 2021-01-01 DIAGNOSIS — A60.00 GENITAL HERPES SIMPLEX, UNSPECIFIED SITE: ICD-10-CM

## 2021-01-01 DIAGNOSIS — Z71.1 CONCERN ABOUT STD IN FEMALE WITHOUT DIAGNOSIS: ICD-10-CM

## 2021-01-01 PROCEDURE — 99283 EMERGENCY DEPT VISIT LOW MDM: CPT

## 2021-01-01 PROCEDURE — 6370000000 HC RX 637 (ALT 250 FOR IP): Performed by: PHYSICIAN ASSISTANT

## 2021-01-01 PROCEDURE — 87491 CHLMYD TRACH DNA AMP PROBE: CPT

## 2021-01-01 PROCEDURE — 87591 N.GONORRHOEAE DNA AMP PROB: CPT

## 2021-01-01 PROCEDURE — 87661 TRICHOMONAS VAGINALIS AMPLIF: CPT

## 2021-01-01 RX ORDER — IBUPROFEN 800 MG/1
800 TABLET ORAL ONCE
Status: COMPLETED | OUTPATIENT
Start: 2021-01-01 | End: 2021-01-01

## 2021-01-01 RX ORDER — VALACYCLOVIR HYDROCHLORIDE 500 MG/1
500 TABLET, FILM COATED ORAL 2 TIMES DAILY
Qty: 6 TABLET | Refills: 0 | Status: SHIPPED | OUTPATIENT
Start: 2021-01-01 | End: 2021-01-04

## 2021-01-01 RX ORDER — DOXYCYCLINE 100 MG/1
100 TABLET ORAL 2 TIMES DAILY
Qty: 14 TABLET | Refills: 0 | Status: SHIPPED | OUTPATIENT
Start: 2021-01-01 | End: 2021-01-08

## 2021-01-01 RX ORDER — FLUCONAZOLE 150 MG/1
150 TABLET ORAL ONCE
Qty: 1 TABLET | Refills: 1 | Status: SHIPPED | OUTPATIENT
Start: 2021-01-01 | End: 2021-01-01

## 2021-01-01 RX ADMIN — IBUPROFEN 800 MG: 800 TABLET ORAL at 11:02

## 2021-01-01 ASSESSMENT — ENCOUNTER SYMPTOMS
VOMITING: 0
NAUSEA: 0
ABDOMINAL PAIN: 0
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
## 2021-01-01 NOTE — ED NOTES
Pt here for a wound check and med refill, Pt is A&OX3, calm, ambulatory, afebrile, breathes are equal and unlabored, denies N/V/D and SOB.      Lilly Bush RN  01/01/21 3839

## 2021-01-01 NOTE — ED TRIAGE NOTES
Pt comes to er with c/o an abscess to her inner thigh that  Drained on its own. She would like to know if it is healing properly as it appears to have a hole. Pt  Also would like medication for a  Herpes outbreak as she does not have a family dr and has no medication left.  Pt in fact has not taken any of her medications including insulin because she does not have a dr to get them

## 2021-01-04 LAB
SPECIMEN SOURCE: ABNORMAL
T. VAGINALIS AMPLIFIED: POSITIVE

## 2021-01-07 ENCOUNTER — HOSPITAL ENCOUNTER (EMERGENCY)
Age: 21
Discharge: HOME | End: 2021-01-07
Payer: COMMERCIAL

## 2021-01-07 VITALS
RESPIRATION RATE: 18 BRPM | HEART RATE: 68 BPM | DIASTOLIC BLOOD PRESSURE: 78 MMHG | WEIGHT: 276 LBS | SYSTOLIC BLOOD PRESSURE: 145 MMHG | TEMPERATURE: 97.9 F | OXYGEN SATURATION: 100 % | BODY MASS INDEX: 41.83 KG/M2 | HEIGHT: 68 IN

## 2021-01-07 DIAGNOSIS — A59.9 TRICHOMONAS INFECTION: Primary | ICD-10-CM

## 2021-01-07 DIAGNOSIS — T25.222A PARTIAL THICKNESS BURN OF LEFT FOOT, INITIAL ENCOUNTER: ICD-10-CM

## 2021-01-07 PROCEDURE — 6370000000 HC RX 637 (ALT 250 FOR IP): Performed by: NURSE PRACTITIONER

## 2021-01-07 PROCEDURE — 99283 EMERGENCY DEPT VISIT LOW MDM: CPT

## 2021-01-07 RX ORDER — GINSENG 100 MG
CAPSULE ORAL
Qty: 1 TUBE | Refills: 1 | Status: SHIPPED | OUTPATIENT
Start: 2021-01-07 | End: 2022-10-27

## 2021-01-07 RX ORDER — METRONIDAZOLE 500 MG/1
2000 TABLET ORAL ONCE
Status: COMPLETED | OUTPATIENT
Start: 2021-01-07 | End: 2021-01-07

## 2021-01-07 RX ORDER — DIAPER,BRIEF,INFANT-TODD,DISP
EACH MISCELLANEOUS ONCE
Status: COMPLETED | OUTPATIENT
Start: 2021-01-07 | End: 2021-01-07

## 2021-01-07 RX ORDER — CEPHALEXIN 500 MG/1
1000 CAPSULE ORAL 2 TIMES DAILY
Qty: 28 CAPSULE | Refills: 0 | Status: SHIPPED | OUTPATIENT
Start: 2021-01-07 | End: 2021-01-14

## 2021-01-07 RX ADMIN — METRONIDAZOLE 2000 MG: 500 TABLET ORAL at 10:29

## 2021-01-07 RX ADMIN — BACITRACIN ZINC 1 G: 500 OINTMENT TOPICAL at 10:29

## 2021-01-07 ASSESSMENT — ENCOUNTER SYMPTOMS
ABDOMINAL PAIN: 0
SHORTNESS OF BREATH: 0
COUGH: 0
BACK PAIN: 0

## 2021-01-07 ASSESSMENT — PAIN DESCRIPTION - ORIENTATION: ORIENTATION: LEFT

## 2021-01-07 ASSESSMENT — PAIN DESCRIPTION - LOCATION: LOCATION: FOOT
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
## 2021-01-08 LAB
C. TRACHOMATIS DNA ,URINE: NEGATIVE
N. GONORRHOEAE DNA, URINE: NEGATIVE

## 2021-01-19 ENCOUNTER — HOSPITAL ENCOUNTER (EMERGENCY)
Age: 21
Discharge: HOME | End: 2021-01-19
Payer: COMMERCIAL

## 2021-01-19 VITALS
RESPIRATION RATE: 17 BRPM | HEIGHT: 69 IN | TEMPERATURE: 97.8 F | WEIGHT: 274 LBS | BODY MASS INDEX: 40.58 KG/M2 | HEART RATE: 91 BPM | OXYGEN SATURATION: 100 % | SYSTOLIC BLOOD PRESSURE: 118 MMHG | DIASTOLIC BLOOD PRESSURE: 78 MMHG

## 2021-01-19 DIAGNOSIS — R73.9 HYPERGLYCEMIA: ICD-10-CM

## 2021-01-19 DIAGNOSIS — A60.04 HERPES SIMPLEX VULVOVAGINITIS: ICD-10-CM

## 2021-01-19 DIAGNOSIS — Z91.199 H/O NONCOMPLIANCE WITH MEDICAL TREATMENT, PRESENTING HAZARDS TO HEALTH: ICD-10-CM

## 2021-01-19 DIAGNOSIS — L03.116 CELLULITIS OF LEFT LOWER EXTREMITY: Primary | ICD-10-CM

## 2021-01-19 DIAGNOSIS — Z3A.01 LESS THAN 8 WEEKS GESTATION OF PREGNANCY: ICD-10-CM

## 2021-01-19 DIAGNOSIS — B37.9 YEAST INFECTION: ICD-10-CM

## 2021-01-19 DIAGNOSIS — Z79.4 LONG TERM (CURRENT) USE OF INSULIN (HCC): ICD-10-CM

## 2021-01-19 LAB
GLUCOSE BLD-MCNC: 469 MG/DL
GLUCOSE BLD-MCNC: 469 MG/DL (ref 60–115)
HCG, URINE, POC: POSITIVE
Lab: NORMAL
NEGATIVE QC PASS/FAIL: NORMAL
PERFORMED ON: ABNORMAL
POSITIVE QC PASS/FAIL: NORMAL

## 2021-01-19 PROCEDURE — 96372 THER/PROPH/DIAG INJ SC/IM: CPT

## 2021-01-19 PROCEDURE — 6370000000 HC RX 637 (ALT 250 FOR IP): Performed by: PHYSICIAN ASSISTANT

## 2021-01-19 PROCEDURE — 99283 EMERGENCY DEPT VISIT LOW MDM: CPT

## 2021-01-19 RX ORDER — INSULIN LISPRO 100 [IU]/ML
5 INJECTION, SOLUTION INTRAVENOUS; SUBCUTANEOUS
Qty: 1 PEN | Refills: 5 | Status: SHIPPED | OUTPATIENT
Start: 2021-01-19 | End: 2022-06-25 | Stop reason: SDUPTHER

## 2021-01-19 RX ORDER — FLUCONAZOLE 150 MG/1
150 TABLET ORAL ONCE
Qty: 1 TABLET | Refills: 0 | Status: SHIPPED | OUTPATIENT
Start: 2021-01-19 | End: 2021-01-19

## 2021-01-19 RX ORDER — IBUPROFEN 800 MG/1
800 TABLET ORAL EVERY 8 HOURS PRN
Qty: 20 TABLET | Refills: 0 | Status: SHIPPED | OUTPATIENT
Start: 2021-01-19 | End: 2021-03-08

## 2021-01-19 RX ORDER — SULFAMETHOXAZOLE AND TRIMETHOPRIM 800; 160 MG/1; MG/1
1 TABLET ORAL 2 TIMES DAILY
Qty: 20 TABLET | Refills: 0 | Status: SHIPPED | OUTPATIENT
Start: 2021-01-19 | End: 2021-01-29

## 2021-01-19 RX ORDER — MUPIROCIN CALCIUM 20 MG/G
CREAM TOPICAL
Qty: 15 G | Refills: 0 | Status: SHIPPED | OUTPATIENT
Start: 2021-01-19 | End: 2021-02-18

## 2021-01-19 RX ORDER — GLUCOSAMINE HCL/CHONDROITIN SU 500-400 MG
CAPSULE ORAL
Qty: 100 STRIP | Refills: 0 | Status: SHIPPED | OUTPATIENT
Start: 2021-01-19 | End: 2022-06-25 | Stop reason: SDUPTHER

## 2021-01-19 RX ORDER — IBUPROFEN 800 MG/1
800 TABLET ORAL ONCE
Status: COMPLETED | OUTPATIENT
Start: 2021-01-19 | End: 2021-01-19

## 2021-01-19 RX ORDER — INSULIN GLARGINE 100 [IU]/ML
10 INJECTION, SOLUTION SUBCUTANEOUS NIGHTLY
Qty: 5 PEN | Refills: 0 | Status: SHIPPED | OUTPATIENT
Start: 2021-01-19 | End: 2022-06-25 | Stop reason: SDUPTHER

## 2021-01-19 RX ORDER — VALACYCLOVIR HYDROCHLORIDE 1 G/1
1000 TABLET, FILM COATED ORAL 3 TIMES DAILY
Qty: 21 TABLET | Refills: 0 | Status: SHIPPED | OUTPATIENT
Start: 2021-01-19 | End: 2021-01-26

## 2021-01-19 RX ADMIN — INSULIN HUMAN 5 UNITS: 100 INJECTION, SOLUTION PARENTERAL at 18:11

## 2021-01-19 RX ADMIN — IBUPROFEN 800 MG: 800 TABLET ORAL at 18:09

## 2021-01-19 ASSESSMENT — ENCOUNTER SYMPTOMS
ABDOMINAL PAIN: 0
COLOR CHANGE: 0
EYE DISCHARGE: 0
BACK PAIN: 0
SHORTNESS OF BREATH: 0
RHINORRHEA: 0
SORE THROAT: 0
CONSTIPATION: 0
ABDOMINAL DISTENTION: 0

## 2021-01-19 ASSESSMENT — PAIN SCALES - GENERAL: PAINLEVEL_OUTOF10: 9

## 2021-01-19 ASSESSMENT — PAIN DESCRIPTION - ORIENTATION: ORIENTATION: LEFT

## 2021-01-19 NOTE — ED PROVIDER NOTES
1 tab p.o. q.a.m., 1 tab p.o. q. midafternoon and 2 tabs p.o. q.h.s.  4 tabs per day. If unable to afford, instruct the patient about substituting the OTC components. ETODOLAC (LODINE XL) 400 MG EXTENDED RELEASE TABLET    Take 1 tablet by mouth daily    FAMOTIDINE (PEPCID) 20 MG TABLET    Take 1 tablet by mouth daily    GABAPENTIN (NEURONTIN) 300 MG CAPSULE    Take 1 capsule by mouth 3 times daily for 7 days. Intended supply: 7 days. GLUCOSE MONITORING KIT (FREESTYLE) MONITORING KIT    1 kit by Does not apply route daily    INSULIN PEN NEEDLE 30G X 8 MM MISC    1 each by Does not apply route daily    IPRATROPIUM-ALBUTEROL (DUONEB) 0.5-2.5 (3) MG/3ML SOLN NEBULIZER SOLUTION    Inhale 3 mLs into the lungs every 6 hours as needed for Shortness of Breath    LANCETS MISC    DX: diabetes mellitus. Use 1-3 time(s) daily - Ok to substitute per insurance (1 each = 1 box)    METHYLPREDNISOLONE (MEDROL, MANNY,) 4 MG TABLET    Take by mouth. ZINC GLUCONATE 15 MG TABS    Take 3 tabs daily       ALLERGIES     Shellfish-derived products and Benzoyl peroxide    FAMILY HISTORY     History reviewed. No pertinent family history.        SOCIAL HISTORY       Social History     Socioeconomic History    Marital status: Single     Spouse name: None    Number of children: None    Years of education: None    Highest education level: None   Occupational History    None   Social Needs    Financial resource strain: None    Food insecurity     Worry: None     Inability: None    Transportation needs     Medical: None     Non-medical: None   Tobacco Use    Smoking status: Current Every Day Smoker     Packs/day: 0.50     Types: Cigarettes, Cigars    Smokeless tobacco: Never Used   Substance and Sexual Activity    Alcohol use: Yes     Comment: occa    Drug use: No    Sexual activity: Yes     Partners: Male   Lifestyle    Physical activity     Days per week: None     Minutes per session: None    Stress: None   Relationships    Social connections     Talks on phone: None     Gets together: None     Attends [employer] service: None     Active member of club or organization: None     Attends meetings of clubs or organizations: None     Relationship status: None    Intimate partner violence     Fear of current or ex partner: None     Emotionally abused: None     Physically abused: None     Forced sexual activity: None   Other Topics Concern    None   Social History Narrative    None       SCREENINGS    [place] Coma Scale  Eye Opening: Spontaneous  Best Verbal Response: Oriented  Best Motor Response: Obeys commands  Lulu Coma Scale Score: 15 @FLOW([id number])@      PHYSICAL EXAM    (up to 7 for level 4, 8 or more for level 5)     ED Triage Vitals [01/19/21 1705]   BP Temp Temp Source Pulse Resp SpO2 Height Weight   118/78 97.8 °F (36.6 °C) Temporal 91 17 100 % 5' 9\" (1.753 m) 274 lb (124.3 kg)       Physical Exam  Vitals signs and nursing note reviewed. Constitutional:       General: She is not in acute distress. Appearance: She is well-developed. She is not ill-appearing, toxic-appearing or diaphoretic. HENT:      Head: Normocephalic. Nose: No congestion. Mouth/Throat:      Mouth: Mucous membranes are moist.      Pharynx: No oropharyngeal exudate or posterior oropharyngeal erythema. Eyes:      Extraocular Movements: Extraocular movements intact. Conjunctiva/sclera: Conjunctivae normal.      Pupils: Pupils are equal, round, and reactive to light. Neck:      Musculoskeletal: Normal range of motion and neck supple. No neck rigidity. Vascular: No JVD. Trachea: No tracheal deviation. Cardiovascular:      Rate and Rhythm: Normal rate. Pulses: Normal pulses. Heart sounds: Normal heart sounds. No murmur. No friction rub. No gallop. Pulmonary:      Effort: Pulmonary effort is normal. No tachypnea, accessory muscle usage, respiratory distress or retractions. Breath sounds: No stridor.  No wheezing, rhonchi or rales. Chest:      Chest wall: No tenderness. Abdominal:      General: Abdomen is flat. Bowel sounds are normal. There is no distension or abdominal bruit. Palpations: There is no shifting dullness, fluid wave, hepatomegaly, splenomegaly, mass or pulsatile mass. Tenderness: There is no abdominal tenderness. There is no right CVA tenderness, left CVA tenderness, guarding or rebound. Negative signs include Martinez's sign, Rovsing's sign and McBurney's sign. Musculoskeletal:         General: No deformity. Skin:     General: Skin is warm and dry. Capillary Refill: Capillary refill takes less than 2 seconds. Coloration: Skin is not jaundiced. Comments: Small area of cellulitis noted to left inner thigh, this is approximately 1 cm diameter, it is nonfluctuant, there is no drainage or discharge. Neurological:      General: No focal deficit present. Mental Status: She is alert and oriented to person, place, and time. Mental status is at baseline. Cranial Nerves: No cranial nerve deficit. Sensory: No sensory deficit. Motor: No weakness.       Coordination: Coordination normal.   Psychiatric:         Mood and Affect: Mood normal.         DIAGNOSTIC RESULTS     EKG: All EKG's are interpreted by the Emergency Department Physician who either signs or Co-signsthis chart in the absence of a cardiologist.        RADIOLOGY:   Juline Mealing such as CT, Ultrasound and MRI are read by the radiologist. Plain radiographic images are visualized and preliminarily interpreted by the emergency physician with the below findings:        Interpretation per the Radiologist below, if available at the time ofthis note:    No orders to display         ED BEDSIDE ULTRASOUND:   Performed by ED Physician - none    LABS:  Labs Reviewed   POCT GLUCOSE - Abnormal; Notable for the following components:       Result Value    POC Glucose 469 (*)     All other components within normal limits   POCT GLUCOSE - Normal   POC PREGNANCY UR-QUAL       All other labs were within normal range or not returned as of this dictation. EMERGENCY DEPARTMENT COURSE and DIFFERENTIAL DIAGNOSIS/MDM:   Vitals:    Vitals:    01/19/21 1705   BP: 118/78   Pulse: 91   Resp: 17   Temp: 97.8 °F (36.6 °C)   TempSrc: Temporal   SpO2: 100%   Weight: 274 lb (124.3 kg)   Height: 5' 9\" (1.753 m)          MDM  Number of Diagnoses or Management Options  Cellulitis of left lower extremity  H/O noncompliance with medical treatment, presenting hazards to health  Herpes simplex vulvovaginitis  Less than 8 weeks gestation of pregnancy  Yeast infection  Diagnosis management comments: Patient presents emergency department with multiple complaints. Primary complaint is that of abscess within the left inner groin which patient states she sat for last 2 days. She states she has had multiple abscesses before in the past, she was here on the seventh of this month for the same thing, she was given prescriptions which she never filled. This area is fairly small at this point is nonfluctuant, and nondrainable at this point. I advised the patient I would give her oral medications, as well as topical antibiotics to use for resolution of this area. Second complaint for this patient is that of yeast infection, which she states been ongoing since her previous visit of 1/7/2021. She states again she lost the prescription did not fill it. Third complaint is that of herpes outbreak. She was seen again 1/7/2020 for the same thing she, she was given a prescription for Valtrex, she states she did not take the medication, as she lost the prescription. She also states that she has a past history of diabetes, she states that she has not established with her regular family physician, she states she has not taken any insulin in over 4 months.   Her blood glucose level on presentation to the ER is 169, she was given some subcutaneous insulin while in ER, and I will refill her prescriptions for insulin. Patient was given referral off to family medicine doctor at [facility] for follow-up for her abscess, as well as her uncontrolled diabetes. Did discuss with the patient at length her risk for further harm or injury to herself by being noncompliant with her medication especially her diabetes. I also advised her if she would get her diabetes under control these other issues may resolve as well. I did have  Digna Hall speak with the patient to see if we can help schedule follow-up appointment for her. CRITICAL CARE TIME   Total Critical Care time was 0 minutes, excluding separately reportableprocedures. There was a high probability of clinicallysignificant/life threatening deterioration in the patient's condition which required my urgent intervention. CONSULTS:  None    PROCEDURES:  Unless otherwise noted below, none     Procedures    FINAL IMPRESSION      1. Cellulitis of left lower extremity    2. H/O noncompliance with medical treatment, presenting hazards to health    3. Yeast infection    4. Herpes simplex vulvovaginitis    5. Less than 8 weeks gestation of pregnancy    6. Hyperglycemia    7. Long term (current) use of insulin [facility])          DISPOSITION/PLAN   DISPOSITION Decision To Discharge 01/19/2021 05:41:29 PM      PATIENT REFERRED TO:  Bernardino Crisostomo MD  [id number] [address]  [phone]    In 3 days        DISCHARGE MEDICATIONS:  New Prescriptions    FLUCONAZOLE (DIFLUCAN) 150 MG TABLET    Take 1 tablet by mouth once for 1 dose    MUPIROCIN (BACTROBAN) 2 % CREAM    Apply topically 3 times daily.     SULFAMETHOXAZOLE-TRIMETHOPRIM (BACTRIM DS) 800-160 MG PER TABLET    Take 1 tablet by mouth 2 times daily for 10 days    VALACYCLOVIR (VALTREX) 1 G TABLET    Take 1 tablet by mouth 3 times daily for 7 days          (Please note that portions of this note were completed with a voice recognition program. Efforts were made to edit the dictations but occasionally words are mis-transcribed.)    Nhi Elder PA-C (electronically signed)  Attending Emergency Physician         Nhi Elder PA-C  01/19/21 5637

## 2021-01-19 NOTE — ED NOTES
Per Renita Quintero, do not recheck FSBS before leaving. Pt educated on f/u and medications. Discharge education reviewed verbally and in writing. Patient instructed to follow up with PCP and come back to the ED with any new or worsening symptoms. No questions or concerns at this time. No s/s of distress noted at this time.         Princess Magana RN  01/19/21 2543

## 2021-01-19 NOTE — CARE COORDINATION
Pt does not have a current PCP. I gave pt the [address] with an explanation on how to use this information and that it is important to follow-up with a PCP regarding the reason for this ER visit. I also told her that she needs to utilize her chosen PCP for these type of needs rather than visiting the ER for them. I explained to her how this will be more convenient for her.

## 2021-02-06 ENCOUNTER — HOSPITAL ENCOUNTER (EMERGENCY)
Age: 21
Discharge: HOME | End: 2021-02-06
Attending: FAMILY MEDICINE
Payer: COMMERCIAL

## 2021-02-06 VITALS
BODY MASS INDEX: 40.92 KG/M2 | HEIGHT: 68 IN | WEIGHT: 270 LBS | HEART RATE: 86 BPM | SYSTOLIC BLOOD PRESSURE: 131 MMHG | DIASTOLIC BLOOD PRESSURE: 86 MMHG | OXYGEN SATURATION: 97 % | RESPIRATION RATE: 18 BRPM | TEMPERATURE: 97.5 F

## 2021-02-06 DIAGNOSIS — B37.31 CANDIDAL VULVOVAGINITIS: ICD-10-CM

## 2021-02-06 DIAGNOSIS — A60.04 HERPES SIMPLEX VULVOVAGINITIS: Primary | ICD-10-CM

## 2021-02-06 LAB
BACTERIA: ABNORMAL /HPF
BILIRUBIN URINE: NEGATIVE
BLOOD, URINE: ABNORMAL
CLARITY: ABNORMAL
COLOR: YELLOW
EPITHELIAL CELLS, UA: ABNORMAL /HPF (ref 0–5)
GLUCOSE URINE: >=1000 MG/DL
HCG, URINE, POC: NEGATIVE
HYALINE CASTS: ABNORMAL /HPF (ref 0–5)
KETONES, URINE: NEGATIVE MG/DL
LEUKOCYTE ESTERASE, URINE: ABNORMAL
Lab: NORMAL
NEGATIVE QC PASS/FAIL: NORMAL
NITRITE, URINE: NEGATIVE
PH UA: 5.5 (ref 5–9)
POSITIVE QC PASS/FAIL: NORMAL
PROTEIN UA: 30 MG/DL
RBC UA: ABNORMAL /HPF (ref 0–5)
SPECIFIC GRAVITY UA: 1.04 (ref 1–1.03)
URINE REFLEX TO CULTURE: YES
UROBILINOGEN, URINE: 0.2 E.U./DL
WBC UA: >100 /HPF (ref 0–5)

## 2021-02-06 PROCEDURE — 81001 URINALYSIS AUTO W/SCOPE: CPT

## 2021-02-06 PROCEDURE — 99283 EMERGENCY DEPT VISIT LOW MDM: CPT

## 2021-02-06 PROCEDURE — 87591 N.GONORRHOEAE DNA AMP PROB: CPT

## 2021-02-06 PROCEDURE — 6370000000 HC RX 637 (ALT 250 FOR IP): Performed by: FAMILY MEDICINE

## 2021-02-06 PROCEDURE — 6360000002 HC RX W HCPCS: Performed by: FAMILY MEDICINE

## 2021-02-06 PROCEDURE — 96372 THER/PROPH/DIAG INJ SC/IM: CPT

## 2021-02-06 PROCEDURE — 87086 URINE CULTURE/COLONY COUNT: CPT

## 2021-02-06 PROCEDURE — 87491 CHLMYD TRACH DNA AMP PROBE: CPT

## 2021-02-06 RX ORDER — HYDROCODONE BITARTRATE AND ACETAMINOPHEN 5; 325 MG/1; MG/1
1 TABLET ORAL ONCE
Status: COMPLETED | OUTPATIENT
Start: 2021-02-06 | End: 2021-02-06

## 2021-02-06 RX ORDER — KETOROLAC TROMETHAMINE 30 MG/ML
60 INJECTION, SOLUTION INTRAMUSCULAR; INTRAVENOUS ONCE
Status: COMPLETED | OUTPATIENT
Start: 2021-02-06 | End: 2021-02-06

## 2021-02-06 RX ORDER — OXYCODONE HYDROCHLORIDE AND ACETAMINOPHEN 5; 325 MG/1; MG/1
1 TABLET ORAL EVERY 6 HOURS PRN
Qty: 12 TABLET | Refills: 0 | Status: SHIPPED | OUTPATIENT
Start: 2021-02-06 | End: 2021-02-09

## 2021-02-06 RX ORDER — ACYCLOVIR 400 MG/1
800 TABLET ORAL ONCE
Status: COMPLETED | OUTPATIENT
Start: 2021-02-06 | End: 2021-02-06

## 2021-02-06 RX ORDER — VALACYCLOVIR HYDROCHLORIDE 500 MG/1
1000 TABLET, FILM COATED ORAL ONCE
Status: DISCONTINUED | OUTPATIENT
Start: 2021-02-06 | End: 2021-02-06 | Stop reason: CLARIF

## 2021-02-06 RX ORDER — FLUCONAZOLE 150 MG/1
150 TABLET ORAL DAILY
Qty: 3 TABLET | Refills: 0 | Status: SHIPPED | OUTPATIENT
Start: 2021-02-06 | End: 2021-03-07

## 2021-02-06 RX ORDER — VALACYCLOVIR HYDROCHLORIDE 500 MG/1
1000 TABLET, FILM COATED ORAL 2 TIMES DAILY
Qty: 40 TABLET | Refills: 0 | Status: SHIPPED | OUTPATIENT
Start: 2021-02-06 | End: 2021-02-16

## 2021-02-06 RX ORDER — LIDOCAINE HYDROCHLORIDE 20 MG/ML
JELLY TOPICAL PRN
Status: DISCONTINUED | OUTPATIENT
Start: 2021-02-06 | End: 2021-02-06 | Stop reason: HOSPADM

## 2021-02-06 RX ORDER — FLUCONAZOLE 100 MG/1
200 TABLET ORAL ONCE
Status: COMPLETED | OUTPATIENT
Start: 2021-02-06 | End: 2021-02-06

## 2021-02-06 RX ORDER — NAPROXEN 500 MG/1
500 TABLET ORAL 2 TIMES DAILY
Qty: 20 TABLET | Refills: 0 | Status: SHIPPED | OUTPATIENT
Start: 2021-02-06 | End: 2021-07-09 | Stop reason: SDUPTHER

## 2021-02-06 RX ADMIN — FLUCONAZOLE 200 MG: 100 TABLET ORAL at 11:20

## 2021-02-06 RX ADMIN — HYDROCODONE BITARTRATE AND ACETAMINOPHEN 1 TABLET: 5; 325 TABLET ORAL at 11:20

## 2021-02-06 RX ADMIN — ACYCLOVIR 800 MG: 400 TABLET ORAL at 11:20

## 2021-02-06 RX ADMIN — LIDOCAINE HYDROCHLORIDE: 20 JELLY TOPICAL at 11:22

## 2021-02-06 RX ADMIN — KETOROLAC TROMETHAMINE 60 MG: 30 INJECTION, SOLUTION INTRAMUSCULAR; INTRAVENOUS at 12:25

## 2021-02-06 ASSESSMENT — PAIN SCALES - GENERAL
PAINLEVEL_OUTOF10: 10
PAINLEVEL_OUTOF10: 10

## 2021-02-06 ASSESSMENT — PAIN DESCRIPTION - LOCATION: LOCATION: VAGINA

## 2021-02-06 ASSESSMENT — PAIN DESCRIPTION - DESCRIPTORS
DESCRIPTORS: ACHING;BURNING
DESCRIPTORS: BURNING

## 2021-02-06 ASSESSMENT — PAIN DESCRIPTION - PAIN TYPE: TYPE: ACUTE PAIN

## 2021-02-06 NOTE — ED TRIAGE NOTES
Patient reports herpes flair that started this past week. Patient states she is take Acyclovir and using lidocaine spray without relief.

## 2021-02-06 NOTE — ED TRIAGE NOTES
Verified with pt that she took 2 doses of acyclovir from home, old script of unknown dosage.  Provider made aware and order clarified for third dose

## 2021-02-06 NOTE — ED PROVIDER NOTES
[facility] ED  eMERGENCY dEPARTMENT eNCOUnter      Pt Name: Ramon Lopez  MRN: [id number]  [place] 2000  Date of evaluation: 2/6/2021  Provider: Nicole Boo MD    [address] [place] [address]       Chief Complaint   Patient presents with    Vaginal Pain     Herpes flair         HISTORY OF PRESENT ILLNESS   (Location/Symptom, Timing/Onset,Context/Setting, Quality, Duration, Modifying Factors, Severity)  Note limiting factors. Ramon Lopez is a 21 y.o. female who presents to the emergency department vagina pain     21years old insulin-dependent diabetic was no recurrent genital herpes and candidal vulvovaginitis on and off for the last few months had a flareup in the last week was started and acyclovir but is here in the ER today with increased vaginal pain soreness and discomfort for the last few days    The history is provided by the patient. Vaginal Pain  This is a recurrent problem. The current episode started more than 2 days ago. The problem occurs constantly. The problem has not changed since onset. Nothing aggravates the symptoms. Nothing relieves the symptoms. NursingNotes were reviewed. REVIEW OF SYSTEMS    (2-9 systems for level 4, 10 or more for level 5)     Review of Systems   Constitutional: Negative. HENT: Negative. Respiratory: Negative. Cardiovascular: Negative. Genitourinary: Positive for dyspareunia, genital sores, vaginal discharge and vaginal pain. Negative for decreased urine volume, difficulty urinating, dysuria, enuresis, flank pain, frequency, hematuria, menstrual problem, pelvic pain, urgency and vaginal bleeding. Skin: Negative. Psychiatric/Behavioral: Negative. All other systems reviewed and are negative. Except as noted above the remainder of the review of systems was reviewed and negative.        PAST MEDICAL HISTORY     Past Medical History:   Diagnosis Date    Diabetes mellitus ([facility] [address]     Genital herpes     GERD (gastroesophageal reflux disease)     Hidradenitis suppurativa     Hypertension     Obesity affecting pregnancy, antepartum, third trimester          SURGICALHISTORY       Past Surgical History:   Procedure Laterality Date    UPPER GASTROINTESTINAL ENDOSCOPY           CURRENT MEDICATIONS       Discharge Medication List as of 2/6/2021 12:17 PM      CONTINUE these medications which have NOT CHANGED    Details   insulin glargine (LANTUS SOLOSTAR) 100 UNIT/ML injection pen Inject 10 Units into the skin nightly, Disp-5 pen, R-0Normal      insulin lispro, 1 Unit Dial, (HUMALOG KWIKPEN) 100 UNIT/ML SOPN Inject 5 Units into the skin 3 times daily (before meals), Disp-1 pen, R-5Normal      ipratropium-albuterol (DUONEB) 0.5-2.5 (3) MG/3ML SOLN nebulizer solution Inhale 3 mLs into the lungs every 6 hours as needed for Shortness of Breath, Disp-360 mL,R-0Normal      albuterol (PROVENTIL) (2.5 MG/3ML) 0.083% nebulizer solution Take 3 mLs by nebulization every 6 hours as needed for Wheezing, Disp-120 each,R-1Print      blood glucose monitor strips Test 3 times a day & as needed for symptoms of irregular blood glucose., Disp-100 strip, R-0, Normal      ibuprofen (IBU) 800 MG tablet Take 1 tablet by mouth every 8 hours as needed for Pain, Disp-20 tablet, R-0Normal      mupirocin (BACTROBAN) 2 % cream Apply topically 3 times daily. , Disp-15 g, R-0, Normal      bacitracin 500 UNIT/GM ointment Apply topically 2 times daily. , Disp-1 Tube, R-1, Print      etodolac (LODINE XL) 400 MG extended release tablet Take 1 tablet by mouth daily, Disp-30 tablet,R-3Print      bacitracin-polymyxin b (POLYSPORIN) 500-78885 UNIT/GM ointment Apply topically 2 times daily. , Disp-1 Tube,R-0, Print      gabapentin (NEURONTIN) 300 MG capsule Take 1 capsule by mouth 3 times daily for 7 days. Intended supply: 7 days. , Disp-21 capsule,R-0Normal      albuterol sulfate  (90 Base) MCG/ACT inhaler Inhale 2 puffs into the lungs every 4 hours as needed for Wheezing, Disp-18 g,R-1Print      clotrimazole (LOTRIMIN) 1 % cream Apply topically 2 times daily to lesions on skin for 3-4 weeks or for 1 week after lesion clears, Disp-1 Tube, R-0, Print      glucose monitoring kit (FREESTYLE) monitoring kit DAILY Starting Thu 7/2/2020, Disp-1 kit, R-0, Print      Zinc Gluconate 15 MG TABS Take 3 tabs daily, Disp-30 tablet, R-0Normal      famotidine (PEPCID) 20 MG tablet Take 1 tablet by mouth daily, Disp-30 tablet, R-0Print      Insulin Pen Needle 30G X 8 MM MISC DAILY Starting Wed 5/27/2020, Disp-100 each, R-0, Print      methylPREDNISolone (MEDROL, MANNY,) 4 MG tablet Take by mouth., Disp-1 kit, R-0Normal      Lancets MISC Disp-1 each, R-5, NormalDX: diabetes mellitus. Use 1-3 time(s) daily - Ok to substitute per insurance (1 each = 1 box)      buPROPion (WELLBUTRIN XL) 150 MG extended release tablet TAKE 1 TABLET BY MOUTH ONCE DAILY IN THE MORNINGHistorical Med      doxylamine-pyridoxine (DICLEGIS) 10-10 MG TBEC Start: 2 tabs p.o. q.h.s.; if symptoms persist after 2 days increase to 1 tab p.o. q.a.m. and 2 tabs p.o. q.h.s.; may increase to 1 tab p.o. q.a.m., 1 tab p.o. q. midafternoon and 2 tabs p.o. q.h.s.  4 tabs per day. If unable to afford, instruct the vipin ent about substituting the OTC components. , Disp-60 tablet, R-1Print             ALLERGIES     Shellfish-derived products and Benzoyl peroxide    FAMILY HISTORY     History reviewed. No pertinent family history.        SOCIAL HISTORY       Social History     Socioeconomic History    Marital status: Single     Spouse name: None    Number of children: None    Years of education: None    Highest education level: None   Occupational History    None   Social Needs    Financial resource strain: None    Food insecurity     Worry: None     Inability: None    Transportation needs     Medical: None     Non-medical: None   Tobacco Use    Smoking status: Current Every Day Smoker     Packs/day: 0.50     Types: Cigarettes, Cigars    Smokeless tobacco: Never Used   Substance and Sexual Activity    Alcohol use: Yes     Comment: occa    Drug use: No    Sexual activity: Yes     Partners: Male   Lifestyle    Physical activity     Days per week: None     Minutes per session: None    Stress: None   Relationships    Social connections     Talks on phone: None     Gets together: None     Attends [employer] witness service: None     Active member of club or organization: None     Attends meetings of clubs or organizations: None     Relationship status: None    Intimate partner violence     Fear of current or ex partner: None     Emotionally abused: None     Physically abused: None     Forced sexual activity: None   Other Topics Concern    None   Social History Narrative    None       SCREENINGS      @FLOW([id number])@      PHYSICAL EXAM    (up to 7 for level 4, 8 or more for level 5)     ED Triage Vitals [02/06/21 1046]   BP Temp Temp Source Pulse Resp SpO2 Height Weight   131/86 97.5 °F (36.4 °C) Oral 86 18 97 % 5' 8\" (1.727 m) 270 lb (122.5 kg)       Physical Exam  Vitals signs and nursing note reviewed. Constitutional:       Appearance: She is well-developed. HENT:      Head: Normocephalic and atraumatic. Right Ear: External ear normal.      Left Ear: External ear normal.      Nose: Nose normal.   Eyes:      Pupils: Pupils are equal, round, and reactive to light. Neck:      Musculoskeletal: Normal range of motion and neck supple. Cardiovascular:      Rate and Rhythm: Normal rate and regular rhythm. Heart sounds: Normal heart sounds. Pulmonary:      Effort: Pulmonary effort is normal. No respiratory distress. Breath sounds: Normal breath sounds. No wheezing or rales. Chest:      Chest wall: No tenderness. Abdominal:      General: Bowel sounds are normal.      Palpations: Abdomen is soft. Genitourinary:     Labia:         Right: Rash, tenderness and lesion present. Left: Rash, tenderness and lesion present. Comments: Vulvovaginal area and the whole peritoneum excoriated with multiple vesicular lesion and also white discharge  Musculoskeletal: Normal range of motion. Skin:     General: Skin is warm and dry. Neurological:      Mental Status: She is alert and oriented to person, place, and time. Cranial Nerves: No cranial nerve deficit. Sensory: No sensory deficit. Motor: No abnormal muscle tone. Coordination: Coordination normal.      Deep Tendon Reflexes: Reflexes normal.   Psychiatric:         Behavior: Behavior normal.         Thought Content:  Thought content normal.         Judgment: Judgment normal.         DIAGNOSTIC RESULTS     EKG: All EKG's are interpreted by the Emergency Department Physician who either signs or Co-signsthis chart in the absence of a cardiologist.        RADIOLOGY:   Anai Vazquez such as CT, Ultrasound and MRI are read by the radiologist. Plain radiographic images are visualized and preliminarily interpreted by the emergency physician with the below findings:      Interpretation per the Radiologist below, if available at the time ofthis note:    No orders to display         ED BEDSIDE ULTRASOUND:   Performed by ED Physician - none    LABS:  Labs Reviewed   URINE RT REFLEX TO CULTURE - Abnormal; Notable for the following components:       Result Value    Clarity, UA TURBID (*)     Glucose, Ur >=1000 (*)     Blood, Urine SMALL (*)     Protein, UA 30 (*)     Leukocyte Esterase, Urine SMALL (*)     All other components within normal limits   MICROSCOPIC URINALYSIS - Abnormal; Notable for the following components:    Bacteria, UA MANY (*)     WBC, UA >100 (*)     All other components within normal limits   CULTURE, URINE    Narrative:     ORDER#: [id number]                          ORDERED BY: Miesha Duncan  SOURCE: Urine Clean Catch                  COLLECTED:  02/06/21 13:02  ANTIBIOTICS AT MIGUE.:                      RECEIVED :  02/06/21 13:02   C.TRACHOMATIS N. GONORRHOEAE DNA, URINE   POC PREGNANCY UR-QUAL       All other labs were within normal range or not returned as of this dictation. EMERGENCY DEPARTMENT COURSE and DIFFERENTIAL DIAGNOSIS/MDM:   Vitals:    Vitals:    02/06/21 1046   BP: 131/86   Pulse: 86   Resp: 18   Temp: 97.5 °F (36.4 °C)   TempSrc: Oral   SpO2: 97%   Weight: 270 lb (122.5 kg)   Height: 5' 8\" (1.727 m)       MDM  Number of Diagnoses or Management Options  Candidal vulvovaginitis  Herpes simplex vulvovaginitis  Diagnosis management comments: Years old insulin-dependent diabetic with recurrent vulvovaginal candidiasis and herpes simplex presented to the ER with worsening vaginal pain due to acute flareup of vaginal herpes simplex in the last few days was given acyclovir in the ER pain medication and was discharged home with pain medicine Diflucan and Valtrex advised to have a close follow-up with OB/GYN  Urine was sent for culture for GC and chlamydia      CONSULTS:  None    PROCEDURES:  Unless otherwise noted below, none     Procedures    FINAL IMPRESSION      1. Herpes simplex vulvovaginitis    2. Candidal vulvovaginitis          DISPOSITION/PLAN   DISPOSITION Decision To Discharge 02/06/2021 12:12:57 PM      PATIENT REFERRED TO:  No follow-up provider specified. DISCHARGE MEDICATIONS:  Discharge Medication List as of 2/6/2021 12:17 PM      START taking these medications    Details   valACYclovir (VALTREX) 500 MG tablet Take 2 tablets by mouth 2 times daily for 10 days, Disp-40 tablet, R-0Print      fluconazole (DIFLUCAN) 150 MG tablet Take 1 tablet by mouth daily, Disp-3 tablet, R-0Print      oxyCODONE-acetaminophen (PERCOCET) 5-325 MG per tablet Take 1 tablet by mouth every 6 hours as needed for Pain for up to 3 days. Intended supply: 3 days.  Take lowest dose possible to manage pain, Disp-12 tablet, R-0Print      naproxen (NAPROSYN) 500 MG tablet Take 1 tablet by mouth 2 times daily for 20 doses, Disp-20 tablet, R-0Print (Please note thatportions of this note were completed with a voice recognition program.  Efforts were made to edit the dictations but occasionally words are mis-transcribed.)    Carlos A Capps MD (electronically signed)  Attending Emergency Physician         Reji Khan MD  02/07/21 2348

## 2021-02-06 NOTE — ED NOTES
Pt's urine specimen collected. Tubes labeled and sent to lab via tube system.          Gilmer Fagan RN  02/06/21 8098

## 2021-02-07 ENCOUNTER — HOSPITAL ENCOUNTER (EMERGENCY)
Age: 21
Discharge: HOME | End: 2021-02-07
Payer: COMMERCIAL

## 2021-02-07 VITALS
TEMPERATURE: 98.3 F | BODY MASS INDEX: 40.58 KG/M2 | OXYGEN SATURATION: 100 % | RESPIRATION RATE: 16 BRPM | DIASTOLIC BLOOD PRESSURE: 96 MMHG | WEIGHT: 274 LBS | SYSTOLIC BLOOD PRESSURE: 136 MMHG | HEART RATE: 90 BPM | HEIGHT: 69 IN

## 2021-02-07 DIAGNOSIS — B37.31 VAGINAL CANDIDIASIS: Primary | ICD-10-CM

## 2021-02-07 DIAGNOSIS — A60.04 HERPES SIMPLEX VULVOVAGINITIS: ICD-10-CM

## 2021-02-07 LAB — URINE CULTURE, ROUTINE: NORMAL

## 2021-02-07 PROCEDURE — 6360000002 HC RX W HCPCS: Performed by: PHYSICIAN ASSISTANT

## 2021-02-07 PROCEDURE — 99283 EMERGENCY DEPT VISIT LOW MDM: CPT

## 2021-02-07 PROCEDURE — 96372 THER/PROPH/DIAG INJ SC/IM: CPT

## 2021-02-07 PROCEDURE — 6370000000 HC RX 637 (ALT 250 FOR IP): Performed by: PHYSICIAN ASSISTANT

## 2021-02-07 RX ORDER — KETOROLAC TROMETHAMINE 30 MG/ML
60 INJECTION, SOLUTION INTRAMUSCULAR; INTRAVENOUS ONCE
Status: COMPLETED | OUTPATIENT
Start: 2021-02-07 | End: 2021-02-07

## 2021-02-07 RX ORDER — MORPHINE SULFATE 2 MG/ML
4 INJECTION, SOLUTION INTRAMUSCULAR; INTRAVENOUS ONCE
Status: COMPLETED | OUTPATIENT
Start: 2021-02-07 | End: 2021-02-07

## 2021-02-07 RX ORDER — ONDANSETRON 4 MG/1
4 TABLET, ORALLY DISINTEGRATING ORAL ONCE
Status: COMPLETED | OUTPATIENT
Start: 2021-02-07 | End: 2021-02-07

## 2021-02-07 RX ORDER — ACYCLOVIR 50 MG/G
OINTMENT TOPICAL
Qty: 15 G | Refills: 1 | Status: SHIPPED | OUTPATIENT
Start: 2021-02-07 | End: 2021-02-14

## 2021-02-07 RX ORDER — ACYCLOVIR 50 MG/G
OINTMENT TOPICAL ONCE
Status: DISCONTINUED | OUTPATIENT
Start: 2021-02-07 | End: 2021-02-07 | Stop reason: HOSPADM

## 2021-02-07 RX ORDER — CLOTRIMAZOLE 1 %
100 CREAM WITH APPLICATOR VAGINAL NIGHTLY
Qty: 7 SUPPOSITORY | Refills: 0 | Status: SHIPPED | OUTPATIENT
Start: 2021-02-07 | End: 2021-02-14

## 2021-02-07 RX ADMIN — ONDANSETRON 4 MG: 4 TABLET, ORALLY DISINTEGRATING ORAL at 02:54

## 2021-02-07 RX ADMIN — KETOROLAC TROMETHAMINE 60 MG: 30 INJECTION, SOLUTION INTRAMUSCULAR at 02:54

## 2021-02-07 RX ADMIN — MORPHINE SULFATE 4 MG: 2 INJECTION, SOLUTION INTRAMUSCULAR; INTRAVENOUS at 02:54

## 2021-02-07 ASSESSMENT — ENCOUNTER SYMPTOMS
VOMITING: 0
BACK PAIN: 0
SORE THROAT: 0
NAUSEA: 0
RHINORRHEA: 0
ABDOMINAL PAIN: 0
PHOTOPHOBIA: 0
SHORTNESS OF BREATH: 0
DIARRHEA: 0
RESPIRATORY NEGATIVE: 1
COUGH: 0
EYE PAIN: 0

## 2021-02-07 ASSESSMENT — PAIN SCALES - GENERAL: PAINLEVEL_OUTOF10: 10

## 2021-02-07 ASSESSMENT — PAIN DESCRIPTION - FREQUENCY: FREQUENCY: CONTINUOUS

## 2021-02-07 ASSESSMENT — PAIN DESCRIPTION - PAIN TYPE: TYPE: ACUTE PAIN

## 2021-02-07 ASSESSMENT — PAIN DESCRIPTION - LOCATION: LOCATION: VAGINA;PERINEUM

## 2021-02-07 NOTE — ED PROVIDER NOTES
[address] [facility] ED  EMERGENCY DEPARTMENT ENCOUNTER      Pt Name: Yoselin Tatum  MRN: [id number]  [place] [address]  Date of evaluation: 2/7/2021  Provider: Kennedy Pryor PA-C      HISTORY OF PRESENT ILLNESS    Yoselin Tatum is a 21 y.o. female who presents to the Emergency Department with vaginal pain. Pt has been dealing with vaginal infection, yeast and herpes, last seen yesterday. Came into the ED due to the pain. She has been dealing with intermittent vaginal issues for the last month and has not f/u with her obgyn when I asked her why she said \"I need to\". She is uncontrolled diabetic but states she is working on getting it better under control and is taking her dm2 medication as she is supposed to. She denies any fevers. REVIEW OF SYSTEMS       Review of Systems   Constitutional: Negative for chills, diaphoresis, fatigue and fever. HENT: Negative for congestion, rhinorrhea and sore throat. Eyes: Negative for photophobia and pain. Respiratory: Negative for cough and shortness of breath. Cardiovascular: Negative for chest pain and palpitations. Gastrointestinal: Negative for abdominal pain, diarrhea, nausea and vomiting. Genitourinary: Positive for genital sores, vaginal discharge and vaginal pain. Negative for dysuria and flank pain. Musculoskeletal: Negative for back pain. Skin: Negative for rash. Neurological: Negative for dizziness, light-headedness and headaches. Psychiatric/Behavioral: Negative. All other systems reviewed and are negative.         PAST MEDICAL HISTORY     Past Medical History:   Diagnosis Date    Diabetes mellitus ([address]     Genital herpes     GERD (gastroesophageal reflux disease)     Hidradenitis suppurativa     Hypertension     Obesity affecting pregnancy, antepartum, third trimester          SURGICAL HISTORY       Past Surgical History:   Procedure Laterality Date    UPPER GASTROINTESTINAL ENDOSCOPY           CURRENT MEDICATIONS Discharge Medication List as of 2/7/2021  3:10 AM      CONTINUE these medications which have NOT CHANGED    Details   valACYclovir (VALTREX) 500 MG tablet Take 2 tablets by mouth 2 times daily for 10 days, Disp-40 tablet, R-0Print      fluconazole (DIFLUCAN) 150 MG tablet Take 1 tablet by mouth daily, Disp-3 tablet, R-0Print      oxyCODONE-acetaminophen (PERCOCET) 5-325 MG per tablet Take 1 tablet by mouth every 6 hours as needed for Pain for up to 3 days. Intended supply: 3 days. Take lowest dose possible to manage pain, Disp-12 tablet, R-0Print      naproxen (NAPROSYN) 500 MG tablet Take 1 tablet by mouth 2 times daily for 20 doses, Disp-20 tablet, R-0Print      blood glucose monitor strips Test 3 times a day & as needed for symptoms of irregular blood glucose., Disp-100 strip, R-0, Normal      ibuprofen (IBU) 800 MG tablet Take 1 tablet by mouth every 8 hours as needed for Pain, Disp-20 tablet, R-0Normal      insulin glargine (LANTUS SOLOSTAR) 100 UNIT/ML injection pen Inject 10 Units into the skin nightly, Disp-5 pen, R-0Normal      insulin lispro, 1 Unit Dial, (HUMALOG KWIKPEN) 100 UNIT/ML SOPN Inject 5 Units into the skin 3 times daily (before meals), Disp-1 pen, R-5Normal      mupirocin (BACTROBAN) 2 % cream Apply topically 3 times daily. , Disp-15 g, R-0, Normal      bacitracin 500 UNIT/GM ointment Apply topically 2 times daily. , Disp-1 Tube, R-1, Print      etodolac (LODINE XL) 400 MG extended release tablet Take 1 tablet by mouth daily, Disp-30 tablet,R-3Print      bacitracin-polymyxin b (POLYSPORIN) 500-41433 UNIT/GM ointment Apply topically 2 times daily. , Disp-1 Tube,R-0, Print      gabapentin (NEURONTIN) 300 MG capsule Take 1 capsule by mouth 3 times daily for 7 days. Intended supply: 7 days. , Disp-21 capsule,R-0Normal      ipratropium-albuterol (DUONEB) 0.5-2.5 (3) MG/3ML SOLN nebulizer solution Inhale 3 mLs into the lungs every 6 hours as needed for Shortness of Breath, Disp-360 mL,R-0Normal albuterol (PROVENTIL) (2.5 MG/3ML) 0.083% nebulizer solution Take 3 mLs by nebulization every 6 hours as needed for Wheezing, Disp-120 each,R-1Print      albuterol sulfate  (90 Base) MCG/ACT inhaler Inhale 2 puffs into the lungs every 4 hours as needed for Wheezing, Disp-18 g,R-1Print      clotrimazole (LOTRIMIN) 1 % cream Apply topically 2 times daily to lesions on skin for 3-4 weeks or for 1 week after lesion clears, Disp-1 Tube, R-0, Print      glucose monitoring kit (FREESTYLE) monitoring kit DAILY Starting u 7/2/2020, Disp-1 kit, R-0, Print      Zinc Gluconate 15 MG TABS Take 3 tabs daily, Disp-30 tablet, R-0Normal      famotidine (PEPCID) 20 MG tablet Take 1 tablet by mouth daily, Disp-30 tablet, R-0Print      Insulin Pen Needle 30G X 8 MM MISC DAILY Starting Wed 5/27/2020, Disp-100 each, R-0, Print      methylPREDNISolone (MEDROL, MANNY,) 4 MG tablet Take by mouth., Disp-1 kit, R-0Normal      Lancets MISC Disp-1 each, R-5, NormalDX: diabetes mellitus. Use 1-3 time(s) daily - Ok to substitute per insurance (1 each = 1 box)      buPROPion (WELLBUTRIN XL) 150 MG extended release tablet TAKE 1 TABLET BY MOUTH ONCE DAILY IN THE MORNINGHistorical Med      doxylamine-pyridoxine (DICLEGIS) 10-10 MG TBEC Start: 2 tabs p.o. q.h.s.; if symptoms persist after 2 days increase to 1 tab p.o. q.a.m. and 2 tabs p.o. q.h.s.; may increase to 1 tab p.o. q.a.m., 1 tab p.o. q. midafternoon and 2 tabs p.o. q.h.s.  4 tabs per day. If unable to afford, instruct the vipin ent about substituting the OTC components. , Disp-60 tablet, R-1Print             ALLERGIES     Shellfish-derived products and Benzoyl peroxide    FAMILY HISTORY     No family history on file.        SOCIAL HISTORY       Social History     Socioeconomic History    Marital status: Single     Spouse name: Not on file    Number of children: Not on file    Years of education: Not on file    Highest education level: Not on file   Occupational History    Not on file   Social Needs    Financial resource strain: Not on file    Food insecurity     Worry: Not on file     Inability: Not on file    Transportation needs     Medical: Not on file     Non-medical: Not on file   Tobacco Use    Smoking status: Current Every Day Smoker     Packs/day: 0.50     Types: Cigarettes, Cigars    Smokeless tobacco: Never Used   Substance and Sexual Activity    Alcohol use: Yes     Comment: occa    Drug use: No    Sexual activity: Yes     Partners: Male   Lifestyle    Physical activity     Days per week: Not on file     Minutes per session: Not on file    Stress: Not on file   Relationships    Social connections     Talks on phone: Not on file     Gets together: Not on file     Attends Tenri[employer] service: Not on file     Active member of club or organization: Not on file     Attends meetings of clubs or organizations: Not on file     Relationship status: Not on file    Intimate partner violence     Fear of current or ex partner: Not on file     Emotionally abused: Not on file     Physically abused: Not on file     Forced sexual activity: Not on file   Other Topics Concern    Not on file   Social History Narrative    Not on file       SCREENINGS             PHYSICAL EXAM    (up to 7 for level 4, 8 or more for level 5)     ED Triage Vitals [02/07/21 0221]   BP Temp Temp Source Pulse Resp SpO2 Height Weight   (!) 136/96 98.3 °F (36.8 °C) Oral 90 16 100 % 5' 9\" (1.753 m) 274 lb (124.3 kg)       Physical Exam  Vitals signs and nursing note reviewed. Exam conducted with a chaperone present. Constitutional:       General: She is not in acute distress. Appearance: Normal appearance. She is well-developed. She is not diaphoretic. HENT:      Head: Normocephalic and atraumatic. Eyes:      General: Lids are normal.      Conjunctiva/sclera: Conjunctivae normal.   Neck:      Musculoskeletal: Normal range of motion and neck supple.    Cardiovascular:      Rate and Rhythm: Normal rate and regular rhythm. Pulses: Normal pulses. Heart sounds: Normal heart sounds. Pulmonary:      Effort: Pulmonary effort is normal.      Breath sounds: Normal breath sounds. Abdominal:      General: Bowel sounds are normal.      Palpations: Abdomen is soft. Tenderness: There is no abdominal tenderness. Genitourinary:     Labia:         Right: Rash, tenderness and lesion present. Left: Rash, tenderness and lesion present. Vagina: Vaginal discharge, erythema, tenderness and lesions present. Comments: signicant skin break down with large amount of white discharge, as well as pt put vasaline on genitalia. Lymphadenopathy:      Cervical: No cervical adenopathy. Skin:     General: Skin is warm and dry. Capillary Refill: Capillary refill takes less than 2 seconds. Findings: No rash. Neurological:      Mental Status: She is alert and oriented to person, place, and time. Psychiatric:         Thought Content: Thought content normal.         Judgment: Judgment normal.           All other labs were within normal range or not returned as of this dictation. EMERGENCY DEPARTMENT COURSE and DIFFERENTIALDIAGNOSIS/MDM:   Vitals:    Vitals:    02/07/21 0221   BP: (!) 136/96   Pulse: 90   Resp: 16   Temp: 98.3 °F (36.8 °C)   TempSrc: Oral   SpO2: 100%   Weight: 274 lb (124.3 kg)   Height: 5' 9\" (1.753 m)            Pt is nontoxic and no acute distress. Vaginal exam reveals significant skin breakdown due to yeast and herpatic sores. Advised to keep genitals open to air, do not apply anything not prescribed and continue to take her prescribed antiviral, diflucan, analgesic. I added to this topical antiviral and yeast vaginal supp. I stressed the importance that she needs to see her obgyn immediately for further tx and evaluation. I also explained she need tight glycemic control bc this contributes to her frequent severe yeast infections.  She was advised to return to ed for new worsening or concerning symptoms. Pt verbalized understanding. Pt stalbe and ready for d/c       PROCEDURES:  Unless otherwise noted below, none     Procedures      FINAL IMPRESSION      1. Vaginal candidiasis    2.  Herpes simplex vulvovaginitis          DISPOSITION/PLAN   DISPOSITION Decision To Discharge 02/07/2021 03:10:11 AM          Maritza Gonzalez (electronically signed)  Attending Emergency Physician       Lilian Perez PA-C  02/07/21 7527

## 2021-02-07 NOTE — ED NOTES
Written and verbal d/c instructions given. Instructed on medication and f/u care.  Verbalized understanding     Chrissy Khan RN  02/07/21 7746

## 2021-02-11 LAB
C. TRACHOMATIS DNA ,URINE: NEGATIVE
N. GONORRHOEAE DNA, URINE: NEGATIVE

## 2021-03-07 ENCOUNTER — APPOINTMENT (OUTPATIENT)
Dept: CT IMAGING | Age: 21
End: 2021-03-07
Payer: COMMERCIAL

## 2021-03-07 ENCOUNTER — HOSPITAL ENCOUNTER (EMERGENCY)
Age: 21
Discharge: HOME | End: 2021-03-07
Payer: COMMERCIAL

## 2021-03-07 VITALS
HEIGHT: 68 IN | SYSTOLIC BLOOD PRESSURE: 131 MMHG | TEMPERATURE: 99.5 F | HEART RATE: 85 BPM | OXYGEN SATURATION: 100 % | DIASTOLIC BLOOD PRESSURE: 80 MMHG | RESPIRATION RATE: 20 BRPM | WEIGHT: 274 LBS | BODY MASS INDEX: 41.52 KG/M2

## 2021-03-07 DIAGNOSIS — E11.65 TYPE 2 DIABETES MELLITUS WITH HYPERGLYCEMIA, WITH LONG-TERM CURRENT USE OF INSULIN (HCC): ICD-10-CM

## 2021-03-07 DIAGNOSIS — L03.311 ABDOMINAL WALL CELLULITIS: Primary | ICD-10-CM

## 2021-03-07 DIAGNOSIS — Z79.4 TYPE 2 DIABETES MELLITUS WITH HYPERGLYCEMIA, WITH LONG-TERM CURRENT USE OF INSULIN (HCC): ICD-10-CM

## 2021-03-07 LAB
ALBUMIN SERPL-MCNC: 4 G/DL (ref 3.5–4.6)
ALP BLD-CCNC: 118 U/L (ref 40–130)
ALT SERPL-CCNC: 12 U/L (ref 0–33)
ANION GAP SERPL CALCULATED.3IONS-SCNC: 13 MEQ/L (ref 9–15)
AST SERPL-CCNC: 11 U/L (ref 0–35)
BASOPHILS ABSOLUTE: 0.1 K/UL (ref 0–0.2)
BASOPHILS RELATIVE PERCENT: 0.9 %
BILIRUB SERPL-MCNC: 0.5 MG/DL (ref 0.2–0.7)
BUN BLDV-MCNC: 9 MG/DL (ref 6–20)
CALCIUM SERPL-MCNC: 9.4 MG/DL (ref 8.5–9.9)
CHLORIDE BLD-SCNC: 96 MEQ/L (ref 95–107)
CHP ED QC CHECK: YES
CO2: 24 MEQ/L (ref 20–31)
CREAT SERPL-MCNC: 0.62 MG/DL (ref 0.5–0.9)
EOSINOPHILS ABSOLUTE: 0.2 K/UL (ref 0–0.7)
EOSINOPHILS RELATIVE PERCENT: 1.8 %
GFR AFRICAN AMERICAN: >60
GFR NON-AFRICAN AMERICAN: >60
GLOBULIN: 3.6 G/DL (ref 2.3–3.5)
GLUCOSE BLD-MCNC: 237 MG/DL
GLUCOSE BLD-MCNC: 237 MG/DL (ref 60–115)
GLUCOSE BLD-MCNC: 429 MG/DL (ref 70–99)
HCT VFR BLD CALC: 43 % (ref 37–47)
HEMOGLOBIN: 14.4 G/DL (ref 12–16)
LACTIC ACID: 2.4 MMOL/L (ref 0.5–2.2)
LACTIC ACID: 3.4 MMOL/L (ref 0.5–2.2)
LYMPHOCYTES ABSOLUTE: 2.2 K/UL (ref 1–4.8)
LYMPHOCYTES RELATIVE PERCENT: 20.8 %
MCH RBC QN AUTO: 29 PG (ref 27–31.3)
MCHC RBC AUTO-ENTMCNC: 33.4 % (ref 33–37)
MCV RBC AUTO: 86.8 FL (ref 82–100)
MONOCYTES ABSOLUTE: 0.6 K/UL (ref 0.2–0.8)
MONOCYTES RELATIVE PERCENT: 5.9 %
NEUTROPHILS ABSOLUTE: 7.6 K/UL (ref 1.4–6.5)
NEUTROPHILS RELATIVE PERCENT: 70.6 %
PDW BLD-RTO: 13.2 % (ref 11.5–14.5)
PERFORMED ON: ABNORMAL
PLATELET # BLD: 305 K/UL (ref 130–400)
POC CREATININE WHOLE BLOOD: 0.6
POTASSIUM SERPL-SCNC: 4.2 MEQ/L (ref 3.4–4.9)
RBC # BLD: 4.95 M/UL (ref 4.2–5.4)
SODIUM BLD-SCNC: 133 MEQ/L (ref 135–144)
TOTAL PROTEIN: 7.6 G/DL (ref 6.3–8)
WBC # BLD: 10.8 K/UL (ref 4.5–11)

## 2021-03-07 PROCEDURE — 83605 ASSAY OF LACTIC ACID: CPT

## 2021-03-07 PROCEDURE — 99282 EMERGENCY DEPT VISIT SF MDM: CPT

## 2021-03-07 PROCEDURE — 96365 THER/PROPH/DIAG IV INF INIT: CPT

## 2021-03-07 PROCEDURE — 6360000002 HC RX W HCPCS: Performed by: NURSE PRACTITIONER

## 2021-03-07 PROCEDURE — 6370000000 HC RX 637 (ALT 250 FOR IP): Performed by: NURSE PRACTITIONER

## 2021-03-07 PROCEDURE — 36415 COLL VENOUS BLD VENIPUNCTURE: CPT

## 2021-03-07 PROCEDURE — 80053 COMPREHEN METABOLIC PANEL: CPT

## 2021-03-07 PROCEDURE — 85025 COMPLETE CBC W/AUTO DIFF WBC: CPT

## 2021-03-07 PROCEDURE — 96376 TX/PRO/DX INJ SAME DRUG ADON: CPT

## 2021-03-07 PROCEDURE — 96375 TX/PRO/DX INJ NEW DRUG ADDON: CPT

## 2021-03-07 PROCEDURE — 2500000003 HC RX 250 WO HCPCS: Performed by: NURSE PRACTITIONER

## 2021-03-07 PROCEDURE — 87040 BLOOD CULTURE FOR BACTERIA: CPT

## 2021-03-07 PROCEDURE — 74177 CT ABD & PELVIS W/CONTRAST: CPT

## 2021-03-07 PROCEDURE — 6360000004 HC RX CONTRAST MEDICATION: Performed by: NURSE PRACTITIONER

## 2021-03-07 PROCEDURE — 2580000003 HC RX 258: Performed by: NURSE PRACTITIONER

## 2021-03-07 RX ORDER — CLINDAMYCIN HYDROCHLORIDE 300 MG/1
300 CAPSULE ORAL 3 TIMES DAILY
Qty: 21 CAPSULE | Refills: 0 | Status: SHIPPED | OUTPATIENT
Start: 2021-03-07 | End: 2021-03-14

## 2021-03-07 RX ORDER — 0.9 % SODIUM CHLORIDE 0.9 %
1000 INTRAVENOUS SOLUTION INTRAVENOUS ONCE
Status: COMPLETED | OUTPATIENT
Start: 2021-03-07 | End: 2021-03-07

## 2021-03-07 RX ORDER — CLINDAMYCIN PHOSPHATE 600 MG/50ML
600 INJECTION INTRAVENOUS ONCE
Status: COMPLETED | OUTPATIENT
Start: 2021-03-07 | End: 2021-03-07

## 2021-03-07 RX ORDER — MORPHINE SULFATE 2 MG/ML
4 INJECTION, SOLUTION INTRAMUSCULAR; INTRAVENOUS
Status: COMPLETED | OUTPATIENT
Start: 2021-03-07 | End: 2021-03-07

## 2021-03-07 RX ORDER — OXYCODONE HYDROCHLORIDE AND ACETAMINOPHEN 5; 325 MG/1; MG/1
1 TABLET ORAL EVERY 6 HOURS PRN
Qty: 12 TABLET | Refills: 0 | Status: SHIPPED | OUTPATIENT
Start: 2021-03-07 | End: 2021-03-10

## 2021-03-07 RX ORDER — ONDANSETRON 2 MG/ML
4 INJECTION INTRAMUSCULAR; INTRAVENOUS ONCE
Status: COMPLETED | OUTPATIENT
Start: 2021-03-07 | End: 2021-03-07

## 2021-03-07 RX ORDER — HYDROCODONE BITARTRATE AND ACETAMINOPHEN 5; 325 MG/1; MG/1
1 TABLET ORAL EVERY 6 HOURS PRN
Qty: 12 TABLET | Refills: 0 | Status: SHIPPED | OUTPATIENT
Start: 2021-03-07 | End: 2021-03-07

## 2021-03-07 RX ORDER — FLUCONAZOLE 200 MG/1
200 TABLET ORAL DAILY
Qty: 2 TABLET | Refills: 0 | Status: SHIPPED | OUTPATIENT
Start: 2021-03-07 | End: 2021-03-15

## 2021-03-07 RX ADMIN — CLINDAMYCIN IN 5 PERCENT DEXTROSE 600 MG: 12 INJECTION, SOLUTION INTRAVENOUS at 20:04

## 2021-03-07 RX ADMIN — SODIUM CHLORIDE 1000 ML: 9 INJECTION, SOLUTION INTRAVENOUS at 19:09

## 2021-03-07 RX ADMIN — ONDANSETRON 4 MG: 2 INJECTION INTRAMUSCULAR; INTRAVENOUS at 19:09

## 2021-03-07 RX ADMIN — MORPHINE SULFATE 4 MG: 2 INJECTION, SOLUTION INTRAMUSCULAR; INTRAVENOUS at 20:02

## 2021-03-07 RX ADMIN — INSULIN HUMAN 10 UNITS: 100 INJECTION, SOLUTION PARENTERAL at 20:04

## 2021-03-07 RX ADMIN — IOPAMIDOL 100 ML: 612 INJECTION, SOLUTION INTRAVENOUS at 19:49

## 2021-03-07 RX ADMIN — MORPHINE SULFATE 4 MG: 2 INJECTION, SOLUTION INTRAMUSCULAR; INTRAVENOUS at 19:09

## 2021-03-07 ASSESSMENT — ENCOUNTER SYMPTOMS
COLOR CHANGE: 0
TROUBLE SWALLOWING: 0
SINUS PRESSURE: 0
ABDOMINAL DISTENTION: 0
COUGH: 0
DIARRHEA: 0
NAUSEA: 0
VOMITING: 0
BACK PAIN: 0
ABDOMINAL PAIN: 1
CONSTIPATION: 0
SORE THROAT: 0
SHORTNESS OF BREATH: 0
RHINORRHEA: 0
WHEEZING: 0
CHEST TIGHTNESS: 0

## 2021-03-07 ASSESSMENT — PAIN SCALES - GENERAL
PAINLEVEL_OUTOF10: 10
PAINLEVEL_OUTOF10: 10

## 2021-03-07 ASSESSMENT — PAIN DESCRIPTION - ORIENTATION: ORIENTATION: RIGHT

## 2021-03-07 ASSESSMENT — PAIN DESCRIPTION - PAIN TYPE: TYPE: ACUTE PAIN

## 2021-03-07 NOTE — ED NOTES
PATIENT PRESENTS TO THE ER WITH COMPLAINTS OF PAIN TO THE RIGHT LOWER ABDOMEN  PATIENT HAS A NOTED ABSCESS TO THE THE RIGHT LOWER ABD  WARM TO TOUCH  REDNESS NOTED  PAINFUL TO TOUCH  LOW GRADE FEVER  DENIES NAUSEA AND VOMITING  COMPLAINS OF HEADACHE  TOOK TYLENOL 1 HOUR PRIOR TO ARRIVAL  PT HAS HISTORY OF ABSCESS IN THE PAST      Hilary Lua RN  03/07/21 7627

## 2021-03-07 NOTE — ED PROVIDER NOTES
[address] [facility] ED  EMERGENCY DEPARTMENT ENCOUNTER      Pt Name: Bello Sigala  MRN: [id number]  [place] [address]  Date of evaluation: 3/7/2021  Provider: Case Christina       Chief Complaint   Patient presents with    Abdominal Pain         HISTORY OF PRESENT ILLNESS   (Location/Symptom, Timing/Onset,Context/Setting, Quality, Duration, Modifying Factors, Severity)  Note limiting factors. Bello Sigala is a 21 y.o. female who presents to the emergency department for complaint of redness swelling possible abscess in the right lower abdomen area. Patient states she has a history of hidradenitis suppurative in the area appear to be a small pimple appearance. She states she typically only gets these types of sores and abscesses in the groin and armpit regions. She states that the area appears a small pimple or boil but did not rub through the head did not open bleed or drain. She states that it had a bruised appearance on the second day and is getting larger faster. Today she has an area the size of a quarter that is very firm very tender with a large area of redness surrounding. She states she is never had a similar abscess on her abdomen in the past.  She denies any injury to the area. She states pain is up to 10 out of 10 sharp stabbing aching pain made worse with any pressure to the area of redness and swelling. She states it feels like the area swelling into the skin instead of swelling outward and turned into a pimple. She denies any other recent illnesses or injuries. She denies any known fevers chills sweats states that she is felt little warm but does not have a fever that she is aware of. Denies any upper respiratory symptoms. She denies any chest pain cough congestion shortness of breath, denies any nausea vomiting diarrhea. Nursing Notes were reviewed.     REVIEW OF SYSTEMS    (2-9 systems for level 4, 10 or more for level 5)     Review of Systems   Constitutional: Negative for activity change, appetite change, chills, diaphoresis, fatigue and fever. HENT: Negative for congestion, ear pain, postnasal drip, rhinorrhea, sinus pressure, sore throat and trouble swallowing. Eyes: Negative for visual disturbance. Respiratory: Negative for cough, chest tightness, shortness of breath and wheezing. Cardiovascular: Negative for chest pain and palpitations. Gastrointestinal: Positive for abdominal pain (skin RLQ - possible abscess). Negative for abdominal distention, constipation, diarrhea, nausea and vomiting. Genitourinary: Negative for difficulty urinating, dysuria, flank pain, frequency and urgency. Musculoskeletal: Negative for arthralgias, back pain, myalgias, neck pain and neck stiffness. Skin: Negative for color change and rash. Neurological: Negative for dizziness, tremors, seizures, syncope, speech difficulty, weakness, light-headedness, numbness and headaches. Except as noted above the remainder of the review of systems was reviewed and negative.        PAST MEDICAL HISTORY     Past Medical History:   Diagnosis Date    Diabetes mellitus ([facility] [address]     Genital herpes     GERD (gastroesophageal reflux disease)     Hidradenitis suppurativa     Hypertension     Obesity affecting pregnancy, antepartum, third trimester      Past Surgical History:   Procedure Laterality Date    UPPER GASTROINTESTINAL ENDOSCOPY       Social History     Socioeconomic History    Marital status: Single     Spouse name: None    Number of children: None    Years of education: None    Highest education level: None   Occupational History    None   Social Needs    Financial resource strain: None    Food insecurity     Worry: None     Inability: None    Transportation needs     Medical: None     Non-medical: None   Tobacco Use    Smoking status: Current Every Day Smoker     Packs/day: 0.50     Types: Cigarettes, Cigars    Smokeless tobacco: Never Used   Substance and Sexual Activity    Alcohol use: Yes     Comment: occa    Drug use: No    Sexual activity: Yes     Partners: Male   Lifestyle    Physical activity     Days per week: None     Minutes per session: None    Stress: None   Relationships    Social connections     Talks on phone: None     Gets together: None     Attends Congregation service: None     Active member of club or organization: None     Attends meetings of clubs or organizations: None     Relationship status: None    Intimate partner violence     Fear of current or ex partner: None     Emotionally abused: None     Physically abused: None     Forced sexual activity: None   Other Topics Concern    None   Social History Narrative    None       SCREENINGS             PHYSICAL EXAM    (up to 7 for level 4, 8 or more for level 5)     ED Triage Vitals [03/07/21 1758]   BP Temp Temp Source Pulse Resp SpO2 Height Weight   (!) 142/83 99.5 °F (37.5 °C) Oral 79 (!) 2 100 % 5' 8\" (1.727 m) 274 lb (124.3 kg)       Physical Exam  Constitutional:       General: She is not in acute distress. Appearance: Normal appearance. She is well-developed. She is obese. She is not ill-appearing, toxic-appearing or diaphoretic. HENT:      Head: Normocephalic and atraumatic. Right Ear: External ear normal.      Left Ear: External ear normal.   Eyes:      General:         Right eye: No discharge. Left eye: No discharge. Extraocular Movements: Extraocular movements intact. Conjunctiva/sclera: Conjunctivae normal.      Pupils: Pupils are equal, round, and reactive to light. Neck:      Musculoskeletal: Normal range of motion and neck supple. No neck rigidity or muscular tenderness. Cardiovascular:      Rate and Rhythm: Normal rate and regular rhythm. Pulses: Normal pulses. Pulmonary:      Effort: Pulmonary effort is normal.      Breath sounds: Normal breath sounds. Abdominal:      General: There is no distension. Palpations: There is no mass. Tenderness: There is abdominal tenderness. There is no guarding or rebound. Hernia: No hernia is present. Comments: The area does not appear to have any liquid fluctuance, unable to perform I&D is the area appears thickened very tender with surrounding cellulitis. Musculoskeletal: Normal range of motion. General: No tenderness or signs of injury. Skin:     General: Skin is warm and dry. Capillary Refill: Capillary refill takes less than 2 seconds. Neurological:      General: No focal deficit present. Mental Status: She is alert and oriented to person, place, and time. Mental status is at baseline. Cranial Nerves: No cranial nerve deficit. Sensory: No sensory deficit. Motor: No weakness. Coordination: Coordination normal.         RESULTS     EKG: All EKG's are interpreted by the Emergency Department Physician who either signs or Co-signsthis chart in the absence of a cardiologist.        RADIOLOGY:   [place] such as CT, Ultrasound and MRI are read by the radiologist. Letty Olvera radiographic images are visualized and preliminarily interpreted by the emergency physician with the below findings:    CT of the abdomen pelvis with contrast per stat radiology shows stomach and bowel no intra-abdominal fluid or stranding present no obstruction no mucosal thickening. No findings suggest acute appendicitis. Within the subcutaneous tissue of the anterior right lower abdominal wall there is inflammatory stranding which is suspected to be related to cellulitis but no definitive sign of abscess. No soft tissue gas identified to suggest necrotizing fasciitis.     Interpretation per the Radiologist below, if available at the time ofthis note:    CT ABDOMEN PELVIS W IV CONTRAST Additional Contrast? None    (Results Pending)         ED BEDSIDE ULTRASOUND:   Performed by ED Physician - none    LABS:  Labs Reviewed   CBC WITH AUTO antibiotics as well as Humulin for the elevation of glucose. Patient is otherwise stable for discharge home continue medication for antibiotics and pain management. Directed to follow-up primary care providers as possible for further evaluation return to the ER for any onset of new concerning symptoms worsening condition. Patient verbalized understandable given instruction education. CRITICAL CARE TIME       CONSULTS:  None    PROCEDURES:  Unless otherwise noted below, none     Procedures    FINAL IMPRESSION      1. Abdominal wall cellulitis    2. Type 2 diabetes mellitus with hyperglycemia, with long-term current use of insulin ([facility] [address]          DISPOSITION/PLAN   DISPOSITION        PATIENT REFERRED TO:  Denise Guzman MD  [address] [facility] [address] [facility]  [phone]    Schedule an appointment as soon as possible for a visit in 3 days        DISCHARGE MEDICATIONS:  New Prescriptions    CLINDAMYCIN (CLEOCIN) 300 MG CAPSULE    Take 1 capsule by mouth 3 times daily for 7 days    FLUCONAZOLE (DIFLUCAN) 200 MG TABLET    Take 1 tablet by mouth daily May repeat in 3 days if symptoms persist    OXYCODONE-ACETAMINOPHEN (PERCOCET) 5-325 MG PER TABLET    Take 1 tablet by mouth every 6 hours as needed for Pain for up to 3 days. Intended supply: 3 days.  Take lowest dose possible to manage pain          (Please notethat portions of this note were completed with a voice recognition program.  Efforts were made to edit the dictations but occasionally words are mis-transcribed.)    SHAQUILLE Palma CNP (electronically signed)  Attending Emergency Physician         SHAQUILLE Palma CNP  03/07/21 2104       SHAQUILLE Palma CNP  03/07/21 2106       SHAQUILLE Palma CNP  03/07/21 2115

## 2021-03-08 ENCOUNTER — HOSPITAL ENCOUNTER (EMERGENCY)
Age: 21
Discharge: HOME | End: 2021-03-08
Payer: COMMERCIAL

## 2021-03-08 VITALS
WEIGHT: 274 LBS | OXYGEN SATURATION: 100 % | HEART RATE: 92 BPM | TEMPERATURE: 98.1 F | SYSTOLIC BLOOD PRESSURE: 120 MMHG | BODY MASS INDEX: 41.52 KG/M2 | RESPIRATION RATE: 18 BRPM | DIASTOLIC BLOOD PRESSURE: 68 MMHG | HEIGHT: 68 IN

## 2021-03-08 DIAGNOSIS — L03.311 ABDOMINAL WALL CELLULITIS: Primary | ICD-10-CM

## 2021-03-08 LAB
HCG, URINE, POC: NEGATIVE
Lab: NORMAL
NEGATIVE QC PASS/FAIL: NORMAL
POSITIVE QC PASS/FAIL: NORMAL

## 2021-03-08 PROCEDURE — 99283 EMERGENCY DEPT VISIT LOW MDM: CPT

## 2021-03-08 PROCEDURE — 6360000002 HC RX W HCPCS: Performed by: PHYSICIAN ASSISTANT

## 2021-03-08 PROCEDURE — 6370000000 HC RX 637 (ALT 250 FOR IP): Performed by: PHYSICIAN ASSISTANT

## 2021-03-08 PROCEDURE — 96372 THER/PROPH/DIAG INJ SC/IM: CPT

## 2021-03-08 RX ORDER — KETOROLAC TROMETHAMINE 30 MG/ML
60 INJECTION, SOLUTION INTRAMUSCULAR; INTRAVENOUS ONCE
Status: COMPLETED | OUTPATIENT
Start: 2021-03-08 | End: 2021-03-08

## 2021-03-08 RX ORDER — CLINDAMYCIN HYDROCHLORIDE 300 MG/1
CAPSULE ORAL
Qty: 10 CAPSULE | Refills: 0 | Status: SHIPPED | OUTPATIENT
Start: 2021-03-08 | End: 2021-03-15

## 2021-03-08 RX ORDER — IBUPROFEN 800 MG/1
800 TABLET ORAL EVERY 8 HOURS PRN
Qty: 15 TABLET | Refills: 0 | Status: SHIPPED | OUTPATIENT
Start: 2021-03-08 | End: 2021-07-10

## 2021-03-08 RX ORDER — CHLORHEXIDINE GLUCONATE 4 G/100ML
SOLUTION TOPICAL
Qty: 237 ML | Refills: 0 | Status: SHIPPED | OUTPATIENT
Start: 2021-03-08 | End: 2021-03-22

## 2021-03-08 RX ORDER — CLINDAMYCIN HYDROCHLORIDE 150 MG/1
300 CAPSULE ORAL ONCE
Status: COMPLETED | OUTPATIENT
Start: 2021-03-08 | End: 2021-03-08

## 2021-03-08 RX ORDER — GREEN TEA/HOODIA GORDONII 315-12.5MG
1 CAPSULE ORAL DAILY
Qty: 30 TABLET | Refills: 1 | Status: SHIPPED | OUTPATIENT
Start: 2021-03-08 | End: 2021-05-07

## 2021-03-08 RX ORDER — ACETAMINOPHEN 325 MG/1
650 TABLET ORAL ONCE
Status: COMPLETED | OUTPATIENT
Start: 2021-03-08 | End: 2021-03-08

## 2021-03-08 RX ADMIN — ACETAMINOPHEN 650 MG: 325 TABLET ORAL at 12:23

## 2021-03-08 RX ADMIN — CLINDAMYCIN HYDROCHLORIDE 300 MG: 150 CAPSULE ORAL at 12:24

## 2021-03-08 RX ADMIN — KETOROLAC TROMETHAMINE 60 MG: 30 INJECTION, SOLUTION INTRAMUSCULAR at 12:23

## 2021-03-08 ASSESSMENT — PAIN DESCRIPTION - LOCATION: LOCATION: ABDOMEN

## 2021-03-08 ASSESSMENT — PAIN SCALES - GENERAL: PAINLEVEL_OUTOF10: 10

## 2021-03-08 ASSESSMENT — PAIN DESCRIPTION - ORIENTATION: ORIENTATION: RIGHT;LOWER

## 2021-03-08 ASSESSMENT — ENCOUNTER SYMPTOMS
GASTROINTESTINAL NEGATIVE: 1
RESPIRATORY NEGATIVE: 1
EYES NEGATIVE: 1

## 2021-03-08 NOTE — ED NOTES
Lactic acid 3.4 per lab, Elisa Humphrey [place], made aware.       Margie Cisneros, RN  03/07/21 1722

## 2021-03-08 NOTE — ED NOTES
IV obtained  Labs sent  Lab notified of needing second culture      Acrhie Valenzuela RN  03/07/21 0936

## 2021-03-08 NOTE — ED TRIAGE NOTES
Pt has co bad abscess that she was seen for yesterday. Pt states she get them a lot and needs a primary Dr to follow up with.

## 2021-03-08 NOTE — ED PROVIDER NOTES
[address] [facility] ED  eMERGENCY dEPARTMENT eNCOUnter      Pt Name: Alexus Nguyen  MRN: [id number]  [place] [address]  Date of evaluation: 3/8/2021  Provider: Connie Corea PA-C      HISTORY OF PRESENT ILLNESS    Alexus Nguyen is a 21 y.o. female who presents to the Emergency Department with chief complaint of abdominal wall pain. Patient was seen and treated here yesterday for abdominal wall cellulitis. Patient is currently on clindamycin; however, she has only had 2 doses. Patient has history of frequent and recurrent skin issues secondary to her hidradenitis suppurativa. Patient requesting primary care follow-up along with dermatology. Patient has not taken anything for pain prior to arrival.        REVIEW OF SYSTEMS       Review of Systems   Constitutional: Negative. HENT: Negative. Eyes: Negative. Respiratory: Negative. Cardiovascular: Negative. Gastrointestinal: Negative. Endocrine: Negative. Genitourinary: Negative. Musculoskeletal: Negative. Skin: Negative. Tenderness right lower abdomen superficial skin   Neurological: Negative. Psychiatric/Behavioral: Negative. PAST MEDICAL HISTORY     Past Medical History:   Diagnosis Date    Diabetes mellitus ([address]     Genital herpes     GERD (gastroesophageal reflux disease)     Hidradenitis suppurativa     Hypertension     Obesity affecting pregnancy, antepartum, third trimester          SURGICAL HISTORY       Past Surgical History:   Procedure Laterality Date    UPPER GASTROINTESTINAL ENDOSCOPY           CURRENT MEDICATIONS       Previous Medications    ALBUTEROL (PROVENTIL) (2.5 MG/3ML) 0.083% NEBULIZER SOLUTION    Take 3 mLs by nebulization every 6 hours as needed for Wheezing    ALBUTEROL SULFATE  (90 BASE) MCG/ACT INHALER    Inhale 2 puffs into the lungs every 4 hours as needed for Wheezing    BACITRACIN 500 UNIT/GM OINTMENT    Apply topically 2 times daily.     BACITRACIN-POLYMYXIN B (POLYSPORIN) 500-29621 UNIT/GM OINTMENT    Apply topically 2 times daily. BLOOD GLUCOSE MONITOR STRIPS    Test 3 times a day & as needed for symptoms of irregular blood glucose. BUPROPION (WELLBUTRIN XL) 150 MG EXTENDED RELEASE TABLET    TAKE 1 TABLET BY MOUTH ONCE DAILY IN THE MORNING    CLINDAMYCIN (CLEOCIN) 300 MG CAPSULE    Take 1 capsule by mouth 3 times daily for 7 days    CLOTRIMAZOLE (LOTRIMIN) 1 % CREAM    Apply topically 2 times daily to lesions on skin for 3-4 weeks or for 1 week after lesion clears    DOXYLAMINE-PYRIDOXINE (DICLEGIS) 10-10 MG TBEC    Start: 2 tabs p.o. q.h.s.; if symptoms persist after 2 days increase to 1 tab p.o. q.a.m. and 2 tabs p.o. q.h.s.; may increase to 1 tab p.o. q.a.m., 1 tab p.o. q. midafternoon and 2 tabs p.o. q.h.s.  4 tabs per day. If unable to afford, instruct the patient about substituting the OTC components. ETODOLAC (LODINE XL) 400 MG EXTENDED RELEASE TABLET    Take 1 tablet by mouth daily    FAMOTIDINE (PEPCID) 20 MG TABLET    Take 1 tablet by mouth daily    FLUCONAZOLE (DIFLUCAN) 200 MG TABLET    Take 1 tablet by mouth daily May repeat in 3 days if symptoms persist    GABAPENTIN (NEURONTIN) 300 MG CAPSULE    Take 1 capsule by mouth 3 times daily for 7 days. Intended supply: 7 days. GLUCOSE MONITORING KIT (FREESTYLE) MONITORING KIT    1 kit by Does not apply route daily    INSULIN GLARGINE (LANTUS SOLOSTAR) 100 UNIT/ML INJECTION PEN    Inject 10 Units into the skin nightly    INSULIN LISPRO, 1 UNIT DIAL, (HUMALOG KWIKPEN) 100 UNIT/ML SOPN    Inject 5 Units into the skin 3 times daily (before meals)    INSULIN PEN NEEDLE 30G X 8 MM MISC    1 each by Does not apply route daily    IPRATROPIUM-ALBUTEROL (DUONEB) 0.5-2.5 (3) MG/3ML SOLN NEBULIZER SOLUTION    Inhale 3 mLs into the lungs every 6 hours as needed for Shortness of Breath    LANCETS MISC    DX: diabetes mellitus.  Use 1-3 time(s) daily - Ok to substitute per insurance (1 each = 1 box) Patient to follow-up with primary care provider along with surgeon, and dermatology for reevaluation and treatment. Patient to return here if symptoms worsen or if new concerning symptoms arise. Patient verbalizes understanding of plan discharge is no further questions. PROCEDURES:  Unless otherwise noted below, none     Procedures      FINAL IMPRESSION      1.  Abdominal wall cellulitis          DISPOSITION/PLAN   DISPOSITION            Rosa Maria Montes PA-C (electronically signed)  Attending Emergency Physician  [address] [facility], [place]  03/08/21 5588

## 2021-03-08 NOTE — ED NOTES
Discharge  instructions given and reviewed. Patient verbalized understanding. Patient ambulated out of ED with a steady gait to POV.      Papito Denis RN  03/08/21 6153

## 2021-03-09 LAB
GFR AFRICAN AMERICAN: >60
GFR NON-AFRICAN AMERICAN: >60
PERFORMED ON: NORMAL
POC CREATININE: 0.6 MG/DL (ref 0.6–1.1)
POC SAMPLE TYPE: NORMAL

## 2021-03-11 ENCOUNTER — HOSPITAL ENCOUNTER (EMERGENCY)
Age: 21
Discharge: HOME | End: 2021-03-11
Attending: EMERGENCY MEDICINE
Payer: COMMERCIAL

## 2021-03-11 VITALS
BODY MASS INDEX: 41.68 KG/M2 | DIASTOLIC BLOOD PRESSURE: 76 MMHG | HEART RATE: 80 BPM | HEIGHT: 68 IN | RESPIRATION RATE: 18 BRPM | WEIGHT: 275 LBS | OXYGEN SATURATION: 100 % | SYSTOLIC BLOOD PRESSURE: 125 MMHG | TEMPERATURE: 97.7 F

## 2021-03-11 VITALS
WEIGHT: 275 LBS | SYSTOLIC BLOOD PRESSURE: 131 MMHG | TEMPERATURE: 98.7 F | BODY MASS INDEX: 41.68 KG/M2 | DIASTOLIC BLOOD PRESSURE: 72 MMHG | OXYGEN SATURATION: 98 % | HEART RATE: 81 BPM | RESPIRATION RATE: 18 BRPM | HEIGHT: 68 IN

## 2021-03-11 DIAGNOSIS — L02.91 ABSCESS: Primary | ICD-10-CM

## 2021-03-11 LAB
ALBUMIN SERPL-MCNC: 3.7 G/DL (ref 3.5–4.6)
ALP BLD-CCNC: 110 U/L (ref 40–130)
ALT SERPL-CCNC: 10 U/L (ref 0–33)
ANION GAP SERPL CALCULATED.3IONS-SCNC: 12 MEQ/L (ref 9–15)
AST SERPL-CCNC: 11 U/L (ref 0–35)
ATYPICAL LYMPHOCYTE RELATIVE PERCENT: 3 %
BANDED NEUTROPHILS RELATIVE PERCENT: 1 % (ref 5–11)
BASOPHILS ABSOLUTE: 0.1 K/UL (ref 0–0.2)
BASOPHILS RELATIVE PERCENT: 1 %
BILIRUB SERPL-MCNC: 0.4 MG/DL (ref 0.2–0.7)
BUN BLDV-MCNC: 10 MG/DL (ref 6–20)
CALCIUM SERPL-MCNC: 9.1 MG/DL (ref 8.5–9.9)
CHLORIDE BLD-SCNC: 99 MEQ/L (ref 95–107)
CO2: 23 MEQ/L (ref 20–31)
CREAT SERPL-MCNC: 0.56 MG/DL (ref 0.5–0.9)
EOSINOPHILS ABSOLUTE: 0.2 K/UL (ref 0–0.7)
EOSINOPHILS RELATIVE PERCENT: 2 %
GFR AFRICAN AMERICAN: >60
GFR NON-AFRICAN AMERICAN: >60
GLOBULIN: 3.4 G/DL (ref 2.3–3.5)
GLUCOSE BLD-MCNC: 403 MG/DL (ref 70–99)
GLUCOSE BLD-MCNC: 434 MG/DL (ref 60–115)
HCT VFR BLD CALC: 37.4 % (ref 37–47)
HEMOGLOBIN: 13 G/DL (ref 12–16)
LYMPHOCYTES ABSOLUTE: 2.5 K/UL (ref 1–4.8)
LYMPHOCYTES RELATIVE PERCENT: 27 %
MCH RBC QN AUTO: 29.5 PG (ref 27–31.3)
MCHC RBC AUTO-ENTMCNC: 34.9 % (ref 33–37)
MCV RBC AUTO: 84.5 FL (ref 82–100)
MONOCYTES ABSOLUTE: 0.4 K/UL (ref 0.2–0.8)
MONOCYTES RELATIVE PERCENT: 4.9 %
NEUTROPHILS ABSOLUTE: 5.3 K/UL (ref 1.4–6.5)
NEUTROPHILS RELATIVE PERCENT: 62 %
PDW BLD-RTO: 12.9 % (ref 11.5–14.5)
PERFORMED ON: ABNORMAL
PLATELET # BLD: 315 K/UL (ref 130–400)
PLATELET SLIDE REVIEW: NORMAL
POTASSIUM SERPL-SCNC: 4 MEQ/L (ref 3.4–4.9)
RBC # BLD: 4.42 M/UL (ref 4.2–5.4)
RBC # BLD: NORMAL 10*6/UL
SMUDGE CELLS: 10.8
SODIUM BLD-SCNC: 134 MEQ/L (ref 135–144)
TOTAL PROTEIN: 7.1 G/DL (ref 6.3–8)
WBC # BLD: 8.4 K/UL (ref 4.5–11)

## 2021-03-11 PROCEDURE — 6370000000 HC RX 637 (ALT 250 FOR IP): Performed by: EMERGENCY MEDICINE

## 2021-03-11 PROCEDURE — 87147 CULTURE TYPE IMMUNOLOGIC: CPT

## 2021-03-11 PROCEDURE — 96365 THER/PROPH/DIAG IV INF INIT: CPT

## 2021-03-11 PROCEDURE — 99283 EMERGENCY DEPT VISIT LOW MDM: CPT

## 2021-03-11 PROCEDURE — 2580000003 HC RX 258: Performed by: EMERGENCY MEDICINE

## 2021-03-11 PROCEDURE — 87070 CULTURE OTHR SPECIMN AEROBIC: CPT

## 2021-03-11 PROCEDURE — 2500000003 HC RX 250 WO HCPCS: Performed by: EMERGENCY MEDICINE

## 2021-03-11 PROCEDURE — 85025 COMPLETE CBC W/AUTO DIFF WBC: CPT

## 2021-03-11 PROCEDURE — 96374 THER/PROPH/DIAG INJ IV PUSH: CPT

## 2021-03-11 PROCEDURE — 87186 SC STD MICRODIL/AGAR DIL: CPT

## 2021-03-11 PROCEDURE — 87205 SMEAR GRAM STAIN: CPT

## 2021-03-11 PROCEDURE — 36415 COLL VENOUS BLD VENIPUNCTURE: CPT

## 2021-03-11 PROCEDURE — 87077 CULTURE AEROBIC IDENTIFY: CPT

## 2021-03-11 PROCEDURE — 6360000002 HC RX W HCPCS: Performed by: EMERGENCY MEDICINE

## 2021-03-11 PROCEDURE — 87075 CULTR BACTERIA EXCEPT BLOOD: CPT

## 2021-03-11 PROCEDURE — 10060 I&D ABSCESS SIMPLE/SINGLE: CPT

## 2021-03-11 PROCEDURE — 96375 TX/PRO/DX INJ NEW DRUG ADDON: CPT

## 2021-03-11 PROCEDURE — 80053 COMPREHEN METABOLIC PANEL: CPT

## 2021-03-11 RX ORDER — KETOROLAC TROMETHAMINE 30 MG/ML
30 INJECTION, SOLUTION INTRAMUSCULAR; INTRAVENOUS ONCE
Status: COMPLETED | OUTPATIENT
Start: 2021-03-11 | End: 2021-03-11

## 2021-03-11 RX ORDER — ONDANSETRON 2 MG/ML
4 INJECTION INTRAMUSCULAR; INTRAVENOUS ONCE
Status: COMPLETED | OUTPATIENT
Start: 2021-03-11 | End: 2021-03-11

## 2021-03-11 RX ORDER — MORPHINE SULFATE 2 MG/ML
4 INJECTION, SOLUTION INTRAMUSCULAR; INTRAVENOUS ONCE
Status: COMPLETED | OUTPATIENT
Start: 2021-03-11 | End: 2021-03-11

## 2021-03-11 RX ORDER — TRAMADOL HYDROCHLORIDE 50 MG/1
50 TABLET ORAL EVERY 6 HOURS PRN
Qty: 12 TABLET | Refills: 0 | Status: SHIPPED | OUTPATIENT
Start: 2021-03-11 | End: 2021-03-14

## 2021-03-11 RX ORDER — 0.9 % SODIUM CHLORIDE 0.9 %
1000 INTRAVENOUS SOLUTION INTRAVENOUS ONCE
Status: COMPLETED | OUTPATIENT
Start: 2021-03-11 | End: 2021-03-11

## 2021-03-11 RX ORDER — HYDROCODONE BITARTRATE AND ACETAMINOPHEN 5; 325 MG/1; MG/1
1 TABLET ORAL EVERY 6 HOURS PRN
Qty: 10 TABLET | Refills: 0 | Status: SHIPPED | OUTPATIENT
Start: 2021-03-11 | End: 2021-03-11 | Stop reason: ALTCHOICE

## 2021-03-11 RX ORDER — CLINDAMYCIN PHOSPHATE 600 MG/50ML
600 INJECTION INTRAVENOUS ONCE
Status: COMPLETED | OUTPATIENT
Start: 2021-03-11 | End: 2021-03-11

## 2021-03-11 RX ADMIN — SODIUM CHLORIDE 1000 ML: 9 INJECTION, SOLUTION INTRAVENOUS at 03:13

## 2021-03-11 RX ADMIN — ONDANSETRON 4 MG: 2 INJECTION INTRAMUSCULAR; INTRAVENOUS at 02:26

## 2021-03-11 RX ADMIN — MORPHINE SULFATE 4 MG: 2 INJECTION, SOLUTION INTRAMUSCULAR; INTRAVENOUS at 02:27

## 2021-03-11 RX ADMIN — KETOROLAC TROMETHAMINE 30 MG: 30 INJECTION, SOLUTION INTRAMUSCULAR; INTRAVENOUS at 02:26

## 2021-03-11 RX ADMIN — INSULIN HUMAN 10 UNITS: 100 INJECTION, SOLUTION PARENTERAL at 03:13

## 2021-03-11 RX ADMIN — CLINDAMYCIN IN 5 PERCENT DEXTROSE 600 MG: 12 INJECTION, SOLUTION INTRAVENOUS at 02:27

## 2021-03-11 ASSESSMENT — ENCOUNTER SYMPTOMS
COUGH: 0
NAUSEA: 0
ABDOMINAL DISTENTION: 0
SHORTNESS OF BREATH: 0
EYE REDNESS: 0
SORE THROAT: 0
EYE PAIN: 0
BACK PAIN: 0
COUGH: 0
VOMITING: 0
SHORTNESS OF BREATH: 0
VOMITING: 0
WHEEZING: 0
ABDOMINAL PAIN: 0
CHEST TIGHTNESS: 0
SINUS PAIN: 0
ABDOMINAL PAIN: 0
PHOTOPHOBIA: 0
SORE THROAT: 0
DIARRHEA: 1
EYE DISCHARGE: 0

## 2021-03-11 ASSESSMENT — PAIN DESCRIPTION - LOCATION: LOCATION: ABDOMEN

## 2021-03-11 ASSESSMENT — PAIN DESCRIPTION - PAIN TYPE
TYPE: ACUTE PAIN
TYPE: ACUTE PAIN

## 2021-03-11 ASSESSMENT — PAIN SCALES - GENERAL
PAINLEVEL_OUTOF10: 10
PAINLEVEL_OUTOF10: 10

## 2021-03-11 ASSESSMENT — PAIN DESCRIPTION - DESCRIPTORS: DESCRIPTORS: BURNING

## 2021-03-11 ASSESSMENT — PAIN DESCRIPTION - ORIENTATION: ORIENTATION: RIGHT

## 2021-03-11 NOTE — ED TRIAGE NOTES
To Ed from home, ambulatory via EMS, c/o abscess right lower abdomen.  Says  She's taking clinda for the abscess but has had sporatic episodes of vomitting them up the past couple days, and  Now thinks the abscess is spreading

## 2021-03-11 NOTE — ED PROVIDER NOTES
[address] [facility] ED  eMERGENCY dEPARTMENT eNCOUnter      Pt Name: Lan Monteiro  MRN: [id number]  [place] 2000  Date of evaluation: 3/11/2021  Provider: Jero Rebolledo MD    CHIEF COMPLAINT       Chief Complaint   Patient presents with    Abscess     right lower abdomen         HISTORY OF PRESENT ILLNESS   (Location/Symptom, Timing/Onset,Context/Setting, Quality, Duration, Modifying Factors, Severity)  Note limiting factors. Lan Monteiro is a 21 y.o. female who presents to the emergency department for evaluation of right sided abdominal abscess. Patient has been treated the last 3 days for abdominal wall cellulitis. It is now formed a head but not draining. She is on clindamycin but she feels like the area is gotten worse because of formed a head. No related fever chills. No other complaints. HPI    NursingNotes were reviewed. REVIEW OF SYSTEMS    (2-9 systems for level 4, 10 or more for level 5)     Review of Systems   Constitutional: Negative for chills and diaphoresis. HENT: Negative for congestion, ear pain, mouth sores and sore throat. Eyes: Negative for photophobia and discharge. Respiratory: Negative for cough, chest tightness, shortness of breath and wheezing. Cardiovascular: Negative for chest pain and palpitations. Gastrointestinal: Negative for abdominal distention, abdominal pain and vomiting. Endocrine: Negative for cold intolerance. Genitourinary: Negative for difficulty urinating. Musculoskeletal: Negative for arthralgias. Skin: Negative for pallor and rash. Allergic/Immunologic: Negative for immunocompromised state. Neurological: Negative for dizziness and syncope. Hematological: Negative for adenopathy. Psychiatric/Behavioral: Negative for agitation and hallucinations. All other systems reviewed and are negative. Except as noted above the remainder of the review of systems was reviewed and negative.        PAST MEDICAL HISTORY     Past (HUMALOG KWIKPEN) 100 UNIT/ML SOPN Inject 5 Units into the skin 3 times daily (before meals), Disp-1 pen, R-5Normal      bacitracin 500 UNIT/GM ointment Apply topically 2 times daily. , Disp-1 Tube, R-1, Print      etodolac (LODINE XL) 400 MG extended release tablet Take 1 tablet by mouth daily, Disp-30 tablet,R-3Print      bacitracin-polymyxin b (POLYSPORIN) 500-32523 UNIT/GM ointment Apply topically 2 times daily. , Disp-1 Tube,R-0, Print      gabapentin (NEURONTIN) 300 MG capsule Take 1 capsule by mouth 3 times daily for 7 days. Intended supply: 7 days. , Disp-21 capsule,R-0Normal      ipratropium-albuterol (DUONEB) 0.5-2.5 (3) MG/3ML SOLN nebulizer solution Inhale 3 mLs into the lungs every 6 hours as needed for Shortness of Breath, Disp-360 mL,R-0Normal      albuterol (PROVENTIL) (2.5 MG/3ML) 0.083% nebulizer solution Take 3 mLs by nebulization every 6 hours as needed for Wheezing, Disp-120 each,R-1Print      albuterol sulfate  (90 Base) MCG/ACT inhaler Inhale 2 puffs into the lungs every 4 hours as needed for Wheezing, Disp-18 g,R-1Print      clotrimazole (LOTRIMIN) 1 % cream Apply topically 2 times daily to lesions on skin for 3-4 weeks or for 1 week after lesion clears, Disp-1 Tube, R-0, Print      glucose monitoring kit (FREESTYLE) monitoring kit DAILY Starting Thu 7/2/2020, Disp-1 kit, R-0, Print      Zinc Gluconate 15 MG TABS Take 3 tabs daily, Disp-30 tablet, R-0Normal      famotidine (PEPCID) 20 MG tablet Take 1 tablet by mouth daily, Disp-30 tablet, R-0Print      methylPREDNISolone (MEDROL, MANNY,) 4 MG tablet Take by mouth., Disp-1 kit, R-0Normal      Lancets MISC Disp-1 each, R-5, NormalDX: diabetes mellitus.  Use 1-3 time(s) daily - Ok to substitute per insurance (1 each = 1 box)      buPROPion (WELLBUTRIN XL) 150 MG extended release tablet TAKE 1 TABLET BY MOUTH ONCE DAILY IN THE MORNINGHistorical Med      doxylamine-pyridoxine (DICLEGIS) 10-10 MG TBEC Start: 2 tabs p.o. q.h.s.; if symptoms persist after 2 days increase to 1 tab p.o. q.a.m. and 2 tabs p.o. q.h.s.; may increase to 1 tab p.o. q.a.m., 1 tab p.o. q. midafternoon and 2 tabs p.o. q.h.s.  4 tabs per day. If unable to afford, instruct the vipin ent about substituting the OTC components. , Disp-60 tablet, R-1Print       !! - Potential duplicate medications found. Please discuss with provider. ALLERGIES     Shellfish-derived products and Benzoyl peroxide    FAMILY HISTORY     No family history on file.        SOCIAL HISTORY       Social History     Socioeconomic History    Marital status: Single     Spouse name: Not on file    Number of children: Not on file    Years of education: Not on file    Highest education level: Not on file   Occupational History    Not on file   Social Needs    Financial resource strain: Not on file    Food insecurity     Worry: Not on file     Inability: Not on file    Transportation needs     Medical: Not on file     Non-medical: Not on file   Tobacco Use    Smoking status: Current Every Day Smoker     Packs/day: 0.50     Types: Cigarettes, Cigars    Smokeless tobacco: Never Used   Substance and Sexual Activity    Alcohol use: Yes     Comment: occa    Drug use: No    Sexual activity: Yes     Partners: Male   Lifestyle    Physical activity     Days per week: Not on file     Minutes per session: Not on file    Stress: Not on file   Relationships    Social connections     Talks on phone: Not on file     Gets together: Not on file     Attends Sabianist service: Not on file     Active member of club or organization: Not on file     Attends meetings of clubs or organizations: Not on file     Relationship status: Not on file    Intimate partner violence     Fear of current or ex partner: Not on file     Emotionally abused: Not on file     Physically abused: Not on file     Forced sexual activity: Not on file   Other Topics Concern    Not on file   Social History Narrative    Not on file Notable for the following components:       Result Value    Gram Stain Result   (*)     Value: Many WBC's  Many Gram positive cocci in clusters-resembling Staph      Organism Staph aureus MSSA (*)     All other components within normal limits    Narrative:     ORDER#: [id number]                          ORDERED BY: Dennise Hendricks  SOURCE: Abscess                            COLLECTED:  03/11/21 02:44  ANTIBIOTICS AT MIGUE.:                      RECEIVED :  03/11/21 02:44   COMPREHENSIVE METABOLIC PANEL - Abnormal; Notable for the following components:    Sodium 134 (*)     Glucose 403 (*)     All other components within normal limits    Narrative:     Dierdre Moh  [facility] tel. [phone],  GLU results called to and read back by DR Filomena Coleman, 03/11/2021 02:59, by [facility]   CBC WITH AUTO DIFFERENTIAL - Abnormal; Notable for the following components:    Bands Relative 1 (*)     All other components within normal limits   POCT GLUCOSE - Abnormal; Notable for the following components:    POC Glucose 434 (*)     All other components within normal limits       All other labs were within normal range or not returned as of this dictation. EMERGENCY DEPARTMENT COURSE and DIFFERENTIAL DIAGNOSIS/MDM:   Vitals:    Vitals:    03/11/21 0133 03/11/21 0145 03/11/21 0200 03/11/21 0230   BP: (!) 141/81   131/72   Pulse: 88   81   Resp: 18   18   Temp: 98.7 °F (37.1 °C)      SpO2: 99% 100% 100% 98%   Weight: 275 lb (124.7 kg)      Height: 5' 8\" (1.727 m)             MDM patient with abdominal wall cellulitis that now has formed a abscess-like head. I was able to I&D the abscess. Seems localized. No elevation in the patient's white blood count. No fever. Blood sugar is elevated so she was given additional insulin and some fluids.   She should continue the clindamycin and follow-up was given with general surgery      CONSULTS:  None    PROCEDURES:  Unless otherwise noted below, none     Incision/Drainage    Date/Time: 3/11/2021 2:15 AM  Performed by: Riley Atkinson MD  Authorized by: Riley Atkinson MD     Consent:     Consent obtained:  Verbal    Consent given by:  Patient    Risks discussed:  Bleeding and pain    Alternatives discussed:  No treatment  Location:     Type:  Abscess    Size:  2    Location:  Trunk    Trunk location:  Abdomen  Pre-procedure details:     Skin preparation:  Chloraprep  Anesthesia (see MAR for exact dosages): Anesthesia method:  None  Procedure type:     Complexity:  Simple  Procedure details:     Needle aspiration: no      Incision types:  Stab incision    Incision depth:  Dermal    Wound management:  Irrigated with saline    Drainage:  Purulent    Drainage amount: Moderate    Wound treatment:  Wound left open    Packing materials:  None  Post-procedure details:     Patient tolerance of procedure: Tolerated well, no immediate complications        FINAL IMPRESSION      1.  Abscess          DISPOSITION/PLAN   DISPOSITION        PATIENT REFERRED TO:  Ana María Ortiz MD  [address] [facility] [phone]    In 2 days        DISCHARGE MEDICATIONS:  Discharge Medication List as of 3/11/2021  3:32 AM             (Please note that portions of this note were completed with a voice recognition program.  Efforts were made to edit the dictations but occasionally words are mis-transcribed.)    Riley Atkinson MD (electronically signed)  Attending Emergency Physician          Riley Atkinson MD  03/12/21 2016

## 2021-03-12 LAB
BLOOD CULTURE, ROUTINE: NORMAL
CULTURE, BLOOD 2: NORMAL

## 2021-03-12 NOTE — ED NOTES
Pt states that she has had these two abscesses for about a week. She rates her pain at 10/10. Pt ambulates to and from restroom with no sign of distress. Pt is playing with son by bouncing with him on the bed. Pt is laughing with her sister. Pt is hitting the call light and asking for pain meds and asking for food and drink for herself and her sister. Pt is alert and oriented x4. Skin is warm dry and Color is WNL. Respirations are even and unlabored.       Dc Navarro RN  03/11/21 2050

## 2021-03-12 NOTE — ED TRIAGE NOTES
Patient arrived from home with c/o of abscesses to her right lower abd and right axillae. Patient states they started 6-7 days ago. States she was seen in this ER 4 days ago and was prescribed 300mg Clindamycin 3 times daily and Mupirocin cream and 800 mg Motrin. States medication has not been effective and pain is getting worse. Patient is afebrile. Vitals are stable.

## 2021-03-12 NOTE — ED PROVIDER NOTES
[address] [facility] ED  EMERGENCY DEPARTMENT ENCOUNTER      Pt Name: Lan Monteiro  MRN: [id number]  Debgfemma 2000  Date of evaluation: 3/11/2021  Provider: Hugh Packer DO    CHIEF COMPLAINT       Chief Complaint   Patient presents with    Abscess     Right lower abd and right axillae     Chief complaint: Abscess  History of chief complaint: This 20-year-old female presents the emergency department complaining of an ongoing abscess on the right abdominal wall over the past week. Patient states she has been seen in the ER 3 times previous. Patient states she does have a longstanding history of hidradenitis suppurative and gets recurrent abscesses in the armpit and groin not usually on the abdominal wall. Patient states she thought maybe it was bruise at first still little hard tender spot under the skin but then became red and came to ahead. Patient states she does have an abscess currently that has been draining in the right underarm as well the past week but not bad. Patient is a diabetic States her sugars have been running high in the 400s states she is taking her insulin. Patient states she has had blood work and has been on antibiotics, reports she was seen again this morning and had repeat blood work and dose of IV antibiotics and they open the small blister on the skin. Patient states it still hurts she has been taking the 800 ibuprofen without improvement. Patient would like something stronger for the pain. patient denies any fevers, states she has felt a little achy today no nausea or vomiting did have some diarrhea today. No other abdominal pain, no dysuria, no chest pain palpitation short of breath weak or dizzy. Patient denies the possibility of pregnancy. Patient is on clindamycin, patient states she just does not know what she should do she wants it cut out. Nursing Notes were reviewed.     REVIEW OF SYSTEMS    (2-9 systems for level 4, 10 or more for level 5)     Review of Systems   Constitutional: Negative for appetite change, fatigue and fever. HENT: Negative for congestion, sinus pain and sore throat. Eyes: Negative for pain and redness. Respiratory: Negative for cough and shortness of breath. Cardiovascular: Negative for chest pain, palpitations and leg swelling. Gastrointestinal: Positive for diarrhea. Negative for abdominal pain, nausea and vomiting. Genitourinary: Negative for difficulty urinating, dysuria, flank pain and frequency. Enuresis: . Musculoskeletal: Positive for myalgias. Negative for back pain. Skin: Positive for wound. Neurological: Negative for dizziness, weakness, numbness and headaches. Hematological: Negative for adenopathy. Except as noted above the remainder of the review of systems was reviewed and negative. PAST MEDICAL HISTORY     Past Medical History:   Diagnosis Date    Diabetes mellitus ([facility] [address]     Genital herpes     GERD (gastroesophageal reflux disease)     Hidradenitis suppurativa     Hypertension     Obesity affecting pregnancy, antepartum, third trimester          SURGICAL HISTORY       Past Surgical History:   Procedure Laterality Date    UPPER GASTROINTESTINAL ENDOSCOPY           CURRENT MEDICATIONS       Previous Medications    ALBUTEROL (PROVENTIL) (2.5 MG/3ML) 0.083% NEBULIZER SOLUTION    Take 3 mLs by nebulization every 6 hours as needed for Wheezing    ALBUTEROL SULFATE  (90 BASE) MCG/ACT INHALER    Inhale 2 puffs into the lungs every 4 hours as needed for Wheezing    BACITRACIN 500 UNIT/GM OINTMENT    Apply topically 2 times daily. BACITRACIN-POLYMYXIN B (POLYSPORIN) 500-50885 UNIT/GM OINTMENT    Apply topically 2 times daily. BLOOD GLUCOSE MONITOR STRIPS    Test 3 times a day & as needed for symptoms of irregular blood glucose.     BUPROPION (WELLBUTRIN XL) 150 MG EXTENDED RELEASE TABLET    TAKE 1 TABLET BY MOUTH ONCE DAILY IN THE MORNING    CHLORHEXIDINE (HIBICLENS) 4 % EXTERNAL LIQUID Apply topically daily as needed. By 50% before using    CLINDAMYCIN (CLEOCIN) 300 MG CAPSULE    Take 1 capsule by mouth 3 times daily for 7 days    CLINDAMYCIN (CLEOCIN) 300 MG CAPSULE    Patient to add to her current clindamycin prescription to maximize her dosing at 300 mg 4 times a day for 10 days. CLOTRIMAZOLE (LOTRIMIN) 1 % CREAM    Apply topically 2 times daily to lesions on skin for 3-4 weeks or for 1 week after lesion clears    DOXYLAMINE-PYRIDOXINE (DICLEGIS) 10-10 MG TBEC    Start: 2 tabs p.o. q.h.s.; if symptoms persist after 2 days increase to 1 tab p.o. q.a.m. and 2 tabs p.o. q.h.s.; may increase to 1 tab p.o. q.a.m., 1 tab p.o. q. midafternoon and 2 tabs p.o. q.h.s.  4 tabs per day. If unable to afford, instruct the patient about substituting the OTC components. ETODOLAC (LODINE XL) 400 MG EXTENDED RELEASE TABLET    Take 1 tablet by mouth daily    FAMOTIDINE (PEPCID) 20 MG TABLET    Take 1 tablet by mouth daily    FLUCONAZOLE (DIFLUCAN) 200 MG TABLET    Take 1 tablet by mouth daily May repeat in 3 days if symptoms persist    GABAPENTIN (NEURONTIN) 300 MG CAPSULE    Take 1 capsule by mouth 3 times daily for 7 days. Intended supply: 7 days. GLUCOSE MONITORING KIT (FREESTYLE) MONITORING KIT    1 kit by Does not apply route daily    IBUPROFEN (ADVIL;MOTRIN) 800 MG TABLET    Take 1 tablet by mouth every 8 hours as needed for Pain or Fever    INSULIN GLARGINE (LANTUS SOLOSTAR) 100 UNIT/ML INJECTION PEN    Inject 10 Units into the skin nightly    INSULIN LISPRO, 1 UNIT DIAL, (HUMALOG KWIKPEN) 100 UNIT/ML SOPN    Inject 5 Units into the skin 3 times daily (before meals)    INSULIN PEN NEEDLE 30G X 8 MM MISC    1 each by Does not apply route daily    IPRATROPIUM-ALBUTEROL (DUONEB) 0.5-2.5 (3) MG/3ML SOLN NEBULIZER SOLUTION    Inhale 3 mLs into the lungs every 6 hours as needed for Shortness of Breath    LANCETS MISC    DX: diabetes mellitus.  Use 1-3 time(s) daily - Ok to substitute per insurance (1 each = 1 box)    METHYLPREDNISOLONE (MEDROL, MANNY,) 4 MG TABLET    Take by mouth. MUPIROCIN (BACTROBAN) 2 % OINTMENT    Apply topically 2 times daily. 22 gram tube generic OK. NAPROXEN (NAPROSYN) 500 MG TABLET    Take 1 tablet by mouth 2 times daily for 20 doses    PROBIOTIC ACIDOPHILUS (FLORANEX) TABS    Take 1 tablet by mouth daily    ZINC GLUCONATE 15 MG TABS    Take 3 tabs daily       ALLERGIES     Shellfish-derived products and Benzoyl peroxide    FAMILY HISTORY     History reviewed. No pertinent family history.        SOCIAL HISTORY       Social History     Socioeconomic History    Marital status: Single     Spouse name: None    Number of children: None    Years of education: None    Highest education level: None   Occupational History    None   Social Needs    Financial resource strain: None    Food insecurity     Worry: None     Inability: None    Transportation needs     Medical: None     Non-medical: None   Tobacco Use    Smoking status: Current Every Day Smoker     Packs/day: 0.50     Types: Cigarettes, Cigars    Smokeless tobacco: Never Used   Substance and Sexual Activity    Alcohol use: Yes     Comment: occa    Drug use: No    Sexual activity: Yes     Partners: Male   Lifestyle    Physical activity     Days per week: None     Minutes per session: None    Stress: None   Relationships    Social connections     Talks on phone: None     Gets together: None     Attends Buddhism service: None     Active member of club or organization: None     Attends meetings of clubs or organizations: None     Relationship status: None    Intimate partner violence     Fear of current or ex partner: None     Emotionally abused: None     Physically abused: None     Forced sexual activity: None   Other Topics Concern    None   Social History Narrative    None         PHYSICAL EXAM    (up to 7 for level 4, 8 or more for level 5)     ED Triage Vitals [03/11/21 1951]   BP Temp Temp Source Pulse Resp SpO2 Height Weight   125/76 97.7 °F (36.5 °C) Temporal 80 18 100 % 5' 8\" (1.727 m) 275 lb (124.7 kg)       Physical Exam    DIAGNOSTIC RESULTS     Patient with 3 previous ED visits the earliest on 3/7/2021 4 days ago at which time patient had a CT scan of the abdomen and pelvis which did show some inflammatory stranding in the lower abdominal wall that was suspected to be related to some underlying cellulitis no definitive abscess was seen there is no gas. Note does indicate there was some erythema to the abdominal wall at that time diagnosed as an abdominal wall cellulitis  I did review blood work from this morning which included CBC within normal limits BMP with hyperglycemia with a glucose of 400 liver function tests were normal Gram staining of the wound was obtained which did show positive cocci in clusters resembling staph. EMERGENCY DEPARTMENT COURSE and DIFFERENTIAL DIAGNOSIS/MDM:   Vitals:    Vitals:    03/11/21 1951   BP: 125/76   Pulse: 80   Resp: 18   Temp: 97.7 °F (36.5 °C)   TempSrc: Temporal   SpO2: 100%   Weight: 275 lb (124.7 kg)   Height: 5' 8\" (1.727 m)     Physical examination:  General appearance: Patient is awake alert interactive appropriate nontoxic in no acute distress  Head is atraumatic normocephalic  Eyes pupils are equally reactive sclera white conjunctive are pink  Oral pharyngeal cavity is pink with good moisture, no exudates or ulcerations no asymmetry, the airway is widely patent  Neck: Supple no meningeal signs no adenopathy no JVD  Heart: Regular rate and rhythm no gross murmurs rubs or clicks  Lungs: Breath sounds are clear with good air movement throughout no active wheezes rales or rhonchi no respiratory distress  Abdomen:  Morbidly obese soft with focal tenderness over an area of the small superficial abscess on the right lower abdominal wall , there is a 1 cm round area of irritation with some purulence noted on the bandage when removed , there is underlying firm induration approximately 3.0 cm with tenderness to palpation there is no fluctuance there is no active drainage no associated warmth. No findings concerning for any diffuse panniculitis, the remainder of the abdomen is nontender to palpation there is no rebound rigidity or guarding there are good bowel sounds, no distention  back: Nontender to palpation no costovertebral angle tenderness  Skin: Warm and dry, with abdominal wall abscess described above, examination under the right underarm does reveal small focal area of tenderness and induration along with multiple other small areas of scar tissue suggesting a some early or drained abscess no erythema  Lower extremities: No edema or calf tenderness or asymmetry. Emergency department course:  Patient is well-appearing nontoxic afebrile vital signs are stable  I did discuss with patient and family at length, findings consistent with some focal infection there is no palpable discrete abscess more underlying induration nothing more to drain today, current treatment should be continued with the clindamycin, and ibuprofen. I will write a short course for stronger pain medication after assessing her OARRS. We discussed care with warm soaks. I advised patient the emergency department only performs I+D on abscesses, we do not excise the entire abscess \"core\", CT findings from 4 days ago suggest an underlying cellulitis with no discrete abscess likely some scarred tissue underlying. Advised patient to follow-up with surgery for further evaluation. OARRS reviewed on the patient did reveal a high overdose risk score at 620, patient has received a 27 controlled substance prescriptions this year from multiple different providers on review it does appear to be related to small volume from ER visits.   I did discuss with patient concern with multiple prescribers and the need to establish with a primary physician or a chronic pain doctor for regular narcotic prescriptions if needed as she will be cut off from receiving any opiates if a continued pattern of such frequent use continues. I will provide ultram #12 here today with significant focal tenderness and pathology, but stressed concerns for more consistent care      FINAL IMPRESSION      1. Abscess          DISPOSITION/PLAN   DISPOSITION Decision To Discharge 03/11/2021 08:44:36 PM  Patient discharged home given a home-going prescription for Vicodin No. 10 to continue clindamycin warm soaks to the area monitor closely return if any change or worsening any redness increased swelling warmth to the area fever vomiting weak or dizzy feeling. Patient advised to monitor her blood sugars follow-up with both primary physician as well as surgery is signed for recheck in the next 2 to 3 days    PATIENT REFERRED TO:  Judi Carlos MD  [address] [place] [address]  [phone]    In 2 days  call in AM for appointment    [facility]  [address] [id number] [phone]  In 2 days        DISCHARGE MEDICATIONS:  New Prescriptions    HYDROCODONE-ACETAMINOPHEN (NORCO) 5-325 MG PER TABLET    Take 1 tablet by mouth every 6 hours as needed for Pain for up to 3 days. Intended supply: 3 days. Take lowest dose possible to manage pain     Controlled Substances Monitoring:     No flowsheet data found.     (Please note that portions of this note were completed with a voice recognition program.  Efforts were made to edit the dictations but occasionally words are mis-transcribed.)    Reyes Evangelista DO (electronically signed)  Attending Emergency Physician            Reyes Evangelista DO  03/11/21 2101       Reyes Evangelista DO  03/11/21 2112

## 2021-03-13 LAB
ANAEROBIC CULTURE: ABNORMAL
GRAM STAIN RESULT: ABNORMAL
ORGANISM: ABNORMAL
WOUND/ABSCESS: ABNORMAL

## 2021-03-15 ENCOUNTER — APPOINTMENT (OUTPATIENT)
Dept: ULTRASOUND IMAGING | Age: 21
End: 2021-03-15
Payer: COMMERCIAL

## 2021-03-15 ENCOUNTER — HOSPITAL ENCOUNTER (EMERGENCY)
Age: 21
Discharge: HOME | End: 2021-03-15
Attending: STUDENT IN AN ORGANIZED HEALTH CARE EDUCATION/TRAINING PROGRAM
Payer: COMMERCIAL

## 2021-03-15 VITALS
DIASTOLIC BLOOD PRESSURE: 108 MMHG | HEIGHT: 68 IN | SYSTOLIC BLOOD PRESSURE: 195 MMHG | RESPIRATION RATE: 18 BRPM | BODY MASS INDEX: 41.68 KG/M2 | WEIGHT: 275 LBS | TEMPERATURE: 97.2 F | OXYGEN SATURATION: 99 % | HEART RATE: 79 BPM

## 2021-03-15 DIAGNOSIS — L02.91 ABSCESS: Primary | ICD-10-CM

## 2021-03-15 PROCEDURE — 87070 CULTURE OTHR SPECIMN AEROBIC: CPT

## 2021-03-15 PROCEDURE — 87205 SMEAR GRAM STAIN: CPT

## 2021-03-15 PROCEDURE — 76999 ECHO EXAMINATION PROCEDURE: CPT

## 2021-03-15 PROCEDURE — 99283 EMERGENCY DEPT VISIT LOW MDM: CPT

## 2021-03-15 PROCEDURE — 2500000003 HC RX 250 WO HCPCS: Performed by: PHYSICIAN ASSISTANT

## 2021-03-15 PROCEDURE — 6370000000 HC RX 637 (ALT 250 FOR IP): Performed by: PHYSICIAN ASSISTANT

## 2021-03-15 PROCEDURE — 87075 CULTR BACTERIA EXCEPT BLOOD: CPT

## 2021-03-15 RX ORDER — LIDOCAINE HYDROCHLORIDE 10 MG/ML
5 INJECTION, SOLUTION EPIDURAL; INFILTRATION; INTRACAUDAL; PERINEURAL ONCE
Status: COMPLETED | OUTPATIENT
Start: 2021-03-15 | End: 2021-03-15

## 2021-03-15 RX ORDER — OXYCODONE HYDROCHLORIDE AND ACETAMINOPHEN 5; 325 MG/1; MG/1
1 TABLET ORAL EVERY 8 HOURS PRN
Qty: 6 TABLET | Refills: 0 | Status: SHIPPED | OUTPATIENT
Start: 2021-03-15 | End: 2021-03-17

## 2021-03-15 RX ORDER — AMOXICILLIN AND CLAVULANATE POTASSIUM 875; 125 MG/1; MG/1
1 TABLET, FILM COATED ORAL 2 TIMES DAILY
Qty: 20 TABLET | Refills: 0 | Status: SHIPPED | OUTPATIENT
Start: 2021-03-15 | End: 2021-03-25

## 2021-03-15 RX ORDER — SULFAMETHOXAZOLE AND TRIMETHOPRIM 800; 160 MG/1; MG/1
1 TABLET ORAL 2 TIMES DAILY
Qty: 20 TABLET | Refills: 0 | Status: SHIPPED | OUTPATIENT
Start: 2021-03-15 | End: 2021-03-25

## 2021-03-15 RX ORDER — NYSTATIN 100000 [USP'U]/G
POWDER TOPICAL
Qty: 1 BOTTLE | Refills: 0 | Status: SHIPPED | OUTPATIENT
Start: 2021-03-15 | End: 2022-01-16

## 2021-03-15 RX ORDER — FLUCONAZOLE 150 MG/1
150 TABLET ORAL ONCE
Qty: 1 TABLET | Refills: 1 | Status: SHIPPED | OUTPATIENT
Start: 2021-03-15 | End: 2021-03-15

## 2021-03-15 RX ORDER — HYDROCODONE BITARTRATE AND ACETAMINOPHEN 5; 325 MG/1; MG/1
1 TABLET ORAL ONCE
Status: COMPLETED | OUTPATIENT
Start: 2021-03-15 | End: 2021-03-15

## 2021-03-15 RX ADMIN — LIDOCAINE HYDROCHLORIDE 5 ML: 10 INJECTION, SOLUTION EPIDURAL; INFILTRATION; INTRACAUDAL; PERINEURAL at 14:57

## 2021-03-15 RX ADMIN — HYDROCODONE BITARTRATE AND ACETAMINOPHEN 1 TABLET: 5; 325 TABLET ORAL at 14:52

## 2021-03-15 ASSESSMENT — ENCOUNTER SYMPTOMS
VOICE CHANGE: 0
BACK PAIN: 0
ABDOMINAL DISTENTION: 0
APNEA: 0
PHOTOPHOBIA: 0
EYE DISCHARGE: 0
COUGH: 0
NAUSEA: 0
VOMITING: 0
ANAL BLEEDING: 0
SHORTNESS OF BREATH: 0
COLOR CHANGE: 1

## 2021-03-15 ASSESSMENT — PAIN SCALES - GENERAL
PAINLEVEL_OUTOF10: 9
PAINLEVEL_OUTOF10: 10

## 2021-03-15 ASSESSMENT — PAIN DESCRIPTION - ORIENTATION: ORIENTATION: RIGHT

## 2021-03-15 NOTE — ED NOTES
Pt c/o swelling, painful abcess present on the right armpit. Area non draining at this time. Pt is requesting this to be drained.       Jose Castillo RN  03/15/21 3114

## 2021-03-15 NOTE — ED TRIAGE NOTES
To ED with c/o abscess on low right abd that was drained here at few days ago. Pt feels that it is improving slowly. Also c/o new abscess in right axillae.  Denies fever, chills, n/v.

## 2021-03-15 NOTE — ED PROVIDER NOTES
[address] [facility] ED  eMERGENCY dEPARTMENT eNCOUnter      Pt Name: Keely Moreau  MRN: [id number]  Armsadamgfurt 2000  Date of evaluation: 3/15/2021  Provider: Patsy Tee PA-C    [address] [place] [address]       Chief Complaint   Patient presents with    Abscess     R axillae, R abd         HISTORY OF PRESENT ILLNESS   (Location/Symptom, Timing/Onset,Context/Setting, Quality, Duration, Modifying Factors, Severity)  Note limiting factors. Keely Moreau is a 24 y.o. female who presents to the emergency department patient presents with right axillary abscess she has history of abscesses hidradenitis. She is an uncontrolled diabetic. She notes that her glucose is elevated she has not followed up with a primary care physician we will refer her to endocrinology for diabetes. She also notes that she does have some slowly healing wounds on her right abdomen and left groin that have been open previously we discussed that her diabetes needs to be controlled. Patient denies fever chills nausea vomiting. Patient active emergency room no acute process. Symptoms mild to moderate severity worse touch motion. HPI    NursingNotes were reviewed. REVIEW OF SYSTEMS    (2-9 systems for level 4, 10 or more for level 5)     Review of Systems   Constitutional: Negative for activity change, appetite change, chills, fever and unexpected weight change. HENT: Negative for ear discharge, nosebleeds and voice change. Eyes: Negative for photophobia and discharge. Respiratory: Negative for apnea, cough and shortness of breath. Cardiovascular: Negative for chest pain. Gastrointestinal: Negative for abdominal distention, anal bleeding, nausea and vomiting. Endocrine: Negative for cold intolerance, heat intolerance and polyphagia. Genitourinary: Negative for genital sores. Musculoskeletal: Negative for back pain, joint swelling, neck pain and neck stiffness. Skin: Positive for color change and wound.  Negative for pallor. Allergic/Immunologic: Negative for immunocompromised state. Neurological: Negative for seizures and facial asymmetry. Hematological: Does not bruise/bleed easily. Psychiatric/Behavioral: Negative for behavioral problems, self-injury and sleep disturbance. All other systems reviewed and are negative. Except as noted above the remainder of the review of systems was reviewed and negative. PAST MEDICAL HISTORY     Past Medical History:   Diagnosis Date    Diabetes mellitus ([address]     Genital herpes     GERD (gastroesophageal reflux disease)     Hidradenitis suppurativa     Hypertension     Obesity affecting pregnancy, antepartum, third trimester          SURGICALHISTORY       Past Surgical History:   Procedure Laterality Date    UPPER GASTROINTESTINAL ENDOSCOPY           CURRENT MEDICATIONS       Discharge Medication List as of 3/15/2021  4:26 PM      CONTINUE these medications which have NOT CHANGED    Details   Probiotic Acidophilus (FLORANEX) TABS Take 1 tablet by mouth daily, Disp-30 tablet, R-1Normal      mupirocin (BACTROBAN) 2 % ointment Apply topically 2 times daily. 22 gram tube generic OK. , Disp-1 Tube, R-0, Normal      ibuprofen (ADVIL;MOTRIN) 800 MG tablet Take 1 tablet by mouth every 8 hours as needed for Pain or Fever, Disp-15 tablet, R-0Normal      chlorhexidine (HIBICLENS) 4 % external liquid Apply topically daily as needed.   By 50% before using, Disp-237 mL, R-0, Normal      Insulin Pen Needle 30G X 8 MM MISC DAILY Starting Sun 3/7/2021, Disp-100 each, R-0, Print      naproxen (NAPROSYN) 500 MG tablet Take 1 tablet by mouth 2 times daily for 20 doses, Disp-20 tablet, R-0Print      blood glucose monitor strips Test 3 times a day & as needed for symptoms of irregular blood glucose., Disp-100 strip, R-0, Normal      insulin glargine (LANTUS SOLOSTAR) 100 UNIT/ML injection pen Inject 10 Units into the skin nightly, Disp-5 pen, R-0Normal      insulin lispro, 1 Unit Dial, (HUMALOG KWIKPEN) 100 UNIT/ML SOPN Inject 5 Units into the skin 3 times daily (before meals), Disp-1 pen, R-5Normal      bacitracin 500 UNIT/GM ointment Apply topically 2 times daily. , Disp-1 Tube, R-1, Print      etodolac (LODINE XL) 400 MG extended release tablet Take 1 tablet by mouth daily, Disp-30 tablet,R-3Print      bacitracin-polymyxin b (POLYSPORIN) 500-22718 UNIT/GM ointment Apply topically 2 times daily. , Disp-1 Tube,R-0, Print      gabapentin (NEURONTIN) 300 MG capsule Take 1 capsule by mouth 3 times daily for 7 days. Intended supply: 7 days. , Disp-21 capsule,R-0Normal      ipratropium-albuterol (DUONEB) 0.5-2.5 (3) MG/3ML SOLN nebulizer solution Inhale 3 mLs into the lungs every 6 hours as needed for Shortness of Breath, Disp-360 mL,R-0Normal      albuterol (PROVENTIL) (2.5 MG/3ML) 0.083% nebulizer solution Take 3 mLs by nebulization every 6 hours as needed for Wheezing, Disp-120 each,R-1Print      albuterol sulfate  (90 Base) MCG/ACT inhaler Inhale 2 puffs into the lungs every 4 hours as needed for Wheezing, Disp-18 g,R-1Print      clotrimazole (LOTRIMIN) 1 % cream Apply topically 2 times daily to lesions on skin for 3-4 weeks or for 1 week after lesion clears, Disp-1 Tube, R-0, Print      glucose monitoring kit (FREESTYLE) monitoring kit DAILY Starting u 7/2/2020, Disp-1 kit, R-0, Print      Zinc Gluconate 15 MG TABS Take 3 tabs daily, Disp-30 tablet, R-0Normal      famotidine (PEPCID) 20 MG tablet Take 1 tablet by mouth daily, Disp-30 tablet, R-0Print      methylPREDNISolone (MEDROL, MANNY,) 4 MG tablet Take by mouth., Disp-1 kit, R-0Normal      Lancets MISC Disp-1 each, R-5, NormalDX: diabetes mellitus.  Use 1-3 time(s) daily - Ok to substitute per insurance (1 each = 1 box)      buPROPion (WELLBUTRIN XL) 150 MG extended release tablet TAKE 1 TABLET BY MOUTH ONCE DAILY IN THE MORNINGHistorical Med      doxylamine-pyridoxine (DICLEGIS) 10-10 MG TBEC Start: 2 tabs p.o. q.h.s.; if symptoms note:    US SOFT TISSUE LIMITED AREA   Final Result   FINDINGS MOST CONSISTENT WITH RESOLVING ABDOMINAL WALL CELLULITIS. ED BEDSIDE ULTRASOUND:   Performed by ED Physician - none    LABS:  Labs Reviewed   CULTURE, ANAEROBIC AND AEROBIC    Narrative:     ORDER#: [id number]                          ORDERED BY: SHANITA COLMENARES  SOURCE: Skin                               COLLECTED:  03/15/21 16:38  ANTIBIOTICS AT MIGUE.:                      RECEIVED :  03/15/21 16:38       All other labs were within normal range or not returned as of this dictation. EMERGENCY DEPARTMENT COURSE and DIFFERENTIAL DIAGNOSIS/MDM:   Vitals:    Vitals:    03/15/21 1354 03/15/21 1630   BP: 130/74 (!) 195/108   Pulse: 68 79   Resp: 18 18   Temp: 97.2 °F (36.2 °C)    TempSrc: Temporal    SpO2: 96% 99%   Weight: 275 lb (124.7 kg)    Height: 5' 8\" (1.727 m)             MDM  Number of Diagnoses or Management Options     Amount and/or Complexity of Data Reviewed  Clinical lab tests: ordered        CONSULTS:  None    PROCEDURES:  Unless otherwise noted below, none     Procedures    FINAL IMPRESSION      1. Abscess          DISPOSITION/PLAN   DISPOSITION        PATIENT REFERRED TO:  Gil Mercado, [facility] [address]  [phone]    Call in 1 day      [address] [place] [address] in [place]    Call in 1 day      [facility]  [phone]  Call       Primary Care physician    Call in 1 day        DISCHARGE MEDICATIONS:  Discharge Medication List as of 3/15/2021  4:26 PM      START taking these medications    Details   oxyCODONE-acetaminophen (PERCOCET) 5-325 MG per tablet Take 1 tablet by mouth every 8 hours as needed for Pain for up to 2 days. WARNING:  May cause drowsiness. May impair ability to operate vehicles or machinery. Do not use in combination with alcohol., Disp-6 tablet, R-0Print      nystatin (MYCOSTATIN) 816105 UNIT/GM powder Apply topically 4 times daily. , Disp-1 Bottle, R-0, Print amoxicillin-clavulanate (AUGMENTIN) 875-125 MG per tablet Take 1 tablet by mouth 2 times daily for 10 days, Disp-20 tablet, R-0Print      sulfamethoxazole-trimethoprim (BACTRIM DS) 800-160 MG per tablet Take 1 tablet by mouth 2 times daily for 10 days, Disp-20 tablet, R-0Print                (Please note that portions of this note were completed with a voice recognition program.  Efforts were made to edit the dictations but occasionally words are mis-transcribed.)    Paty Dacosta PA-C (electronically signed)  Attending Emergency Physician       Paty Dacotsa PA-C  03/24/21 6283

## 2021-03-18 LAB
ANAEROBIC CULTURE: NORMAL
GRAM STAIN RESULT: NORMAL
WOUND/ABSCESS: NORMAL

## 2021-05-02 ENCOUNTER — HOSPITAL ENCOUNTER (EMERGENCY)
Age: 21
Discharge: HOME | End: 2021-05-02
Attending: FAMILY MEDICINE
Payer: COMMERCIAL

## 2021-05-02 ENCOUNTER — APPOINTMENT (OUTPATIENT)
Dept: GENERAL RADIOLOGY | Age: 21
End: 2021-05-02
Payer: COMMERCIAL

## 2021-05-02 VITALS
BODY MASS INDEX: 43.4 KG/M2 | TEMPERATURE: 98.3 F | DIASTOLIC BLOOD PRESSURE: 59 MMHG | HEART RATE: 74 BPM | RESPIRATION RATE: 16 BRPM | HEIGHT: 69 IN | SYSTOLIC BLOOD PRESSURE: 136 MMHG | WEIGHT: 293 LBS | OXYGEN SATURATION: 97 %

## 2021-05-02 DIAGNOSIS — U07.1 COVID-19 VIRUS INFECTION: Primary | ICD-10-CM

## 2021-05-02 LAB
HCG, URINE, POC: NEGATIVE
Lab: NORMAL
NEGATIVE QC PASS/FAIL: NORMAL
POSITIVE QC PASS/FAIL: NORMAL
SARS-COV-2, NAAT: DETECTED

## 2021-05-02 PROCEDURE — 87635 SARS-COV-2 COVID-19 AMP PRB: CPT

## 2021-05-02 PROCEDURE — 6370000000 HC RX 637 (ALT 250 FOR IP): Performed by: FAMILY MEDICINE

## 2021-05-02 PROCEDURE — 99284 EMERGENCY DEPT VISIT MOD MDM: CPT

## 2021-05-02 PROCEDURE — 71045 X-RAY EXAM CHEST 1 VIEW: CPT

## 2021-05-02 RX ORDER — BENZONATATE 100 MG/1
100 CAPSULE ORAL 3 TIMES DAILY PRN
Qty: 30 CAPSULE | Refills: 0 | Status: SHIPPED | OUTPATIENT
Start: 2021-05-02 | End: 2021-05-09

## 2021-05-02 RX ORDER — ALBUTEROL SULFATE 90 UG/1
2 AEROSOL, METERED RESPIRATORY (INHALATION) EVERY 6 HOURS PRN
Status: DISCONTINUED | OUTPATIENT
Start: 2021-05-02 | End: 2021-05-02 | Stop reason: HOSPADM

## 2021-05-02 RX ORDER — BENZONATATE 100 MG/1
100 CAPSULE ORAL ONCE
Status: DISCONTINUED | OUTPATIENT
Start: 2021-05-02 | End: 2021-05-02

## 2021-05-02 RX ADMIN — ALBUTEROL SULFATE 2 PUFF: 90 AEROSOL, METERED RESPIRATORY (INHALATION) at 12:18

## 2021-05-02 ASSESSMENT — ENCOUNTER SYMPTOMS
COUGH: 1
SHORTNESS OF BREATH: 1

## 2021-05-02 ASSESSMENT — PAIN SCALES - GENERAL: PAINLEVEL_OUTOF10: 7

## 2021-05-02 NOTE — ED PROVIDER NOTES
[address] [facility] ED  eMERGENCY dEPARTMENT eNCOUnter      Pt Name: Leonard Raphael  MRN: [id number]  [place] 2000  Date of evaluation: 5/2/2021  Provider: Karyle Maine, MD    [address] [place] [address]       Chief Complaint   Patient presents with    Cough      [facility]         HISTORY OF PRESENT ILLNESS   (Location/Symptom, Timing/Onset,Context/Setting, Quality, Duration, Modifying Factors, Severity)  Note limiting factors. Leonard Raphael is a 24 y.o. female who presents to the emergency department   32years old with known diabetes elevated BMI presented to the ER with flulike symptoms fever chills body ache cough congestion for the last 8 days worsening cough today and mild SOB today. State had a [facility] antigen test yesterday and was positive for Covid. The history is provided by the patient. NursingNotes were reviewed. REVIEW OF SYSTEMS    (2-9 systems for level 4, 10 or more for level 5)     Review of Systems   Constitutional: Positive for chills, fatigue and fever. HENT: Negative. Respiratory: Positive for cough and shortness of breath. Cardiovascular: Negative. Skin: Negative. Psychiatric/Behavioral: Negative. Except as noted above the remainder of the review of systems was reviewed and negative.        PAST MEDICAL HISTORY     Past Medical History:   Diagnosis Date    Diabetes mellitus ([address]     Genital herpes     GERD (gastroesophageal reflux disease)     Hidradenitis suppurativa     Hypertension     Obesity affecting pregnancy, antepartum, third trimester          SURGICALHISTORY       Past Surgical History:   Procedure Laterality Date    UPPER GASTROINTESTINAL ENDOSCOPY           CURRENT MEDICATIONS       Previous Medications    ALBUTEROL (PROVENTIL) (2.5 MG/3ML) 0.083% NEBULIZER SOLUTION    Take 3 mLs by nebulization every 6 hours as needed for Wheezing    ALBUTEROL SULFATE  (90 BASE) MCG/ACT INHALER    Inhale 2 puffs into the lungs every 4 hours as needed for Wheezing    BACITRACIN 500 UNIT/GM OINTMENT    Apply topically 2 times daily. BACITRACIN-POLYMYXIN B (POLYSPORIN) [id number] UNIT/GM OINTMENT    Apply topically 2 times daily. BLOOD GLUCOSE MONITOR STRIPS    Test 3 times a day & as needed for symptoms of irregular blood glucose. BUPROPION (WELLBUTRIN XL) 150 MG EXTENDED RELEASE TABLET    TAKE 1 TABLET BY MOUTH ONCE DAILY IN THE MORNING    CLOTRIMAZOLE (LOTRIMIN) 1 % CREAM    Apply topically 2 times daily to lesions on skin for 3-4 weeks or for 1 week after lesion clears    DOXYLAMINE-PYRIDOXINE (DICLEGIS) 10-10 MG TBEC    Start: 2 tabs p.o. q.h.s.; if symptoms persist after 2 days increase to 1 tab p.o. q.a.m. and 2 tabs p.o. q.h.s.; may increase to 1 tab p.o. q.a.m., 1 tab p.o. q. midafternoon and 2 tabs p.o. q.h.s.  4 tabs per day. If unable to afford, instruct the patient about substituting the OTC components. ETODOLAC (LODINE XL) 400 MG EXTENDED RELEASE TABLET    Take 1 tablet by mouth daily    FAMOTIDINE (PEPCID) 20 MG TABLET    Take 1 tablet by mouth daily    GABAPENTIN (NEURONTIN) 300 MG CAPSULE    Take 1 capsule by mouth 3 times daily for 7 days. Intended supply: 7 days. GLUCOSE MONITORING KIT (FREESTYLE) MONITORING KIT    1 kit by Does not apply route daily    IBUPROFEN (ADVIL;MOTRIN) 800 MG TABLET    Take 1 tablet by mouth every 8 hours as needed for Pain or Fever    INSULIN GLARGINE (LANTUS SOLOSTAR) 100 UNIT/ML INJECTION PEN    Inject 10 Units into the skin nightly    INSULIN LISPRO, 1 UNIT DIAL, (HUMALOG KWIKPEN) 100 UNIT/ML SOPN    Inject 5 Units into the skin 3 times daily (before meals)    INSULIN PEN NEEDLE 30G X 8 MM MISC    1 each by Does not apply route daily    IPRATROPIUM-ALBUTEROL (DUONEB) 0.5-2.5 (3) MG/3ML SOLN NEBULIZER SOLUTION    Inhale 3 mLs into the lungs every 6 hours as needed for Shortness of Breath    LANCETS MISC    DX: diabetes mellitus.  Use 1-3 time(s) daily - Ok to substitute per insurance (1 each = 1 box)    METHYLPREDNISOLONE (MEDROL, MANNY,) 4 MG TABLET    Take by mouth. NAPROXEN (NAPROSYN) 500 MG TABLET    Take 1 tablet by mouth 2 times daily for 20 doses    NYSTATIN (MYCOSTATIN) [id number] UNIT/GM POWDER    Apply topically 4 times daily. PROBIOTIC ACIDOPHILUS (FLORANEX) TABS    Take 1 tablet by mouth daily    ZINC GLUCONATE 15 MG TABS    Take 3 tabs daily       ALLERGIES     Shellfish-derived products and Benzoyl peroxide    FAMILY HISTORY     History reviewed. No pertinent family history.        SOCIAL HISTORY       Social History     Socioeconomic History    Marital status: Single     Spouse name: None    Number of children: None    Years of education: None    Highest education level: None   Occupational History    None   Social Needs    Financial resource strain: None    Food insecurity     Worry: None     Inability: None    Transportation needs     Medical: None     Non-medical: None   Tobacco Use    Smoking status: Current Every Day Smoker     Packs/day: 0.50     Types: Cigarettes, Cigars    Smokeless tobacco: Never Used   Substance and Sexual Activity    Alcohol use: Yes     Comment: occa    Drug use: No    Sexual activity: Yes     Partners: Male   Lifestyle    Physical activity     Days per week: None     Minutes per session: None    Stress: None   Relationships    Social connections     Talks on phone: None     Gets together: None     Attends Sabianism service: None     Active member of club or organization: None     Attends meetings of clubs or organizations: None     Relationship status: None    Intimate partner violence     Fear of current or ex partner: None     Emotionally abused: None     Physically abused: None     Forced sexual activity: None   Other Topics Concern    None   Social History Narrative    None       SCREENINGS      @FLOW([id number])@      PHYSICAL EXAM    (up to 7 for level 4, 8 or more for level 5)     ED Triage Vitals   BP Temp Temp src Pulse Resp SpO2 Height Weight   05/02/21 1026 05/02/21 1023 -- 05/02/21 1023 05/02/21 1023 05/02/21 1023 05/02/21 1023 05/02/21 1023   128/81 98.3 °F (36.8 °C)  97 19 98 % 5' 9\" (1.753 m) 299 lb (135.6 kg)       Physical Exam  Vitals signs and nursing note reviewed. Constitutional:       Appearance: She is well-developed. HENT:      Head: Normocephalic and atraumatic. Right Ear: External ear normal.      Left Ear: External ear normal.      Nose: Nose normal.   Eyes:      Pupils: Pupils are equal, round, and reactive to light. Neck:      Musculoskeletal: Normal range of motion and neck supple. Cardiovascular:      Rate and Rhythm: Normal rate and regular rhythm. Heart sounds: Normal heart sounds. Pulmonary:      Effort: Pulmonary effort is normal. No respiratory distress. Breath sounds: Normal breath sounds. No wheezing or rales. Chest:      Chest wall: No tenderness. Abdominal:      General: Bowel sounds are normal.      Palpations: Abdomen is soft. Musculoskeletal: Normal range of motion. Skin:     General: Skin is warm and dry. Neurological:      Mental Status: She is alert and oriented to person, place, and time. Cranial Nerves: No cranial nerve deficit. Sensory: No sensory deficit. Motor: No abnormal muscle tone. Coordination: Coordination normal.      Deep Tendon Reflexes: Reflexes normal.   Psychiatric:         Behavior: Behavior normal.         Thought Content:  Thought content normal.         Judgment: Judgment normal.         DIAGNOSTIC RESULTS     EKG: All EKG's are interpreted by the Emergency Department Physician who either signs or Co-signsthis chart in the absence of a cardiologist.       RADIOLOGY:   Non-plain filmimages such as CT, Ultrasound and MRI are read by the radiologist. Plain radiographic images are visualized and preliminarily interpreted by the emergency physician with the below findings:         Interpretation per the Radiologist below, if available at the time ofthis note:    XR CHEST PORTABLE   Final Result   NO ACUTE CARDIOPULMONARY DISEASE. ED BEDSIDE ULTRASOUND:   Performed by ED Physician - none    LABS:  Labs Reviewed   COVID-19, RAPID - Abnormal; Notable for the following components:       Result Value    SARS-CoV-2, NAAT DETECTED (*)     All other components within normal limits    Narrative:     Yosef Stone tel. [phone],  COVID  results called to and read back by Savannah Perez, 05/02/2021 11:08, by  [address] [place] [address]       All other labs were within normal range or not returned as of this dictation. EMERGENCY DEPARTMENT COURSE and DIFFERENTIAL DIAGNOSIS/MDM:   Vitals:    Vitals:    05/02/21 1023 05/02/21 1026 05/02/21 1130   BP:  128/81 (!) 136/59   Pulse: 97  74   Resp: 19  16   Temp: 98.3 °F (36.8 °C)     SpO2: 98%  97%   Weight: 299 lb (135.6 kg)     Height: 5' 9\" (1.753 m)                   MDM  Number of Diagnoses or Management Options  Diagnosis management comments: Patient educated about their test results of positive  COVID19 infection. Patient informed that the current CDC recommendations are that if you symptoms are mild to go home and self quarantine for 10 from the start of symptoms. Patinet was advised and educated about the importance of watching closely for new symptoms of shortness of breath severe weakness severe diarrhea or any signs of dehydration or respiratory distress. Advised to return to the ER immediately or call 911 if worsening or new symptoms. Patient was advised per current CDC recommendation; He can get out of home isolation when it have been 10 days from the start of symptoms, most of the symptoms have improved and there was no fever for 24 hours.  Patient verbalized understanding and agreed with the plan         CONSULTS:  None    PROCEDURES:  Unless otherwise noted below, none     Procedures    FINAL IMPRESSION      1. COVID-19 virus infection DISPOSITION/PLAN   DISPOSITION Decision To Discharge 05/02/2021 12:13:10 PM      PATIENT REFERRED TO:  No follow-up provider specified.     DISCHARGE MEDICATIONS:  New Prescriptions    No medications on file          (Please note thatportions of this note were completed with a voice recognition program.  Efforts were made to edit the dictations but occasionally words are mis-transcribed.)    Claribel Howell MD (electronically signed)  Attending Emergency Physician          Jarred De La Torre MD  05/02/21 402 [facility] 133 MD Rohit  05/02/21 1227

## 2021-05-02 NOTE — ED NOTES
D/C instructions given. Aware a prescription for cough medicine was sent to [facility] electronically. States she feels ok right now, just tired of coughing. Ambulated out with steady gait.      Kinjal Damico RN  05/02/21 8293

## 2021-05-02 NOTE — ED NOTES
No wheezing heard posteriorly. Faint wheezing heard to upper airway anteriorly. Dr Ladi Newman made aware.      Chris Womack RN  05/02/21 1141

## 2021-05-02 NOTE — ED TRIAGE NOTES
PT ALERT IN TRIAGE. PT RESP EVEN AND UNLABORED. PT C/O CHEST NECK BACK PAIN X 3DAYS. PT SHOWING NO S/S OF DISTRESS. PT ABLE TO FOLLOW ALL COMMANDS.

## 2021-05-03 ENCOUNTER — CARE COORDINATION (OUTPATIENT)
Dept: CARE COORDINATION | Age: 21
End: 2021-05-03

## 2021-05-03 NOTE — CARE COORDINATION
Patient contacted regarding GCLOU-18 diagnosis\". Discussed COVID-19 related testing which was available at this time. Test results were positive. Patient informed of results, if available? Yes    Ambulatory Care Manager contacted the patient by telephone to perform post discharge assessment. Call within 2 business days of discharge: Yes. Verified name and  with patient as identifiers. Provided introduction to self, and explanation of the CTN/ACM role, and reason for call due to risk factors for infection and/or exposure to COVID-19. Symptoms reviewed with patient who verbalized the following symptoms: fatigue, pain or aching joints, cough, shortness of breath, loss of taste or smell, chills or shaking, sweating, nausea, vomiting, diarrhea and dizziness/lightheadedness. Due to no new or worsening symptoms encounter was not routed to provider for escalation. Discussed follow-up appointments. If no appointment was previously scheduled, appointment scheduling offered: Yes  [facility] follow up appointment(s): No future appointments. Non-[facility] follow up appointment(s): Patient instructed to contact PCP office to schedule Er follow-up. Non-face-to-face services provided:  Education of patient/family/caregiver/guardian to support self-management-Covid-19     Advance Care Planning:   Does patient have an Advance Directive:  not reviewed. Patient has following risk factors of: diabetes and chronic kidney disease. ACM reviewed discharge instructions, medical action plan and red flags such as increased shortness of breath, increasing fever and signs of decompensation with patient who verbalized understanding. Discussed exposure protocols and quarantine with CDC Guidelines What to do if you are sick with coronavirus disease .  Patient was given an opportunity for questions and concerns.  The patient agrees to contact the [facility] exposure line [phone], [facility]: ([phone]) and PCP office for questions related to their healthcare. ACM provided contact information for future needs. Reviewed and educated patient on any new and changed medications related to discharge diagnosis     Was patient discharged with a pulse oximeter? No Discussed and confirmed pulse oximeter discharge instructions and when to notify provider or seek emergency care. Patient/family/caregiver given information for [employer] and agrees to enroll no  Patient's preferred e-mail:    Patient's preferred phone number:   Based on Loop alert triggers, patient will be contacted by nurse care manager for worsening symptoms. Plan for follow-up call in 5-7 days based on severity of symptoms and risk factors. Patient reports that following [facility] ER visit she was admitted to [facility] overnight.

## 2021-05-11 ENCOUNTER — CARE COORDINATION (OUTPATIENT)
Dept: CARE COORDINATION | Age: 21
End: 2021-05-11

## 2021-05-11 NOTE — CARE COORDINATION
You Patient resolved from the Care Transitions episode on 5/11/2021  Discussed COVID-19 related testing which was available at this time. Test results were positive. Patient informed of results, if available? Yes    Patient/family has been provided the following resources and education related to COVID-19:                         Signs, symptoms and red flags related to COVID-19            CDC exposure and quarantine guidelines            [facility] exposure contact - [phone]            Contact for their local Department of Health                 Patient currently reports that the following symptoms have improved:  cough, cold, clammy, and pale skin, low body temperature and no new/worsening symptoms     No further outreach scheduled with this CTN/ACM. Episode of Care resolved. Patient has this CTN/ACM contact information if future needs arise. Patient reports symptoms resolved. She states only has a \"slight\" cough. Patient instructed to contact Adina Smith DO office to schedule follow-up appointment. Patient verbalizes understanding.

## 2021-06-07 ENCOUNTER — HOSPITAL ENCOUNTER (EMERGENCY)
Age: 21
Discharge: HOME | End: 2021-06-07
Attending: EMERGENCY MEDICINE
Payer: COMMERCIAL

## 2021-06-07 VITALS
WEIGHT: 283 LBS | HEART RATE: 98 BPM | DIASTOLIC BLOOD PRESSURE: 74 MMHG | HEIGHT: 69 IN | BODY MASS INDEX: 41.92 KG/M2 | SYSTOLIC BLOOD PRESSURE: 119 MMHG | TEMPERATURE: 97.7 F | RESPIRATION RATE: 26 BRPM | OXYGEN SATURATION: 100 %

## 2021-06-07 DIAGNOSIS — Z20.822 SUSPECTED COVID-19 VIRUS INFECTION: ICD-10-CM

## 2021-06-07 DIAGNOSIS — J06.9 ACUTE UPPER RESPIRATORY INFECTION: Primary | ICD-10-CM

## 2021-06-07 DIAGNOSIS — J40 BRONCHITIS: ICD-10-CM

## 2021-06-07 PROCEDURE — 6370000000 HC RX 637 (ALT 250 FOR IP): Performed by: EMERGENCY MEDICINE

## 2021-06-07 PROCEDURE — 94761 N-INVAS EAR/PLS OXIMETRY MLT: CPT

## 2021-06-07 PROCEDURE — 94640 AIRWAY INHALATION TREATMENT: CPT

## 2021-06-07 PROCEDURE — 93005 ELECTROCARDIOGRAM TRACING: CPT | Performed by: EMERGENCY MEDICINE

## 2021-06-07 PROCEDURE — 99284 EMERGENCY DEPT VISIT MOD MDM: CPT

## 2021-06-07 RX ORDER — ALBUTEROL SULFATE 90 UG/1
2 AEROSOL, METERED RESPIRATORY (INHALATION) EVERY 4 HOURS PRN
Qty: 18 G | Refills: 1 | Status: SHIPPED | OUTPATIENT
Start: 2021-06-07 | End: 2021-06-21

## 2021-06-07 RX ORDER — PREDNISONE 10 MG/1
60 TABLET ORAL DAILY
Qty: 30 TABLET | Refills: 0 | Status: SHIPPED | OUTPATIENT
Start: 2021-06-07 | End: 2021-06-12

## 2021-06-07 RX ORDER — IPRATROPIUM BROMIDE AND ALBUTEROL SULFATE 2.5; .5 MG/3ML; MG/3ML
1 SOLUTION RESPIRATORY (INHALATION) PRN
Status: DISCONTINUED | OUTPATIENT
Start: 2021-06-07 | End: 2021-06-07 | Stop reason: HOSPADM

## 2021-06-07 RX ORDER — PREDNISONE 20 MG/1
60 TABLET ORAL ONCE
Status: COMPLETED | OUTPATIENT
Start: 2021-06-07 | End: 2021-06-07

## 2021-06-07 RX ADMIN — IPRATROPIUM BROMIDE AND ALBUTEROL SULFATE 1 AMPULE: .5; 3 SOLUTION RESPIRATORY (INHALATION) at 18:59

## 2021-06-07 RX ADMIN — PREDNISONE 60 MG: 20 TABLET ORAL at 19:17

## 2021-06-07 ASSESSMENT — ENCOUNTER SYMPTOMS
DIARRHEA: 0
ABDOMINAL PAIN: 0
COLOR CHANGE: 0
RHINORRHEA: 0
SINUS PRESSURE: 1
WHEEZING: 1
VOMITING: 0
SHORTNESS OF BREATH: 1
NAUSEA: 0
EYE DISCHARGE: 0
ABDOMINAL DISTENTION: 0
FACIAL SWELLING: 0
PHOTOPHOBIA: 0

## 2021-06-07 ASSESSMENT — PAIN DESCRIPTION - LOCATION: LOCATION: CHEST

## 2021-06-07 ASSESSMENT — PAIN SCALES - GENERAL: PAINLEVEL_OUTOF10: 8

## 2021-06-07 ASSESSMENT — PAIN DESCRIPTION - PAIN TYPE: TYPE: ACUTE PAIN

## 2021-06-07 NOTE — ED NOTES
Patient up to use the restroom. Respirations even and unlabored.    Lungs sounds clear     Ghanshyam Segura RN  06/07/21 1930

## 2021-06-07 NOTE — ED PROVIDER NOTES
[address] [facility] ED  eMERGENCY dEPARTMENT eNCOUnter      Pt Name: Gi Caba  MRN: [id number]  [place] 2000  Date of evaluation: 6/7/2021  Provider: Bailee Beltre, [address] [place] [address]       Chief Complaint   Patient presents with    Shortness of Breath     cough, sore throat         HISTORY OF PRESENT ILLNESS   (Location/Symptom, Timing/Onset,Context/Setting, Quality, Duration, Modifying Factors, Severity)  Note limiting factors. Gi Caba is a 24 y.o. female who presents to the emergency department with a chief complaint of congestion and sore throat with a dry cough. Patient states that she feels like nasal congestion continually is running in the back of her throat and irritating it and now she has a dry cough and feels like her chest is tight. She knows she has had a fever off and on though she does not have a thermometer to measure it. Her child started with a few symptoms last week but he got better quickly and she \"just cannot seem to shake it\". HPI    NursingNotes were reviewed. REVIEW OF SYSTEMS    (2-9 systems for level 4, 10 or more for level 5)     Review of Systems   Constitutional: Positive for fatigue and fever. Negative for activity change and appetite change. HENT: Positive for congestion and sinus pressure. Negative for facial swelling and rhinorrhea. Eyes: Negative for photophobia and discharge. Respiratory: Positive for shortness of breath and wheezing. Cardiovascular: Negative for chest pain. Gastrointestinal: Negative for abdominal distention, abdominal pain, diarrhea, nausea and vomiting. Endocrine: Negative for polydipsia and polyphagia. Genitourinary: Negative for difficulty urinating, frequency, vaginal bleeding and vaginal discharge. Musculoskeletal: Negative for gait problem. Skin: Negative for color change. Allergic/Immunologic: Negative for immunocompromised state.    Neurological: Negative for dizziness, weakness and light-headedness. Hematological: Negative for adenopathy. Psychiatric/Behavioral: Negative for behavioral problems. Except as noted above the remainder of the review of systems was reviewed and negative. PAST MEDICAL HISTORY     Past Medical History:   Diagnosis Date    Diabetes mellitus ([address]     Genital herpes     GERD (gastroesophageal reflux disease)     Hidradenitis suppurativa     Hypertension     Obesity affecting pregnancy, antepartum, third trimester          SURGICALHISTORY       Past Surgical History:   Procedure Laterality Date    UPPER GASTROINTESTINAL ENDOSCOPY           CURRENT MEDICATIONS       Discharge Medication List as of 6/7/2021  7:30 PM      CONTINUE these medications which have NOT CHANGED    Details   nystatin (MYCOSTATIN) 735684 UNIT/GM powder Apply topically 4 times daily. , Disp-1 Bottle, R-0, Print      ibuprofen (ADVIL;MOTRIN) 800 MG tablet Take 1 tablet by mouth every 8 hours as needed for Pain or Fever, Disp-15 tablet, R-0Normal      Insulin Pen Needle 30G X 8 MM MISC DAILY Starting Sun 3/7/2021, Disp-100 each, R-0, Print      naproxen (NAPROSYN) 500 MG tablet Take 1 tablet by mouth 2 times daily for 20 doses, Disp-20 tablet, R-0Print      blood glucose monitor strips Test 3 times a day & as needed for symptoms of irregular blood glucose., Disp-100 strip, R-0, Normal      insulin glargine (LANTUS SOLOSTAR) 100 UNIT/ML injection pen Inject 10 Units into the skin nightly, Disp-5 pen, R-0Normal      insulin lispro, 1 Unit Dial, (HUMALOG KWIKPEN) 100 UNIT/ML SOPN Inject 5 Units into the skin 3 times daily (before meals), Disp-1 pen, R-5Normal      bacitracin 500 UNIT/GM ointment Apply topically 2 times daily. , Disp-1 Tube, R-1, Print      etodolac (LODINE XL) 400 MG extended release tablet Take 1 tablet by mouth daily, Disp-30 tablet,R-3Print      bacitracin-polymyxin b (POLYSPORIN) 500-06504 UNIT/GM ointment Apply topically 2 times daily. , Disp-1 Tube,R-0, Print Tobacco Use    Smoking status: Current Every Day Smoker     Packs/day: 0.50     Types: Cigarettes, Cigars    Smokeless tobacco: Never Used   Vaping Use    Vaping Use: Never used   Substance and Sexual Activity    Alcohol use: Yes     Comment: occa    Drug use: No    Sexual activity: Yes     Partners: Male   Other Topics Concern    Not on file   Social History Narrative    Not on file     Social Determinants of Health     Financial Resource Strain:     Difficulty of Paying Living Expenses:    Food Insecurity:     Worried About Running Out of Food in the Last Year:     Ran Out of Food in the Last Year:    Transportation Needs:     Lack of Transportation (Medical):  Lack of Transportation (Non-Medical):    Physical Activity:     Days of Exercise per Week:     Minutes of Exercise per Session:    Stress:     Feeling of Stress :    Social Connections:     Frequency of Communication with Friends and Family:     Frequency of Social Gatherings with Friends and Family:     Attends L[employer] day Services:     Active Member of Clubs or Organizations:     Attends Club or Organization Meetings:     Marital Status:    Intimate Partner Violence:     Fear of Current or Ex-Partner:     Emotionally Abused:     Physically Abused:     Sexually Abused:        SCREENINGS    Luul Coma Scale  Eye Opening: Spontaneous  Best Verbal Response: Oriented  Best Motor Response: Obeys commands  Lulu Coma Scale Score: 15 @FLOW([id number])@      PHYSICAL EXAM    (up to 7 for level 4, 8 or more for level 5)     ED Triage Vitals [06/07/21 1728]   BP Temp Temp Source Pulse Resp SpO2 Height Weight   115/68 97.7 °F (36.5 °C) Temporal 93 22 97 % 5' 9\" (1.753 m) 283 lb (128.4 kg)       Physical Exam  Constitutional:       General: She is not in acute distress. Appearance: She is well-developed. HENT:      Head: Normocephalic and atraumatic. Mouth/Throat:      Pharynx: No pharyngeal swelling or oropharyngeal exudate. Comments: Positive postnasal drip. Patient has some boggy nasal turbinates. Her ears are clear. Eyes:      Conjunctiva/sclera: Conjunctivae normal.      Pupils: Pupils are equal, round, and reactive to light. Neck:      Thyroid: No thyromegaly. Cardiovascular:      Rate and Rhythm: Normal rate. Heart sounds: No murmur heard. Pulmonary:      Effort: Pulmonary effort is normal.      Breath sounds: Examination of the right-upper field reveals wheezing. Examination of the left-upper field reveals wheezing. Examination of the right-middle field reveals wheezing. Examination of the left-middle field reveals wheezing. Examination of the right-lower field reveals wheezing. Examination of the left-lower field reveals wheezing. Wheezing present. No decreased breath sounds, rhonchi or rales. Chest:      Chest wall: No mass. Abdominal:      General: Bowel sounds are normal.      Palpations: Abdomen is soft. Musculoskeletal:         General: Normal range of motion. Cervical back: Normal range of motion and neck supple. Right lower leg: No tenderness. Left lower leg: No tenderness. Skin:     General: Skin is warm and dry. Findings: No rash. Neurological:      Mental Status: She is alert and oriented to person, place, and time. Cranial Nerves: No cranial nerve deficit. Motor: No weakness. Deep Tendon Reflexes: Reflexes are normal and symmetric.          DIAGNOSTIC RESULTS     EKG: All EKG's are interpreted by the Emergency Department Physician who either signs or Co-signsthis chart in the absence of a cardiologist.    NSR at 98 bpm with nonspecific T wave changes, improved from previous EKG; no acute st seg elevation    RADIOLOGY:   Non-plain filmimages such as CT, Ultrasound and MRI are read by the radiologist. Plain radiographic images are visualized and preliminarily interpreted by the emergency physician with the below findings:        Interpretation per the Radiologist below, if available at the time ofthis note:    No orders to display         ED BEDSIDE ULTRASOUND:   Performed by ED Physician - none    LABS:  Labs Reviewed - No data to display    All other labs were within normal range or not returned as of this dictation. EMERGENCY DEPARTMENT COURSE and DIFFERENTIAL DIAGNOSIS/MDM:   Vitals:    Vitals:    06/07/21 1728 06/07/21 1845 06/07/21 1859 06/07/21 1915   BP: 115/68 (!) 154/75  119/74   Pulse: 93 94  98   Resp: 22 24 18 26   Temp: 97.7 °F (36.5 °C)      TempSrc: Temporal      SpO2: 97%  100% 100%   Weight: 283 lb (128.4 kg)      Height: 5' 9\" (1.753 m)          Patient has some decent nasal congestion however she is using over-the-counter medications to treat this, she has no signs of infection. She has some wheezing in all lobes and this resolves quickly with albuterol and oral prednisone. She is placed on a 5-day burst of prednisone and given a refill of her inhaler. She needs time over-the-counter medications and these medicines. She is warned that the prednisone will increase her blood sugar temporarily and to keep a closer eye on this. She is discharged home in stable condition. MDM    CRITICAL CARE TIME   Total Critical Care time was 0 minutes, excluding separately reportableprocedures. There was a high probability of clinicallysignificant/life threatening deterioration in the patient's condition which required my urgent intervention. CONSULTS:  None    PROCEDURES:  Unless otherwise noted below, none     Procedures    FINAL IMPRESSION      1. Acute upper respiratory infection    2. Bronchitis    3.  Suspected COVID-19 virus infection          DISPOSITION/PLAN   DISPOSITION Decision To Discharge 06/07/2021 07:28:38 PM      PATIENT REFERRED TO:  DO Edi [address]      As needed      DISCHARGE MEDICATIONS:  Discharge Medication List as of 6/7/2021  7:30 PM      START taking these medications    Details   predniSONE (DELTASONE) 10 MG tablet Take 6 tablets by mouth daily for 5 doses, Disp-30 tablet, R-0Normal                (Please note that portions of this note were completed with a voice recognition program.  Efforts were made to edit the dictations but occasionally words are mis-transcribed.)    Ama Nixon DO (electronically signed)  Attending Emergency Physician         Ama Nixon,   06/07/21 [facility], DO  06/21/21 [address] Lu Perez,   07/05/21 [facility]

## 2021-06-08 LAB
EKG ATRIAL RATE: 98 BPM
EKG P AXIS: 58 DEGREES
EKG P-R INTERVAL: 162 MS
EKG Q-T INTERVAL: 328 MS
EKG QRS DURATION: 90 MS
EKG QTC CALCULATION (BAZETT): 418 MS
EKG R AXIS: 54 DEGREES
EKG T AXIS: 15 DEGREES
EKG VENTRICULAR RATE: 98 BPM

## 2021-06-08 PROCEDURE — 93010 ELECTROCARDIOGRAM REPORT: CPT | Performed by: INTERNAL MEDICINE

## 2021-07-07 ENCOUNTER — HOSPITAL ENCOUNTER (EMERGENCY)
Age: 21
Discharge: HOME | End: 2021-07-07
Payer: COMMERCIAL

## 2021-07-07 VITALS
RESPIRATION RATE: 18 BRPM | HEART RATE: 90 BPM | WEIGHT: 282 LBS | DIASTOLIC BLOOD PRESSURE: 75 MMHG | SYSTOLIC BLOOD PRESSURE: 135 MMHG | HEIGHT: 69 IN | BODY MASS INDEX: 41.77 KG/M2 | TEMPERATURE: 98 F | OXYGEN SATURATION: 99 %

## 2021-07-07 DIAGNOSIS — J01.90 ACUTE NON-RECURRENT SINUSITIS, UNSPECIFIED LOCATION: Primary | ICD-10-CM

## 2021-07-07 PROCEDURE — 99282 EMERGENCY DEPT VISIT SF MDM: CPT

## 2021-07-07 RX ORDER — AMOXICILLIN AND CLAVULANATE POTASSIUM 875; 125 MG/1; MG/1
1 TABLET, FILM COATED ORAL 2 TIMES DAILY
Qty: 20 TABLET | Refills: 0 | Status: SHIPPED | OUTPATIENT
Start: 2021-07-07 | End: 2021-07-17

## 2021-07-07 ASSESSMENT — PAIN DESCRIPTION - DESCRIPTORS: DESCRIPTORS: ACHING

## 2021-07-07 ASSESSMENT — PAIN DESCRIPTION - FREQUENCY: FREQUENCY: CONTINUOUS

## 2021-07-07 ASSESSMENT — ENCOUNTER SYMPTOMS
COUGH: 0
SORE THROAT: 1
NAUSEA: 0
SINUS PAIN: 1
SINUS PRESSURE: 1
DIARRHEA: 0
SHORTNESS OF BREATH: 0
BACK PAIN: 0
VOMITING: 0
ABDOMINAL PAIN: 0

## 2021-07-07 ASSESSMENT — PAIN DESCRIPTION - PAIN TYPE: TYPE: ACUTE PAIN

## 2021-07-07 ASSESSMENT — PAIN DESCRIPTION - LOCATION: LOCATION: THROAT

## 2021-07-07 ASSESSMENT — PAIN SCALES - GENERAL: PAINLEVEL_OUTOF10: 6

## 2021-07-07 NOTE — ED PROVIDER NOTES
[address] [facility] ED  eMERGENCY dEPARTMENT eNCOUnter      Pt Name: Katherine Ramirez  MRN: [id number]  [place] 2000  Date of evaluation: 7/7/2021  Provider: Agustin Moritz, APRN - CNP      HISTORY OF PRESENT ILLNESS    Katherine Ramirez is a 24 y.o. female who presents to the Emergency Department with sinus pressure, congestion and post nasal drip for 1 week. Patient states she has had headache, chills and sweats. Eating and drinking well. Pain is mild to moderate. REVIEW OF SYSTEMS       Review of Systems   Constitutional: Positive for diaphoresis. Negative for activity change, appetite change and fever. HENT: Positive for postnasal drip, sinus pressure, sinus pain and sore throat. Negative for congestion. Respiratory: Negative for cough and shortness of breath. Cardiovascular: Negative for chest pain. Gastrointestinal: Negative for abdominal pain, diarrhea, nausea and vomiting. Genitourinary: Negative for dysuria. Musculoskeletal: Negative for arthralgias, back pain and neck pain. Skin: Negative for rash. Neurological: Positive for headaches. Negative for dizziness. All other systems reviewed and are negative. PAST MEDICAL HISTORY     Past Medical History:   Diagnosis Date    Diabetes mellitus ([facility] [address]     Genital herpes     GERD (gastroesophageal reflux disease)     Hidradenitis suppurativa     Hypertension     Obesity affecting pregnancy, antepartum, third trimester          SURGICAL HISTORY       Past Surgical History:   Procedure Laterality Date    UPPER GASTROINTESTINAL ENDOSCOPY           CURRENT MEDICATIONS       Previous Medications    ALBUTEROL (PROVENTIL) (2.5 MG/3ML) 0.083% NEBULIZER SOLUTION    Take 3 mLs by nebulization every 6 hours as needed for Wheezing    ALBUTEROL SULFATE  (90 BASE) MCG/ACT INHALER    Inhale 2 puffs into the lungs every 4 hours as needed for Wheezing    BACITRACIN 500 UNIT/GM OINTMENT    Apply topically 2 times daily. BACITRACIN-POLYMYXIN B (POLYSPORIN) 500-39408 UNIT/GM OINTMENT    Apply topically 2 times daily. BLOOD GLUCOSE MONITOR STRIPS    Test 3 times a day & as needed for symptoms of irregular blood glucose. BUPROPION (WELLBUTRIN XL) 150 MG EXTENDED RELEASE TABLET    TAKE 1 TABLET BY MOUTH ONCE DAILY IN THE MORNING    CLOTRIMAZOLE (LOTRIMIN) 1 % CREAM    Apply topically 2 times daily to lesions on skin for 3-4 weeks or for 1 week after lesion clears    DOXYLAMINE-PYRIDOXINE (DICLEGIS) 10-10 MG TBEC    Start: 2 tabs p.o. q.h.s.; if symptoms persist after 2 days increase to 1 tab p.o. q.a.m. and 2 tabs p.o. q.h.s.; may increase to 1 tab p.o. q.a.m., 1 tab p.o. q. midafternoon and 2 tabs p.o. q.h.s.  4 tabs per day. If unable to afford, instruct the patient about substituting the OTC components. ETODOLAC (LODINE XL) 400 MG EXTENDED RELEASE TABLET    Take 1 tablet by mouth daily    FAMOTIDINE (PEPCID) 20 MG TABLET    Take 1 tablet by mouth daily    GABAPENTIN (NEURONTIN) 300 MG CAPSULE    Take 1 capsule by mouth 3 times daily for 7 days. Intended supply: 7 days. GLUCOSE MONITORING KIT (FREESTYLE) MONITORING KIT    1 kit by Does not apply route daily    IBUPROFEN (ADVIL;MOTRIN) 800 MG TABLET    Take 1 tablet by mouth every 8 hours as needed for Pain or Fever    INSULIN GLARGINE (LANTUS SOLOSTAR) 100 UNIT/ML INJECTION PEN    Inject 10 Units into the skin nightly    INSULIN LISPRO, 1 UNIT DIAL, (HUMALOG KWIKPEN) 100 UNIT/ML SOPN    Inject 5 Units into the skin 3 times daily (before meals)    INSULIN PEN NEEDLE 30G X 8 MM MISC    1 each by Does not apply route daily    IPRATROPIUM-ALBUTEROL (DUONEB) 0.5-2.5 (3) MG/3ML SOLN NEBULIZER SOLUTION    Inhale 3 mLs into the lungs every 6 hours as needed for Shortness of Breath    LANCETS MISC    DX: diabetes mellitus.  Use 1-3 time(s) daily - Ok to substitute per insurance (1 each = 1 box)    NAPROXEN (NAPROSYN) 500 MG TABLET    Take 1 tablet by mouth 2 times daily for 20 doses NYSTATIN (MYCOSTATIN) 703902 UNIT/GM POWDER    Apply topically 4 times daily. ZINC GLUCONATE 15 MG TABS    Take 3 tabs daily       ALLERGIES     Shellfish-derived products and Benzoyl peroxide    FAMILY HISTORY     History reviewed. No pertinent family history. SOCIAL HISTORY       Social History     Socioeconomic History    Marital status: Single     Spouse name: None    Number of children: None    Years of education: None    Highest education level: None   Occupational History    None   Tobacco Use    Smoking status: Current Every Day Smoker     Packs/day: 0.50     Types: Cigarettes, Cigars    Smokeless tobacco: Never Used   Vaping Use    Vaping Use: Never used   Substance and Sexual Activity    Alcohol use: Yes     Comment: occa    Drug use: No    Sexual activity: Yes     Partners: Male   Other Topics Concern    None   Social History Narrative    None     Social Determinants of Health     Financial Resource Strain:     Difficulty of Paying Living Expenses:    Food Insecurity:     Worried About Running Out of Food in the Last Year:     Ran Out of Food in the Last Year:    Transportation Needs:     Lack of Transportation (Medical):      Lack of Transportation (Non-Medical):    Physical Activity:     Days of Exercise per Week:     Minutes of Exercise per Session:    Stress:     Feeling of Stress :    Social Connections:     Frequency of Communication with Friends and Family:     Frequency of Social Gatherings with Friends and Family:     Attends Church Services:     Active Member of Clubs or Organizations:     Attends Club or Organization Meetings:     Marital Status:    Intimate Partner Violence:     Fear of Current or Ex-Partner:     Emotionally Abused:     Physically Abused:     Sexually Abused:        SCREENINGS      @FLOW([id number])@      PHYSICAL EXAM    (up to 7 for level 4, 8 or more for level 5)     ED Triage Vitals [07/07/21 0854]   BP Temp Temp Source Pulse Resp SpO2 Height Weight   135/75 98 °F (36.7 °C) Temporal 90 18 99 % 5' 9\" (1.753 m) 282 lb (127.9 kg)       Physical Exam  Vitals and nursing note reviewed. Constitutional:       Appearance: She is well-developed. HENT:      Head: Normocephalic and atraumatic. Right Ear: Hearing, tympanic membrane, ear canal and external ear normal.      Left Ear: Hearing, tympanic membrane, ear canal and external ear normal.      Nose: Nose normal.      Mouth/Throat:      Lips: Pink. Mouth: Mucous membranes are moist.      Pharynx: Oropharynx is clear. Uvula midline. Eyes:      Conjunctiva/sclera: Conjunctivae normal.      Pupils: Pupils are equal, round, and reactive to light. Cardiovascular:      Rate and Rhythm: Normal rate and regular rhythm. Heart sounds: Normal heart sounds. Pulmonary:      Effort: Pulmonary effort is normal. No accessory muscle usage or respiratory distress. Breath sounds: Normal breath sounds. No decreased air movement. No decreased breath sounds, wheezing or rhonchi. Abdominal:      General: Bowel sounds are normal. There is no distension. Palpations: Abdomen is soft. Tenderness: There is no abdominal tenderness. Musculoskeletal:         General: Normal range of motion. Cervical back: Normal range of motion and neck supple. Skin:     General: Skin is warm and dry. Neurological:      General: No focal deficit present. Mental Status: She is alert and oriented to person, place, and time. GCS: GCS eye subscore is 4. GCS verbal subscore is 5. GCS motor subscore is 6. Deep Tendon Reflexes: Reflexes are normal and symmetric. Psychiatric:         Judgment: Judgment normal.           All other labs were within normal range or not returned as of this dictation.     EMERGENCY DEPARTMENT COURSE and DIFFERENTIALDIAGNOSIS/MDM:   Vitals:    Vitals:    07/07/21 0854   BP: 135/75   Pulse: 90   Resp: 18   Temp: 98 °F (36.7 °C)   TempSrc: Temporal   SpO2: 99% Weight: 282 lb (127.9 kg)   Height: 5' 9\" (1.753 m)            24 yr old female with acute sinusitis. Prescription for Augmentin was given to the patient. F/U With PCP as needed. Patient verbalizes understanding. PROCEDURES:  Unless otherwise noted below, none     Procedures      FINAL IMPRESSION      1.  Acute non-recurrent sinusitis, unspecified location          DISPOSITION/PLAN   DISPOSITION Decision To Discharge 07/07/2021 09:37:25 AM          SHAQUILLE Greene CNP (electronically signed)  Attending Emergency Physician     SHAQUILLE Greene CNP  07/07/21 8869

## 2021-07-09 ENCOUNTER — HOSPITAL ENCOUNTER (EMERGENCY)
Age: 21
Discharge: HOME | End: 2021-07-09
Payer: COMMERCIAL

## 2021-07-09 ENCOUNTER — APPOINTMENT (OUTPATIENT)
Dept: GENERAL RADIOLOGY | Age: 21
End: 2021-07-09
Payer: COMMERCIAL

## 2021-07-09 VITALS
HEIGHT: 69 IN | RESPIRATION RATE: 18 BRPM | WEIGHT: 280 LBS | DIASTOLIC BLOOD PRESSURE: 88 MMHG | BODY MASS INDEX: 41.47 KG/M2 | OXYGEN SATURATION: 99 % | SYSTOLIC BLOOD PRESSURE: 141 MMHG | HEART RATE: 78 BPM | TEMPERATURE: 97.5 F

## 2021-07-09 DIAGNOSIS — T07.XXXA MULTIPLE ABRASIONS: Primary | ICD-10-CM

## 2021-07-09 DIAGNOSIS — M79.18 MUSCULOSKELETAL PAIN: ICD-10-CM

## 2021-07-09 LAB
EKG ATRIAL RATE: 103 BPM
EKG P AXIS: 58 DEGREES
EKG P-R INTERVAL: 168 MS
EKG Q-T INTERVAL: 358 MS
EKG QRS DURATION: 80 MS
EKG QTC CALCULATION (BAZETT): 468 MS
EKG R AXIS: 56 DEGREES
EKG T AXIS: 22 DEGREES
EKG VENTRICULAR RATE: 103 BPM

## 2021-07-09 PROCEDURE — 96372 THER/PROPH/DIAG INJ SC/IM: CPT

## 2021-07-09 PROCEDURE — 6370000000 HC RX 637 (ALT 250 FOR IP): Performed by: PHYSICIAN ASSISTANT

## 2021-07-09 PROCEDURE — 99285 EMERGENCY DEPT VISIT HI MDM: CPT

## 2021-07-09 PROCEDURE — 93005 ELECTROCARDIOGRAM TRACING: CPT | Performed by: PHYSICIAN ASSISTANT

## 2021-07-09 PROCEDURE — 73130 X-RAY EXAM OF HAND: CPT

## 2021-07-09 PROCEDURE — 6360000002 HC RX W HCPCS: Performed by: PHYSICIAN ASSISTANT

## 2021-07-09 PROCEDURE — 73564 X-RAY EXAM KNEE 4 OR MORE: CPT

## 2021-07-09 PROCEDURE — 93010 ELECTROCARDIOGRAM REPORT: CPT | Performed by: INTERNAL MEDICINE

## 2021-07-09 PROCEDURE — 71046 X-RAY EXAM CHEST 2 VIEWS: CPT

## 2021-07-09 RX ORDER — BACITRACIN, NEOMYCIN, POLYMYXIN B 400; 3.5; 5 [USP'U]/G; MG/G; [USP'U]/G
OINTMENT TOPICAL ONCE
Status: COMPLETED | OUTPATIENT
Start: 2021-07-09 | End: 2021-07-09

## 2021-07-09 RX ORDER — NAPROXEN 500 MG/1
500 TABLET ORAL ONCE
Status: COMPLETED | OUTPATIENT
Start: 2021-07-09 | End: 2021-07-09

## 2021-07-09 RX ORDER — NAPROXEN 500 MG/1
500 TABLET ORAL 2 TIMES DAILY
Qty: 20 TABLET | Refills: 0 | Status: SHIPPED | OUTPATIENT
Start: 2021-07-09 | End: 2022-10-27

## 2021-07-09 RX ORDER — HYDROCODONE BITARTRATE AND ACETAMINOPHEN 5; 325 MG/1; MG/1
1 TABLET ORAL EVERY 6 HOURS PRN
Qty: 10 TABLET | Refills: 0 | Status: SHIPPED | OUTPATIENT
Start: 2021-07-09 | End: 2021-07-12

## 2021-07-09 RX ORDER — FAMOTIDINE 20 MG/1
20 TABLET, FILM COATED ORAL ONCE
Status: COMPLETED | OUTPATIENT
Start: 2021-07-09 | End: 2021-07-09

## 2021-07-09 RX ADMIN — FAMOTIDINE 20 MG: 20 TABLET ORAL at 05:15

## 2021-07-09 RX ADMIN — BACITRACIN, NEOMYCIN, POLYMYXIN B: 400; 3.5; 5 OINTMENT TOPICAL at 03:40

## 2021-07-09 RX ADMIN — HYDROMORPHONE HYDROCHLORIDE 1 MG: 1 INJECTION, SOLUTION INTRAMUSCULAR; INTRAVENOUS; SUBCUTANEOUS at 03:59

## 2021-07-09 RX ADMIN — NAPROXEN 500 MG: 500 TABLET ORAL at 05:15

## 2021-07-09 ASSESSMENT — ENCOUNTER SYMPTOMS
EYE DISCHARGE: 0
EYE REDNESS: 0
APNEA: 0
PHOTOPHOBIA: 0
COUGH: 0
CHEST TIGHTNESS: 1
VOMITING: 0
NAUSEA: 0
VOICE CHANGE: 0
ANAL BLEEDING: 0
BACK PAIN: 0
EYE PAIN: 0
COLOR CHANGE: 1
SHORTNESS OF BREATH: 0
ABDOMINAL DISTENTION: 0
EYE ITCHING: 0
ABDOMINAL PAIN: 0

## 2021-07-09 ASSESSMENT — PAIN DESCRIPTION - LOCATION: LOCATION: CHEST

## 2021-07-09 ASSESSMENT — PAIN DESCRIPTION - FREQUENCY: FREQUENCY: CONTINUOUS

## 2021-07-09 ASSESSMENT — PAIN DESCRIPTION - ORIENTATION: ORIENTATION: RIGHT

## 2021-07-09 ASSESSMENT — PAIN SCALES - GENERAL
PAINLEVEL_OUTOF10: 4
PAINLEVEL_OUTOF10: 10
PAINLEVEL_OUTOF10: 10
PAINLEVEL_OUTOF10: 8

## 2021-07-09 ASSESSMENT — PAIN DESCRIPTION - PAIN TYPE: TYPE: ACUTE PAIN

## 2021-07-09 NOTE — ED NOTES
Pt verbalizes understanding of discharge instructions, follow up and medications.  Pt ambulatory out of ED, viviens, A&O x3     Michelle Ford RN  07/09/21 1478

## 2021-07-09 NOTE — ED NOTES
Pt up out of bed, walks to  with steady gait. Pt back in bed.       Ferman Alpers, RN  07/09/21 0374
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
## 2021-07-10 ENCOUNTER — HOSPITAL ENCOUNTER (EMERGENCY)
Age: 21
Discharge: HOME | End: 2021-07-10
Payer: COMMERCIAL

## 2021-07-10 VITALS
OXYGEN SATURATION: 100 % | TEMPERATURE: 97 F | SYSTOLIC BLOOD PRESSURE: 161 MMHG | RESPIRATION RATE: 19 BRPM | DIASTOLIC BLOOD PRESSURE: 79 MMHG | HEART RATE: 90 BPM | WEIGHT: 280 LBS | HEIGHT: 69 IN | BODY MASS INDEX: 41.47 KG/M2

## 2021-07-10 DIAGNOSIS — T07.XXXA ABRASIONS OF MULTIPLE SITES: Primary | ICD-10-CM

## 2021-07-10 PROCEDURE — 6370000000 HC RX 637 (ALT 250 FOR IP): Performed by: PHYSICIAN ASSISTANT

## 2021-07-10 PROCEDURE — 99283 EMERGENCY DEPT VISIT LOW MDM: CPT

## 2021-07-10 PROCEDURE — 6360000002 HC RX W HCPCS: Performed by: PHYSICIAN ASSISTANT

## 2021-07-10 PROCEDURE — 96372 THER/PROPH/DIAG INJ SC/IM: CPT

## 2021-07-10 RX ORDER — KETOROLAC TROMETHAMINE 30 MG/ML
60 INJECTION, SOLUTION INTRAMUSCULAR; INTRAVENOUS ONCE
Status: COMPLETED | OUTPATIENT
Start: 2021-07-10 | End: 2021-07-10

## 2021-07-10 RX ORDER — KETOROLAC TROMETHAMINE 10 MG/1
10 TABLET, FILM COATED ORAL EVERY 6 HOURS PRN
Qty: 20 TABLET | Refills: 0 | Status: SHIPPED | OUTPATIENT
Start: 2021-07-10 | End: 2021-08-06

## 2021-07-10 RX ORDER — LIDOCAINE HYDROCHLORIDE 10 MG/G
GEL TOPICAL
Qty: 226 G | Refills: 0 | Status: SHIPPED | OUTPATIENT
Start: 2021-07-10 | End: 2022-10-27

## 2021-07-10 RX ORDER — OXYCODONE HYDROCHLORIDE AND ACETAMINOPHEN 5; 325 MG/1; MG/1
1 TABLET ORAL ONCE
Status: COMPLETED | OUTPATIENT
Start: 2021-07-10 | End: 2021-07-10

## 2021-07-10 RX ORDER — DIAPER,BRIEF,INFANT-TODD,DISP
EACH MISCELLANEOUS ONCE
Status: COMPLETED | OUTPATIENT
Start: 2021-07-10 | End: 2021-07-10

## 2021-07-10 RX ORDER — ORPHENADRINE CITRATE 30 MG/ML
60 INJECTION INTRAMUSCULAR; INTRAVENOUS ONCE
Status: COMPLETED | OUTPATIENT
Start: 2021-07-10 | End: 2021-07-10

## 2021-07-10 RX ADMIN — KETOROLAC TROMETHAMINE 60 MG: 30 INJECTION, SOLUTION INTRAMUSCULAR at 09:26

## 2021-07-10 RX ADMIN — OXYCODONE HYDROCHLORIDE AND ACETAMINOPHEN 1 TABLET: 5; 325 TABLET ORAL at 09:26

## 2021-07-10 RX ADMIN — BACITRACIN ZINC 1 G: 500 OINTMENT TOPICAL at 09:27

## 2021-07-10 RX ADMIN — ORPHENADRINE CITRATE 60 MG: 60 INJECTION INTRAMUSCULAR; INTRAVENOUS at 09:27

## 2021-07-10 ASSESSMENT — PAIN DESCRIPTION - LOCATION: LOCATION: GENERALIZED

## 2021-07-10 ASSESSMENT — ENCOUNTER SYMPTOMS
PHOTOPHOBIA: 0
COUGH: 0
SHORTNESS OF BREATH: 0
SORE THROAT: 0
EYE PAIN: 0
DIARRHEA: 0
BACK PAIN: 0
ABDOMINAL PAIN: 0
NAUSEA: 0
VOMITING: 0
RHINORRHEA: 0

## 2021-07-10 ASSESSMENT — PAIN SCALES - GENERAL
PAINLEVEL_OUTOF10: 10
PAINLEVEL_OUTOF10: 10

## 2021-07-10 ASSESSMENT — PAIN DESCRIPTION - DESCRIPTORS: DESCRIPTORS: ACHING
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
## 2021-07-11 ENCOUNTER — HOSPITAL ENCOUNTER (EMERGENCY)
Age: 21
Discharge: HOME | End: 2021-07-11
Attending: EMERGENCY MEDICINE
Payer: COMMERCIAL

## 2021-07-11 VITALS
BODY MASS INDEX: 43.4 KG/M2 | SYSTOLIC BLOOD PRESSURE: 145 MMHG | WEIGHT: 293 LBS | HEART RATE: 97 BPM | OXYGEN SATURATION: 99 % | RESPIRATION RATE: 19 BRPM | TEMPERATURE: 98.9 F | HEIGHT: 69 IN | DIASTOLIC BLOOD PRESSURE: 85 MMHG

## 2021-07-11 DIAGNOSIS — L73.2 HYDRADENITIS: ICD-10-CM

## 2021-07-11 DIAGNOSIS — T14.8XXA SKIN ABRASION: Primary | ICD-10-CM

## 2021-07-11 PROCEDURE — 99284 EMERGENCY DEPT VISIT MOD MDM: CPT

## 2021-07-11 PROCEDURE — 6370000000 HC RX 637 (ALT 250 FOR IP): Performed by: EMERGENCY MEDICINE

## 2021-07-11 RX ORDER — CEPHALEXIN 500 MG/1
500 CAPSULE ORAL 2 TIMES DAILY
Qty: 14 CAPSULE | Refills: 0 | Status: SHIPPED | OUTPATIENT
Start: 2021-07-11 | End: 2021-07-18

## 2021-07-11 RX ORDER — DIAPER,BRIEF,INFANT-TODD,DISP
EACH MISCELLANEOUS ONCE
Status: COMPLETED | OUTPATIENT
Start: 2021-07-11 | End: 2021-07-11

## 2021-07-11 RX ORDER — CEPHALEXIN 500 MG/1
500 CAPSULE ORAL ONCE
Status: COMPLETED | OUTPATIENT
Start: 2021-07-11 | End: 2021-07-11

## 2021-07-11 RX ORDER — OXYCODONE HYDROCHLORIDE AND ACETAMINOPHEN 5; 325 MG/1; MG/1
1 TABLET ORAL ONCE
Status: COMPLETED | OUTPATIENT
Start: 2021-07-11 | End: 2021-07-11

## 2021-07-11 RX ORDER — KETOROLAC TROMETHAMINE 30 MG/ML
30 INJECTION, SOLUTION INTRAMUSCULAR; INTRAVENOUS ONCE
Status: DISCONTINUED | OUTPATIENT
Start: 2021-07-11 | End: 2021-07-11 | Stop reason: HOSPADM

## 2021-07-11 RX ADMIN — CEPHALEXIN 500 MG: 500 CAPSULE ORAL at 12:01

## 2021-07-11 RX ADMIN — BACITRACIN ZINC 3 G: 500 OINTMENT TOPICAL at 12:10

## 2021-07-11 RX ADMIN — OXYCODONE HYDROCHLORIDE AND ACETAMINOPHEN 1 TABLET: 5; 325 TABLET ORAL at 12:01

## 2021-07-11 ASSESSMENT — ENCOUNTER SYMPTOMS
SORE THROAT: 0
BACK PAIN: 0
COUGH: 0
ABDOMINAL PAIN: 0
NAUSEA: 0
SHORTNESS OF BREATH: 0
DIARRHEA: 0
VOMITING: 0

## 2021-07-11 ASSESSMENT — PAIN SCALES - GENERAL
PAINLEVEL_OUTOF10: 10
PAINLEVEL_OUTOF10: 10

## 2021-07-11 ASSESSMENT — PAIN DESCRIPTION - PAIN TYPE: TYPE: ACUTE PAIN

## 2021-07-11 ASSESSMENT — PAIN DESCRIPTION - LOCATION: LOCATION: GENERALIZED
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
## 2021-08-06 ENCOUNTER — APPOINTMENT (OUTPATIENT)
Dept: GENERAL RADIOLOGY | Age: 21
End: 2021-08-06
Payer: COMMERCIAL

## 2021-08-06 ENCOUNTER — HOSPITAL ENCOUNTER (EMERGENCY)
Age: 21
Discharge: HOME | End: 2021-08-06
Attending: EMERGENCY MEDICINE
Payer: COMMERCIAL

## 2021-08-06 VITALS
BODY MASS INDEX: 41.47 KG/M2 | OXYGEN SATURATION: 99 % | RESPIRATION RATE: 18 BRPM | HEART RATE: 93 BPM | SYSTOLIC BLOOD PRESSURE: 133 MMHG | DIASTOLIC BLOOD PRESSURE: 86 MMHG | HEIGHT: 69 IN | TEMPERATURE: 98.3 F | WEIGHT: 280 LBS

## 2021-08-06 DIAGNOSIS — S63.502A SPRAIN OF LEFT WRIST, INITIAL ENCOUNTER: Primary | ICD-10-CM

## 2021-08-06 PROCEDURE — 99283 EMERGENCY DEPT VISIT LOW MDM: CPT

## 2021-08-06 PROCEDURE — 73110 X-RAY EXAM OF WRIST: CPT

## 2021-08-06 PROCEDURE — 29125 APPL SHORT ARM SPLINT STATIC: CPT

## 2021-08-06 PROCEDURE — 73090 X-RAY EXAM OF FOREARM: CPT

## 2021-08-06 RX ORDER — IBUPROFEN 600 MG/1
600 TABLET ORAL EVERY 6 HOURS PRN
Qty: 40 TABLET | Refills: 0 | Status: SHIPPED | OUTPATIENT
Start: 2021-08-06 | End: 2021-11-22

## 2021-08-06 ASSESSMENT — PAIN DESCRIPTION - PAIN TYPE: TYPE: ACUTE PAIN

## 2021-08-06 ASSESSMENT — PAIN DESCRIPTION - LOCATION: LOCATION: ARM

## 2021-08-06 ASSESSMENT — PAIN DESCRIPTION - DESCRIPTORS: DESCRIPTORS: THROBBING;SHOOTING

## 2021-08-06 ASSESSMENT — ENCOUNTER SYMPTOMS
RESPIRATORY NEGATIVE: 1
GASTROINTESTINAL NEGATIVE: 1

## 2021-08-06 ASSESSMENT — PAIN SCALES - GENERAL: PAINLEVEL_OUTOF10: 10

## 2021-08-06 ASSESSMENT — PAIN DESCRIPTION - ORIENTATION: ORIENTATION: LEFT
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
## 2021-09-12 ENCOUNTER — HOSPITAL ENCOUNTER (EMERGENCY)
Age: 21
Discharge: HOME | End: 2021-09-12
Payer: COMMERCIAL

## 2021-09-12 VITALS
WEIGHT: 280 LBS | HEART RATE: 88 BPM | SYSTOLIC BLOOD PRESSURE: 143 MMHG | DIASTOLIC BLOOD PRESSURE: 84 MMHG | OXYGEN SATURATION: 100 % | HEIGHT: 69 IN | RESPIRATION RATE: 18 BRPM | TEMPERATURE: 99.2 F | BODY MASS INDEX: 41.47 KG/M2

## 2021-09-12 DIAGNOSIS — B34.9 VIRAL ILLNESS: Primary | ICD-10-CM

## 2021-09-12 PROCEDURE — U0005 INFEC AGEN DETEC AMPLI PROBE: HCPCS

## 2021-09-12 PROCEDURE — U0003 INFECTIOUS AGENT DETECTION BY NUCLEIC ACID (DNA OR RNA); SEVERE ACUTE RESPIRATORY SYNDROME CORONAVIRUS 2 (SARS-COV-2) (CORONAVIRUS DISEASE [COVID-19]), AMPLIFIED PROBE TECHNIQUE, MAKING USE OF HIGH THROUGHPUT TECHNOLOGIES AS DESCRIBED BY CMS-2020-01-R: HCPCS

## 2021-09-12 PROCEDURE — 99285 EMERGENCY DEPT VISIT HI MDM: CPT

## 2021-09-12 RX ORDER — GUAIFENESIN AND DEXTROMETHORPHAN HYDROBROMIDE 1200; 60 MG/1; MG/1
1 TABLET, EXTENDED RELEASE ORAL 2 TIMES DAILY
Qty: 28 TABLET | Refills: 0 | Status: SHIPPED | OUTPATIENT
Start: 2021-09-12 | End: 2022-10-27

## 2021-09-12 ASSESSMENT — PAIN DESCRIPTION - FREQUENCY
FREQUENCY: INTERMITTENT
FREQUENCY: INTERMITTENT

## 2021-09-12 ASSESSMENT — ENCOUNTER SYMPTOMS
APNEA: 0
FACIAL SWELLING: 0
ABDOMINAL PAIN: 0
EYE ITCHING: 0
DIARRHEA: 0
COLOR CHANGE: 0
RHINORRHEA: 0
SINUS PRESSURE: 0
COUGH: 1
TROUBLE SWALLOWING: 0
BACK PAIN: 0
VOMITING: 0
SHORTNESS OF BREATH: 1
WHEEZING: 0
NAUSEA: 0
EYE DISCHARGE: 0
EYE PAIN: 0
SORE THROAT: 1
SINUS PAIN: 0
CHEST TIGHTNESS: 0

## 2021-09-12 ASSESSMENT — PAIN SCALES - WONG BAKER: WONGBAKER_NUMERICALRESPONSE: 2

## 2021-09-12 ASSESSMENT — PAIN DESCRIPTION - PAIN TYPE
TYPE: ACUTE PAIN
TYPE: CHRONIC PAIN

## 2021-09-12 ASSESSMENT — PAIN DESCRIPTION - DESCRIPTORS
DESCRIPTORS: BURNING;PRESSURE
DESCRIPTORS: BURNING

## 2021-09-12 ASSESSMENT — PAIN SCALES - GENERAL: PAINLEVEL_OUTOF10: 8

## 2021-09-12 ASSESSMENT — PAIN - FUNCTIONAL ASSESSMENT: PAIN_FUNCTIONAL_ASSESSMENT: ACTIVITIES ARE NOT PREVENTED

## 2021-09-12 ASSESSMENT — PAIN DESCRIPTION - LOCATION
LOCATION: RIB CAGE;THROAT
LOCATION: CHEST;THROAT

## 2021-09-12 ASSESSMENT — PAIN DESCRIPTION - PROGRESSION: CLINICAL_PROGRESSION: GRADUALLY WORSENING

## 2021-09-12 ASSESSMENT — PAIN DESCRIPTION - ONSET: ONSET: ON-GOING

## 2021-09-12 ASSESSMENT — PAIN DESCRIPTION - ORIENTATION: ORIENTATION: MID

## 2021-09-12 NOTE — ED TRIAGE NOTES
Pt reports that she has had a cough with rib pain as well as throat pain pt arrived to triage eating and drinking pt alert rr even non labored complaint of sore thraot runny nose and cough

## 2021-09-12 NOTE — ED PROVIDER NOTES
[address] [facility] ED  eMERGENCY dEPARTMENT eNCOUnter      Pt Name: Katerina Brown  MRN: [id number]  [place] [address]  Date of evaluation: 9/12/2021  Provider: SHAQUILLE Espinoza CNP        HISTORY OF PRESENT ILLNESS    Katerina Brown is a 24 y.o. female per chart review has ah/o     The history is provided by the patient. No  was used. 2 days of constant dry cough is intermittent unchanging and sore throat, denies nausea and sinus congetison  REVIEW OF SYSTEMS       Review of Systems   Constitutional: Negative for appetite change, chills, diaphoresis, fatigue and fever. HENT: Positive for sore throat. Negative for congestion, ear discharge, ear pain, facial swelling, hearing loss, mouth sores, postnasal drip, rhinorrhea, sinus pressure, sinus pain and trouble swallowing. Eyes: Negative for pain, discharge and itching. Respiratory: Positive for cough and shortness of breath. Negative for apnea, chest tightness and wheezing. Cardiovascular: Negative for chest pain. Gastrointestinal: Negative for abdominal pain, diarrhea, nausea and vomiting. Endocrine: Negative for cold intolerance and heat intolerance. Genitourinary: Negative for decreased urine volume and difficulty urinating. Musculoskeletal: Negative for arthralgias, back pain and myalgias. Skin: Negative for color change, pallor and rash. Neurological: Negative for dizziness, syncope, weakness, light-headedness and headaches. Hematological: Negative for adenopathy. Psychiatric/Behavioral: Negative for behavioral problems, confusion and sleep disturbance. Except as noted above the remainder of the review of systems was reviewed and negative.        PAST MEDICAL HISTORY     Past Medical History:   Diagnosis Date    Diabetes mellitus ([address]     Genital herpes     GERD (gastroesophageal reflux disease)     Hidradenitis suppurativa     Hypertension     Obesity affecting pregnancy, antepartum, third trimester          SURGICAL HISTORY       Past Surgical History:   Procedure Laterality Date    UPPER GASTROINTESTINAL ENDOSCOPY           CURRENT MEDICATIONS       Discharge Medication List as of 9/12/2021 11:25 AM      CONTINUE these medications which have NOT CHANGED    Details   ibuprofen (IBU) 600 MG tablet Take 1 tablet by mouth every 6 hours as needed for Pain, Disp-40 tablet, R-0Print      Lidocaine HCl (BURN RELIEF) 1 % GEL Apply twice daily to affected areas for pain as needed, Disp-226 g, R-0Print      naproxen (NAPROSYN) 500 MG tablet Take 1 tablet by mouth 2 times daily for 20 doses, Disp-20 tablet, R-0Normal      albuterol sulfate  (90 Base) MCG/ACT inhaler Inhale 2 puffs into the lungs every 4 hours as needed for Wheezing, Disp-18 g, R-1Normal      nystatin (MYCOSTATIN) 576709 UNIT/GM powder Apply topically 4 times daily. , Disp-1 Bottle, R-0, Print      Insulin Pen Needle 30G X 8 MM MISC DAILY Starting Sun 3/7/2021, Disp-100 each, R-0, Print      blood glucose monitor strips Test 3 times a day & as needed for symptoms of irregular blood glucose., Disp-100 strip, R-0, Normal      insulin glargine (LANTUS SOLOSTAR) 100 UNIT/ML injection pen Inject 10 Units into the skin nightly, Disp-5 pen, R-0Normal      insulin lispro, 1 Unit Dial, (HUMALOG KWIKPEN) 100 UNIT/ML SOPN Inject 5 Units into the skin 3 times daily (before meals), Disp-1 pen, R-5Normal      bacitracin 500 UNIT/GM ointment Apply topically 2 times daily. , Disp-1 Tube, R-1, Print      bacitracin-polymyxin b (POLYSPORIN) 500-42695 UNIT/GM ointment Apply topically 2 times daily. , Disp-1 Tube,R-0, Print      gabapentin (NEURONTIN) 300 MG capsule Take 1 capsule by mouth 3 times daily for 7 days. Intended supply: 7 days. , Disp-21 capsule,R-0Normal      ipratropium-albuterol (DUONEB) 0.5-2.5 (3) MG/3ML SOLN nebulizer solution Inhale 3 mLs into the lungs every 6 hours as needed for Shortness of Breath, Disp-360 mL,R-0Normal      albuterol (PROVENTIL) (2.5 MG/3ML) 0.083% nebulizer solution Take 3 mLs by nebulization every 6 hours as needed for Wheezing, Disp-120 each,R-1Print      clotrimazole (LOTRIMIN) 1 % cream Apply topically 2 times daily to lesions on skin for 3-4 weeks or for 1 week after lesion clears, Disp-1 Tube, R-0, Print      glucose monitoring kit (FREESTYLE) monitoring kit DAILY Starting Thu 7/2/2020, Disp-1 kit, R-0, Print      Zinc Gluconate 15 MG TABS Take 3 tabs daily, Disp-30 tablet, R-0Normal      famotidine (PEPCID) 20 MG tablet Take 1 tablet by mouth daily, Disp-30 tablet, R-0Print      Lancets MISC Disp-1 each, R-5, NormalDX: diabetes mellitus. Use 1-3 time(s) daily - Ok to substitute per insurance (1 each = 1 box)      buPROPion (WELLBUTRIN XL) 150 MG extended release tablet TAKE 1 TABLET BY MOUTH ONCE DAILY IN THE MORNINGHistorical Med      doxylamine-pyridoxine (DICLEGIS) 10-10 MG TBEC Start: 2 tabs p.o. q.h.s.; if symptoms persist after 2 days increase to 1 tab p.o. q.a.m. and 2 tabs p.o. q.h.s.; may increase to 1 tab p.o. q.a.m., 1 tab p.o. q. midafternoon and 2 tabs p.o. q.h.s.  4 tabs per day. If unable to afford, instruct the vipin ent about substituting the OTC components. , Disp-60 tablet, R-1Print             ALLERGIES     Shellfish-derived products and Benzoyl peroxide    FAMILY HISTORY     History reviewed. No pertinent family history.        SOCIAL HISTORY       Social History     Socioeconomic History    Marital status: Single     Spouse name: None    Number of children: None    Years of education: None    Highest education level: None   Occupational History    None   Tobacco Use    Smoking status: Current Every Day Smoker     Packs/day: 0.50     Types: Cigarettes, Cigars    Smokeless tobacco: Never Used   Vaping Use    Vaping Use: Never used   Substance and Sexual Activity    Alcohol use: Yes     Comment: occa    Drug use: No    Sexual activity: Yes     Partners: Male   Other Topics Concern    None rate and regular rhythm. Pulses: Normal pulses. Heart sounds: Normal heart sounds. No murmur heard. No gallop. Pulmonary:      Effort: Pulmonary effort is normal. No respiratory distress. Breath sounds: No stridor. Wheezing (expiratory) present. No rhonchi or rales. Chest:      Chest wall: No tenderness. Abdominal:      General: Abdomen is flat. There is no distension. Palpations: Abdomen is soft. Musculoskeletal:         General: Normal range of motion. Cervical back: No rigidity or tenderness. Lymphadenopathy:      Cervical: No cervical adenopathy. Skin:     General: Skin is warm and dry. Capillary Refill: Capillary refill takes less than 2 seconds. Coloration: Skin is not pale. Neurological:      General: No focal deficit present. Mental Status: She is alert and oriented to person, place, and time. Mental status is at baseline. Psychiatric:         Mood and Affect: Mood normal.         Behavior: Behavior normal.           LABS:  Labs Reviewed   COVID-19 AMBULATORY           Akiko Christine, APRN - CNP     MDM:   Vitals:    Vitals:    09/12/21 1039 09/12/21 1043 09/12/21 1130   BP:  130/81 (!) 143/84   Pulse:  90 88   Resp: 16  18   Temp: 99.2 °F (37.3 °C)     TempSrc: Temporal     SpO2: 99%  100%   Weight: 280 lb (127 kg)     Height: 5' 9\" (1.753 m)             MDM   66-year-old female reporting 2 to 3 days of sore throat and cough, cough is nonproductive she reports she feels like she has something in her chest.  Patient denies history of asthma but has albuterol treatments at home she has been using occasionally. She has mild relief with treatments. Pt is afebrile has nontoxic appearance and VS ar stable on physical exam pharynx is pink and moist, neck is supple no masses. She has mild expiratory wheezing. Symptoms are consistent with viral illness Covid order placed. Order for Mucinex DM sent to patient pharmacy.   Consideration given for prednisone dose, however patient is diabetic she reports her blood sugars are not well controlled. Review of chart demonstrates that the last 2 orders for insulin were provided from the ER. I educated patient that it is essential  she follow-up with her PCP to have her diabetes managed properly. Patient educated to continue albuterol treatments at home every 4 hours as needed. CRITICAL CARE TIME   Total CriticalCare time was 0 minutes, excluding separately reportable procedures. There was a high probability of clinically significant/life threatening deterioration in the patient's condition which required my urgent intervention. PROCEDURES:  Unlessotherwise noted below, none     Procedures      FINAL IMPRESSION      1.  Viral illness          DISPOSITION/PLAN   DISPOSITION Decision To Discharge 09/12/2021 11:22:19 AM          SHAQUILLE Razo CNP (electronically signed)  Attending Emergency Physician           SHAQUILLE Razo CNP  09/12/21 9984

## 2021-09-12 NOTE — CARE COORDINATION
A black  port was found in the patient's room. I called her listed number but it was disconnected. I labeled the  port with her name and left it at the [facility].

## 2021-09-13 ENCOUNTER — CARE COORDINATION (OUTPATIENT)
Dept: CARE COORDINATION | Age: 21
End: 2021-09-13

## 2021-09-13 NOTE — CARE COORDINATION
Attempted to contact patient for post ED COVID-19 Monitoring. Unable to reach patient by phone.    Phone is not working at this time   I will attempt a follow up call tomorrow

## 2021-09-14 ENCOUNTER — CARE COORDINATION (OUTPATIENT)
Dept: CARE COORDINATION | Age: 21
End: 2021-09-14

## 2021-09-14 LAB
SARS-COV-2: NOT DETECTED
SOURCE: NORMAL

## 2021-11-22 ENCOUNTER — HOSPITAL ENCOUNTER (EMERGENCY)
Age: 21
Discharge: HOME | End: 2021-11-22
Attending: EMERGENCY MEDICINE
Payer: COMMERCIAL

## 2021-11-22 VITALS
HEIGHT: 69 IN | BODY MASS INDEX: 40.73 KG/M2 | DIASTOLIC BLOOD PRESSURE: 76 MMHG | HEART RATE: 84 BPM | RESPIRATION RATE: 16 BRPM | WEIGHT: 275 LBS | OXYGEN SATURATION: 100 % | TEMPERATURE: 97.3 F | SYSTOLIC BLOOD PRESSURE: 124 MMHG

## 2021-11-22 DIAGNOSIS — L02.91 ABSCESS: ICD-10-CM

## 2021-11-22 DIAGNOSIS — L73.2 HIDRADENITIS SUPPURATIVA: Primary | ICD-10-CM

## 2021-11-22 LAB
HCG, URINE, POC: NEGATIVE
Lab: NORMAL
NEGATIVE QC PASS/FAIL: NORMAL
POSITIVE QC PASS/FAIL: NORMAL

## 2021-11-22 PROCEDURE — 87070 CULTURE OTHR SPECIMN AEROBIC: CPT

## 2021-11-22 PROCEDURE — 10060 I&D ABSCESS SIMPLE/SINGLE: CPT

## 2021-11-22 PROCEDURE — 6370000000 HC RX 637 (ALT 250 FOR IP): Performed by: PHYSICIAN ASSISTANT

## 2021-11-22 PROCEDURE — 87075 CULTR BACTERIA EXCEPT BLOOD: CPT

## 2021-11-22 PROCEDURE — 87185 SC STD ENZYME DETCJ PER NZM: CPT

## 2021-11-22 PROCEDURE — 2500000003 HC RX 250 WO HCPCS: Performed by: PHYSICIAN ASSISTANT

## 2021-11-22 PROCEDURE — 87205 SMEAR GRAM STAIN: CPT

## 2021-11-22 PROCEDURE — 99284 EMERGENCY DEPT VISIT MOD MDM: CPT

## 2021-11-22 RX ORDER — CHLORHEXIDINE GLUCONATE 213 G/1000ML
SOLUTION TOPICAL
Qty: 236 ML | Refills: 0 | Status: SHIPPED | OUTPATIENT
Start: 2021-11-22 | End: 2021-12-06

## 2021-11-22 RX ORDER — LIDOCAINE HYDROCHLORIDE 10 MG/ML
5 INJECTION, SOLUTION EPIDURAL; INFILTRATION; INTRACAUDAL; PERINEURAL ONCE
Status: DISCONTINUED | OUTPATIENT
Start: 2021-11-22 | End: 2021-11-22

## 2021-11-22 RX ORDER — HYDROCODONE BITARTRATE AND ACETAMINOPHEN 5; 325 MG/1; MG/1
1 TABLET ORAL ONCE
Status: COMPLETED | OUTPATIENT
Start: 2021-11-22 | End: 2021-11-22

## 2021-11-22 RX ORDER — IBUPROFEN 800 MG/1
800 TABLET ORAL EVERY 8 HOURS PRN
Qty: 20 TABLET | Refills: 0 | Status: SHIPPED | OUTPATIENT
Start: 2021-11-22 | End: 2022-04-17

## 2021-11-22 RX ORDER — LIDOCAINE HYDROCHLORIDE AND EPINEPHRINE 10; 10 MG/ML; UG/ML
20 INJECTION, SOLUTION INFILTRATION; PERINEURAL ONCE
Status: COMPLETED | OUTPATIENT
Start: 2021-11-22 | End: 2021-11-22

## 2021-11-22 RX ORDER — SULFAMETHOXAZOLE AND TRIMETHOPRIM 800; 160 MG/1; MG/1
2 TABLET ORAL 2 TIMES DAILY
Qty: 40 TABLET | Refills: 0 | Status: SHIPPED | OUTPATIENT
Start: 2021-11-22 | End: 2021-12-02

## 2021-11-22 RX ADMIN — LIDOCAINE HYDROCHLORIDE AND EPINEPHRINE 2 ML: 10; 10 INJECTION, SOLUTION INFILTRATION; PERINEURAL at 16:36

## 2021-11-22 RX ADMIN — HYDROCODONE BITARTRATE AND ACETAMINOPHEN 1 TABLET: 5; 325 TABLET ORAL at 16:35

## 2021-11-22 ASSESSMENT — ENCOUNTER SYMPTOMS
GASTROINTESTINAL NEGATIVE: 1
RESPIRATORY NEGATIVE: 1
EYES NEGATIVE: 1

## 2021-11-22 ASSESSMENT — PAIN DESCRIPTION - DESCRIPTORS: DESCRIPTORS: ACHING

## 2021-11-22 ASSESSMENT — PAIN DESCRIPTION - FREQUENCY: FREQUENCY: CONTINUOUS

## 2021-11-22 ASSESSMENT — PAIN SCALES - GENERAL
PAINLEVEL_OUTOF10: 10
PAINLEVEL_OUTOF10: 10
PAINLEVEL_OUTOF10: 8

## 2021-11-22 NOTE — ED PROVIDER NOTES
[address] [facility] ED  eMERGENCY dEPARTMENT eNCOUnter      Pt Name: Zion Fink  MRN: [id number]  [place] 2000  Date of evaluation: 11/22/2021  Provider: Bernard Mathis PA-C      HISTORY OF PRESENT ILLNESS    Zion Fink is a 24 y.o. female who presents to the Emergency Department with chief complaint of abscess in vaginal area. Patient states she has a history of hidradenitis suppurativa and gets frequent skin infections with abscesses. Patient states this is been going on for the last few days. Patient states she is followed up with dermatology and everything the past and keeps having persistent issues. Patient denies fever and has no other concerns at this time. REVIEW OF SYSTEMS       Review of Systems   Constitutional: Negative. HENT: Negative. Eyes: Negative. Respiratory: Negative. Cardiovascular: Negative. Gastrointestinal: Negative. Endocrine: Negative. Genitourinary: Negative. Vaginal abscess fluctuant to palpation   Musculoskeletal: Negative. Skin: Negative. Neurological: Negative. Psychiatric/Behavioral: Negative.           PAST MEDICAL HISTORY     Past Medical History:   Diagnosis Date    Diabetes mellitus ([facility] [address]     Genital herpes     GERD (gastroesophageal reflux disease)     Hidradenitis suppurativa     Hypertension     Obesity affecting pregnancy, antepartum, third trimester          SURGICAL HISTORY       Past Surgical History:   Procedure Laterality Date    UPPER GASTROINTESTINAL ENDOSCOPY           CURRENT MEDICATIONS       Discharge Medication List as of 11/22/2021  4:51 PM      CONTINUE these medications which have NOT CHANGED    Details   Dextromethorphan-guaiFENesin  MG TB12 Take 1 tablet by mouth 2 times daily, Disp-28 tablet, R-0Normal      Lidocaine HCl (BURN RELIEF) 1 % GEL Apply twice daily to affected areas for pain as needed, Disp-226 g, R-0Print      naproxen (NAPROSYN) 500 MG tablet Take 1 tablet by mouth 2 times daily for 20 doses, Disp-20 tablet, R-0Normal      albuterol sulfate  (90 Base) MCG/ACT inhaler Inhale 2 puffs into the lungs every 4 hours as needed for Wheezing, Disp-18 g, R-1Normal      nystatin (MYCOSTATIN) 400818 UNIT/GM powder Apply topically 4 times daily. , Disp-1 Bottle, R-0, Print      Insulin Pen Needle 30G X 8 MM MISC DAILY Starting Sun 3/7/2021, Disp-100 each, R-0, Print      blood glucose monitor strips Test 3 times a day & as needed for symptoms of irregular blood glucose., Disp-100 strip, R-0, Normal      insulin glargine (LANTUS SOLOSTAR) 100 UNIT/ML injection pen Inject 10 Units into the skin nightly, Disp-5 pen, R-0Normal      insulin lispro, 1 Unit Dial, (HUMALOG KWIKPEN) 100 UNIT/ML SOPN Inject 5 Units into the skin 3 times daily (before meals), Disp-1 pen, R-5Normal      bacitracin 500 UNIT/GM ointment Apply topically 2 times daily. , Disp-1 Tube, R-1, Print      bacitracin-polymyxin b (POLYSPORIN) 500-91220 UNIT/GM ointment Apply topically 2 times daily. , Disp-1 Tube,R-0, Print      gabapentin (NEURONTIN) 300 MG capsule Take 1 capsule by mouth 3 times daily for 7 days. Intended supply: 7 days. , Disp-21 capsule,R-0Normal      ipratropium-albuterol (DUONEB) 0.5-2.5 (3) MG/3ML SOLN nebulizer solution Inhale 3 mLs into the lungs every 6 hours as needed for Shortness of Breath, Disp-360 mL,R-0Normal      albuterol (PROVENTIL) (2.5 MG/3ML) 0.083% nebulizer solution Take 3 mLs by nebulization every 6 hours as needed for Wheezing, Disp-120 each,R-1Print      clotrimazole (LOTRIMIN) 1 % cream Apply topically 2 times daily to lesions on skin for 3-4 weeks or for 1 week after lesion clears, Disp-1 Tube, R-0, Print      glucose monitoring kit (FREESTYLE) monitoring kit DAILY Starting Thu 7/2/2020, Disp-1 kit, R-0, Print      Zinc Gluconate 15 MG TABS Take 3 tabs daily, Disp-30 tablet, R-0Normal      famotidine (PEPCID) 20 MG tablet Take 1 tablet by mouth daily, Disp-30 tablet, R-0Print Lancets MISC Disp-1 each, R-5, NormalDX: diabetes mellitus. Use 1-3 time(s) daily - Ok to substitute per insurance (1 each = 1 box)      buPROPion (WELLBUTRIN XL) 150 MG extended release tablet TAKE 1 TABLET BY MOUTH ONCE DAILY IN THE MORNINGHistorical Med      doxylamine-pyridoxine (DICLEGIS) 10-10 MG TBEC Start: 2 tabs p.o. q.h.s.; if symptoms persist after 2 days increase to 1 tab p.o. q.a.m. and 2 tabs p.o. q.h.s.; may increase to 1 tab p.o. q.a.m., 1 tab p.o. q. midafternoon and 2 tabs p.o. q.h.s.  4 tabs per day. If unable to afford, instruct the vipin ent about substituting the OTC components. , Disp-60 tablet, R-1Print             ALLERGIES     Shellfish-derived products and Benzoyl peroxide    FAMILY HISTORY     History reviewed. No pertinent family history. SOCIAL HISTORY       Social History     Socioeconomic History    Marital status: Single     Spouse name: None    Number of children: None    Years of education: None    Highest education level: None   Occupational History    None   Tobacco Use    Smoking status: Current Every Day Smoker     Packs/day: 0.50     Types: Cigarettes, Cigars    Smokeless tobacco: Never Used   Vaping Use    Vaping Use: Never used   Substance and Sexual Activity    Alcohol use: Yes     Comment: occa    Drug use: No    Sexual activity: Yes     Partners: Male   Other Topics Concern    None   Social History Narrative    None     Social Determinants of Health     Financial Resource Strain:     Difficulty of Paying Living Expenses: Not on file   Food Insecurity:     Worried About Running Out of Food in the Last Year: Not on file    Minnie of Food in the Last Year: Not on file   Transportation Needs:     Lack of Transportation (Medical): Not on file    Lack of Transportation (Non-Medical):  Not on file   Physical Activity:     Days of Exercise per Week: Not on file    Minutes of Exercise per Session: Not on file   Stress:     Feeling of Stress : Not on file Social Connections:     Frequency of Communication with Friends and Family: Not on file    Frequency of Social Gatherings with Friends and Family: Not on file    Attends Catholic Services: Not on file    Active Member of Clubs or Organizations: Not on file    Attends Club or Organization Meetings: Not on file    Marital Status: Not on file   Intimate Partner Violence:     Fear of Current or Ex-Partner: Not on file    Emotionally Abused: Not on file    Physically Abused: Not on file    Sexually Abused: Not on file   Housing Stability:     Unable to Pay for Housing in the Last Year: Not on file    Number of [place] in the Last Year: Not on file    Unstable Housing in the Last Year: Not on file       SCREENINGS    [place] Coma Scale  Eye Opening: Spontaneous  Best Verbal Response: Oriented  Best Motor Response: Obeys commands  [place] Coma Scale Score: 15 @FLOW([id number])@      PHYSICAL EXAM    (up to 7 for level 4, 8 or more for level 5)     ED Triage Vitals [11/22/21 1413]   BP Temp Temp Source Pulse Resp SpO2 Height Weight   124/76 97.3 °F (36.3 °C) Temporal 84 16 100 % 5' 9\" (1.753 m) 275 lb (124.7 kg)       Physical Exam  Constitutional:       General: She is not in acute distress. Appearance: She is well-developed. HENT:      Head: Normocephalic and atraumatic. Eyes:      Conjunctiva/sclera: Conjunctivae normal.      Pupils: Pupils are equal, round, and reactive to light. Cardiovascular:      Rate and Rhythm: Normal rate and regular rhythm. Heart sounds: No murmur heard. Pulmonary:      Effort: No respiratory distress. Breath sounds: Normal breath sounds. No wheezing or rales. Abdominal:      General: There is no distension. Palpations: Abdomen is soft. Tenderness: There is no abdominal tenderness. Genitourinary:         Comments: Nurse present for evaluation  Musculoskeletal:         General: Normal range of motion.       Cervical back: Normal range of motion and neck supple. Skin:     General: Skin is warm and dry. Findings: No erythema or rash. Neurological:      Mental Status: She is alert and oriented to person, place, and time. Cranial Nerves: No cranial nerve deficit. Psychiatric:         Judgment: Judgment normal.           All other labs were within normal range or not returned as of this dictation. EMERGENCY DEPARTMENT COURSE and DIFFERENTIALDIAGNOSIS/MDM:   Vitals:    Vitals:    11/22/21 1413   BP: 124/76   Pulse: 84   Resp: 16   Temp: 97.3 °F (36.3 °C)   TempSrc: Temporal   SpO2: 100%   Weight: 275 lb (124.7 kg)   Height: 5' 9\" (1.753 m)          Patient given Norco for pain while in the emergency room. She will be kept on Motrin and Bactrim for treatment at home. Hibiclens topical wash along with Dial soap. Mupirocin for nasal to help eradicate MRSA as suspected. Wound culture is pending. Patient to follow-up with dermatology for reevaluation and treatment. Return if symptoms worsen or if new concerning symptoms arise. She verbalizes understanding of plan at discharge has no further questions. PROCEDURES:  Unless otherwise noted below, none     Incision/Drainage    Date/Time: 11/22/2021 5:22 PM  Performed by: Eliane Muñoz PA-C  Authorized by: Elida Galindo MD     Consent:     Consent obtained:  Verbal    Consent given by:  Patient    Risks discussed:  Bleeding and incomplete drainage    Alternatives discussed:  Referral  Location:     Type:  Abscess    Location:  Anogenital    Anogenital location: Mons pubis. Pre-procedure details:     Skin preparation:  Betadine  Anesthesia (see MAR for exact dosages):      Anesthesia method:  Local infiltration    Local anesthetic:  Lidocaine 1% WITH epi  Procedure type:     Complexity:  Simple  Procedure details:     Incision types:  Single straight    Incision depth:  Dermal    Scalpel blade:  11    Wound management:  Irrigated with saline    Drainage:  Bloody and purulent Drainage amount: Moderate    Wound treatment:  Wound left open  Post-procedure details:     Patient tolerance of procedure: Tolerated well, no immediate complications          FINAL IMPRESSION      1. Hidradenitis suppurativa    2.  Abscess          DISPOSITION/PLAN   DISPOSITION Decision To Discharge 11/22/2021 05:03:08 PM          Aleks Mckeon PA-C (electronically signed)  Attending Emergency Physician  [facility]  11/22/21 1121

## 2021-11-26 LAB
ANAEROBIC CULTURE: ABNORMAL
GRAM STAIN RESULT: ABNORMAL
ORGANISM: ABNORMAL
WOUND/ABSCESS: ABNORMAL

## 2021-12-25 PROCEDURE — 99283 EMERGENCY DEPT VISIT LOW MDM: CPT

## 2021-12-26 ENCOUNTER — APPOINTMENT (OUTPATIENT)
Dept: GENERAL RADIOLOGY | Age: 21
End: 2021-12-26
Payer: COMMERCIAL

## 2021-12-26 ENCOUNTER — HOSPITAL ENCOUNTER (EMERGENCY)
Age: 21
Discharge: HOME | End: 2021-12-26
Attending: EMERGENCY MEDICINE
Payer: COMMERCIAL

## 2021-12-26 VITALS
HEIGHT: 69 IN | WEIGHT: 280 LBS | HEART RATE: 97 BPM | DIASTOLIC BLOOD PRESSURE: 88 MMHG | OXYGEN SATURATION: 100 % | BODY MASS INDEX: 41.47 KG/M2 | SYSTOLIC BLOOD PRESSURE: 140 MMHG | RESPIRATION RATE: 20 BRPM | TEMPERATURE: 99.4 F

## 2021-12-26 DIAGNOSIS — R07.9 CHEST PAIN, UNSPECIFIED TYPE: Primary | ICD-10-CM

## 2021-12-26 LAB — SARS-COV-2, NAAT: NOT DETECTED

## 2021-12-26 PROCEDURE — 93005 ELECTROCARDIOGRAM TRACING: CPT | Performed by: EMERGENCY MEDICINE

## 2021-12-26 PROCEDURE — 96372 THER/PROPH/DIAG INJ SC/IM: CPT

## 2021-12-26 PROCEDURE — 71045 X-RAY EXAM CHEST 1 VIEW: CPT

## 2021-12-26 PROCEDURE — 6360000002 HC RX W HCPCS: Performed by: EMERGENCY MEDICINE

## 2021-12-26 PROCEDURE — 6370000000 HC RX 637 (ALT 250 FOR IP): Performed by: EMERGENCY MEDICINE

## 2021-12-26 PROCEDURE — 87635 SARS-COV-2 COVID-19 AMP PRB: CPT

## 2021-12-26 RX ORDER — MELOXICAM 15 MG/1
15 TABLET ORAL DAILY PRN
Qty: 90 TABLET | Refills: 1 | Status: SHIPPED | OUTPATIENT
Start: 2021-12-26 | End: 2022-10-27

## 2021-12-26 RX ORDER — KETOROLAC TROMETHAMINE 15 MG/ML
30 INJECTION, SOLUTION INTRAMUSCULAR; INTRAVENOUS ONCE
Status: COMPLETED | OUTPATIENT
Start: 2021-12-26 | End: 2021-12-26

## 2021-12-26 RX ORDER — ACETAMINOPHEN 500 MG
1000 TABLET ORAL ONCE
Status: COMPLETED | OUTPATIENT
Start: 2021-12-26 | End: 2021-12-26

## 2021-12-26 RX ADMIN — ACETAMINOPHEN 1000 MG: 500 TABLET ORAL at 00:55

## 2021-12-26 RX ADMIN — KETOROLAC TROMETHAMINE 30 MG: 15 INJECTION, SOLUTION INTRAMUSCULAR; INTRAVENOUS at 00:55

## 2021-12-26 ASSESSMENT — ENCOUNTER SYMPTOMS
COUGH: 0
VOMITING: 0
SORE THROAT: 0
ABDOMINAL PAIN: 0
BACK PAIN: 0
NAUSEA: 0
DIARRHEA: 0
SHORTNESS OF BREATH: 0

## 2021-12-26 ASSESSMENT — PAIN SCALES - GENERAL
PAINLEVEL_OUTOF10: 8
PAINLEVEL_OUTOF10: 8

## 2021-12-26 ASSESSMENT — PAIN DESCRIPTION - LOCATION: LOCATION: CHEST

## 2021-12-26 ASSESSMENT — PAIN DESCRIPTION - PAIN TYPE: TYPE: ACUTE PAIN

## 2021-12-26 NOTE — ED NOTES
Pt verbalizes understanding of discharge instructions, medications and follow up.  Pt ambulatory out of ED, jessica, A&O Hernando Randle RN  12/26/21 0013

## 2021-12-26 NOTE — ED NOTES
Pt coming into ED due to chest pains that have been going on a while. Pt states she sometimes gets these pains every now and then. Pt states she just wants to make sure \"my arteries aren't blocked or anything. \" EKG done, pt swabbed for COVID and medicated.  Pt relaxing in bed, Liane lopez RN  12/26/21 [id number]

## 2021-12-26 NOTE — ED PROVIDER NOTES
[facility] ED  eMERGENCYdEPARTMENT eNCOUnter      Pt Name: Isaiah Bruner  MRN: [id number]  [place] 2000  Date of evaluation: 12/25/2021  Rosendo Douglass MD    CHIEF COMPLAINT           HPI  Isaiah Bruner is a 24 y.o. female per chart review has a h/o DM II, GERD, HTN presents to the ED with chest pain. Pt notes gradual onset, moderate, intermittent, sharp, substernal chest pain x 3 days. No cough. Pt denies fever, n/v, sob, ab pain, dysuria, diarrhea. Pt smokes. ROS  Review of Systems   Constitutional: Negative for activity change, chills and fever. HENT: Negative for ear pain and sore throat. Eyes: Negative for visual disturbance. Respiratory: Negative for cough and shortness of breath. Cardiovascular: Positive for chest pain. Negative for palpitations and leg swelling. Gastrointestinal: Negative for abdominal pain, diarrhea, nausea and vomiting. Genitourinary: Negative for dysuria. Musculoskeletal: Negative for back pain. Skin: Negative for rash. Neurological: Negative for dizziness and weakness. Except as noted above the remainder of the review of systems was reviewed and negative. PAST MEDICAL HISTORY     Past Medical History:   Diagnosis Date    Diabetes mellitus ([address]     Genital herpes     GERD (gastroesophageal reflux disease)     Hidradenitis suppurativa     Hypertension     Obesity affecting pregnancy, antepartum, third trimester          SURGICAL HISTORY       Past Surgical History:   Procedure Laterality Date    UPPER GASTROINTESTINAL ENDOSCOPY           CURRENTMEDICATIONS       Previous Medications    ALBUTEROL (PROVENTIL) (2.5 MG/3ML) 0.083% NEBULIZER SOLUTION    Take 3 mLs by nebulization every 6 hours as needed for Wheezing    ALBUTEROL SULFATE  (90 BASE) MCG/ACT INHALER    Inhale 2 puffs into the lungs every 4 hours as needed for Wheezing    BACITRACIN 500 UNIT/GM OINTMENT    Apply topically 2 times daily. BACITRACIN-POLYMYXIN B (POLYSPORIN) 500-73533 UNIT/GM OINTMENT    Apply topically 2 times daily. BLOOD GLUCOSE MONITOR STRIPS    Test 3 times a day & as needed for symptoms of irregular blood glucose. BUPROPION (WELLBUTRIN XL) 150 MG EXTENDED RELEASE TABLET    TAKE 1 TABLET BY MOUTH ONCE DAILY IN THE MORNING    CLOTRIMAZOLE (LOTRIMIN) 1 % CREAM    Apply topically 2 times daily to lesions on skin for 3-4 weeks or for 1 week after lesion clears    DEXTROMETHORPHAN-GUAIFENESIN  MG TB12    Take 1 tablet by mouth 2 times daily    DOXYLAMINE-PYRIDOXINE (DICLEGIS) 10-10 MG TBEC    Start: 2 tabs p.o. q.h.s.; if symptoms persist after 2 days increase to 1 tab p.o. q.a.m. and 2 tabs p.o. q.h.s.; may increase to 1 tab p.o. q.a.m., 1 tab p.o. q. midafternoon and 2 tabs p.o. q.h.s.  4 tabs per day. If unable to afford, instruct the patient about substituting the OTC components. FAMOTIDINE (PEPCID) 20 MG TABLET    Take 1 tablet by mouth daily    GABAPENTIN (NEURONTIN) 300 MG CAPSULE    Take 1 capsule by mouth 3 times daily for 7 days. Intended supply: 7 days. GLUCOSE MONITORING KIT (FREESTYLE) MONITORING KIT    1 kit by Does not apply route daily    IBUPROFEN (ADVIL;MOTRIN) 800 MG TABLET    Take 1 tablet by mouth every 8 hours as needed for Pain or Fever    INSULIN GLARGINE (LANTUS SOLOSTAR) 100 UNIT/ML INJECTION PEN    Inject 10 Units into the skin nightly    INSULIN LISPRO, 1 UNIT DIAL, (HUMALOG KWIKPEN) 100 UNIT/ML SOPN    Inject 5 Units into the skin 3 times daily (before meals)    INSULIN PEN NEEDLE 30G X 8 MM MISC    1 each by Does not apply route daily    IPRATROPIUM-ALBUTEROL (DUONEB) 0.5-2.5 (3) MG/3ML SOLN NEBULIZER SOLUTION    Inhale 3 mLs into the lungs every 6 hours as needed for Shortness of Breath    LANCETS MISC    DX: diabetes mellitus.  Use 1-3 time(s) daily - Ok to substitute per insurance (1 each = 1 box)    LIDOCAINE HCL (BURN RELIEF) 1 % GEL    Apply twice daily to affected areas for pain as needed    MUPIROCIN (BACTROBAN NASAL) 2 % NASAL OINTMENT    By Nasal route 2 times daily to remove bacteria x 10 days    NAPROXEN (NAPROSYN) 500 MG TABLET    Take 1 tablet by mouth 2 times daily for 20 doses    NYSTATIN (MYCOSTATIN) 294183 UNIT/GM POWDER    Apply topically 4 times daily. ZINC GLUCONATE 15 MG TABS    Take 3 tabs daily       ALLERGIES     Shellfish-derived products and Benzoyl peroxide    FAMILY HISTORY     History reviewed. No pertinent family history. SOCIAL HISTORY       Social History     Socioeconomic History    Marital status: Single     Spouse name: None    Number of children: None    Years of education: None    Highest education level: None   Occupational History    None   Tobacco Use    Smoking status: Current Every Day Smoker     Packs/day: 0.50     Types: Cigarettes, Cigars    Smokeless tobacco: Never Used   Vaping Use    Vaping Use: Never used   Substance and Sexual Activity    Alcohol use: Yes     Comment: occa    Drug use: No    Sexual activity: Yes     Partners: Male   Other Topics Concern    None   Social History Narrative    None     Social Determinants of Health     Financial Resource Strain:     Difficulty of Paying Living Expenses: Not on file   Food Insecurity:     Worried About Running Out of Food in the Last Year: Not on file    Minnie of Food in the Last Year: Not on file   Transportation Needs:     Lack of Transportation (Medical): Not on file    Lack of Transportation (Non-Medical):  Not on file   Physical Activity:     Days of Exercise per Week: Not on file    Minutes of Exercise per Session: Not on file   Stress:     Feeling of Stress : Not on file   Social Connections:     Frequency of Communication with Friends and Family: Not on file    Frequency of Social Gatherings with Friends and Family: Not on file    Attends Anabaptist Services: Not on file    Active Member of Clubs or Organizations: Not on file    Attends Club or Organization cessation x 10 minutes and advised about how smoking is making his condition worse. Pt and I discussed treatment options and pt wanted to try cold turkey to help with smoking cessation. Pt given prescription for mobic, given chest pain warning signs and will f/uw with pcp. Pt understands plan. FINAL IMPRESSION      1.  Chest pain, unspecified type          DISPOSITION/PLAN   DISPOSITION Decision To Discharge 12/26/2021 01:37:47 AM        DISCHARGE MEDICATIONS:  [unfilled]         Peg Desouza MD(electronically signed)  Attending Emergency Physician            Peg Desouza MD  12/26/21 7339

## 2021-12-27 LAB
EKG ATRIAL RATE: 74 BPM
EKG P AXIS: 47 DEGREES
EKG P-R INTERVAL: 154 MS
EKG Q-T INTERVAL: 344 MS
EKG QRS DURATION: 88 MS
EKG QTC CALCULATION (BAZETT): 381 MS
EKG R AXIS: 67 DEGREES
EKG T AXIS: 0 DEGREES
EKG VENTRICULAR RATE: 74 BPM

## 2021-12-27 PROCEDURE — 93010 ELECTROCARDIOGRAM REPORT: CPT | Performed by: INTERNAL MEDICINE

## 2022-01-15 VITALS
RESPIRATION RATE: 16 BRPM | TEMPERATURE: 98.6 F | OXYGEN SATURATION: 99 % | HEIGHT: 69 IN | SYSTOLIC BLOOD PRESSURE: 126 MMHG | WEIGHT: 278 LBS | DIASTOLIC BLOOD PRESSURE: 89 MMHG | BODY MASS INDEX: 41.18 KG/M2 | HEART RATE: 100 BPM

## 2022-01-15 PROCEDURE — 99283 EMERGENCY DEPT VISIT LOW MDM: CPT

## 2022-01-15 ASSESSMENT — PAIN SCALES - GENERAL: PAINLEVEL_OUTOF10: 8

## 2022-01-15 ASSESSMENT — PAIN DESCRIPTION - PAIN TYPE: TYPE: ACUTE PAIN

## 2022-01-15 ASSESSMENT — PAIN DESCRIPTION - LOCATION: LOCATION: ABDOMEN

## 2022-01-15 ASSESSMENT — PAIN DESCRIPTION - ORIENTATION: ORIENTATION: LOWER;RIGHT

## 2022-01-16 ENCOUNTER — HOSPITAL ENCOUNTER (EMERGENCY)
Age: 22
Discharge: HOME | End: 2022-01-16
Payer: COMMERCIAL

## 2022-01-16 DIAGNOSIS — L30.9 DERMATITIS: Primary | ICD-10-CM

## 2022-01-16 RX ORDER — FLUCONAZOLE 150 MG/1
150 TABLET ORAL ONCE
Qty: 1 TABLET | Refills: 0 | Status: SHIPPED | OUTPATIENT
Start: 2022-01-16 | End: 2022-01-16

## 2022-01-16 RX ORDER — NYSTATIN 100000 [USP'U]/G
POWDER TOPICAL
Qty: 60 G | Refills: 0 | Status: SHIPPED | OUTPATIENT
Start: 2022-01-16 | End: 2022-10-27

## 2022-01-16 RX ORDER — SULFAMETHOXAZOLE AND TRIMETHOPRIM 800; 160 MG/1; MG/1
1 TABLET ORAL 2 TIMES DAILY
Qty: 20 TABLET | Refills: 0 | Status: SHIPPED | OUTPATIENT
Start: 2022-01-16 | End: 2022-01-26

## 2022-01-16 ASSESSMENT — ENCOUNTER SYMPTOMS
ABDOMINAL DISTENTION: 0
COLOR CHANGE: 1
VOMITING: 0
VOICE CHANGE: 0
NAUSEA: 0
ANAL BLEEDING: 0
EYE DISCHARGE: 0
COUGH: 0
APNEA: 0

## 2022-01-16 NOTE — ED NOTES
Pt understands discharge instructions.   Pt instructed to follow up with PCP   Prescriptions explained   Pt told to come back for new or worsening symptoms  No further questions         Lit Dangelo RN  01/16/22 [id number]

## 2022-01-16 NOTE — ED TRIAGE NOTES
Patient presents to the er with complaints of right sided pain at c section scar  Some redness noted   C Section was 01/2020

## 2022-01-16 NOTE — ED PROVIDER NOTES
[address] [facility] ED  eMERGENCY dEPARTMENT eNCOUnter      Pt Name: Sara Drake  MRN: [id number]  [place] 2000  Date of evaluation: 1/15/2022  Provider: Javier Herrera, [address] [place] [address]       Chief Complaint   Patient presents with    Other     pain in c section scar states that it is draining, c section was 01/2020         HISTORY OF PRESENT ILLNESS   (Location/Symptom, Timing/Onset,Context/Setting, Quality, Duration, Modifying Factors, Severity)  Note limiting factors. Sara Drake is a 24 y.o. female who presents to the emergency department  Pain and rash at c section site. .  Pt notes and indentation at this location that has been present since her 2020 c section. She did see dr Reggie Maciel at [facility] and [facility]istry 2 days ago and hba1c was 'prediabetic' per pt report. Pt denies fever/n/v/d. HPI    NursingNotes were reviewed. REVIEW OF SYSTEMS    (2-9 systems for level 4, 10 or more for level 5)     Review of Systems   Constitutional: Negative for activity change, appetite change and unexpected weight change. HENT: Negative for ear discharge, nosebleeds and voice change. Eyes: Negative for discharge. Respiratory: Negative for apnea and cough. Cardiovascular: Negative for chest pain. Gastrointestinal: Negative for abdominal distention, anal bleeding, nausea and vomiting. Genitourinary: Negative for hematuria. Musculoskeletal: Negative for joint swelling. Skin: Positive for color change and rash. Negative for pallor and wound. Hematological: Does not bruise/bleed easily. All other systems reviewed and are negative. Except as noted above the remainder of the review of systems was reviewed and negative.        PAST MEDICAL HISTORY     Past Medical History:   Diagnosis Date    Diabetes mellitus ([address]     Genital herpes     GERD (gastroesophageal reflux disease)     Hidradenitis suppurativa     Hypertension     Obesity affecting pregnancy, antepartum, third trimester          SURGICALHISTORY       Past Surgical History:   Procedure Laterality Date    UPPER GASTROINTESTINAL ENDOSCOPY           CURRENT MEDICATIONS       Previous Medications    ALBUTEROL (PROVENTIL) (2.5 MG/3ML) 0.083% NEBULIZER SOLUTION    Take 3 mLs by nebulization every 6 hours as needed for Wheezing    ALBUTEROL SULFATE  (90 BASE) MCG/ACT INHALER    Inhale 2 puffs into the lungs every 4 hours as needed for Wheezing    BACITRACIN 500 UNIT/GM OINTMENT    Apply topically 2 times daily. BACITRACIN-POLYMYXIN B (POLYSPORIN) [id number] UNIT/GM OINTMENT    Apply topically 2 times daily. BLOOD GLUCOSE MONITOR STRIPS    Test 3 times a day & as needed for symptoms of irregular blood glucose. BUPROPION (WELLBUTRIN XL) 150 MG EXTENDED RELEASE TABLET    TAKE 1 TABLET BY MOUTH ONCE DAILY IN THE MORNING    CLOTRIMAZOLE (LOTRIMIN) 1 % CREAM    Apply topically 2 times daily to lesions on skin for 3-4 weeks or for 1 week after lesion clears    DEXTROMETHORPHAN-GUAIFENESIN  MG TB12    Take 1 tablet by mouth 2 times daily    DOXYLAMINE-PYRIDOXINE (DICLEGIS) 10-10 MG TBEC    Start: 2 tabs p.o. q.h.s.; if symptoms persist after 2 days increase to 1 tab p.o. q.a.m. and 2 tabs p.o. q.h.s.; may increase to 1 tab p.o. q.a.m., 1 tab p.o. q. midafternoon and 2 tabs p.o. q.h.s.  4 tabs per day. If unable to afford, instruct the patient about substituting the OTC components. FAMOTIDINE (PEPCID) 20 MG TABLET    Take 1 tablet by mouth daily    GABAPENTIN (NEURONTIN) 300 MG CAPSULE    Take 1 capsule by mouth 3 times daily for 7 days. Intended supply: 7 days.     GLUCOSE MONITORING KIT (FREESTYLE) MONITORING KIT    1 kit by Does not apply route daily    IBUPROFEN (ADVIL;MOTRIN) 800 MG TABLET    Take 1 tablet by mouth every 8 hours as needed for Pain or Fever    INSULIN GLARGINE (LANTUS SOLOSTAR) 100 UNIT/ML INJECTION PEN    Inject 10 Units into the skin nightly    INSULIN LISPRO, 1 UNIT DIAL, (HUMALOG KWIKPEN) 100 UNIT/ML SOPN    Inject 5 Units into the skin 3 times daily (before meals)    INSULIN PEN NEEDLE 30G X 8 MM MISC    1 each by Does not apply route daily    IPRATROPIUM-ALBUTEROL (DUONEB) 0.5-2.5 (3) MG/3ML SOLN NEBULIZER SOLUTION    Inhale 3 mLs into the lungs every 6 hours as needed for Shortness of Breath    LANCETS MISC    DX: diabetes mellitus. Use 1-3 time(s) daily - Ok to substitute per insurance (1 each = 1 box)    LIDOCAINE HCL (BURN RELIEF) 1 % GEL    Apply twice daily to affected areas for pain as needed    MELOXICAM (MOBIC) 15 MG TABLET    Take 1 tablet by mouth daily as needed for Pain    MUPIROCIN (BACTROBAN NASAL) 2 % NASAL OINTMENT    By Nasal route 2 times daily to remove bacteria x 10 days    NAPROXEN (NAPROSYN) 500 MG TABLET    Take 1 tablet by mouth 2 times daily for 20 doses    ZINC GLUCONATE 15 MG TABS    Take 3 tabs daily            Shellfish-derived products and Benzoyl peroxide    FAMILY HISTORY     History reviewed. No pertinent family history. SOCIAL HISTORY       Social History     Socioeconomic History    Marital status: Single     Spouse name: None    Number of children: None    Years of education: None    Highest education level: None   Occupational History    None   Tobacco Use    Smoking status: Current Every Day Smoker     Packs/day: 0.50     Types: Cigarettes, Cigars    Smokeless tobacco: Never Used   Vaping Use    Vaping Use: Never used   Substance and Sexual Activity    Alcohol use: Yes     Comment: occa    Drug use: No    Sexual activity: Yes     Partners: Male   Other Topics Concern    None   Social History Narrative    None     Social Determinants of Health     Financial Resource Strain:     Difficulty of Paying Living Expenses: Not on file   Food Insecurity:     Worried About Running Out of Food in the Last Year: Not on file    Minnie of Food in the Last Year: Not on file   Transportation Needs:     Lack of Transportation (Medical):  Not on file    Lack of Transportation (Non-Medical): Not on file   Physical Activity:     Days of Exercise per Week: Not on file    Minutes of Exercise per Session: Not on file   Stress:     Feeling of Stress : Not on file   Social Connections:     Frequency of Communication with Friends and Family: Not on file    Frequency of Social Gatherings with Friends and Family: Not on file    Attends Buddhist Services: Not on file    Active Member of [address] [place] [address] or Organizations: Not on file    Attends Club or Organization Meetings: Not on file    Marital Status: Not on file   Intimate Partner Violence:     Fear of Current or Ex-Partner: Not on file    Emotionally Abused: Not on file    Physically Abused: Not on file    Sexually Abused: Not on file   Housing Stability:     Unable to Pay for Housing in the Last Year: Not on file    Number of [place] in the Last Year: Not on file    Unstable Housing in the Last Year: Not on file       SCREENINGS   Ebola Virus Disease (EVD) Screening   Temp: 98.6 °F (37 °C)  CIWA Assessment  BP: 126/89  Pulse: 100    PHYSICAL EXAM    (up to 7 for level 4, 8 or more for level 5)     ED Triage Vitals [01/15/22 2334]   BP Temp Temp Source Pulse Resp SpO2 Height Weight   126/89 98.6 °F (37 °C) Oral 100 16 99 % 5' 9\" (1.753 m) 278 lb (126.1 kg)       Physical Exam  Vitals and nursing note reviewed. Constitutional:       General: She is not in acute distress. Appearance: She is well-developed. HENT:      Head: Normocephalic and atraumatic. Right Ear: External ear normal.      Left Ear: External ear normal.      Nose: Nose normal.      Mouth/Throat:      Mouth: Mucous membranes are moist.   Eyes:      General:         Right eye: No discharge. Left eye: No discharge. Pupils: Pupils are equal, round, and reactive to light. Cardiovascular:      Rate and Rhythm: Normal rate and regular rhythm. Heart sounds: Normal heart sounds.    Pulmonary:      Effort: Pulmonary effort is normal. No respiratory distress. Breath sounds: Normal breath sounds. No stridor. Abdominal:      General: Bowel sounds are normal. There is no distension. Palpations: Abdomen is soft. Musculoskeletal:         General: Normal range of motion. Cervical back: Normal range of motion and neck supple. Skin:     General: Skin is warm. Findings: Erythema and rash present. Neurological:      Mental Status: She is alert and oriented to person, place, and time. Psychiatric:         Mood and Affect: Mood normal.         RESULTS     EKG: All EKG's are interpreted by the Emergency Department Physician who either signs or Co-signsthis chart in the absence of a cardiologist.         RADIOLOGY:   Dimple Hung such as CT, Ultrasound and MRI are read by the radiologist. Plain radiographic images are visualized and preliminarily interpreted by the emergency physician with the below findings:         Interpretation per the Radiologist below, if available at the time ofthis note:    No orders to display         ED BEDSIDE ULTRASOUND:   Performed by ED Physician - none    LABS:  Labs Reviewed - No data to display    All other labs were within normal range or not returned as of this dictation. EMERGENCY DEPARTMENT COURSE and DIFFERENTIAL DIAGNOSIS/MDM:   Vitals:    Vitals:    01/15/22 2334   BP: 126/89   Pulse: 100   Resp: 16   Temp: 98.6 °F (37 °C)   TempSrc: Oral   SpO2: 99%   Weight: 278 lb (126.1 kg)   Height: 5' 9\" (1.753 m)            MDM  Number of Diagnoses or Management Options  Diagnosis management comments: Pt has hx abscess/skin infection. She has good a1c per her report. rash appears to be intertrigionous. Will tx abx and nystatin powder and pt to follo wup  With primary care/dr alarcon. Pt requests diflucan. CRITICAL CARE TIME     CONSULTS:  None    PROCEDURES:  Unless otherwise noted below, none     Procedures    FINAL IMPRESSION      1.  Dermatitis DISPOSITION/PLAN   DISPOSITION Decision To Discharge 01/16/2022 01:01:56 AM      PATIENT REFERRED TO:  Rudy Vivar, SHAQUILLE - NP  [address] [place] [address]  [phone]    Call in 1 day      [facility]) ED  [address] [address]  [phone]    If symptoms worsen      DISCHARGE MEDICATIONS:  New Prescriptions    FLUCONAZOLE (DIFLUCAN) 150 MG TABLET    Take 1 tablet by mouth once for 1 dose    NYSTATIN (MYCOSTATIN) 367271 UNIT/GM POWDER    Apply topically 4 times daily.     SULFAMETHOXAZOLE-TRIMETHOPRIM (BACTRIM DS) 800-160 MG PER TABLET    Take 1 tablet by mouth 2 times daily for 10 days          (Please note that portions of this note were completed with a voice recognition program.  Efforts were made to edit the dictations but occasionally words are mis-transcribed.)    Jose Angel Elizabeth PA-C (electronically signed)  Attending Emergency Physician       Jose Angel Elizabeth PA-C  01/16/22 0116       Jose Angel Elizabeth PA-C  01/16/22 0116

## 2022-04-17 ENCOUNTER — HOSPITAL ENCOUNTER (EMERGENCY)
Age: 22
Discharge: HOME | End: 2022-04-17
Payer: COMMERCIAL

## 2022-04-17 ENCOUNTER — APPOINTMENT (OUTPATIENT)
Dept: GENERAL RADIOLOGY | Age: 22
End: 2022-04-17
Payer: COMMERCIAL

## 2022-04-17 VITALS
DIASTOLIC BLOOD PRESSURE: 128 MMHG | HEIGHT: 69 IN | SYSTOLIC BLOOD PRESSURE: 244 MMHG | TEMPERATURE: 98.3 F | BODY MASS INDEX: 41.47 KG/M2 | HEART RATE: 77 BPM | RESPIRATION RATE: 16 BRPM | WEIGHT: 280 LBS | OXYGEN SATURATION: 98 %

## 2022-04-17 DIAGNOSIS — S39.012A STRAIN OF LUMBAR REGION, INITIAL ENCOUNTER: Primary | ICD-10-CM

## 2022-04-17 LAB
HCG, URINE, POC: NEGATIVE
Lab: NORMAL
NEGATIVE QC PASS/FAIL: NORMAL
POSITIVE QC PASS/FAIL: NORMAL

## 2022-04-17 PROCEDURE — 6360000002 HC RX W HCPCS: Performed by: STUDENT IN AN ORGANIZED HEALTH CARE EDUCATION/TRAINING PROGRAM

## 2022-04-17 PROCEDURE — 99283 EMERGENCY DEPT VISIT LOW MDM: CPT

## 2022-04-17 PROCEDURE — 72110 X-RAY EXAM L-2 SPINE 4/>VWS: CPT

## 2022-04-17 PROCEDURE — 6370000000 HC RX 637 (ALT 250 FOR IP): Performed by: STUDENT IN AN ORGANIZED HEALTH CARE EDUCATION/TRAINING PROGRAM

## 2022-04-17 PROCEDURE — 96372 THER/PROPH/DIAG INJ SC/IM: CPT

## 2022-04-17 RX ORDER — KETOROLAC TROMETHAMINE 30 MG/ML
30 INJECTION, SOLUTION INTRAMUSCULAR; INTRAVENOUS ONCE
Status: COMPLETED | OUTPATIENT
Start: 2022-04-17 | End: 2022-04-17

## 2022-04-17 RX ORDER — IBUPROFEN 800 MG/1
800 TABLET ORAL 2 TIMES DAILY PRN
Qty: 30 TABLET | Refills: 0 | Status: SHIPPED | OUTPATIENT
Start: 2022-04-17 | End: 2022-10-27

## 2022-04-17 RX ORDER — FAMOTIDINE 20 MG/1
20 TABLET, FILM COATED ORAL 2 TIMES DAILY
Qty: 60 TABLET | Refills: 0 | Status: SHIPPED | OUTPATIENT
Start: 2022-04-17 | End: 2022-10-27

## 2022-04-17 RX ORDER — HYDROCODONE BITARTRATE AND ACETAMINOPHEN 5; 325 MG/1; MG/1
1 TABLET ORAL EVERY 6 HOURS PRN
Qty: 10 TABLET | Refills: 0 | Status: SHIPPED | OUTPATIENT
Start: 2022-04-17 | End: 2022-04-20

## 2022-04-17 RX ORDER — CYCLOBENZAPRINE HCL 10 MG
10 TABLET ORAL ONCE
Status: COMPLETED | OUTPATIENT
Start: 2022-04-17 | End: 2022-04-17

## 2022-04-17 RX ADMIN — KETOROLAC TROMETHAMINE 30 MG: 30 INJECTION, SOLUTION INTRAMUSCULAR at 13:42

## 2022-04-17 RX ADMIN — CYCLOBENZAPRINE 10 MG: 10 TABLET, FILM COATED ORAL at 13:42

## 2022-04-17 ASSESSMENT — PAIN DESCRIPTION - LOCATION: LOCATION: BACK

## 2022-04-17 ASSESSMENT — ENCOUNTER SYMPTOMS
SORE THROAT: 0
SHORTNESS OF BREATH: 0
CHEST TIGHTNESS: 0
NAUSEA: 0
BACK PAIN: 1
DIARRHEA: 0
EYE PAIN: 0

## 2022-04-17 ASSESSMENT — PAIN SCALES - GENERAL
PAINLEVEL_OUTOF10: 5
PAINLEVEL_OUTOF10: 9

## 2022-04-17 ASSESSMENT — PAIN DESCRIPTION - PAIN TYPE: TYPE: ACUTE PAIN

## 2022-04-17 ASSESSMENT — PAIN DESCRIPTION - DESCRIPTORS: DESCRIPTORS: ACHING

## 2022-04-17 ASSESSMENT — PAIN - FUNCTIONAL ASSESSMENT: PAIN_FUNCTIONAL_ASSESSMENT: 0-10

## 2022-04-17 NOTE — ED PROVIDER NOTES
[address] [facility] ED  eMERGENCYdEPARTMENT eNCOUnter      Pt Name: Josesito Alexander  MRN: [id number]  [place] [address]  Date of evaluation: 4/17/2022  Provider:Lucian Dietz PA-C    CHIEF COMPLAINT           HPI  Josesito Alexander is a 25 y.o. female per chart review has a h/o obesity, nicotine dependence presents with low back pain. Patient reports gradual onset, severe, sharp, radiating down the left side left hip and back pain that began a few days ago without injury. Patient states she has had this pain before and she thinks it is sciatica. Patient denies abdominal pain, chest pain, shortness of breath, lightheadedness, fever, chills. ROS  Review of Systems   Constitutional: Negative for chills, fatigue and fever. HENT: Negative for ear pain, hearing loss and sore throat. Eyes: Negative for pain and visual disturbance. Respiratory: Negative for chest tightness and shortness of breath. Cardiovascular: Negative for chest pain. Gastrointestinal: Negative for diarrhea and nausea. Endocrine: Negative for cold intolerance. Genitourinary: Negative for hematuria. Musculoskeletal: Positive for back pain. Skin: Negative for rash and wound. Neurological: Negative for dizziness and headaches. Psychiatric/Behavioral: Negative for behavioral problems and confusion. Except as noted above the remainder of the review of systems was reviewed and negative.        PAST MEDICAL HISTORY     Past Medical History:   Diagnosis Date    Diabetes mellitus ([address]     Genital herpes     GERD (gastroesophageal reflux disease)     Hidradenitis suppurativa     Hypertension     Obesity affecting pregnancy, antepartum, third trimester          SURGICAL HISTORY       Past Surgical History:   Procedure Laterality Date    UPPER GASTROINTESTINAL ENDOSCOPY           CURRENTMEDICATIONS       Discharge Medication List as of 4/17/2022  2:34 PM      CONTINUE these medications which have NOT CHANGED    Details nystatin (MYCOSTATIN) 484967 UNIT/GM powder Apply topically 4 times daily. , Disp-60 g, R-0, Print      meloxicam (MOBIC) 15 MG tablet Take 1 tablet by mouth daily as needed for Pain, Disp-90 tablet, R-1Print      mupirocin (BACTROBAN NASAL) 2 % nasal ointment By Nasal route 2 times daily to remove bacteria x 10 days, Disp-1 g, R-0, Normal      Dextromethorphan-guaiFENesin  MG TB12 Take 1 tablet by mouth 2 times daily, Disp-28 tablet, R-0Normal      Lidocaine HCl (BURN RELIEF) 1 % GEL Apply twice daily to affected areas for pain as needed, Disp-226 g, R-0Print      naproxen (NAPROSYN) 500 MG tablet Take 1 tablet by mouth 2 times daily for 20 doses, Disp-20 tablet, R-0Normal      albuterol sulfate  (90 Base) MCG/ACT inhaler Inhale 2 puffs into the lungs every 4 hours as needed for Wheezing, Disp-18 g, R-1Normal      Insulin Pen Needle 30G X 8 MM MISC DAILY Starting Sun 3/7/2021, Disp-100 each, R-0, Print      blood glucose monitor strips Test 3 times a day & as needed for symptoms of irregular blood glucose., Disp-100 strip, R-0, Normal      insulin glargine (LANTUS SOLOSTAR) 100 UNIT/ML injection pen Inject 10 Units into the skin nightly, Disp-5 pen, R-0Normal      insulin lispro, 1 Unit Dial, (HUMALOG KWIKPEN) 100 UNIT/ML SOPN Inject 5 Units into the skin 3 times daily (before meals), Disp-1 pen, R-5Normal      bacitracin 500 UNIT/GM ointment Apply topically 2 times daily. , Disp-1 Tube, R-1, Print      bacitracin-polymyxin b (POLYSPORIN) 500-35853 UNIT/GM ointment Apply topically 2 times daily. , Disp-1 Tube,R-0, Print      gabapentin (NEURONTIN) 300 MG capsule Take 1 capsule by mouth 3 times daily for 7 days. Intended supply: 7 days. , Disp-21 capsule,R-0Normal      ipratropium-albuterol (DUONEB) 0.5-2.5 (3) MG/3ML SOLN nebulizer solution Inhale 3 mLs into the lungs every 6 hours as needed for Shortness of Breath, Disp-360 mL,R-0Normal      albuterol (PROVENTIL) (2.5 MG/3ML) 0.083% nebulizer solution Take 3 mLs by nebulization every 6 hours as needed for Wheezing, Disp-120 each,R-1Print      clotrimazole (LOTRIMIN) 1 % cream Apply topically 2 times daily to lesions on skin for 3-4 weeks or for 1 week after lesion clears, Disp-1 Tube, R-0, Print      glucose monitoring kit (FREESTYLE) monitoring kit DAILY Starting Thu 7/2/2020, Disp-1 kit, R-0, Print      Zinc Gluconate 15 MG TABS Take 3 tabs daily, Disp-30 tablet, R-0Normal      Lancets MISC Disp-1 each, R-5, NormalDX: diabetes mellitus. Use 1-3 time(s) daily - Ok to substitute per insurance (1 each = 1 box)      buPROPion (WELLBUTRIN XL) 150 MG extended release tablet TAKE 1 TABLET BY MOUTH ONCE DAILY IN THE MORNINGHistorical Med      doxylamine-pyridoxine (DICLEGIS) 10-10 MG TBEC Start: 2 tabs p.o. q.h.s.; if symptoms persist after 2 days increase to 1 tab p.o. q.a.m. and 2 tabs p.o. q.h.s.; may increase to 1 tab p.o. q.a.m., 1 tab p.o. q. midafternoon and 2 tabs p.o. q.h.s.  4 tabs per day. If unable to afford, instruct the vipin ent about substituting the OTC components. , Disp-60 tablet, R-1Print             ALLERGIES     Shellfish-derived products and Benzoyl peroxide    FAMILY HISTORY     History reviewed. No pertinent family history.        SOCIAL HISTORY       Social History     Socioeconomic History    Marital status: Single     Spouse name: None    Number of children: None    Years of education: None    Highest education level: None   Occupational History    None   Tobacco Use    Smoking status: Current Every Day Smoker     Packs/day: 0.50     Types: Cigarettes, Cigars    Smokeless tobacco: Never Used   Vaping Use    Vaping Use: Never used   Substance and Sexual Activity    Alcohol use: Yes     Comment: occa    Drug use: No    Sexual activity: Yes     Partners: Male   Other Topics Concern    None   Social History Narrative    None     Social Determinants of Health     Financial Resource Strain:     Difficulty of Paying Living Expenses: Not on file   Food Insecurity:     Worried About [address] [employer] in the Last Year: Not on file    Minnie of Food in the Last Year: Not on file   Transportation Needs:     Lack of Transportation (Medical): Not on file    Lack of Transportation (Non-Medical): Not on file   Physical Activity:     Days of Exercise per Week: Not on file    Minutes of Exercise per Session: Not on file   Stress:     Feeling of Stress : Not on file   Social Connections:     Frequency of Communication with Friends and Family: Not on file    Frequency of Social Gatherings with Friends and Family: Not on file    Attends Roman Catholic Services: Not on file    Active Member of [address] [place] [address] [employer] or Organizations: Not on file    Attends Club or Organization Meetings: Not on file    Marital Status: Not on file   Intimate Partner Violence:     Fear of Current or Ex-Partner: Not on file    Emotionally Abused: Not on file    Physically Abused: Not on file    Sexually Abused: Not on file   Housing Stability:     Unable to Pay for Housing in the Last Year: Not on file    Number of [place] in the Last Year: Not on file    Unstable Housing in the Last Year: Not on file         PHYSICAL EXAM       ED Triage Vitals [04/17/22 1326]   BP Temp Temp Source Pulse Resp SpO2 Height Weight   (!) 244/128 98.3 °F (36.8 °C) Tympanic 77 16 98 % 5' 9\" (1.753 m) 280 lb (127 kg)       Physical Exam  Constitutional:       Appearance: Normal appearance. HENT:      Head: Normocephalic and atraumatic. Right Ear: External ear normal.      Left Ear: External ear normal.      Nose: Nose normal.      Mouth/Throat:      Mouth: Mucous membranes are moist.   Eyes:      Extraocular Movements: Extraocular movements intact. Conjunctiva/sclera: Conjunctivae normal.   Cardiovascular:      Rate and Rhythm: Normal rate and regular rhythm. Heart sounds: Normal heart sounds.    Pulmonary:      Effort: Pulmonary effort is normal.      Breath sounds: Normal breath sounds. No stridor. No wheezing or rhonchi. Abdominal:      Palpations: Abdomen is soft. Tenderness: There is no abdominal tenderness. Musculoskeletal:         General: Normal range of motion. Cervical back: Normal range of motion and neck supple. No tenderness. Legs:       Comments: Pain with back flexion, palpation. Reflexes equal bilaterally, strength full in left and right legs, pedal pulses equal bilaterally   Skin:     General: Skin is warm and dry. Neurological:      General: No focal deficit present. Mental Status: She is alert and oriented to person, place, and time. Psychiatric:         Mood and Affect: Mood normal.         Behavior: Behavior normal.           MDM  This is a 66-year-old female presenting with back pain. Patient is afebrile and has a blood pressure of 244/128 in triage. Patient given IM Toradol, p.o. Flexeril. Lumbar x-ray negative for fracture. Patient given IM Toradol, p.o. Flexeril. Patient reevaluated and is not improved. Yadira sciatica due to the radiating pain. Recommend patient follow-up with orthopedics, use diet and exercise for weight loss, and apply heating and topical NSAIDs as needed. Blood pressure retaken and systolic is in the 444L. Patient discharged with small course of pain control medications. She is agreeable to this plan. She will return if symptoms change or worsen. FINAL IMPRESSION      1.  Strain of lumbar region, initial encounter          DISPOSITION/PLAN   DISPOSITION Decision To Discharge 04/17/2022 02:30:45 PM        DISCHARGE MEDICATIONS:  [unfilled]         Shalom Morris PA-C(electronically signed)  Attending Emergency Physician           Shalom Morris PA-C  04/18/22 [id number]

## 2022-05-23 ENCOUNTER — APPOINTMENT (OUTPATIENT)
Dept: GENERAL RADIOLOGY | Age: 22
End: 2022-05-23
Payer: COMMERCIAL

## 2022-05-23 ENCOUNTER — HOSPITAL ENCOUNTER (EMERGENCY)
Age: 22
Discharge: LEFT BEFORE DISCHARGE | End: 2022-05-23
Attending: EMERGENCY MEDICINE
Payer: COMMERCIAL

## 2022-05-23 VITALS
BODY MASS INDEX: 42.44 KG/M2 | RESPIRATION RATE: 18 BRPM | SYSTOLIC BLOOD PRESSURE: 142 MMHG | HEIGHT: 68 IN | WEIGHT: 280 LBS | HEART RATE: 79 BPM | DIASTOLIC BLOOD PRESSURE: 77 MMHG | OXYGEN SATURATION: 98 % | TEMPERATURE: 99.1 F

## 2022-05-23 DIAGNOSIS — R07.9 CHEST PAIN, UNSPECIFIED TYPE: Primary | ICD-10-CM

## 2022-05-23 LAB
ANION GAP SERPL CALCULATED.3IONS-SCNC: 15 MEQ/L (ref 9–15)
BASOPHILS ABSOLUTE: 0.1 K/UL (ref 0–0.2)
BASOPHILS RELATIVE PERCENT: 0.7 %
BUN BLDV-MCNC: 14 MG/DL (ref 6–20)
CALCIUM SERPL-MCNC: 9.1 MG/DL (ref 8.5–9.9)
CHLORIDE BLD-SCNC: 98 MEQ/L (ref 95–107)
CO2: 19 MEQ/L (ref 20–31)
CREAT SERPL-MCNC: 0.68 MG/DL (ref 0.5–0.9)
EKG ATRIAL RATE: 87 BPM
EKG P AXIS: 46 DEGREES
EKG P-R INTERVAL: 192 MS
EKG Q-T INTERVAL: 354 MS
EKG QRS DURATION: 92 MS
EKG QTC CALCULATION (BAZETT): 425 MS
EKG R AXIS: 55 DEGREES
EKG T AXIS: 19 DEGREES
EKG VENTRICULAR RATE: 87 BPM
EOSINOPHILS ABSOLUTE: 0.3 K/UL (ref 0–0.7)
EOSINOPHILS RELATIVE PERCENT: 3.2 %
GFR AFRICAN AMERICAN: >60
GFR NON-AFRICAN AMERICAN: >60
GLUCOSE BLD-MCNC: 418 MG/DL (ref 70–99)
HCT VFR BLD CALC: 41.6 % (ref 37–47)
HEMOGLOBIN: 14.3 G/DL (ref 12–16)
LYMPHOCYTES ABSOLUTE: 2.9 K/UL (ref 1–4.8)
LYMPHOCYTES RELATIVE PERCENT: 35.8 %
MCH RBC QN AUTO: 29.2 PG (ref 27–31.3)
MCHC RBC AUTO-ENTMCNC: 34.4 % (ref 33–37)
MCV RBC AUTO: 85 FL (ref 82–100)
MONOCYTES ABSOLUTE: 0.6 K/UL (ref 0.2–0.8)
MONOCYTES RELATIVE PERCENT: 7.6 %
NEUTROPHILS ABSOLUTE: 4.2 K/UL (ref 1.4–6.5)
NEUTROPHILS RELATIVE PERCENT: 52.7 %
PDW BLD-RTO: 12.7 % (ref 11.5–14.5)
PLATELET # BLD: 240 K/UL (ref 130–400)
POTASSIUM REFLEX MAGNESIUM: 4.1 MEQ/L (ref 3.4–4.9)
RBC # BLD: 4.9 M/UL (ref 4.2–5.4)
SODIUM BLD-SCNC: 132 MEQ/L (ref 135–144)
TROPONIN: <0.01 NG/ML (ref 0–0.01)
WBC # BLD: 8 K/UL (ref 4.8–10.8)

## 2022-05-23 PROCEDURE — 84484 ASSAY OF TROPONIN QUANT: CPT

## 2022-05-23 PROCEDURE — 80048 BASIC METABOLIC PNL TOTAL CA: CPT

## 2022-05-23 PROCEDURE — 96374 THER/PROPH/DIAG INJ IV PUSH: CPT

## 2022-05-23 PROCEDURE — 2580000003 HC RX 258: Performed by: EMERGENCY MEDICINE

## 2022-05-23 PROCEDURE — 36415 COLL VENOUS BLD VENIPUNCTURE: CPT

## 2022-05-23 PROCEDURE — 99285 EMERGENCY DEPT VISIT HI MDM: CPT

## 2022-05-23 PROCEDURE — 6360000002 HC RX W HCPCS: Performed by: EMERGENCY MEDICINE

## 2022-05-23 PROCEDURE — 85025 COMPLETE CBC W/AUTO DIFF WBC: CPT

## 2022-05-23 PROCEDURE — 96372 THER/PROPH/DIAG INJ SC/IM: CPT

## 2022-05-23 PROCEDURE — 93005 ELECTROCARDIOGRAM TRACING: CPT | Performed by: EMERGENCY MEDICINE

## 2022-05-23 PROCEDURE — 71046 X-RAY EXAM CHEST 2 VIEWS: CPT

## 2022-05-23 PROCEDURE — 6370000000 HC RX 637 (ALT 250 FOR IP): Performed by: EMERGENCY MEDICINE

## 2022-05-23 RX ORDER — KETOROLAC TROMETHAMINE 10 MG/1
10 TABLET, FILM COATED ORAL EVERY 6 HOURS PRN
Qty: 20 TABLET | Refills: 0 | Status: SHIPPED | OUTPATIENT
Start: 2022-05-23 | End: 2022-10-27

## 2022-05-23 RX ORDER — KETOROLAC TROMETHAMINE 15 MG/ML
15 INJECTION, SOLUTION INTRAMUSCULAR; INTRAVENOUS ONCE
Status: COMPLETED | OUTPATIENT
Start: 2022-05-23 | End: 2022-05-23

## 2022-05-23 RX ORDER — LIDOCAINE 50 MG/G
1 PATCH TOPICAL DAILY
Qty: 30 PATCH | Refills: 0 | Status: SHIPPED | OUTPATIENT
Start: 2022-05-23 | End: 2022-06-22

## 2022-05-23 RX ORDER — 0.9 % SODIUM CHLORIDE 0.9 %
500 INTRAVENOUS SOLUTION INTRAVENOUS ONCE
Status: COMPLETED | OUTPATIENT
Start: 2022-05-23 | End: 2022-05-23

## 2022-05-23 RX ADMIN — INSULIN HUMAN 8 UNITS: 100 INJECTION, SOLUTION PARENTERAL at 04:37

## 2022-05-23 RX ADMIN — KETOROLAC TROMETHAMINE 15 MG: 15 INJECTION, SOLUTION INTRAMUSCULAR; INTRAVENOUS at 03:17

## 2022-05-23 RX ADMIN — SODIUM CHLORIDE 500 ML: 9 INJECTION, SOLUTION INTRAVENOUS at 04:34

## 2022-05-23 ASSESSMENT — PAIN - FUNCTIONAL ASSESSMENT: PAIN_FUNCTIONAL_ASSESSMENT: 0-10

## 2022-05-23 ASSESSMENT — ENCOUNTER SYMPTOMS
SHORTNESS OF BREATH: 0
VOMITING: 0

## 2022-05-23 ASSESSMENT — PAIN SCALES - GENERAL: PAINLEVEL_OUTOF10: 8

## 2022-05-23 ASSESSMENT — HEART SCORE: ECG: 1

## 2022-05-23 NOTE — PROGRESS NOTES
0430: Informed Dr. Any Angel that patient reported that she was not here to be seen for her diabetes, but came for chest pain. Patient states she does not want fluids and insulin for her blood sugar. Dr. Any Angel made aware and informed that patient wanted to speak with him.     0500: Patient requesting to leave. AMA paper discussed. Patient does not want to sign, states she will wait for her discharge instructions. Dr. Any Angel made aware. 0510: Dr. Any Angel in room to speak with patient about test results. Patient not present in room, suspected to have eloped without paperwork.

## 2022-05-23 NOTE — ED TRIAGE NOTES
midsternal chest pain that radiates to the left, started at 2200 tonight will laying down and has not improved, denies family history, nothing makes it better or worse, ate dinner at 2100, denies N/V

## 2022-05-23 NOTE — ED PROVIDER NOTES
[facility] ED  EMERGENCY DEPARTMENT ENCOUNTER      Pt Name: Ashley Mijares  MRN: [id number]  [place] 2000  Date of evaluation: 5/23/2022  Provider: Kehinde Lazaro [id number]       Chief Complaint   Patient presents with    Chest Pain         HISTORY OF PRESENT ILLNESS   (Location/Symptom, Timing/Onset, Context/Setting, Quality, Duration, Modifying Factors, Severity)  Note limiting factors. Ashley Mijares is a 25 y.o. female who presents to the emergency department for evaluation of chest pain. History of obesity, DM II, HTN, tobacco dependence. She says that since 2200 she was having central chest pressure/tightness. This is nonexertional, nonradiating, reproducible with no associated vomiting, diaphoresis, neck or jaw pain, shortness of breath, abdominal pain, back pain, calf pain or swelling. No recent surgeries, history of DVT or PE, hemoptysis or hormone use. HPI    Nursing Notes were reviewed. REVIEW OF SYSTEMS    (2-9 systems for level 4, 10 or more for level 5)     Review of Systems   Constitutional: Negative for fever. Chest tightness/pressure   Respiratory: Negative for shortness of breath. Cardiovascular: Negative for leg swelling. Gastrointestinal: Negative for vomiting. All other systems reviewed and are negative. Except as noted above the remainder of the review of systems was reviewed and negative.        PAST MEDICAL HISTORY     Past Medical History:   Diagnosis Date    Diabetes mellitus ([address]     Genital herpes     GERD (gastroesophageal reflux disease)     Hidradenitis suppurativa     Hypertension     Obesity affecting pregnancy, antepartum, third trimester          SURGICAL HISTORY       Past Surgical History:   Procedure Laterality Date    UPPER GASTROINTESTINAL ENDOSCOPY           CURRENT MEDICATIONS       Previous Medications    ALBUTEROL (PROVENTIL) (2.5 MG/3ML) 0.083% NEBULIZER SOLUTION    Take 3 mLs by nebulization every 6 hours as needed for Wheezing    ALBUTEROL SULFATE  (90 BASE) MCG/ACT INHALER    Inhale 2 puffs into the lungs every 4 hours as needed for Wheezing    BACITRACIN 500 UNIT/GM OINTMENT    Apply topically 2 times daily. BACITRACIN-POLYMYXIN B (POLYSPORIN) 500-52488 UNIT/GM OINTMENT    Apply topically 2 times daily. BLOOD GLUCOSE MONITOR STRIPS    Test 3 times a day & as needed for symptoms of irregular blood glucose. BUPROPION (WELLBUTRIN XL) 150 MG EXTENDED RELEASE TABLET    TAKE 1 TABLET BY MOUTH ONCE DAILY IN THE MORNING    CLOTRIMAZOLE (LOTRIMIN) 1 % CREAM    Apply topically 2 times daily to lesions on skin for 3-4 weeks or for 1 week after lesion clears    DEXTROMETHORPHAN-GUAIFENESIN  MG TB12    Take 1 tablet by mouth 2 times daily    DOXYLAMINE-PYRIDOXINE (DICLEGIS) 10-10 MG TBEC    Start: 2 tabs p.o. q.h.s.; if symptoms persist after 2 days increase to 1 tab p.o. q.a.m. and 2 tabs p.o. q.h.s.; may increase to 1 tab p.o. q.a.m., 1 tab p.o. q. midafternoon and 2 tabs p.o. q.h.s.  4 tabs per day. If unable to afford, instruct the patient about substituting the OTC components. FAMOTIDINE (PEPCID) 20 MG TABLET    Take 1 tablet by mouth 2 times daily    GABAPENTIN (NEURONTIN) 300 MG CAPSULE    Take 1 capsule by mouth 3 times daily for 7 days. Intended supply: 7 days.     GLUCOSE MONITORING KIT (FREESTYLE) MONITORING KIT    1 kit by Does not apply route daily    IBUPROFEN (ADVIL;MOTRIN) 800 MG TABLET    Take 1 tablet by mouth 2 times daily as needed for Pain    INSULIN GLARGINE (LANTUS SOLOSTAR) 100 UNIT/ML INJECTION PEN    Inject 10 Units into the skin nightly    INSULIN LISPRO, 1 UNIT DIAL, (HUMALOG KWIKPEN) 100 UNIT/ML SOPN    Inject 5 Units into the skin 3 times daily (before meals)    INSULIN PEN NEEDLE 30G X 8 MM MISC    1 each by Does not apply route daily    IPRATROPIUM-ALBUTEROL (DUONEB) 0.5-2.5 (3) MG/3ML SOLN NEBULIZER SOLUTION    Inhale 3 mLs into the lungs every 6 hours as needed for Shortness of Breath    LANCETS MISC    DX: diabetes mellitus. Use 1-3 time(s) daily - Ok to substitute per insurance (1 each = 1 box)    LIDOCAINE HCL (BURN RELIEF) 1 % GEL    Apply twice daily to affected areas for pain as needed    MELOXICAM (MOBIC) 15 MG TABLET    Take 1 tablet by mouth daily as needed for Pain    MUPIROCIN (BACTROBAN NASAL) 2 % NASAL OINTMENT    By Nasal route 2 times daily to remove bacteria x 10 days    NAPROXEN (NAPROSYN) 500 MG TABLET    Take 1 tablet by mouth 2 times daily for 20 doses    NYSTATIN (MYCOSTATIN) 929678 UNIT/GM POWDER    Apply topically 4 times daily. ZINC GLUCONATE 15 MG TABS    Take 3 tabs daily       ALLERGIES     Shellfish-derived products and Benzoyl peroxide    FAMILY HISTORY     History reviewed. No pertinent family history. SOCIAL HISTORY       Social History     Socioeconomic History    Marital status: Single     Spouse name: None    Number of children: None    Years of education: None    Highest education level: None   Occupational History    None   Tobacco Use    Smoking status: Current Every Day Smoker     Packs/day: 0.50     Types: Cigarettes, Cigars    Smokeless tobacco: Never Used   Vaping Use    Vaping Use: Never used   Substance and Sexual Activity    Alcohol use: Yes     Comment: occa    Drug use: No    Sexual activity: Yes     Partners: Male   Other Topics Concern    None   Social History Narrative    None     Social Determinants of Health     Financial Resource Strain:     Difficulty of Paying Living Expenses: Not on file   Food Insecurity:     Worried About Running Out of Food in the Last Year: Not on file    Minnie of Food in the Last Year: Not on file   Transportation Needs:     Lack of Transportation (Medical): Not on file    Lack of Transportation (Non-Medical):  Not on file   Physical Activity:     Days of Exercise per Week: Not on file    Minutes of Exercise per Session: Not on file   Stress:     Feeling of Stress : Not on file   Social Connections:     Frequency of Communication with Friends and Family: Not on file    Frequency of Social Gatherings with Friends and Family: Not on file    Attends Mandaen Services: Not on file    Active Member of Clubs or Organizations: Not on file    Attends Club or Organization Meetings: Not on file    Marital Status: Not on file   Intimate Partner Violence:     Fear of Current or Ex-Partner: Not on file    Emotionally Abused: Not on file    Physically Abused: Not on file    Sexually Abused: Not on file   Housing Stability:     Unable to Pay for Housing in the Last Year: Not on file    Number of [place] in the Last Year: Not on file    Unstable Housing in the Last Year: Not on file       SCREENINGS         [place] Coma Scale  Eye Opening: Spontaneous  Best Verbal Response: Oriented  Best Motor Response: Obeys commands  [place] Coma Scale Score: 15     Heart Score for chest pain patients  History: Slightly Suspicious  ECG: Non-Specifc repolarization disturbance/LBTB/PM  Patient Age: < 45 years  Risk Factors: > 3 Risk factors or history of atherosclerotic disease*  Troponin: < 1X normal limit  Heart Score Total: 3               CIWA Assessment  BP: (!) 142/77  Pulse: 79                 PHYSICAL EXAM    (up to 7 for level 4, 8 or more for level 5)     ED Triage Vitals [05/23/22 0131]   BP Temp Temp Source Pulse Resp SpO2 Height Weight   126/73 99.1 °F (37.3 °C) Oral 88 18 97 % 5' 8\" (1.727 m) 280 lb (127 kg)       Physical Exam  Constitutional:       General: She is not in acute distress. Appearance: She is obese. She is not toxic-appearing or diaphoretic. Comments: Left reproducible costochondral tenderness without overlying skin change   HENT:      Head: Normocephalic and atraumatic. Nose: Nose normal.      Mouth/Throat:      Mouth: Mucous membranes are moist.   Eyes:      General: No scleral icterus.   Neck:      Comments: No JVD  Cardiovascular:      Rate and Rhythm: Normal rate and regular rhythm. Pulses: Normal pulses. Pulmonary:      Effort: Pulmonary effort is normal.      Breath sounds: Normal breath sounds. Abdominal:      Tenderness: There is no abdominal tenderness. There is no guarding or rebound. Musculoskeletal:      Right lower leg: No edema. Left lower leg: No edema. Skin:     Findings: No rash. Neurological:      Mental Status: She is alert and oriented to person, place, and time. Psychiatric:         Mood and Affect: Mood normal.         DIAGNOSTIC RESULTS     EKG: All EKG's are interpreted by the Emergency Department Physician who either signs or Co-signs this chart in the absence of a cardiologist.    Normal sinus rhythm, rate 87, normal axis, normal intervals, , nonspecific ST and T wave change, no delta wave/WPW, no STEMI    RADIOLOGY:   Non-plain film images such as CT, Ultrasound and MRI are read by the radiologist. Plain radiographic images are visualized and preliminarily interpreted by the emergency physician with the below findings:    No cardiomegaly, no pneumothorax, no focal consolidation    Interpretation per the Radiologist below, if available at the time of this note:    XR CHEST (2 VW)    (Results Pending)         ED BEDSIDE ULTRASOUND:   Performed by ED Physician - none    LABS:  Labs Reviewed   BASIC METABOLIC PANEL W/ REFLEX TO MG FOR LOW K - Abnormal; Notable for the following components:       Result Value    Sodium 132 (*)     CO2 19 (*)     Glucose 418 (*)     All other components within normal limits    Narrative:     Nazia PATEL tel. [phone],  Glu results called to and read back by BUDDY Landaverde, 05/23/2022 04:07, by [facility]   CBC WITH AUTO DIFFERENTIAL   TROPONIN       All other labs were within normal range or not returned as of this dictation.     EMERGENCY DEPARTMENT COURSE and DIFFERENTIAL DIAGNOSIS/MDM:   Vitals:    Vitals:    05/23/22 0131 05/23/22 0420   BP: 126/73 (!) 142/77   Pulse: 88 79 Resp: 18 18   Temp: 99.1 °F (37.3 °C)    TempSrc: Oral    SpO2: 97% 98%   Weight: 280 lb (127 kg)    Height: 5' 8\" (1.727 m)        Hyperglycemia without significant acidemia or anion gap concerning for DKA    MDM  PERC-  Low HEART score for adverse outcome  Troponin within normal limits    REASSESSMENT      Patient had some symptom improvement with Toradol, possibly musculoskeletal, of note I was planning on giving her IV fluids, monitoring her blood glucose however she states nursing staff that she is not here for that, she says that her boyfriend and child are in the car and she requested discharge, I was unable to updated her on her results however she had left prior to completion of her labs    CONSULTS:  None    PROCEDURES:  Unless otherwise noted below, none     Procedures    FINAL IMPRESSION      1. Chest pain, unspecified type          DISPOSITION/PLAN   DISPOSITION        PATIENT REFERRED TO:  No follow-up provider specified. DISCHARGE MEDICATIONS:  New Prescriptions    KETOROLAC (TORADOL) 10 MG TABLET    Take 1 tablet by mouth every 6 hours as needed for Pain    LIDOCAINE (LIDODERM) 5 %    Place 1 patch onto the skin daily 12 hours on, 12 hours off. Controlled Substances Monitoring:     No flowsheet data found.     (Please note that portions of this note were completed with a voice recognition program.  Efforts were made to edit the dictations but occasionally words are mis-transcribed.)    Adri Leger MD (electronically signed)  Attending Emergency Physician           Adri Leger MD  05/23/22 Trego Oostdavid Farley MD  05/23/22 [id number]

## 2022-05-31 ENCOUNTER — HOSPITAL ENCOUNTER (EMERGENCY)
Age: 22
Discharge: HOME | End: 2022-05-31
Payer: COMMERCIAL

## 2022-05-31 VITALS
DIASTOLIC BLOOD PRESSURE: 65 MMHG | HEIGHT: 68 IN | RESPIRATION RATE: 15 BRPM | BODY MASS INDEX: 42.74 KG/M2 | WEIGHT: 282 LBS | TEMPERATURE: 98.9 F | SYSTOLIC BLOOD PRESSURE: 110 MMHG | OXYGEN SATURATION: 97 % | HEART RATE: 91 BPM

## 2022-05-31 DIAGNOSIS — Z78.9 BODY PIERCING: Primary | ICD-10-CM

## 2022-05-31 PROCEDURE — 99283 EMERGENCY DEPT VISIT LOW MDM: CPT

## 2022-05-31 RX ORDER — AMOXICILLIN AND CLAVULANATE POTASSIUM 875; 125 MG/1; MG/1
1 TABLET, FILM COATED ORAL 2 TIMES DAILY
Qty: 20 TABLET | Refills: 0 | Status: SHIPPED | OUTPATIENT
Start: 2022-05-31 | End: 2022-06-10

## 2022-05-31 RX ORDER — FLUCONAZOLE 150 MG/1
150 TABLET ORAL ONCE
Qty: 2 TABLET | Refills: 0 | Status: SHIPPED | OUTPATIENT
Start: 2022-05-31 | End: 2022-05-31

## 2022-05-31 ASSESSMENT — ENCOUNTER SYMPTOMS
BACK PAIN: 0
COUGH: 0
RHINORRHEA: 0
DIARRHEA: 0
EYE PAIN: 0
SORE THROAT: 0
SHORTNESS OF BREATH: 0
NAUSEA: 0
ABDOMINAL PAIN: 0
VOMITING: 0
FACIAL SWELLING: 1
PHOTOPHOBIA: 0

## 2022-05-31 ASSESSMENT — PAIN SCALES - GENERAL: PAINLEVEL_OUTOF10: 8

## 2022-05-31 ASSESSMENT — PAIN - FUNCTIONAL ASSESSMENT
PAIN_FUNCTIONAL_ASSESSMENT: NONE - DENIES PAIN
PAIN_FUNCTIONAL_ASSESSMENT: 0-10

## 2022-05-31 ASSESSMENT — PAIN DESCRIPTION - PAIN TYPE: TYPE: ACUTE PAIN

## 2022-05-31 ASSESSMENT — PAIN DESCRIPTION - DESCRIPTORS: DESCRIPTORS: ACHING;SORE

## 2022-05-31 ASSESSMENT — PAIN DESCRIPTION - ORIENTATION: ORIENTATION: LOWER

## 2022-05-31 ASSESSMENT — PAIN DESCRIPTION - LOCATION: LOCATION: MOUTH

## 2022-05-31 NOTE — ED TRIAGE NOTES
Pt to the ED via walk into triage with lip piercing problem. Pt states that she has had her piercing for about 1 month. Pt lip is swollen and painful and states that she cant get the ring to come out and needing help with getting it out.

## 2022-05-31 NOTE — ED NOTES
Pt asking for diflucan at discharge due to yeast infection probable with ATB therapy.  Maritza SALCIDO updated      Tu Cole RN  05/31/22 Jose Dewey, RN  05/31/22 [id number]

## 2022-05-31 NOTE — ED PROVIDER NOTES
[address] [facility] ED  EMERGENCY DEPARTMENT ENCOUNTER      Pt Name: Amisha Zamorano  MRN: [id number]  [place] [address]  Date of evaluation: 5/31/2022  Provider: Ibis Partida PA-C      HISTORY OF PRESENT ILLNESS    Amisha Zamorano is a 25 y.o. female who presents to the Emergency Department with lower lip piercing that she cannot get out and is irritating and having some swelling. She got it pierced about 1 month ago. There is been no drainage but she is unable to screw the ball off. REVIEW OF SYSTEMS       Review of Systems   Constitutional: Negative for chills, diaphoresis, fatigue and fever. HENT: Positive for facial swelling. Negative for congestion, rhinorrhea and sore throat. Eyes: Negative for photophobia and pain. Respiratory: Negative for cough and shortness of breath. Cardiovascular: Negative for chest pain and palpitations. Gastrointestinal: Negative for abdominal pain, diarrhea, nausea and vomiting. Genitourinary: Negative for dysuria and flank pain. Musculoskeletal: Negative for back pain. Skin: Negative for rash. Neurological: Negative for dizziness, light-headedness and headaches. Psychiatric/Behavioral: Negative. All other systems reviewed and are negative.         PAST MEDICAL HISTORY     Past Medical History:   Diagnosis Date    Diabetes mellitus ([address]     Genital herpes     GERD (gastroesophageal reflux disease)     Hidradenitis suppurativa     Hypertension     Obesity affecting pregnancy, antepartum, third trimester          SURGICAL HISTORY       Past Surgical History:   Procedure Laterality Date    UPPER GASTROINTESTINAL ENDOSCOPY           CURRENT MEDICATIONS       Discharge Medication List as of 5/31/2022  9:20 AM      CONTINUE these medications which have NOT CHANGED    Details   lidocaine (LIDODERM) 5 % Place 1 patch onto the skin daily 12 hours on, 12 hours off., Disp-30 patch, R-0Normal      ketorolac (TORADOL) 10 MG tablet Take 1 tablet by mouth every 6 hours as needed for Pain, Disp-20 tablet, R-0Normal      ibuprofen (ADVIL;MOTRIN) 800 MG tablet Take 1 tablet by mouth 2 times daily as needed for Pain, Disp-30 tablet, R-0Print      famotidine (PEPCID) 20 MG tablet Take 1 tablet by mouth 2 times daily, Disp-60 tablet, R-0Print      nystatin (MYCOSTATIN) 566156 UNIT/GM powder Apply topically 4 times daily. , Disp-60 g, R-0, Print      meloxicam (MOBIC) 15 MG tablet Take 1 tablet by mouth daily as needed for Pain, Disp-90 tablet, R-1Print      mupirocin (BACTROBAN NASAL) 2 % nasal ointment By Nasal route 2 times daily to remove bacteria x 10 days, Disp-1 g, R-0, Normal      Dextromethorphan-guaiFENesin  MG TB12 Take 1 tablet by mouth 2 times daily, Disp-28 tablet, R-0Normal      Lidocaine HCl (BURN RELIEF) 1 % GEL Apply twice daily to affected areas for pain as needed, Disp-226 g, R-0Print      naproxen (NAPROSYN) 500 MG tablet Take 1 tablet by mouth 2 times daily for 20 doses, Disp-20 tablet, R-0Normal      albuterol sulfate  (90 Base) MCG/ACT inhaler Inhale 2 puffs into the lungs every 4 hours as needed for Wheezing, Disp-18 g, R-1Normal      Insulin Pen Needle 30G X 8 MM MISC DAILY Starting Sun 3/7/2021, Disp-100 each, R-0, Print      blood glucose monitor strips Test 3 times a day & as needed for symptoms of irregular blood glucose., Disp-100 strip, R-0, Normal      insulin glargine (LANTUS SOLOSTAR) 100 UNIT/ML injection pen Inject 10 Units into the skin nightly, Disp-5 pen, R-0Normal      insulin lispro, 1 Unit Dial, (HUMALOG KWIKPEN) 100 UNIT/ML SOPN Inject 5 Units into the skin 3 times daily (before meals), Disp-1 pen, R-5Normal      bacitracin 500 UNIT/GM ointment Apply topically 2 times daily. , Disp-1 Tube, R-1, Print      bacitracin-polymyxin b (POLYSPORIN) 500-34569 UNIT/GM ointment Apply topically 2 times daily. , Disp-1 Tube,R-0, Print      gabapentin (NEURONTIN) 300 MG capsule Take 1 capsule by mouth 3 times daily for 7 days. Intended supply: 7 days. , Disp-21 capsule,R-0Normal      ipratropium-albuterol (DUONEB) 0.5-2.5 (3) MG/3ML SOLN nebulizer solution Inhale 3 mLs into the lungs every 6 hours as needed for Shortness of Breath, Disp-360 mL,R-0Normal      albuterol (PROVENTIL) (2.5 MG/3ML) 0.083% nebulizer solution Take 3 mLs by nebulization every 6 hours as needed for Wheezing, Disp-120 each,R-1Print      clotrimazole (LOTRIMIN) 1 % cream Apply topically 2 times daily to lesions on skin for 3-4 weeks or for 1 week after lesion clears, Disp-1 Tube, R-0, Print      glucose monitoring kit (FREESTYLE) monitoring kit DAILY Starting Thu 7/2/2020, Disp-1 kit, R-0, Print      Zinc Gluconate 15 MG TABS Take 3 tabs daily, Disp-30 tablet, R-0Normal      Lancets MISC Disp-1 each, R-5, NormalDX: diabetes mellitus. Use 1-3 time(s) daily - Ok to substitute per insurance (1 each = 1 box)      buPROPion (WELLBUTRIN XL) 150 MG extended release tablet TAKE 1 TABLET BY MOUTH ONCE DAILY IN THE MORNINGHistorical Med      doxylamine-pyridoxine (DICLEGIS) 10-10 MG TBEC Start: 2 tabs p.o. q.h.s.; if symptoms persist after 2 days increase to 1 tab p.o. q.a.m. and 2 tabs p.o. q.h.s.; may increase to 1 tab p.o. q.a.m., 1 tab p.o. q. midafternoon and 2 tabs p.o. q.h.s.  4 tabs per day. If unable to afford, instruct the vipin ent about substituting the OTC components. , Disp-60 tablet, R-1Print             ALLERGIES     Shellfish-derived products and Benzoyl peroxide    FAMILY HISTORY     History reviewed. No pertinent family history.        SOCIAL HISTORY       Social History     Socioeconomic History    Marital status: Single     Spouse name: None    Number of children: None    Years of education: None    Highest education level: None   Occupational History    None   Tobacco Use    Smoking status: Current Every Day Smoker     Packs/day: 0.50     Types: Cigarettes, Cigars    Smokeless tobacco: Never Used   Vaping Use    Vaping Use: Never used   Substance and is not in acute distress. Appearance: Normal appearance. She is well-developed. She is not diaphoretic. HENT:      Head: Normocephalic and atraumatic. Mouth/Throat:        Comments: No drainage. Eyes:      General: Lids are normal.      Conjunctiva/sclera: Conjunctivae normal.   Cardiovascular:      Rate and Rhythm: Normal rate and regular rhythm. Pulses: Normal pulses. Heart sounds: Normal heart sounds. Pulmonary:      Effort: Pulmonary effort is normal.      Breath sounds: Normal breath sounds. Abdominal:      General: Bowel sounds are normal.      Palpations: Abdomen is soft. Tenderness: There is no abdominal tenderness. Musculoskeletal:      Cervical back: Normal range of motion and neck supple. Lymphadenopathy:      Cervical: No cervical adenopathy. Skin:     General: Skin is warm and dry. Capillary Refill: Capillary refill takes less than 2 seconds. Findings: No rash. Neurological:      Mental Status: She is alert and oriented to person, place, and time. Psychiatric:         Thought Content: Thought content normal.         Judgment: Judgment normal.           All other labs were within normal range or not returned as of this dictation. EMERGENCY DEPARTMENT COURSE and DIFFERENTIALDIAGNOSIS/MDM:   Vitals:    Vitals:    05/31/22 0832   BP: 110/65   Pulse: 91   Resp: 15   Temp: 98.9 °F (37.2 °C)   TempSrc: Oral   SpO2: 97%   Weight: 282 lb (127.9 kg)   Height: 5' 8\" (1.727 m)            Presents with concern for not being able to get her lip piercing out. I used hemostats and unscrew the ball without difficulty. Lip seems a little inflamed as it is mildly swollen in the middle no discernible abscess noted. Will start on antibiotic and instructed to use ice to the area. Follow-up in a couple days for wound recheck. Patient stable for discharge. PROCEDURES:  Unless otherwise noted below, none     Procedures      FINAL IMPRESSION      1.  Body piercing DISPOSITION/PLAN   DISPOSITION Decision To Discharge 05/31/2022 08:55:09 AM          Gio Cuevas (electronically signed)  Attending Emergency Physician       Crystal Johnson PA-C  05/31/22 1002

## 2022-05-31 NOTE — ED NOTES
Lip ring removed and ATB given at discharge. Pt states understanding at this time of discharge information.       Darlin Choe RN  05/31/22 [id number]

## 2022-06-25 ENCOUNTER — HOSPITAL ENCOUNTER (EMERGENCY)
Age: 22
Discharge: HOME | End: 2022-06-25
Attending: EMERGENCY MEDICINE
Payer: COMMERCIAL

## 2022-06-25 VITALS
BODY MASS INDEX: 42.44 KG/M2 | HEART RATE: 78 BPM | SYSTOLIC BLOOD PRESSURE: 129 MMHG | DIASTOLIC BLOOD PRESSURE: 73 MMHG | WEIGHT: 280 LBS | HEIGHT: 68 IN | TEMPERATURE: 98.4 F | RESPIRATION RATE: 18 BRPM | OXYGEN SATURATION: 99 %

## 2022-06-25 DIAGNOSIS — R73.9 HYPERGLYCEMIA: Primary | ICD-10-CM

## 2022-06-25 DIAGNOSIS — J40 BRONCHITIS: ICD-10-CM

## 2022-06-25 DIAGNOSIS — J45.901 EXACERBATION OF ASTHMA, UNSPECIFIED ASTHMA SEVERITY, UNSPECIFIED WHETHER PERSISTENT: ICD-10-CM

## 2022-06-25 LAB
ALBUMIN SERPL-MCNC: 3.9 G/DL (ref 3.5–4.6)
ALP BLD-CCNC: 105 U/L (ref 40–130)
ALT SERPL-CCNC: 22 U/L (ref 0–33)
ANION GAP SERPL CALCULATED.3IONS-SCNC: 15 MEQ/L (ref 9–15)
AST SERPL-CCNC: 15 U/L (ref 0–35)
BASE EXCESS VENOUS: -5 (ref -3–3)
BASOPHILS ABSOLUTE: 0.1 K/UL (ref 0–0.2)
BASOPHILS RELATIVE PERCENT: 1.1 %
BETA-HYDROXYBUTYRATE: 1.7 MG/DL (ref 0.2–2.8)
BILIRUB SERPL-MCNC: 0.4 MG/DL (ref 0.2–0.7)
BILIRUBIN URINE: NEGATIVE
BLOOD, URINE: NEGATIVE
BUN BLDV-MCNC: 10 MG/DL (ref 6–20)
CALCIUM IONIZED: 1.22 MMOL/L (ref 1.12–1.32)
CALCIUM SERPL-MCNC: 9.1 MG/DL (ref 8.5–9.9)
CHLORIDE BLD-SCNC: 94 MEQ/L (ref 95–107)
CHP ED QC CHECK: YES
CLARITY: CLEAR
CO2: 20 MEQ/L (ref 20–31)
COLOR: YELLOW
CREAT SERPL-MCNC: 0.62 MG/DL (ref 0.5–0.9)
EOSINOPHILS ABSOLUTE: 0.2 K/UL (ref 0–0.7)
EOSINOPHILS RELATIVE PERCENT: 2.9 %
GFR AFRICAN AMERICAN: >60
GFR AFRICAN AMERICAN: >60
GFR NON-AFRICAN AMERICAN: >60
GFR NON-AFRICAN AMERICAN: >60
GLOBULIN: 2.5 G/DL (ref 2.3–3.5)
GLUCOSE BLD-MCNC: 315 MG/DL (ref 70–99)
GLUCOSE BLD-MCNC: 542 MG/DL
GLUCOSE BLD-MCNC: 542 MG/DL (ref 70–99)
GLUCOSE BLD-MCNC: 551 MG/DL (ref 70–99)
GLUCOSE BLD-MCNC: 584 MG/DL (ref 70–99)
GLUCOSE URINE: >=1000 MG/DL
HCO3 VENOUS: 19.7 MMOL/L (ref 23–29)
HCT VFR BLD CALC: 42.7 % (ref 37–47)
HEMOGLOBIN: 14.3 G/DL (ref 12–16)
HEMOGLOBIN: 15.3 GM/DL (ref 12–16)
KETONES, URINE: NEGATIVE MG/DL
LACTATE: 4.19 MMOL/L (ref 0.4–2)
LEUKOCYTE ESTERASE, URINE: NEGATIVE
LYMPHOCYTES ABSOLUTE: 1.8 K/UL (ref 1–4.8)
LYMPHOCYTES RELATIVE PERCENT: 22.9 %
MCH RBC QN AUTO: 29.3 PG (ref 27–31.3)
MCHC RBC AUTO-ENTMCNC: 33.5 % (ref 33–37)
MCV RBC AUTO: 87.4 FL (ref 82–100)
MONOCYTES ABSOLUTE: 0.6 K/UL (ref 0.2–0.8)
MONOCYTES RELATIVE PERCENT: 8 %
NEUTROPHILS ABSOLUTE: 5.2 K/UL (ref 1.4–6.5)
NEUTROPHILS RELATIVE PERCENT: 65.1 %
NITRITE, URINE: NEGATIVE
O2 SAT, VEN: 92 %
PCO2, VEN: 33.4 MM HG (ref 40–50)
PDW BLD-RTO: 13 % (ref 11.5–14.5)
PERFORMED ON: ABNORMAL
PH UA: 5.5 (ref 5–9)
PH VENOUS: 7.38 (ref 7.32–7.42)
PLATELET # BLD: 236 K/UL (ref 130–400)
PO2, VEN: 63 MM HG
POC CHLORIDE: 97 MEQ/L (ref 99–110)
POC CREATININE: 0.6 MG/DL (ref 0.6–1.2)
POC HEMATOCRIT: 45 % (ref 36–48)
POC POTASSIUM: 4.1 MEQ/L (ref 3.5–5.1)
POC SAMPLE TYPE: ABNORMAL
POC SODIUM: 132 MEQ/L (ref 136–145)
POTASSIUM SERPL-SCNC: 4.1 MEQ/L (ref 3.4–4.9)
PROTEIN UA: NEGATIVE MG/DL
RBC # BLD: 4.89 M/UL (ref 4.2–5.4)
SODIUM BLD-SCNC: 129 MEQ/L (ref 135–144)
SPECIFIC GRAVITY UA: 1.03 (ref 1–1.03)
TCO2 CALC VENOUS: 21 MMOL/L
TOTAL PROTEIN: 6.4 G/DL (ref 6.3–8)
URINE REFLEX TO CULTURE: ABNORMAL
UROBILINOGEN, URINE: 0.2 E.U./DL
WBC # BLD: 8 K/UL (ref 4.8–10.8)

## 2022-06-25 PROCEDURE — 82435 ASSAY OF BLOOD CHLORIDE: CPT

## 2022-06-25 PROCEDURE — 84132 ASSAY OF SERUM POTASSIUM: CPT

## 2022-06-25 PROCEDURE — 82010 KETONE BODYS QUAN: CPT

## 2022-06-25 PROCEDURE — 85014 HEMATOCRIT: CPT

## 2022-06-25 PROCEDURE — 2580000003 HC RX 258: Performed by: EMERGENCY MEDICINE

## 2022-06-25 PROCEDURE — 82565 ASSAY OF CREATININE: CPT

## 2022-06-25 PROCEDURE — 80053 COMPREHEN METABOLIC PANEL: CPT

## 2022-06-25 PROCEDURE — 84295 ASSAY OF SERUM SODIUM: CPT

## 2022-06-25 PROCEDURE — 82803 BLOOD GASES ANY COMBINATION: CPT

## 2022-06-25 PROCEDURE — 83605 ASSAY OF LACTIC ACID: CPT

## 2022-06-25 PROCEDURE — 82330 ASSAY OF CALCIUM: CPT

## 2022-06-25 PROCEDURE — 36600 WITHDRAWAL OF ARTERIAL BLOOD: CPT

## 2022-06-25 PROCEDURE — 81003 URINALYSIS AUTO W/O SCOPE: CPT

## 2022-06-25 PROCEDURE — 36415 COLL VENOUS BLD VENIPUNCTURE: CPT

## 2022-06-25 PROCEDURE — 99284 EMERGENCY DEPT VISIT MOD MDM: CPT

## 2022-06-25 PROCEDURE — 82948 REAGENT STRIP/BLOOD GLUCOSE: CPT

## 2022-06-25 PROCEDURE — 85025 COMPLETE CBC W/AUTO DIFF WBC: CPT

## 2022-06-25 PROCEDURE — 96374 THER/PROPH/DIAG INJ IV PUSH: CPT

## 2022-06-25 PROCEDURE — 6370000000 HC RX 637 (ALT 250 FOR IP): Performed by: EMERGENCY MEDICINE

## 2022-06-25 RX ORDER — 0.9 % SODIUM CHLORIDE 0.9 %
1000 INTRAVENOUS SOLUTION INTRAVENOUS ONCE
Status: COMPLETED | OUTPATIENT
Start: 2022-06-25 | End: 2022-06-25

## 2022-06-25 RX ORDER — INSULIN GLARGINE 100 [IU]/ML
10 INJECTION, SOLUTION SUBCUTANEOUS NIGHTLY
Qty: 5 PEN | Refills: 0 | Status: SHIPPED | OUTPATIENT
Start: 2022-06-25

## 2022-06-25 RX ORDER — BLOOD-GLUCOSE METER
1 KIT MISCELLANEOUS DAILY
Qty: 1 KIT | Refills: 0 | Status: SHIPPED | OUTPATIENT
Start: 2022-06-25

## 2022-06-25 RX ORDER — POTASSIUM CHLORIDE 20 MEQ/1
40 TABLET, EXTENDED RELEASE ORAL ONCE
Status: COMPLETED | OUTPATIENT
Start: 2022-06-25 | End: 2022-06-25

## 2022-06-25 RX ORDER — GLUCOSAMINE HCL/CHONDROITIN SU 500-400 MG
CAPSULE ORAL
Qty: 100 STRIP | Refills: 0 | Status: SHIPPED | OUTPATIENT
Start: 2022-06-25

## 2022-06-25 RX ORDER — INSULIN LISPRO 100 [IU]/ML
5 INJECTION, SOLUTION INTRAVENOUS; SUBCUTANEOUS
Qty: 1 PEN | Refills: 5 | Status: SHIPPED | OUTPATIENT
Start: 2022-06-25

## 2022-06-25 RX ADMIN — SODIUM CHLORIDE 1000 ML: 9 INJECTION, SOLUTION INTRAVENOUS at 09:24

## 2022-06-25 RX ADMIN — INSULIN HUMAN 10 UNITS: 100 INJECTION, SOLUTION PARENTERAL at 10:13

## 2022-06-25 RX ADMIN — POTASSIUM CHLORIDE 40 MEQ: 1500 TABLET, EXTENDED RELEASE ORAL at 10:12

## 2022-06-25 ASSESSMENT — ENCOUNTER SYMPTOMS: NAUSEA: 1

## 2022-06-25 NOTE — ED NOTES
Patient tearful. States that she has a lot going on. Warm blanket provided. Call light in reach. Fifi GOODWIN case manager in room to speak with patient.       ouraubrey Smith RN  06/25/22 [id number]

## 2022-06-25 NOTE — ED TRIAGE NOTES
Pt arrived to ED with complaints of \"high blood sugar\"  Pt states HX of DM and no medications due to not following up  Pt states hx of DKA  Pt does not know what BS is due to [facility] monitor is packed somewhere\"  Pt has N/V, ABD pain, loose stools, frequent urination, H/A, weakness and \"funny taste in mouth\".    Pt states intermittent dizziness  Pt is alert and oriented times 4  Pt denies any Chest pain at this time

## 2022-06-25 NOTE — CARE COORDINATION
I was asked to assist pt with a PCP. Met with pt at the bedside to discuss. I inquired if she had a meter and supplies, she said no. She does have Care source I explained to her other options to get meter if prescription for one is not covered. Attempted to make appt with [facility], pt is not able to be seen due to behavior. Pt aware of this and \"I missed a lot of appts\" stressed w her importance of establishing a PCP and compliance w her DM is crucial for long term. I gave her [facility] and [facility] pamphlets for her to schedule. Other options would be  or  physicians, she verbalized understanding.  Electronically signed by Cyndi Dumas RN on 6/25/2022 at 10:20 AM

## 2022-06-25 NOTE — ED PROVIDER NOTES
[address] [facility] ED  EMERGENCY DEPARTMENT ENCOUNTER      Pt Name: Linad Hallman  MRN: [id number]  [place] [address]  Date of evaluation: 6/25/2022  Provider: Flora Lee MD    CHIEF COMPLAINT       Chief Complaint   Patient presents with    Hyperglycemia     pt states has symtpoms of DKA         HISTORY OF PRESENT ILLNESS   (Location/Symptom, Timing/Onset, Context/Setting, Quality, Duration, Modifying Factors, Severity)  Note limiting factors. 26-year-old female presenting with elevated blood sugar. Patient states she has a history of DKA and feels similar today. She notes some nausea and lightheadedness. She has not vomited. She is not taking any medication for her blood sugars. She has not taken any diabetic medications \"for awhile. \"          Nursing Notes were reviewed. REVIEW OF SYSTEMS    (2-9 systems for level 4, 10 or more for level 5)     Review of Systems   Gastrointestinal: Positive for nausea. Neurological: Positive for light-headedness. All other systems reviewed and are negative. Except as noted above the remainder of the review of systems was reviewed and negative.        PAST MEDICAL HISTORY     Past Medical History:   Diagnosis Date    Diabetes mellitus ([address]     Genital herpes     GERD (gastroesophageal reflux disease)     Hidradenitis suppurativa     Hypertension     Obesity affecting pregnancy, antepartum, third trimester          SURGICAL HISTORY       Past Surgical History:   Procedure Laterality Date    UPPER GASTROINTESTINAL ENDOSCOPY           CURRENT MEDICATIONS       Current Discharge Medication List      CONTINUE these medications which have NOT CHANGED    Details   ketorolac (TORADOL) 10 MG tablet Take 1 tablet by mouth every 6 hours as needed for Pain  Qty: 20 tablet, Refills: 0      ibuprofen (ADVIL;MOTRIN) 800 MG tablet Take 1 tablet by mouth 2 times daily as needed for Pain  Qty: 30 tablet, Refills: 0      famotidine (PEPCID) 20 MG tablet Take 1 tablet by mouth 2 times daily  Qty: 60 tablet, Refills: 0      nystatin (MYCOSTATIN) 729700 UNIT/GM powder Apply topically 4 times daily. Qty: 60 g, Refills: 0      meloxicam (MOBIC) 15 MG tablet Take 1 tablet by mouth daily as needed for Pain  Qty: 90 tablet, Refills: 1      mupirocin (BACTROBAN NASAL) 2 % nasal ointment By Nasal route 2 times daily to remove bacteria x 10 days  Qty: 1 g, Refills: 0      Dextromethorphan-guaiFENesin  MG TB12 Take 1 tablet by mouth 2 times daily  Qty: 28 tablet, Refills: 0      Lidocaine HCl (BURN RELIEF) 1 % GEL Apply twice daily to affected areas for pain as needed  Qty: 226 g, Refills: 0      naproxen (NAPROSYN) 500 MG tablet Take 1 tablet by mouth 2 times daily for 20 doses  Qty: 20 tablet, Refills: 0      albuterol sulfate  (90 Base) MCG/ACT inhaler Inhale 2 puffs into the lungs every 4 hours as needed for Wheezing  Qty: 18 g, Refills: 1    Associated Diagnoses: Suspected COVID-19 virus infection      bacitracin 500 UNIT/GM ointment Apply topically 2 times daily. Qty: 1 Tube, Refills: 1      bacitracin-polymyxin b (POLYSPORIN) 500-39727 UNIT/GM ointment Apply topically 2 times daily. Qty: 1 Tube, Refills: 0      gabapentin (NEURONTIN) 300 MG capsule Take 1 capsule by mouth 3 times daily for 7 days. Intended supply: 7 days.   Qty: 21 capsule, Refills: 0      ipratropium-albuterol (DUONEB) 0.5-2.5 (3) MG/3ML SOLN nebulizer solution Inhale 3 mLs into the lungs every 6 hours as needed for Shortness of Breath  Qty: 360 mL, Refills: 0    Associated Diagnoses: Exacerbation of asthma, unspecified asthma severity, unspecified whether persistent; Bronchitis      albuterol (PROVENTIL) (2.5 MG/3ML) 0.083% nebulizer solution Take 3 mLs by nebulization every 6 hours as needed for Wheezing  Qty: 120 each, Refills: 1      clotrimazole (LOTRIMIN) 1 % cream Apply topically 2 times daily to lesions on skin for 3-4 weeks or for 1 week after lesion clears  Qty: 1 Tube, Refills: 0 Zinc Gluconate 15 MG TABS Take 3 tabs daily  Qty: 30 tablet, Refills: 0      Lancets MISC DX: diabetes mellitus. Use 1-3 time(s) daily - Ok to substitute per insurance (1 each = 1 box)  Qty: 1 each, Refills: 5      buPROPion (WELLBUTRIN XL) 150 MG extended release tablet TAKE 1 TABLET BY MOUTH ONCE DAILY IN THE MORNING      doxylamine-pyridoxine (DICLEGIS) 10-10 MG TBEC Start: 2 tabs p.o. q.h.s.; if symptoms persist after 2 days increase to 1 tab p.o. q.a.m. and 2 tabs p.o. q.h.s.; may increase to 1 tab p.o. q.a.m., 1 tab p.o. q. midafternoon and 2 tabs p.o. q.h.s.  4 tabs per day. If unable to afford, instruct the patient about substituting the OTC components. Qty: 60 tablet, Refills: 1             ALLERGIES     Shellfish-derived products and Benzoyl peroxide    FAMILY HISTORY     History reviewed. No pertinent family history. SOCIAL HISTORY       Social History     Socioeconomic History    Marital status: Single     Spouse name: None    Number of children: None    Years of education: None    Highest education level: None   Occupational History    None   Tobacco Use    Smoking status: Current Every Day Smoker     Packs/day: 0.50     Types: Cigarettes, Cigars    Smokeless tobacco: Never Used   Vaping Use    Vaping Use: Never used   Substance and Sexual Activity    Alcohol use: Yes     Comment: occa    Drug use: No    Sexual activity: Yes     Partners: Male   Other Topics Concern    None   Social History Narrative    None     Social Determinants of Health     Financial Resource Strain:     Difficulty of Paying Living Expenses: Not on file   Food Insecurity:     Worried About Running Out of Food in the Last Year: Not on file    Minnie of Food in the Last Year: Not on file   Transportation Needs:     Lack of Transportation (Medical): Not on file    Lack of Transportation (Non-Medical):  Not on file   Physical Activity:     Days of Exercise per Week: Not on file    Minutes of Exercise per Session: Not on file   Stress:     Feeling of Stress : Not on file   Social Connections:     Frequency of Communication with Friends and Family: Not on file    Frequency of Social Gatherings with Friends and Family: Not on file    Attends Synagogue Services: Not on file    Active Member of [address] [place] [address] or Organizations: Not on file    Attends Club or Organization Meetings: Not on file    Marital Status: Not on file   Intimate Partner Violence:     Fear of Current or Ex-Partner: Not on file    Emotionally Abused: Not on file    Physically Abused: Not on file    Sexually Abused: Not on file   Housing Stability:     Unable to Pay for Housing in the Last Year: Not on file    Number of [place] in the Last Year: Not on file    Unstable Housing in the Last Year: Not on file       SCREENINGS    [place] Coma Scale  Eye Opening: Spontaneous  Best Verbal Response: Oriented  Best Motor Response: Obeys commands  [place] Coma Scale Score: 15          PHYSICAL EXAM    (up to 7 for level 4, 8 or more for level 5)     ED Triage Vitals [06/25/22 0824]   BP Temp Temp Source Heart Rate Resp SpO2 Height Weight   129/82 98.4 °F (36.9 °C) Oral 78 18 99 % 5' 8\" (1.727 m) 280 lb (127 kg)       Physical Exam  Vitals and nursing note reviewed. Constitutional:       General: She is not in acute distress. Appearance: Normal appearance. She is well-developed. She is obese. She is not ill-appearing. HENT:      Head: Normocephalic and atraumatic. Mouth/Throat:      Mouth: Mucous membranes are moist.      Pharynx: Oropharynx is clear. Eyes:      Extraocular Movements: Extraocular movements intact. Conjunctiva/sclera: Conjunctivae normal.   Cardiovascular:      Rate and Rhythm: Normal rate and regular rhythm. Pulmonary:      Effort: Pulmonary effort is normal.      Breath sounds: Normal breath sounds. Abdominal:      General: Bowel sounds are normal.      Palpations: Abdomen is soft. Tenderness:  There is no abdominal tenderness. Musculoskeletal:         General: No deformity. Normal range of motion. Cervical back: Normal range of motion and neck supple. Skin:     General: Skin is warm and dry. Capillary Refill: Capillary refill takes less than 2 seconds. Neurological:      General: No focal deficit present. Mental Status: She is alert and oriented to person, place, and time. Mental status is at baseline. Cranial Nerves: No cranial nerve deficit. Psychiatric:         Thought Content:  Thought content normal.         DIAGNOSTIC RESULTS     EKG: All EKG's are interpreted by the Emergency Department Physician who either signs or Co-signs this chart in the absence of a cardiologist.    RADIOLOGY:   Non-plain film images such as CT, Ultrasound and MRI are read by the radiologist. Plain radiographic images are visualized and preliminarily interpreted by the emergency physician with the below findings:    Interpretation per the Radiologist below, if available at the time of this note:    No orders to display       LABS:  Labs Reviewed   COMPREHENSIVE METABOLIC PANEL - Abnormal; Notable for the following components:       Result Value    Sodium 129 (*)     Chloride 94 (*)     Glucose 551 (*)     All other components within normal limits    Narrative:     CALL  Godinez  [facility] tel. [phone],  GLUCOSE  results called to and read back by Makenna Bailey, 06/25/2022 10:51,  by Alejandra Cuevas - Abnormal; Notable for the following components:    Glucose, Ur >=1000 (*)     All other components within normal limits   POCT GLUCOSE - Abnormal; Notable for the following components:    POC Glucose 542 (*)     All other components within normal limits   POCT VENOUS - Abnormal; Notable for the following components:    POC Sodium 132 (*)     POC Chloride 97 (*)     POC Glucose 584 (*)     pCO2, [place] 33.4 (*)     HCO3, Venous 19.7 (*)     Base Excess, Mateo -5 (*)     Lactate 4.19 (*)     All other components within normal limits   POCT GLUCOSE - Normal   CBC WITH AUTO DIFFERENTIAL   BETA-HYDROXYBUTYRATE   POCT EPOC BLOOD GAS, LACTIC ACID, ICA       All other labs were within normal range or not returned as of this dictation. EMERGENCY DEPARTMENT COURSE and DIFFERENTIAL DIAGNOSIS/MDM:   Vitals:    Vitals:    06/25/22 0824 06/25/22 0930   BP: 129/82 129/73   Pulse: 78    Resp: 18    Temp: 98.4 °F (36.9 °C)    TempSrc: Oral    SpO2: 99%    Weight: 280 lb (127 kg)    Height: 5' 8\" (1.727 m)        MDM  Number of Diagnoses or Management Options  Hyperglycemia  Diagnosis management comments: Feels well on reevaluation and is not in DKA on workup. Blood sugar improved after fluids and insulin. She is given home diabetic supplies, meds and is seen by care coordination. Patient will be discharged home in good condition. Patient has been hemodynamically stable throughout ED course and is appropriate for outpatient follow up. Patient should follow up with PCP in 2-3 days or return to ED immediately for any new or worsening symptoms. Patient is well appearing on discharge and agreeable with plan of care. Procedures    CRITICAL CARE TIME   Total Critical Care time was 0 minutes, excluding separately reportable procedures. There was a high probability of clinically significant/life threatening deterioration in the patient's condition which required my urgent intervention. FINAL IMPRESSION      1. Hyperglycemia    2. Exacerbation of asthma, unspecified asthma severity, unspecified whether persistent    3.  Bronchitis          DISPOSITION/PLAN   DISPOSITION Decision To Discharge 06/25/2022 10:59:30 AM      (Please note that portions of this note were completed with a voice recognition program.  Efforts were made to edit the dictations but occasionally words are mis-transcribed.)    Anuradha Givens MD (electronically signed)  Attending Emergency Physician        Anuradha Givens MD  06/25/22 3931

## 2022-09-28 ENCOUNTER — HOSPITAL ENCOUNTER (EMERGENCY)
Age: 22
Discharge: HOME | End: 2022-09-28
Attending: EMERGENCY MEDICINE
Payer: COMMERCIAL

## 2022-09-28 VITALS
DIASTOLIC BLOOD PRESSURE: 98 MMHG | OXYGEN SATURATION: 96 % | BODY MASS INDEX: 42.44 KG/M2 | HEART RATE: 92 BPM | RESPIRATION RATE: 18 BRPM | SYSTOLIC BLOOD PRESSURE: 143 MMHG | HEIGHT: 68 IN | WEIGHT: 280 LBS

## 2022-09-28 DIAGNOSIS — L02.214 ABSCESS OF GROIN, RIGHT: Primary | ICD-10-CM

## 2022-09-28 PROCEDURE — 87070 CULTURE OTHR SPECIMN AEROBIC: CPT

## 2022-09-28 PROCEDURE — 87075 CULTR BACTERIA EXCEPT BLOOD: CPT

## 2022-09-28 PROCEDURE — 99283 EMERGENCY DEPT VISIT LOW MDM: CPT

## 2022-09-28 PROCEDURE — 6370000000 HC RX 637 (ALT 250 FOR IP): Performed by: PHYSICIAN ASSISTANT

## 2022-09-28 PROCEDURE — 10061 I&D ABSCESS COMP/MULTIPLE: CPT

## 2022-09-28 PROCEDURE — 86403 PARTICLE AGGLUT ANTBDY SCRN: CPT

## 2022-09-28 RX ORDER — HYDROCODONE BITARTRATE AND ACETAMINOPHEN 5; 325 MG/1; MG/1
1 TABLET ORAL ONCE
Status: COMPLETED | OUTPATIENT
Start: 2022-09-28 | End: 2022-09-28

## 2022-09-28 RX ADMIN — HYDROCODONE BITARTRATE AND ACETAMINOPHEN 1 TABLET: 5; 325 TABLET ORAL at 20:21

## 2022-09-28 ASSESSMENT — ENCOUNTER SYMPTOMS
SHORTNESS OF BREATH: 0
ABDOMINAL PAIN: 0
VOICE CHANGE: 0
PHOTOPHOBIA: 0
DIARRHEA: 0
NAUSEA: 0
BACK PAIN: 0
SORE THROAT: 0
COUGH: 0
VOMITING: 0
RHINORRHEA: 0
TROUBLE SWALLOWING: 0

## 2022-09-28 ASSESSMENT — PAIN SCALES - GENERAL: PAINLEVEL_OUTOF10: 8

## 2022-09-29 NOTE — ED PROVIDER NOTES
[address] [facility] ED  [facility]MENTe[facility]      Pt Name: Radha Colindres  MRN: [id number]  [place] [address]  Date ofevaluation: 9/28/2022  Provider: Javier Day PA-C    CHIEF COMPLAINT       Chief Complaint   Patient presents with    Abscess         HISTORY OF PRESENT ILLNESS   (Location/Symptom, Timing/Onset,Context/Setting, Quality, Duration, Modifying Factors, Severity)  Note limiting factors. This is a 25-year-old female with past medical history of chronic kidney disease, anxiety, hidradenitis who presents for worsening abscess to the right groin. She was seen earlier at an express care/urgent care and given antibiotics and has been taking those reportedly but the abscess has not gotten any better and she wishes for it to be drained. She denies any active drainage at this time. She denies any fevers or chills. She denies a history of IV drug use. Her symptoms are aggravated with palpation and movement, little relief with prescribed medications and warm compresses. She states that the pain is in 8/10, throbbing and constant. NursingNotes were reviewed. REVIEW OF SYSTEMS    (2-9 systems for level 4, 10 or more for level 5)     Review of Systems   Constitutional:  Negative for chills, fatigue and fever. HENT:  Negative for congestion, postnasal drip, rhinorrhea, sore throat, trouble swallowing and voice change. Eyes:  Negative for photophobia and visual disturbance. Respiratory:  Negative for cough and shortness of breath. Cardiovascular:  Negative for chest pain. Gastrointestinal:  Negative for abdominal pain, diarrhea, nausea and vomiting. Genitourinary:  Negative for difficulty urinating, dysuria, flank pain, frequency, vaginal bleeding, vaginal discharge and vaginal pain. Musculoskeletal:  Negative for arthralgias, back pain, myalgias, neck pain and neck stiffness. Skin:  Positive for wound. Negative for rash.    Allergic/Immunologic: Negative for immunocompromised state. Neurological:  Negative for dizziness, light-headedness and headaches. Hematological:  Positive for adenopathy. Psychiatric/Behavioral:  Negative for confusion. All other systems reviewed and are negative. Except as noted above the remainder of the review of systems was reviewed and negative. PAST MEDICAL HISTORY     Past Medical History:   Diagnosis Date    Diabetes mellitus ([facility] [address]     Genital herpes     GERD (gastroesophageal reflux disease)     Hidradenitis suppurativa     Hypertension     Obesity affecting pregnancy, antepartum, third trimester          SURGICALHISTORY       Past Surgical History:   Procedure Laterality Date    UPPER GASTROINTESTINAL ENDOSCOPY           CURRENT MEDICATIONS       Discharge Medication List as of 9/28/2022  8:34 PM        CONTINUE these medications which have NOT CHANGED    Details   blood glucose monitor strips Test 3 times a day & as needed for symptoms of irregular blood glucose., Disp-100 strip, R-0, Print      glucose monitoring (FREESTYLE) kit DAILY Starting Sat 6/25/2022, Disp-1 kit, R-0, Print      Insulin Pen Needle 30G X 8 MM MISC DAILY Starting Sat 6/25/2022, Disp-100 each, R-0, Print      insulin glargine (LANTUS SOLOSTAR) 100 UNIT/ML injection pen Inject 10 Units into the skin nightly, Disp-5 pen, R-0Print      insulin lispro, 1 Unit Dial, (HUMALOG KWIKPEN) 100 UNIT/ML SOPN Inject 5 Units into the skin 3 times daily (before meals), Disp-1 pen, R-5Print      ketorolac (TORADOL) 10 MG tablet Take 1 tablet by mouth every 6 hours as needed for Pain, Disp-20 tablet, R-0Normal      ibuprofen (ADVIL;MOTRIN) 800 MG tablet Take 1 tablet by mouth 2 times daily as needed for Pain, Disp-30 tablet, R-0Print      famotidine (PEPCID) 20 MG tablet Take 1 tablet by mouth 2 times daily, Disp-60 tablet, R-0Print      nystatin (MYCOSTATIN) 772007 UNIT/GM powder Apply topically 4 times daily. , Disp-60 g, R-0, Print      meloxicam (MOBIC) 15 MG tablet Take 1 tablet by mouth daily as needed for Pain, Disp-90 tablet, R-1Print      mupirocin (BACTROBAN NASAL) 2 % nasal ointment By Nasal route 2 times daily to remove bacteria x 10 days, Disp-1 g, R-0, Normal      Dextromethorphan-guaiFENesin  MG TB12 Take 1 tablet by mouth 2 times daily, Disp-28 tablet, R-0Normal      Lidocaine HCl (BURN RELIEF) 1 % GEL Apply twice daily to affected areas for pain as needed, Disp-226 g, R-0Print      naproxen (NAPROSYN) 500 MG tablet Take 1 tablet by mouth 2 times daily for 20 doses, Disp-20 tablet, R-0Normal      albuterol sulfate  (90 Base) MCG/ACT inhaler Inhale 2 puffs into the lungs every 4 hours as needed for Wheezing, Disp-18 g, R-1Normal      bacitracin 500 UNIT/GM ointment Apply topically 2 times daily. , Disp-1 Tube, R-1, Print      bacitracin-polymyxin b (POLYSPORIN) 500-82909 UNIT/GM ointment Apply topically 2 times daily. , Disp-1 Tube,R-0, Print      gabapentin (NEURONTIN) 300 MG capsule Take 1 capsule by mouth 3 times daily for 7 days. Intended supply: 7 days. , Disp-21 capsule,R-0Normal      ipratropium-albuterol (DUONEB) 0.5-2.5 (3) MG/3ML SOLN nebulizer solution Inhale 3 mLs into the lungs every 6 hours as needed for Shortness of Breath, Disp-360 mL,R-0Normal      albuterol (PROVENTIL) (2.5 MG/3ML) 0.083% nebulizer solution Take 3 mLs by nebulization every 6 hours as needed for Wheezing, Disp-120 each,R-1Print      clotrimazole (LOTRIMIN) 1 % cream Apply topically 2 times daily to lesions on skin for 3-4 weeks or for 1 week after lesion clears, Disp-1 Tube, R-0, Print      Zinc Gluconate 15 MG TABS Take 3 tabs daily, Disp-30 tablet, R-0Normal      Lancets MISC Disp-1 each, R-5, NormalDX: diabetes mellitus.  Use 1-3 time(s) daily - Ok to substitute per insurance (1 each = 1 box)      buPROPion (WELLBUTRIN XL) 150 MG extended release tablet TAKE 1 TABLET BY MOUTH ONCE DAILY IN THE MORNINGHistorical Med      doxylamine-pyridoxine (DICLEGIS) 10-10 MG TBEC Start: 2 tabs p.o. q.h.s.; if symptoms persist after 2 days increase to 1 tab p.o. q.a.m. and 2 tabs p.o. q.h.s.; may increase to 1 tab p.o. q.a.m., 1 tab p.o. q. midafternoon and 2 tabs p.o. q.h.s.  4 tabs per day. If unable to afford, instruct the vipin ent about substituting the OTC components. , Disp-60 tablet, R-1Print                  Shellfish-derived products and Benzoyl peroxide    FAMILY HISTORY     History reviewed. No pertinent family history. SOCIAL HISTORY       Social History     Socioeconomic History    Marital status: Single     Spouse name: None    Number of children: None    Years of education: None    Highest education level: None   Tobacco Use    Smoking status: Every Day     Packs/day: 0.50     Types: Cigarettes, Cigars    Smokeless tobacco: Never   Vaping Use    Vaping Use: Never used   Substance and Sexual Activity    Alcohol use: Yes     Comment: occa    Drug use: No    Sexual activity: Yes     Partners: Male       SCREENINGS    [place] Coma Scale  Eye Opening: Spontaneous  Best Verbal Response: Oriented  Best Motor Response: Obeys commands  [place] Coma Scale Score: 15        PHYSICAL EXAM    (up to 7 for level 4, 8 or more for level 5)     ED Triage Vitals [09/28/22 1810]   BP Temp Temp src Heart Rate Resp SpO2 Height Weight   (!) 143/98 -- -- 92 18 96 % 5' 8\" (1.727 m) 280 lb (127 kg)       Physical Exam  Vitals and nursing note reviewed. Constitutional:       General: She is not in acute distress. Appearance: Normal appearance. She is obese. She is not ill-appearing, toxic-appearing or diaphoretic. Musculoskeletal:         General: Normal range of motion. Cervical back: Normal range of motion and neck supple. Skin:     Findings: Abscess and erythema present. Neurological:      Mental Status: She is alert.        RESULTS          No orders to display       LABS:  Labs Reviewed   CULTURE, ANAEROBIC AND AEROBIC       All other labs were within normal range or not returned as of this dictation. EMERGENCY DEPARTMENT COURSE and DIFFERENTIAL DIAGNOSIS/MDM:   Vitals:    Vitals:    09/28/22 1810   BP: (!) 143/98   Pulse: 92   Resp: 18   SpO2: 96%   Weight: 280 lb (127 kg)   Height: 5' 8\" (1.727 m)            MDM        REASSESSMENT              PROCEDURES:  Unless otherwise noted below, none     Incision/Drainage    Date/Time: 9/28/2022 8:13 PM  Performed by: Noah Devries PA-C  Authorized by: Steven Farley MD     Consent:     Consent obtained:  Verbal    Consent given by:  Patient    Risks, benefits, and alternatives were discussed: yes      Risks discussed:  Incomplete drainage, bleeding, damage to other organs, pain and infection    Alternatives discussed:  Alternative treatment and no treatment  Universal protocol:     Procedure explained and questions answered to patient or proxy's satisfaction: yes      Patient identity confirmed:  Verbally with patient  Location:     Type:  Abscess    Size:  3 cm x 2 cm    Location:  Anogenital    Anogenital location: right inguinal fold. Pre-procedure details:     Skin preparation:  Povidone-iodine  Anesthesia:     Anesthesia method:  Local infiltration    Local anesthetic:  Lidocaine 1% WITH epi  Procedure type:     Complexity:  Complex  Procedure details:     Incision types:  Stab incision    Incision depth:  Dermal    Wound management:  Probed and deloculated    Drainage:  Purulent and bloody    Drainage amount:   Moderate    Packing materials:  1/4 in iodoform gauze  Post-procedure details:     Procedure completion:  Tolerated well, no immediate complications  Incision/Drainage    Date/Time: 9/28/2022 8:14 PM  Performed by: Noah Devries PA-C  Authorized by: Steven Farley MD     Consent:     Consent obtained:  Verbal    Consent given by:  Patient    Risks, benefits, and alternatives were discussed: yes      Risks discussed:  Incomplete drainage, damage to other organs, bleeding, infection and pain    Alternatives discussed:  No treatment and alternative treatment  Universal protocol:     Procedure explained and questions answered to patient or proxy's satisfaction: yes    Location:     Type:  Abscess    Size:  1 cm x 1 cm    Location:  Anogenital (right inguinal crease)  Pre-procedure details:     Skin preparation:  Povidone-iodine  Sedation:     Sedation type:  None  Anesthesia:     Anesthesia method:  Local infiltration    Local anesthetic:  Lidocaine 1% WITH epi  Procedure type:     Complexity:  Complex  Procedure details:     Incision types:  Stab incision    Incision depth:  Dermal    Wound management:  Probed and deloculated    Drainage:  Purulent and bloody    Drainage amount: Moderate    Wound treatment:  Drain placed    Packing materials:  1/4 in iodoform gauze  Post-procedure details:     Procedure completion:  Tolerated well, no immediate complications    FINAL IMPRESSION      1. Abscess of groin, right          DISPOSITION/PLAN   DISPOSITION Decision To Discharge 09/28/2022 08:33:44 PM      PATIENT REFERREDTO:  No follow-up provider specified.     DISCHARGEMEDICATIONS:  Discharge Medication List as of 9/28/2022  8:34 PM             (Please note that portions of this note were completed with a voice recognition program.  Efforts were made to edit the dictations but occasionally words are mis-transcribed.)    King Daigle PA-C (electronically signed)  Attending Emergency Physician       King Daigle PA-C  09/28/22 0950

## 2022-10-04 ENCOUNTER — HOSPITAL ENCOUNTER (EMERGENCY)
Age: 22
Discharge: HOME | End: 2022-10-04
Payer: COMMERCIAL

## 2022-10-04 VITALS
HEIGHT: 68 IN | DIASTOLIC BLOOD PRESSURE: 101 MMHG | RESPIRATION RATE: 16 BRPM | BODY MASS INDEX: 42.44 KG/M2 | SYSTOLIC BLOOD PRESSURE: 138 MMHG | WEIGHT: 280 LBS | OXYGEN SATURATION: 97 % | HEART RATE: 83 BPM | TEMPERATURE: 99.1 F

## 2022-10-04 DIAGNOSIS — K08.89 PAIN, DENTAL: Primary | ICD-10-CM

## 2022-10-04 LAB
CULTURE WOUND: ABNORMAL
CULTURE WOUND: ABNORMAL
ORGANISM: ABNORMAL

## 2022-10-04 PROCEDURE — 6370000000 HC RX 637 (ALT 250 FOR IP): Performed by: STUDENT IN AN ORGANIZED HEALTH CARE EDUCATION/TRAINING PROGRAM

## 2022-10-04 PROCEDURE — 99283 EMERGENCY DEPT VISIT LOW MDM: CPT

## 2022-10-04 RX ORDER — HYDROCODONE BITARTRATE AND ACETAMINOPHEN 5; 325 MG/1; MG/1
1 TABLET ORAL ONCE
Status: COMPLETED | OUTPATIENT
Start: 2022-10-04 | End: 2022-10-04

## 2022-10-04 RX ORDER — OXYCODONE HYDROCHLORIDE AND ACETAMINOPHEN 5; 325 MG/1; MG/1
1 TABLET ORAL EVERY 6 HOURS PRN
Qty: 12 TABLET | Refills: 0 | Status: SHIPPED | OUTPATIENT
Start: 2022-10-04 | End: 2022-10-09

## 2022-10-04 RX ADMIN — HYDROCODONE BITARTRATE AND ACETAMINOPHEN 1 TABLET: 5; 325 TABLET ORAL at 11:52

## 2022-10-04 ASSESSMENT — PAIN DESCRIPTION - PAIN TYPE: TYPE: ACUTE PAIN

## 2022-10-04 ASSESSMENT — PAIN DESCRIPTION - ORIENTATION: ORIENTATION: LEFT;LOWER

## 2022-10-04 ASSESSMENT — LIFESTYLE VARIABLES
HOW OFTEN DO YOU HAVE A DRINK CONTAINING ALCOHOL: MONTHLY OR LESS
HOW MANY STANDARD DRINKS CONTAINING ALCOHOL DO YOU HAVE ON A TYPICAL DAY: 1 OR 2

## 2022-10-04 ASSESSMENT — PAIN SCALES - GENERAL: PAINLEVEL_OUTOF10: 10

## 2022-10-04 ASSESSMENT — PAIN DESCRIPTION - LOCATION: LOCATION: TEETH

## 2022-10-04 ASSESSMENT — PAIN - FUNCTIONAL ASSESSMENT: PAIN_FUNCTIONAL_ASSESSMENT: 0-10

## 2022-10-04 NOTE — ED TRIAGE NOTES
Patient presents with complaints of left lower dental pain since Thursday since having a filling done at the dentist's office.  No distress noted on arrival.

## 2022-10-04 NOTE — ED NOTES
Called for patient in waiting room, patient no present in waiting room.       Jessica Castro RN  10/04/22 1037

## 2022-10-04 NOTE — ED PROVIDER NOTES
[address] [facility] ED  EMERGENCY DEPARTMENT ENCOUNTER      Pt Name: Bill Jordan  MRN: [id number]  [place] [address]  Date of evaluation: 10/4/2022  Provider: Patricia Albrecht PA-C    [address] [place] [address]       Chief Complaint   Patient presents with    Dental Pain     Left lower dental pain since Thursday, had a deep filling on Thursday         HISTORY OF PRESENT ILLNESS   (Location/Symptom, Timing/Onset, Context/Setting, Quality, Duration, Modifying Factors, Severity)  Note limiting factors. Bill Jordan is a 25 y.o. female who presents to the emergency department for lower dental pain. Obie Wu is a 25year old female who presents to the ED today for dental pain. Patient said she had her back left tooth filled last Thursday. Patient admits she was given pain medication as well as antibiotics, which she is currently on. Patient admits the pain has no receded, is just getting worse and is accompanied by headaches. Patient admits to taking motrin and Tylenol as well as using a cold pack to ease the pain, with no relief. Patient also states that her dentist office told her she might require root canals if the sensitivity does not go away, but she stressed her insurance does not cover those. She admits not being able to function properly due to the pain and is here seeking a solution. Dental Pain  Location:  Lower  Quality:  Constant and shooting  Severity:  Moderate  Onset quality:  Gradual  Duration:  1 week  Timing:  Constant  Progression:  Worsening  Chronicity:  New  Context: recent dental surgery    Previous work-up:  Filled cavity  Relieved by:  Nothing  Ineffective treatments:  Ice and acetaminophen  Associated symptoms: headaches    Risk factors: sufficient dental care      Nursing Notes were reviewed. REVIEW OF SYSTEMS    (2-9 systems for level 4, 10 or more for level 5)     Review of Systems   HENT:  Positive for dental problem. Neurological:  Positive for headaches.    All other systems reviewed and are negative. Except as noted above the remainder of the review of systems was reviewed and negative. PAST MEDICAL HISTORY     Past Medical History:   Diagnosis Date    Diabetes mellitus ([address]     Genital herpes     GERD (gastroesophageal reflux disease)     Hidradenitis suppurativa     Hypertension     Obesity affecting pregnancy, antepartum, third trimester          SURGICAL HISTORY       Past Surgical History:   Procedure Laterality Date    UPPER GASTROINTESTINAL ENDOSCOPY           CURRENT MEDICATIONS       Previous Medications    ALBUTEROL (PROVENTIL) (2.5 MG/3ML) 0.083% NEBULIZER SOLUTION    Take 3 mLs by nebulization every 6 hours as needed for Wheezing    ALBUTEROL SULFATE  (90 BASE) MCG/ACT INHALER    Inhale 2 puffs into the lungs every 4 hours as needed for Wheezing    BACITRACIN 500 UNIT/GM OINTMENT    Apply topically 2 times daily. BACITRACIN-POLYMYXIN B (POLYSPORIN) 500-67661 UNIT/GM OINTMENT    Apply topically 2 times daily. BLOOD GLUCOSE MONITOR STRIPS    Test 3 times a day & as needed for symptoms of irregular blood glucose. BUPROPION (WELLBUTRIN XL) 150 MG EXTENDED RELEASE TABLET    TAKE 1 TABLET BY MOUTH ONCE DAILY IN THE MORNING    CLOTRIMAZOLE (LOTRIMIN) 1 % CREAM    Apply topically 2 times daily to lesions on skin for 3-4 weeks or for 1 week after lesion clears    DEXTROMETHORPHAN-GUAIFENESIN  MG TB12    Take 1 tablet by mouth 2 times daily    DOXYLAMINE-PYRIDOXINE (DICLEGIS) 10-10 MG TBEC    Start: 2 tabs p.o. q.h.s.; if symptoms persist after 2 days increase to 1 tab p.o. q.a.m. and 2 tabs p.o. q.h.s.; may increase to 1 tab p.o. q.a.m., 1 tab p.o. q. midafternoon and 2 tabs p.o. q.h.s.  4 tabs per day. If unable to afford, instruct the patient about substituting the OTC components.     FAMOTIDINE (PEPCID) 20 MG TABLET    Take 1 tablet by mouth 2 times daily    GABAPENTIN (NEURONTIN) 300 MG CAPSULE    Take 1 capsule by mouth 3 times daily for 7 days. Intended supply: 7 days. GLUCOSE MONITORING (FREESTYLE) KIT    1 kit by Does not apply route daily    IBUPROFEN (ADVIL;MOTRIN) 800 MG TABLET    Take 1 tablet by mouth 2 times daily as needed for Pain    INSULIN GLARGINE (LANTUS SOLOSTAR) 100 UNIT/ML INJECTION PEN    Inject 10 Units into the skin nightly    INSULIN LISPRO, 1 UNIT DIAL, (HUMALOG KWIKPEN) 100 UNIT/ML SOPN    Inject 5 Units into the skin 3 times daily (before meals)    INSULIN PEN NEEDLE 30G X 8 MM MISC    1 each by Does not apply route daily    IPRATROPIUM-ALBUTEROL (DUONEB) 0.5-2.5 (3) MG/3ML SOLN NEBULIZER SOLUTION    Inhale 3 mLs into the lungs every 6 hours as needed for Shortness of Breath    KETOROLAC (TORADOL) 10 MG TABLET    Take 1 tablet by mouth every 6 hours as needed for Pain    LANCETS MISC    DX: diabetes mellitus. Use 1-3 time(s) daily - Ok to substitute per insurance (1 each = 1 box)    LIDOCAINE HCL (BURN RELIEF) 1 % GEL    Apply twice daily to affected areas for pain as needed    MELOXICAM (MOBIC) 15 MG TABLET    Take 1 tablet by mouth daily as needed for Pain    MUPIROCIN (BACTROBAN NASAL) 2 % NASAL OINTMENT    By Nasal route 2 times daily to remove bacteria x 10 days    NAPROXEN (NAPROSYN) 500 MG TABLET    Take 1 tablet by mouth 2 times daily for 20 doses    NYSTATIN (MYCOSTATIN) 512157 UNIT/GM POWDER    Apply topically 4 times daily. ZINC GLUCONATE 15 MG TABS    Take 3 tabs daily       ALLERGIES     Shellfish-derived products and Benzoyl peroxide    FAMILY HISTORY     History reviewed. No pertinent family history.        SOCIAL HISTORY       Social History     Socioeconomic History    Marital status: Single     Spouse name: None    Number of children: None    Years of education: None    Highest education level: None   Tobacco Use    Smoking status: Every Day     Packs/day: 0.50     Types: Cigarettes, Cigars    Smokeless tobacco: Never   Vaping Use    Vaping Use: Never used   Substance and Sexual Activity    Alcohol use: Yes     Comment: occa    Drug use: No    Sexual activity: Yes     Partners: Male       SCREENINGS         [place] Coma Scale  Eye Opening: Spontaneous  Best Verbal Response: Oriented  Best Motor Response: Obeys commands  [place] Coma Scale Score: 15                     CIWA Assessment  BP: (!) 138/101  Heart Rate: 83                 PHYSICAL EXAM    (up to 7 for level 4, 8 or more for level 5)     ED Triage Vitals [10/04/22 1050]   BP Temp Temp Source Heart Rate Resp SpO2 Height Weight   (!) 138/101 99.1 °F (37.3 °C) Oral 83 16 97 % 5' 8\" (1.727 m) 280 lb (127 kg)       Physical Exam  Constitutional:       General: She is awake. She is not in acute distress. Appearance: She is not ill-appearing, toxic-appearing or diaphoretic. HENT:      Head:      Jaw: There is normal jaw occlusion. Right Ear: Tympanic membrane, ear canal and external ear normal.      Left Ear: Tympanic membrane, ear canal and external ear normal.      Nose: Nose normal.      Mouth/Throat:      Lips: Pink. Mouth: Mucous membranes are moist.      Dentition: Dental tenderness present. No gingival swelling, dental caries or dental abscesses. Tongue: No lesions. Palate: No mass. Pharynx: Oropharynx is clear. Uvula midline. Tonsils: No tonsillar exudate or tonsillar abscesses. Comments: History of tooth filling last Thursday and previous history of upper root canals on the same side. No dental abscess. No drainage. Patent airway. No drooling trismus stridor. No facial or neck swelling. Neurological:      Mental Status: She is alert. Psychiatric:         Behavior: Behavior is cooperative.      DIAGNOSTIC RESULTS     EKG: All EKG's are interpreted by the Emergency Department Physician who either signs or Co-signs this chart in the absence of a cardiologist.        RADIOLOGY:   Non-plain film images such as CT, Ultrasound and MRI are read by the radiologist. Plain radiographic images are visualized and preliminarily interpreted by the emergency physician with the below findings:      Interpretation per the Radiologist below, if available at the time of this note:    No orders to display         ED BEDSIDE ULTRASOUND:   Performed by ED Physician - none    LABS:  Labs Reviewed - No data to display    All other labs were within normal range or not returned as of this dictation. EMERGENCY DEPARTMENT COURSE and DIFFERENTIAL DIAGNOSIS/MDM:   Vitals:    Vitals:    10/04/22 1050   BP: (!) 138/101   Pulse: 83   Resp: 16   Temp: 99.1 °F (37.3 °C)   TempSrc: Oral   SpO2: 97%   Weight: 280 lb (127 kg)   Height: 5' 8\" (1.727 m)           MDM    Pt seen by Dr. Nilsa Wallis, Podiatry resident and myself. Pt presents with dental pain. Afebrile, HD stable. Given po norco in the ED. Currently being treated with abx. Will prescribe short course of percocet. F/u with dentistry in 1-2 days. Return to the ED for worsening sx, given warning signs for which she should return. REASSESSMENT          CRITICAL CARE TIME   Total Critical Care time was 20 minutes, excluding separately reportable procedures. There was a high probability of clinically significant/life threatening deterioration in the patient's condition which required my urgent intervention. CONSULTS:  None    PROCEDURES:  Unless otherwise noted below, none     Procedures        FINAL IMPRESSION      1.  Pain, dental          DISPOSITION/PLAN   DISPOSITION Discharge - Pending Orders Complete 10/04/2022 11:41:41 AM      PATIENT REFERRED TO:  [facility]  [address] [facility]  Schedule an appointment as soon as possible for a visit in 1 day      [facility]) ED  [address] [facility]  [phone]  Go to   As needed, If symptoms worsen    DISCHARGE MEDICATIONS:  New Prescriptions    OXYCODONE-ACETAMINOPHEN (PERCOCET) 5-325 MG PER TABLET    Take 1 tablet by mouth every 6 hours as needed for Pain for up to 5 days. Intended supply: 3 days. Take lowest dose possible to manage pain     Controlled Substances Monitoring:     No flowsheet data found.     (Please note that portions of this note were completed with a voice recognition program.  Efforts were made to edit the dictations but occasionally words are mis-transcribed.)    Deo Rios PA-C PGY-1 (electronically signed)             Deo Rios PA-C  10/04/22 [address] [facility]  10/04/22 2153

## 2022-10-27 ENCOUNTER — HOSPITAL ENCOUNTER (EMERGENCY)
Age: 22
Discharge: HOME | End: 2022-10-27
Attending: STUDENT IN AN ORGANIZED HEALTH CARE EDUCATION/TRAINING PROGRAM
Payer: COMMERCIAL

## 2022-10-27 VITALS
DIASTOLIC BLOOD PRESSURE: 70 MMHG | HEIGHT: 68 IN | WEIGHT: 280 LBS | HEART RATE: 76 BPM | TEMPERATURE: 98 F | RESPIRATION RATE: 18 BRPM | OXYGEN SATURATION: 97 % | BODY MASS INDEX: 42.44 KG/M2 | SYSTOLIC BLOOD PRESSURE: 113 MMHG

## 2022-10-27 DIAGNOSIS — L02.91 ABSCESS: Primary | ICD-10-CM

## 2022-10-27 LAB
HCG, URINE, POC: NEGATIVE
Lab: NORMAL
NEGATIVE QC PASS/FAIL: NORMAL
POSITIVE QC PASS/FAIL: NORMAL

## 2022-10-27 PROCEDURE — 87075 CULTR BACTERIA EXCEPT BLOOD: CPT

## 2022-10-27 PROCEDURE — 86403 PARTICLE AGGLUT ANTBDY SCRN: CPT

## 2022-10-27 PROCEDURE — 87185 SC STD ENZYME DETCJ PER NZM: CPT

## 2022-10-27 PROCEDURE — 6370000000 HC RX 637 (ALT 250 FOR IP): Performed by: STUDENT IN AN ORGANIZED HEALTH CARE EDUCATION/TRAINING PROGRAM

## 2022-10-27 PROCEDURE — 99283 EMERGENCY DEPT VISIT LOW MDM: CPT

## 2022-10-27 PROCEDURE — 87070 CULTURE OTHR SPECIMN AEROBIC: CPT

## 2022-10-27 PROCEDURE — 10060 I&D ABSCESS SIMPLE/SINGLE: CPT

## 2022-10-27 PROCEDURE — 87076 CULTURE ANAEROBE IDENT EACH: CPT

## 2022-10-27 RX ORDER — IBUPROFEN 800 MG/1
800 TABLET ORAL EVERY 6 HOURS PRN
Qty: 20 TABLET | Refills: 0 | Status: SHIPPED | OUTPATIENT
Start: 2022-10-27 | End: 2022-11-01

## 2022-10-27 RX ORDER — IBUPROFEN 800 MG/1
800 TABLET ORAL ONCE
Status: COMPLETED | OUTPATIENT
Start: 2022-10-27 | End: 2022-10-27

## 2022-10-27 RX ORDER — ETHYL CHLORIDE 100 %
AEROSOL, SPRAY (ML) TOPICAL
Status: DISCONTINUED | OUTPATIENT
Start: 2022-10-27 | End: 2022-10-27 | Stop reason: HOSPADM

## 2022-10-27 RX ORDER — DOXYCYCLINE HYCLATE 100 MG/1
100 CAPSULE ORAL 2 TIMES DAILY
Qty: 20 CAPSULE | Refills: 0 | Status: SHIPPED | OUTPATIENT
Start: 2022-10-27 | End: 2022-11-06

## 2022-10-27 RX ORDER — DOXYCYCLINE HYCLATE 100 MG/1
100 CAPSULE ORAL ONCE
Status: COMPLETED | OUTPATIENT
Start: 2022-10-27 | End: 2022-10-27

## 2022-10-27 RX ADMIN — DOXYCYCLINE HYCLATE 100 MG: 100 CAPSULE ORAL at 09:40

## 2022-10-27 RX ADMIN — IBUPROFEN 800 MG: 800 TABLET, FILM COATED ORAL at 09:40

## 2022-10-27 ASSESSMENT — ENCOUNTER SYMPTOMS
CHEST TIGHTNESS: 0
COUGH: 0
VOMITING: 0
TROUBLE SWALLOWING: 0
SINUS PRESSURE: 0
ABDOMINAL PAIN: 0
BACK PAIN: 0
ROS SKIN COMMENTS: ABCESS
SHORTNESS OF BREATH: 0
DIARRHEA: 0

## 2022-10-27 ASSESSMENT — PAIN DESCRIPTION - FREQUENCY: FREQUENCY: CONTINUOUS

## 2022-10-27 ASSESSMENT — PAIN DESCRIPTION - LOCATION: LOCATION: GROIN

## 2022-10-27 ASSESSMENT — PAIN SCALES - GENERAL
PAINLEVEL_OUTOF10: 10
PAINLEVEL_OUTOF10: 10

## 2022-10-27 ASSESSMENT — PAIN DESCRIPTION - PAIN TYPE: TYPE: ACUTE PAIN

## 2022-10-27 ASSESSMENT — PAIN DESCRIPTION - DESCRIPTORS: DESCRIPTORS: ACHING;THROBBING

## 2022-10-27 ASSESSMENT — PAIN - FUNCTIONAL ASSESSMENT: PAIN_FUNCTIONAL_ASSESSMENT: 0-10

## 2022-10-27 NOTE — ED PROVIDER NOTES
[facility] ED  eMERGENCY dEPARTMENT eNCOUnter      Pt Name: Inocencia Mcduffie  MRN: [id number]  [place] [address]  Date of evaluation: 10/27/2022  Provider: Matthew Blake, [address] [place] [address]       Chief Complaint   Patient presents with    Abscess     Groin           HISTORY OF PRESENT ILLNESS   (Location/Symptom, Timing/Onset,Context/Setting, Quality, Duration, Modifying Factors, Severity)  Note limiting factors. Inocencia Mcduffie is a 25 y.o. female who presents to the emergency department with complaints of an abscess to the right groin region. Present x2 days. History of the same. Patient's previously been treated with doxycycline. Patient denies any fever, chills or cough. Patient denies any nausea, vomiting or diarrhea. Patient states that looks like is getting ready to pop. Patient's been placing warm compresses. Is any fever, chills, nausea or vomiting. Patient states she is due any day for her period. Patient denies modifying factors making her symptoms better. Touch or movement makes it worse. The history is provided by the patient. NursingNotes were reviewed. REVIEW OF SYSTEMS    (2-9 systems for level 4, 10 or more for level 5)     Review of Systems   Constitutional:  Negative for activity change, appetite change, chills, fever and unexpected weight change. HENT:  Negative for drooling, ear pain, nosebleeds, sinus pressure and trouble swallowing. Respiratory:  Negative for cough, chest tightness and shortness of breath. Cardiovascular:  Negative for chest pain and leg swelling. Gastrointestinal:  Negative for abdominal pain, diarrhea and vomiting. Endocrine: Negative for polydipsia and polyphagia. Genitourinary:  Negative for dysuria, flank pain and frequency. Musculoskeletal:  Negative for back pain and myalgias. Skin:  Negative for pallor and rash. abcess   Neurological:  Negative for syncope, weakness and headaches.    Hematological:  Does not bruise/bleed easily. All other systems reviewed and are negative. Except as noted above the remainder of the review of systems was reviewed and negative. PAST MEDICAL HISTORY     Past Medical History:   Diagnosis Date    Diabetes mellitus ([address]     Genital herpes     GERD (gastroesophageal reflux disease)     Hidradenitis suppurativa     Hypertension     Obesity affecting pregnancy, antepartum, third trimester          SURGICALHISTORY       Past Surgical History:   Procedure Laterality Date    UPPER GASTROINTESTINAL ENDOSCOPY           CURRENT MEDICATIONS       Previous Medications    ALBUTEROL (PROVENTIL) (2.5 MG/3ML) 0.083% NEBULIZER SOLUTION    Take 3 mLs by nebulization every 6 hours as needed for Wheezing    ALBUTEROL SULFATE  (90 BASE) MCG/ACT INHALER    Inhale 2 puffs into the lungs every 4 hours as needed for Wheezing    BACITRACIN 500 UNIT/GM OINTMENT    Apply topically 2 times daily. BACITRACIN-POLYMYXIN B (POLYSPORIN) 500-85799 UNIT/GM OINTMENT    Apply topically 2 times daily. BLOOD GLUCOSE MONITOR STRIPS    Test 3 times a day & as needed for symptoms of irregular blood glucose. BUPROPION (WELLBUTRIN XL) 150 MG EXTENDED RELEASE TABLET    TAKE 1 TABLET BY MOUTH ONCE DAILY IN THE MORNING    CLOTRIMAZOLE (LOTRIMIN) 1 % CREAM    Apply topically 2 times daily to lesions on skin for 3-4 weeks or for 1 week after lesion clears    DEXTROMETHORPHAN-GUAIFENESIN  MG TB12    Take 1 tablet by mouth 2 times daily    DOXYLAMINE-PYRIDOXINE (DICLEGIS) 10-10 MG TBEC    Start: 2 tabs p.o. q.h.s.; if symptoms persist after 2 days increase to 1 tab p.o. q.a.m. and 2 tabs p.o. q.h.s.; may increase to 1 tab p.o. q.a.m., 1 tab p.o. q. midafternoon and 2 tabs p.o. q.h.s.  4 tabs per day. If unable to afford, instruct the patient about substituting the OTC components.     FAMOTIDINE (PEPCID) 20 MG TABLET    Take 1 tablet by mouth 2 times daily    GABAPENTIN (NEURONTIN) 300 MG CAPSULE    Take 1 capsule by mouth 3 times daily for 7 days. Intended supply: 7 days. GLUCOSE MONITORING (FREESTYLE) KIT    1 kit by Does not apply route daily    IBUPROFEN (ADVIL;MOTRIN) 800 MG TABLET    Take 1 tablet by mouth 2 times daily as needed for Pain    INSULIN GLARGINE (LANTUS SOLOSTAR) 100 UNIT/ML INJECTION PEN    Inject 10 Units into the skin nightly    INSULIN LISPRO, 1 UNIT DIAL, (HUMALOG KWIKPEN) 100 UNIT/ML SOPN    Inject 5 Units into the skin 3 times daily (before meals)    INSULIN PEN NEEDLE 30G X 8 MM MISC    1 each by Does not apply route daily    IPRATROPIUM-ALBUTEROL (DUONEB) 0.5-2.5 (3) MG/3ML SOLN NEBULIZER SOLUTION    Inhale 3 mLs into the lungs every 6 hours as needed for Shortness of Breath    KETOROLAC (TORADOL) 10 MG TABLET    Take 1 tablet by mouth every 6 hours as needed for Pain    LANCETS MISC    DX: diabetes mellitus. Use 1-3 time(s) daily - Ok to substitute per insurance (1 each = 1 box)    LIDOCAINE HCL (BURN RELIEF) 1 % GEL    Apply twice daily to affected areas for pain as needed    MELOXICAM (MOBIC) 15 MG TABLET    Take 1 tablet by mouth daily as needed for Pain    MUPIROCIN (BACTROBAN NASAL) 2 % NASAL OINTMENT    By Nasal route 2 times daily to remove bacteria x 10 days    NAPROXEN (NAPROSYN) 500 MG TABLET    Take 1 tablet by mouth 2 times daily for 20 doses    NYSTATIN (MYCOSTATIN) 958920 UNIT/GM POWDER    Apply topically 4 times daily. ZINC GLUCONATE 15 MG TABS    Take 3 tabs daily       ALLERGIES     Shellfish-derived products and Benzoyl peroxide    FAMILY HISTORY     History reviewed. No pertinent family history.        SOCIAL HISTORY       Social History     Socioeconomic History    Marital status: Single     Spouse name: None    Number of children: None    Years of education: None    Highest education level: None   Tobacco Use    Smoking status: Every Day     Packs/day: 0.50     Types: Cigarettes, Cigars    Smokeless tobacco: Never   Vaping Use    Vaping Use: Never used   Substance and Sexual Activity    Alcohol use: Yes     Comment: occa    Drug use: No    Sexual activity: Yes     Partners: Male       SCREENINGS    Lulu Coma Scale  Eye Opening: Spontaneous  Best Verbal Response: Oriented  Best Motor Response: Obeys commands  [place] Coma Scale Score: 15 @FLOW([id number])@      PHYSICAL EXAM    (up to 7 for level 4, 8 or more for level 5)     ED Triage Vitals [10/27/22 0804]   BP Temp Temp Source Heart Rate Resp SpO2 Height Weight   113/70 98 °F (36.7 °C) Temporal 76 18 97 % 5' 8\" (1.727 m) 280 lb (127 kg)       Physical Exam  Vitals and nursing note reviewed. Constitutional:       General: She is not in acute distress. Appearance: She is well-developed. She is not diaphoretic. HENT:      Head: Normocephalic and atraumatic. No Gonzalez's sign. Right Ear: External ear normal.      Left Ear: External ear normal.      Nose: Nose normal.      Mouth/Throat:      Pharynx: No oropharyngeal exudate. Eyes:      General: No scleral icterus. Right eye: No foreign body. Conjunctiva/sclera:      Left eye: No exudate. Neck:      Vascular: No JVD. Trachea: No tracheal deviation. Cardiovascular:      Rate and Rhythm: Normal rate and regular rhythm. Heart sounds: Heart sounds not distant. Pulmonary:      Effort: Pulmonary effort is normal. No respiratory distress. Breath sounds: Normal breath sounds. No stridor. No wheezing. Abdominal:      General: Bowel sounds are normal. There is no distension. Palpations: Abdomen is soft. Tenderness: There is no abdominal tenderness. There is no guarding or rebound. Musculoskeletal:         General: No tenderness or deformity. Normal range of motion. Cervical back: Normal range of motion and neck supple. No rigidity. Lymphadenopathy:      Head:      Right side of head: No submental adenopathy. Left side of head: No submental adenopathy. Skin:     General: Skin is warm and dry.       Capillary Refill: Capillary refill takes less than 2 seconds. Findings: No erythema or rash. Neurological:      General: No focal deficit present. Mental Status: She is alert. Cranial Nerves: No cranial nerve deficit. Coordination: Coordination normal.      Deep Tendon Reflexes: Reflexes are normal and symmetric. Psychiatric:         Behavior: Behavior normal.         Thought Content: Thought content normal.         Judgment: Judgment normal.       DIAGNOSTIC RESULTS     EKG: All EKG's are interpreted by the Emergency Department Physician who either signs or Co-signsthis chart in the absence of a cardiologist.        RADIOLOGY:   Timothy Binning such as CT, Ultrasound and MRI are read by the radiologist. Plain radiographic images are visualized and preliminarily interpreted by the emergency physician with the below findings:        Interpretation per the Radiologist below, if available at the time ofthis note:    No orders to display         ED BEDSIDE ULTRASOUND:   Performed by ED Physician - none    LABS:  Labs Reviewed   POC PREGNANCY UR-QUAL       All other labs were within normal range or not returned as of this dictation. EMERGENCY DEPARTMENT COURSE and DIFFERENTIAL DIAGNOSIS/MDM:   Vitals:    Vitals:    10/27/22 0804   BP: 113/70   Pulse: 76   Resp: 18   Temp: 98 °F (36.7 °C)   TempSrc: Temporal   SpO2: 97%   Weight: 280 lb (127 kg)   Height: 5' 8\" (1.727 m)           MDM    Not have a family doctor and chart review shows she gets a lot of her care through the ER. Patient was upset that she had to wait. ER physician ordered a pregnancy test.  She is not pregnant and she will be to prescribe doxycycline. Drainage of abscess via a aspiration. Chlorhexidine was used to cleanse the skin and the abscess is completely flat. Slowly 1 cm in size of the right near the surface. Ethyl chloride spray was used. Anesthesia was achieved and the patient did not feel the needle go in.     The findings were discussed with the patient. The patient was invited to return  to the ER if worse symptoms. The patient verbalized understanding of the care and they have no further questions. CONSULTS:  None    PROCEDURES:  Unless otherwise noted below, none     Incision/Drainage    Date/Time: 10/27/2022 9:33 AM  Performed by: Cassy Cedeno DO  Authorized by: Cassy Cedeno DO     Consent:     Consent obtained:  Verbal    Consent given by:  Patient    Risks, benefits, and alternatives were discussed: yes      Risks discussed:  Pain and bleeding  Universal protocol:     Procedure explained and questions answered to patient or proxy's satisfaction: yes      Relevant documents present and verified: yes      Test results available : yes      Imaging studies available: yes      Required blood products, implants, devices, and special equipment available: yes      Site/side marked: yes      Immediately prior to procedure, a time out was called: yes      Patient identity confirmed:  Arm band  Location:     Type:  Abscess    Size:  1 cm    Location:  Anogenital    Anogenital location: Groin. Pre-procedure details:     Skin preparation:  Antiseptic wash and chlorhexidine  Sedation:     Sedation type:  None  Anesthesia:     Anesthesia method:  Topical application    Topical anesthesia: Ethyl chloride spray. Procedure type:     Complexity:  Simple  Procedure details:     Ultrasound guidance: no      Needle aspiration: yes      Needle size:  18 G    Drainage:  Purulent    Drainage amount:  Scant    Wound treatment:  Wound left open    Packing materials:  None  Post-procedure details:     Procedure completion:  Tolerated well, no immediate complications    FINAL IMPRESSION      1. Abscess          DISPOSITION/PLAN   DISPOSITION Discharge - Pending Orders Complete 10/27/2022 09:27:53 AM      PATIENT REFERRED TO:  No follow-up provider specified.     DISCHARGE MEDICATIONS:  New Prescriptions    No medications on file (Please note that portions of this note were completed with a voice recognition program.  Efforts were made to edit the dictations but occasionally words are mis-transcribed.)    Gerson Porter DO (electronically signed)  Attending Emergency Physician            Gerson Porter DO  10/27/22 4373

## 2022-10-27 NOTE — DISCHARGE INSTRUCTIONS
Needle aspiration of an abscess. Wound cultures ordered. ER physician believes this is probably staph. Make an appointment with her primary care provider. That way you will have to wait in the emergency room to have procedures like this done. Areas swelling up, turning red, and is enlarging or you develop a fever and vomiting the return to the nearest emergency room. Next dose of antibiotic will be tonight.

## 2022-11-01 LAB
CULTURE WOUND: ABNORMAL
ORGANISM: ABNORMAL
ORGANISM: ABNORMAL

## 2022-11-07 ENCOUNTER — HOSPITAL ENCOUNTER (EMERGENCY)
Age: 22
Discharge: HOME | End: 2022-11-07

## 2022-11-07 VITALS
SYSTOLIC BLOOD PRESSURE: 152 MMHG | HEIGHT: 68 IN | WEIGHT: 280 LBS | BODY MASS INDEX: 42.44 KG/M2 | OXYGEN SATURATION: 97 % | RESPIRATION RATE: 18 BRPM | TEMPERATURE: 98.3 F | HEART RATE: 81 BPM | DIASTOLIC BLOOD PRESSURE: 88 MMHG

## 2022-11-07 ASSESSMENT — PAIN DESCRIPTION - DESCRIPTORS: DESCRIPTORS: ACHING

## 2022-11-07 ASSESSMENT — PAIN SCALES - GENERAL: PAINLEVEL_OUTOF10: 10

## 2022-11-07 ASSESSMENT — PAIN DESCRIPTION - PAIN TYPE: TYPE: ACUTE PAIN

## 2022-11-07 ASSESSMENT — PAIN DESCRIPTION - LOCATION: LOCATION: HEAD

## 2022-11-07 ASSESSMENT — PAIN DESCRIPTION - FREQUENCY: FREQUENCY: CONTINUOUS

## 2022-11-07 ASSESSMENT — PAIN - FUNCTIONAL ASSESSMENT: PAIN_FUNCTIONAL_ASSESSMENT: 0-10

## 2023-01-10 ENCOUNTER — APPOINTMENT (OUTPATIENT)
Dept: GENERAL RADIOLOGY | Age: 23
End: 2023-01-10
Payer: COMMERCIAL

## 2023-01-10 ENCOUNTER — HOSPITAL ENCOUNTER (EMERGENCY)
Age: 23
Discharge: HOME | End: 2023-01-10
Attending: EMERGENCY MEDICINE
Payer: COMMERCIAL

## 2023-01-10 VITALS
BODY MASS INDEX: 42.44 KG/M2 | HEIGHT: 68 IN | SYSTOLIC BLOOD PRESSURE: 139 MMHG | WEIGHT: 280 LBS | TEMPERATURE: 97.5 F | DIASTOLIC BLOOD PRESSURE: 91 MMHG | OXYGEN SATURATION: 100 % | RESPIRATION RATE: 12 BRPM | HEART RATE: 89 BPM

## 2023-01-10 DIAGNOSIS — R11.2 NAUSEA AND VOMITING, UNSPECIFIED VOMITING TYPE: Primary | ICD-10-CM

## 2023-01-10 DIAGNOSIS — R19.7 DIARRHEA, UNSPECIFIED TYPE: ICD-10-CM

## 2023-01-10 LAB
BACTERIA: ABNORMAL /HPF
BILIRUBIN URINE: NEGATIVE
BLOOD, URINE: ABNORMAL
CLARITY: CLEAR
COLOR: YELLOW
EPITHELIAL CELLS, UA: ABNORMAL /HPF (ref 0–5)
GLUCOSE BLD-MCNC: 271 MG/DL (ref 70–99)
GLUCOSE URINE: NEGATIVE MG/DL
HCG, URINE, POC: NEGATIVE
HYALINE CASTS: ABNORMAL /HPF (ref 0–5)
INFLUENZA A BY PCR: NEGATIVE
INFLUENZA B BY PCR: NEGATIVE
KETONES, URINE: NEGATIVE MG/DL
LEUKOCYTE ESTERASE, URINE: NEGATIVE
Lab: NORMAL
NEGATIVE QC PASS/FAIL: NORMAL
NITRITE, URINE: NEGATIVE
PERFORMED ON: ABNORMAL
PH UA: 6 (ref 5–9)
POSITIVE QC PASS/FAIL: NORMAL
PROTEIN UA: NEGATIVE MG/DL
RBC UA: ABNORMAL /HPF (ref 0–5)
SARS-COV-2, NAAT: NOT DETECTED
SPECIFIC GRAVITY UA: 1.01 (ref 1–1.03)
URINE REFLEX TO CULTURE: ABNORMAL
UROBILINOGEN, URINE: 0.2 E.U./DL
WBC UA: ABNORMAL /HPF (ref 0–5)

## 2023-01-10 PROCEDURE — 87635 SARS-COV-2 COVID-19 AMP PRB: CPT

## 2023-01-10 PROCEDURE — 6370000000 HC RX 637 (ALT 250 FOR IP): Performed by: EMERGENCY MEDICINE

## 2023-01-10 PROCEDURE — 74022 RADEX COMPL AQT ABD SERIES: CPT

## 2023-01-10 PROCEDURE — 87502 INFLUENZA DNA AMP PROBE: CPT

## 2023-01-10 PROCEDURE — 6360000002 HC RX W HCPCS: Performed by: EMERGENCY MEDICINE

## 2023-01-10 PROCEDURE — 99284 EMERGENCY DEPT VISIT MOD MDM: CPT

## 2023-01-10 PROCEDURE — 96374 THER/PROPH/DIAG INJ IV PUSH: CPT

## 2023-01-10 PROCEDURE — 81001 URINALYSIS AUTO W/SCOPE: CPT

## 2023-01-10 RX ORDER — ONDANSETRON 2 MG/ML
4 INJECTION INTRAMUSCULAR; INTRAVENOUS ONCE
Status: COMPLETED | OUTPATIENT
Start: 2023-01-10 | End: 2023-01-10

## 2023-01-10 RX ORDER — ONDANSETRON 4 MG/1
4 TABLET, ORALLY DISINTEGRATING ORAL 3 TIMES DAILY PRN
Qty: 12 TABLET | Refills: 0 | Status: SHIPPED | OUTPATIENT
Start: 2023-01-10

## 2023-01-10 RX ORDER — ACETAMINOPHEN 325 MG/1
650 TABLET ORAL ONCE
Status: COMPLETED | OUTPATIENT
Start: 2023-01-10 | End: 2023-01-10

## 2023-01-10 RX ADMIN — ONDANSETRON 4 MG: 2 INJECTION INTRAMUSCULAR; INTRAVENOUS at 12:09

## 2023-01-10 RX ADMIN — Medication: at 12:11

## 2023-01-10 RX ADMIN — ACETAMINOPHEN 650 MG: 325 TABLET ORAL at 13:26

## 2023-01-10 ASSESSMENT — PAIN DESCRIPTION - ORIENTATION: ORIENTATION: LOWER

## 2023-01-10 ASSESSMENT — PAIN DESCRIPTION - DESCRIPTORS: DESCRIPTORS: DULL

## 2023-01-10 ASSESSMENT — PAIN DESCRIPTION - LOCATION: LOCATION: ABDOMEN;BACK

## 2023-01-10 ASSESSMENT — ENCOUNTER SYMPTOMS
NAUSEA: 1
VOMITING: 1
DIARRHEA: 1

## 2023-01-10 ASSESSMENT — PAIN SCALES - GENERAL: PAINLEVEL_OUTOF10: 10

## 2023-01-10 ASSESSMENT — PAIN DESCRIPTION - PAIN TYPE: TYPE: ACUTE PAIN

## 2023-01-10 ASSESSMENT — PAIN - FUNCTIONAL ASSESSMENT: PAIN_FUNCTIONAL_ASSESSMENT: 0-10

## 2023-01-10 NOTE — ED PROVIDER NOTES
[facility] ED  EMERGENCY DEPARTMENT ENCOUNTER      Pt Name: Rogelio Miller  MRN: [id number]  [place] [address]  Date of evaluation: 1/10/2023  Provider: Mayra Hair MD    CHIEF COMPLAINT       Chief Complaint   Patient presents with    Abdominal Pain     Pt c/o lower abd pain that extends into her mid back since yesterday AM, reports associated n/v         HISTORY OF PRESENT ILLNESS   (Location/Symptom, Timing/Onset, Context/Setting, Quality, Duration, Modifying Factors, Severity)  Note limiting factors. 77-year-old female presenting with nausea, vomiting and diarrhea. Symptoms have been ongoing since the morning. Notes diffuse pain. No fevers. Has not taken anything for relief. Nonbilious and nonbloody. Hx of DM. Nursing Notes were reviewed. REVIEW OF SYSTEMS    (2-9 systems for level 4, 10 or more for level 5)     Review of Systems   Gastrointestinal:  Positive for diarrhea, nausea and vomiting. All other systems reviewed and are negative. Except as noted above the remainder of the review of systems was reviewed and negative. PAST MEDICAL HISTORY     Past Medical History:   Diagnosis Date    Diabetes mellitus ([address]     Genital herpes     GERD (gastroesophageal reflux disease)     Hidradenitis suppurativa     Hypertension     Obesity affecting pregnancy, antepartum, third trimester          SURGICAL HISTORY       Past Surgical History:   Procedure Laterality Date    UPPER GASTROINTESTINAL ENDOSCOPY           CURRENT MEDICATIONS       Previous Medications    ALBUTEROL (PROVENTIL) (2.5 MG/3ML) 0.083% NEBULIZER SOLUTION    Take 3 mLs by nebulization every 6 hours as needed for Wheezing    ALBUTEROL SULFATE  (90 BASE) MCG/ACT INHALER    Inhale 2 puffs into the lungs every 4 hours as needed for Wheezing    BACITRACIN-POLYMYXIN B (POLYSPORIN) 500-34152 UNIT/GM OINTMENT    Apply topically 2 times daily.     BLOOD GLUCOSE MONITOR STRIPS    Test 3 times a day & as needed for symptoms of irregular blood glucose.    BUPROPION (WELLBUTRIN XL) 150 MG EXTENDED RELEASE TABLET    TAKE 1 TABLET BY MOUTH ONCE DAILY IN THE MORNING    CLOTRIMAZOLE (LOTRIMIN) 1 % CREAM    Apply topically 2 times daily to lesions on skin for 3-4 weeks or for 1 week after lesion clears    GLUCOSE MONITORING (FREESTYLE) KIT    1 kit by Does not apply route daily    IBUPROFEN (ADVIL;MOTRIN) 800 MG TABLET    Take 1 tablet by mouth every 6 hours as needed for Pain    INSULIN GLARGINE (LANTUS SOLOSTAR) 100 UNIT/ML INJECTION PEN    Inject 10 Units into the skin nightly    INSULIN LISPRO, 1 UNIT DIAL, (HUMALOG KWIKPEN) 100 UNIT/ML SOPN    Inject 5 Units into the skin 3 times daily (before meals)    INSULIN PEN NEEDLE 30G X 8 MM MISC    1 each by Does not apply route daily    LANCETS MISC    DX: diabetes mellitus. Use 1-3 time(s) daily - Ok to substitute per insurance (1 each = 1 box)       ALLERGIES     Shellfish-derived products and Benzoyl peroxide    FAMILY HISTORY     History reviewed. No pertinent family history.       SOCIAL HISTORY       Social History     Socioeconomic History    Marital status: Single     Spouse name: None    Number of children: None    Years of education: None    Highest education level: None   Tobacco Use    Smoking status: Every Day     Packs/day: 0.50     Types: Cigarettes, Cigars    Smokeless tobacco: Never   Vaping Use    Vaping Use: Never used   Substance and Sexual Activity    Alcohol use: Yes     Comment: occa    Drug use: No    Sexual activity: Yes     Partners: Male       SCREENINGS    Lulu Coma Scale  Eye Opening: Spontaneous  Best Verbal Response: Oriented  Best Motor Response: Obeys commands  Lulu Coma Scale Score: 15          PHYSICAL EXAM    (up to 7 for level 4, 8 or more for level 5)     ED Triage Vitals   BP Temp Temp Source Heart Rate Resp SpO2 Height Weight   01/10/23 1051 01/10/23 1050 01/10/23 1050 01/10/23 1050 01/10/23 1050 01/10/23 1050 01/10/23 1050 01/10/23 1050  (!) 139/91 97.5 °F (36.4 °C) Temporal 89 12 100 % 5' 8\" (1.727 m) 280 lb (127 kg)       Physical Exam  Vitals and nursing note reviewed. Constitutional:       General: She is not in acute distress. Appearance: Normal appearance. She is well-developed. She is obese. She is not ill-appearing. HENT:      Head: Normocephalic and atraumatic. Mouth/Throat:      Mouth: Mucous membranes are moist.      Pharynx: Oropharynx is clear. Eyes:      Extraocular Movements: Extraocular movements intact. Conjunctiva/sclera: Conjunctivae normal.   Cardiovascular:      Rate and Rhythm: Normal rate and regular rhythm. Pulmonary:      Effort: Pulmonary effort is normal.      Breath sounds: Normal breath sounds. Abdominal:      General: Bowel sounds are normal.      Palpations: Abdomen is soft. Tenderness: There is no abdominal tenderness. There is no guarding or rebound. Musculoskeletal:         General: No deformity. Normal range of motion. Cervical back: Normal range of motion and neck supple. Skin:     General: Skin is warm and dry. Capillary Refill: Capillary refill takes less than 2 seconds. Neurological:      General: No focal deficit present. Mental Status: She is alert and oriented to person, place, and time. Mental status is at baseline. Cranial Nerves: No cranial nerve deficit. Psychiatric:         Thought Content:  Thought content normal.       DIAGNOSTIC RESULTS     EKG: All EKG's are interpreted by the Emergency Department Physician who either signs or Co-signs this chart in the absence of a cardiologist.    RADIOLOGY:   Non-plain film images such as CT, Ultrasound and MRI are read by the radiologist. Plain radiographic images are visualized and preliminarily interpreted by the emergency physician with the below findings:    Interpretation per the Radiologist below, if available at the time of this note:    XR ACUTE ABD SERIES CHEST 1 VW   Final Result   No acute cardiopulmonary disease. Nonspecific abdomen. LABS:  Labs Reviewed   URINALYSIS WITH REFLEX TO CULTURE - Abnormal; Notable for the following components:       Result Value    Blood, Urine SMALL (*)     All other components within normal limits   MICROSCOPIC URINALYSIS - Abnormal; Notable for the following components:    Bacteria, UA RARE (*)     RBC, UA 6-10 (*)     All other components within normal limits   POCT GLUCOSE - Abnormal; Notable for the following components:    POC Glucose 271 (*)     All other components within normal limits   COVID-19, RAPID   RAPID INFLUENZA A/B ANTIGENS   POC PREGNANCY UR-QUAL   POCT GLUCOSE       All other labs were within normal range or not returned as of this dictation. EMERGENCY DEPARTMENT COURSE and DIFFERENTIAL DIAGNOSIS/MDM:   Vitals:    Vitals:    01/10/23 1050 01/10/23 1051   BP:  (!) 139/91   Pulse: 89    Resp: 12    Temp: 97.5 °F (36.4 °C)    TempSrc: Temporal    SpO2: 100%    Weight: 280 lb (127 kg)    Height: 5' 8\" (1.727 m)        MDM  Number of Diagnoses or Management Options  Diarrhea, unspecified type  Nausea and vomiting, unspecified vomiting type  Diagnosis management comments: Tolerating fluids and feels well on reevaluation. Suspect GI bug. Patient will be discharged home in good condition. Patient has been hemodynamically stable throughout ED course and is appropriate for outpatient follow up. Patient should follow up with PCP in 2-3 days or return to ED immediately for any new or worsening symptoms. Patient is well appearing on discharge and agreeable with plan of care. Procedures    CRITICAL CARE TIME   Total Critical Care time was 0 minutes, excluding separately reportable procedures. There was a high probability of clinically significant/life threatening deterioration in the patient's condition which required my urgent intervention. FINAL IMPRESSION      1. Nausea and vomiting, unspecified vomiting type    2. Diarrhea, unspecified type          DISPOSITION/PLAN   DISPOSITION Decision To Discharge 01/10/2023 01:15:23 PM      (Please note that portions of this note were completed with a voice recognition program.  Efforts were made to edit the dictations but occasionally words are mis-transcribed.)    Jannet Lopes MD (electronically signed)  Attending Emergency Physician        Jannet Lopes MD  01/10/23 0488

## 2023-01-12 ENCOUNTER — APPOINTMENT (OUTPATIENT)
Dept: CT IMAGING | Age: 23
End: 2023-01-12
Payer: COMMERCIAL

## 2023-01-12 ENCOUNTER — HOSPITAL ENCOUNTER (EMERGENCY)
Age: 23
Discharge: HOME | End: 2023-01-12
Payer: COMMERCIAL

## 2023-01-12 VITALS
SYSTOLIC BLOOD PRESSURE: 138 MMHG | DIASTOLIC BLOOD PRESSURE: 87 MMHG | BODY MASS INDEX: 42.44 KG/M2 | TEMPERATURE: 98.4 F | RESPIRATION RATE: 18 BRPM | HEART RATE: 77 BPM | HEIGHT: 68 IN | WEIGHT: 280 LBS | OXYGEN SATURATION: 98 %

## 2023-01-12 DIAGNOSIS — R10.84 GENERALIZED ABDOMINAL PAIN: ICD-10-CM

## 2023-01-12 DIAGNOSIS — R10.9 BILATERAL FLANK PAIN: Primary | ICD-10-CM

## 2023-01-12 LAB
ADENOVIRUS BY PCR: NOT DETECTED
ALBUMIN SERPL-MCNC: 3.3 G/DL (ref 3.5–4.6)
ALP BLD-CCNC: 68 U/L (ref 40–130)
ALT SERPL-CCNC: 12 U/L (ref 0–33)
ANION GAP SERPL CALCULATED.3IONS-SCNC: 12 MEQ/L (ref 9–15)
AST SERPL-CCNC: 9 U/L (ref 0–35)
BACTERIA: ABNORMAL /HPF
BASOPHILS ABSOLUTE: 0.1 K/UL (ref 0–0.2)
BASOPHILS RELATIVE PERCENT: 0.7 %
BILIRUB SERPL-MCNC: 1 MG/DL (ref 0.2–0.7)
BILIRUBIN URINE: NEGATIVE
BLOOD, URINE: ABNORMAL
BORDETELLA PARAPERTUSSIS BY PCR: NOT DETECTED
BORDETELLA PERTUSSIS BY PCR: NOT DETECTED
BUN BLDV-MCNC: 9 MG/DL (ref 6–20)
CALCIUM SERPL-MCNC: 9 MG/DL (ref 8.5–9.9)
CHLAMYDOPHILIA PNEUMONIAE BY PCR: NOT DETECTED
CHLORIDE BLD-SCNC: 98 MEQ/L (ref 95–107)
CLARITY: CLEAR
CO2: 26 MEQ/L (ref 20–31)
COLOR: YELLOW
CORONAVIRUS 229E BY PCR: NOT DETECTED
CORONAVIRUS HKU1 BY PCR: NOT DETECTED
CORONAVIRUS NL63 BY PCR: NOT DETECTED
CORONAVIRUS OC43 BY PCR: NOT DETECTED
CREAT SERPL-MCNC: 0.87 MG/DL (ref 0.5–0.9)
EOSINOPHILS ABSOLUTE: 0.1 K/UL (ref 0–0.7)
EOSINOPHILS RELATIVE PERCENT: 1.4 %
EPITHELIAL CELLS, UA: ABNORMAL /HPF (ref 0–5)
GFR SERPL CREATININE-BSD FRML MDRD: >60 ML/MIN/{1.73_M2}
GLOBULIN: 2.9 G/DL (ref 2.3–3.5)
GLUCOSE BLD-MCNC: 292 MG/DL (ref 70–99)
GLUCOSE URINE: 100 MG/DL
HCG, URINE, POC: NEGATIVE
HCT VFR BLD CALC: 43.4 % (ref 37–47)
HEMOGLOBIN: 14.5 G/DL (ref 12–16)
HUMAN METAPNEUMOVIRUS BY PCR: NOT DETECTED
HUMAN RHINOVIRUS/ENTEROVIRUS BY PCR: NOT DETECTED
HYALINE CASTS: ABNORMAL /HPF (ref 0–5)
INFLUENZA A BY PCR: NOT DETECTED
INFLUENZA B BY PCR: NOT DETECTED
KETONES, URINE: NEGATIVE MG/DL
LACTIC ACID: 2 MMOL/L (ref 0.5–2.2)
LEUKOCYTE ESTERASE, URINE: NEGATIVE
LIPASE: 16 U/L (ref 12–95)
LYMPHOCYTES ABSOLUTE: 1.8 K/UL (ref 1–4.8)
LYMPHOCYTES RELATIVE PERCENT: 17.1 %
Lab: NORMAL
MCH RBC QN AUTO: 29.2 PG (ref 27–31.3)
MCHC RBC AUTO-ENTMCNC: 33.4 % (ref 33–37)
MCV RBC AUTO: 87.4 FL (ref 79.4–94.8)
MONOCYTES ABSOLUTE: 0.8 K/UL (ref 0.2–0.8)
MONOCYTES RELATIVE PERCENT: 8.1 %
MYCOPLASMA PNEUMONIAE BY PCR: NOT DETECTED
NEGATIVE QC PASS/FAIL: NORMAL
NEUTROPHILS ABSOLUTE: 7.5 K/UL (ref 1.4–6.5)
NEUTROPHILS RELATIVE PERCENT: 72.7 %
NITRITE, URINE: NEGATIVE
PARAINFLUENZA VIRUS 1 BY PCR: NOT DETECTED
PARAINFLUENZA VIRUS 2 BY PCR: NOT DETECTED
PARAINFLUENZA VIRUS 3 BY PCR: NOT DETECTED
PARAINFLUENZA VIRUS 4 BY PCR: NOT DETECTED
PDW BLD-RTO: 13.3 % (ref 11.5–14.5)
PH UA: 5.5 (ref 5–9)
PLATELET # BLD: 250 K/UL (ref 130–400)
POSITIVE QC PASS/FAIL: NORMAL
POTASSIUM SERPL-SCNC: 4.4 MEQ/L (ref 3.4–4.9)
PROTEIN UA: ABNORMAL MG/DL
RBC # BLD: 4.96 M/UL (ref 4.2–5.4)
RBC UA: ABNORMAL /HPF (ref 0–5)
RESPIRATORY SYNCYTIAL VIRUS BY PCR: NOT DETECTED
SARS-COV-2, PCR: NOT DETECTED
SODIUM BLD-SCNC: 136 MEQ/L (ref 135–144)
SPECIFIC GRAVITY UA: 1.01 (ref 1–1.03)
TOTAL PROTEIN: 6.2 G/DL (ref 6.3–8)
URINE REFLEX TO CULTURE: ABNORMAL
UROBILINOGEN, URINE: 0.2 E.U./DL
WBC # BLD: 10.3 K/UL (ref 4.8–10.8)
WBC UA: ABNORMAL /HPF (ref 0–5)

## 2023-01-12 PROCEDURE — 36415 COLL VENOUS BLD VENIPUNCTURE: CPT

## 2023-01-12 PROCEDURE — 96374 THER/PROPH/DIAG INJ IV PUSH: CPT

## 2023-01-12 PROCEDURE — 74176 CT ABD & PELVIS W/O CONTRAST: CPT

## 2023-01-12 PROCEDURE — 83605 ASSAY OF LACTIC ACID: CPT

## 2023-01-12 PROCEDURE — 2500000003 HC RX 250 WO HCPCS: Performed by: PHYSICIAN ASSISTANT

## 2023-01-12 PROCEDURE — 99284 EMERGENCY DEPT VISIT MOD MDM: CPT

## 2023-01-12 PROCEDURE — 80053 COMPREHEN METABOLIC PANEL: CPT

## 2023-01-12 PROCEDURE — A4216 STERILE WATER/SALINE, 10 ML: HCPCS | Performed by: PHYSICIAN ASSISTANT

## 2023-01-12 PROCEDURE — 2580000003 HC RX 258: Performed by: PHYSICIAN ASSISTANT

## 2023-01-12 PROCEDURE — 96375 TX/PRO/DX INJ NEW DRUG ADDON: CPT

## 2023-01-12 PROCEDURE — 83690 ASSAY OF LIPASE: CPT

## 2023-01-12 PROCEDURE — 0202U NFCT DS 22 TRGT SARS-COV-2: CPT

## 2023-01-12 PROCEDURE — 85025 COMPLETE CBC W/AUTO DIFF WBC: CPT

## 2023-01-12 PROCEDURE — 6360000002 HC RX W HCPCS: Performed by: PHYSICIAN ASSISTANT

## 2023-01-12 PROCEDURE — 81001 URINALYSIS AUTO W/SCOPE: CPT

## 2023-01-12 RX ORDER — TRAMADOL HYDROCHLORIDE 50 MG/1
50 TABLET ORAL EVERY 8 HOURS PRN
Qty: 8 TABLET | Refills: 0 | Status: SHIPPED | OUTPATIENT
Start: 2023-01-12 | End: 2023-01-15

## 2023-01-12 RX ORDER — KETOROLAC TROMETHAMINE 30 MG/ML
30 INJECTION, SOLUTION INTRAMUSCULAR; INTRAVENOUS ONCE
Status: COMPLETED | OUTPATIENT
Start: 2023-01-12 | End: 2023-01-12

## 2023-01-12 RX ORDER — 0.9 % SODIUM CHLORIDE 0.9 %
1000 INTRAVENOUS SOLUTION INTRAVENOUS ONCE
Status: COMPLETED | OUTPATIENT
Start: 2023-01-12 | End: 2023-01-12

## 2023-01-12 RX ADMIN — SODIUM CHLORIDE 1000 ML: 9 INJECTION, SOLUTION INTRAVENOUS at 17:02

## 2023-01-12 RX ADMIN — KETOROLAC TROMETHAMINE 30 MG: 30 INJECTION, SOLUTION INTRAMUSCULAR; INTRAVENOUS at 17:03

## 2023-01-12 RX ADMIN — FAMOTIDINE 20 MG: 10 INJECTION INTRAVENOUS at 17:04

## 2023-01-12 ASSESSMENT — ENCOUNTER SYMPTOMS
ABDOMINAL PAIN: 1
APNEA: 0
SHORTNESS OF BREATH: 0
TROUBLE SWALLOWING: 0
EYE PAIN: 0
BACK PAIN: 1
ALLERGIC/IMMUNOLOGIC NEGATIVE: 1
COLOR CHANGE: 0

## 2023-01-12 ASSESSMENT — PAIN DESCRIPTION - LOCATION: LOCATION: ABDOMEN;BACK

## 2023-01-12 ASSESSMENT — PAIN SCALES - GENERAL: PAINLEVEL_OUTOF10: 10

## 2023-01-12 NOTE — ED PROVIDER NOTES
[address] [facility] ED  eMERGENCYdEPARTMENT eNCOUnter      Pt Name: Deni Monday  MRN: [id number]  [place] 2000  Date of evaluation: 1/12/2023  Provider:Charanjit Qiu PA-C    CHIEF COMPLAINT       Chief Complaint   Patient presents with    Back Pain     Lower back pain      Abdominal Pain     Lower bilat abd pain         HISTORY OF PRESENT ILLNESS  (Location/Symptom, Timing/Onset, Context/Setting, Quality, Duration, Modifying Factors, Severity.)   Deni Monlyly is a 25 y.o. female who presents to the emergency department complaints of abdominal pain with bilateral flank pain, subjective fever and chills, decreased stool output, generalized malaise and fatigue. Patient was seen in the emergency department on January 10 for the same complaints and diagnosed with a \"GI thing\". Patient states that she was sent home with antiemetics which she has been taking as well as Motrin 800s that she has without improvement. Patient states that she did move her bowels once however she typically moves her bowels several times a day and feels that she is not pooping as frequently. Patient does complain of an associated headache but denies any congestion, sore throats or cough. Patient states poor appetite over the past several days as well    HPI    Nursing Notes were reviewed and I agree. REVIEW OF SYSTEMS    (2-9 systems for level 4, 10 or more for level 5)     Review of Systems   Constitutional:  Positive for appetite change, chills, fatigue and fever. Negative for diaphoresis. HENT:  Negative for hearing loss and trouble swallowing. Eyes:  Negative for pain. Respiratory:  Negative for apnea and shortness of breath. Cardiovascular:  Negative for chest pain. Gastrointestinal:  Positive for abdominal pain. Endocrine: Negative. Genitourinary:  Negative for hematuria. Musculoskeletal:  Positive for back pain. Negative for neck pain and neck stiffness. Skin:  Negative for color change. Allergic/Immunologic: Negative. Neurological:  Positive for headaches. Negative for dizziness and numbness. Hematological: Negative. Psychiatric/Behavioral: Negative. All other systems reviewed and are negative. Except as noted above the remainder of the review of systems was reviewed and negative.        PAST MEDICAL HISTORY     Past Medical History:   Diagnosis Date    Diabetes mellitus ([address]     Genital herpes     GERD (gastroesophageal reflux disease)     Hidradenitis suppurativa     Hypertension     Obesity affecting pregnancy, antepartum, third trimester          SURGICAL HISTORY       Past Surgical History:   Procedure Laterality Date    UPPER GASTROINTESTINAL ENDOSCOPY           CURRENT MEDICATIONS       Discharge Medication List as of 1/12/2023  6:16 PM        CONTINUE these medications which have NOT CHANGED    Details   ondansetron (ZOFRAN-ODT) 4 MG disintegrating tablet Take 1 tablet by mouth 3 times daily as needed for Nausea or Vomiting, Disp-12 tablet, R-0Print      ibuprofen (ADVIL;MOTRIN) 800 MG tablet Take 1 tablet by mouth every 6 hours as needed for Pain, Disp-20 tablet, R-0Normal      blood glucose monitor strips Test 3 times a day & as needed for symptoms of irregular blood glucose., Disp-100 strip, R-0, Print      glucose monitoring (FREESTYLE) kit DAILY Starting Sat 6/25/2022, Disp-1 kit, R-0, Print      Insulin Pen Needle 30G X 8 MM MISC DAILY Starting Sat 6/25/2022, Disp-100 each, R-0, Print      insulin glargine (LANTUS SOLOSTAR) 100 UNIT/ML injection pen Inject 10 Units into the skin nightly, Disp-5 pen, R-0Print      insulin lispro, 1 Unit Dial, (HUMALOG KWIKPEN) 100 UNIT/ML SOPN Inject 5 Units into the skin 3 times daily (before meals), Disp-1 pen, R-5Print      albuterol sulfate  (90 Base) MCG/ACT inhaler Inhale 2 puffs into the lungs every 4 hours as needed for Wheezing, Disp-18 g, R-1Normal      bacitracin-polymyxin b (POLYSPORIN) 500-77660 UNIT/GM ointment Apply topically 2 times daily. , Disp-1 Tube,R-0, Print      albuterol (PROVENTIL) (2.5 MG/3ML) 0.083% nebulizer solution Take 3 mLs by nebulization every 6 hours as needed for Wheezing, Disp-120 each,R-1Print      clotrimazole (LOTRIMIN) 1 % cream Apply topically 2 times daily to lesions on skin for 3-4 weeks or for 1 week after lesion clears, Disp-1 Tube, R-0, Print      Lancets MISC Disp-1 each, R-5, NormalDX: diabetes mellitus. Use 1-3 time(s) daily - Ok to substitute per insurance (1 each = 1 box)      buPROPion (WELLBUTRIN XL) 150 MG extended release tablet TAKE 1 TABLET BY MOUTH ONCE DAILY IN THE MORNINGHistorical Med             ALLERGIES     Shellfish-derived products and Benzoyl peroxide    FAMILY HISTORY     History reviewed. No pertinent family history. SOCIAL HISTORY       Social History     Socioeconomic History    Marital status: Single     Spouse name: None    Number of children: None    Years of education: None    Highest education level: None   Tobacco Use    Smoking status: Every Day     Packs/day: 0.50     Types: Cigarettes, Cigars    Smokeless tobacco: Never   Vaping Use    Vaping Use: Never used   Substance and Sexual Activity    Alcohol use: Yes     Comment: occa    Drug use: No    Sexual activity: Yes     Partners: Male       SCREENINGS    Lulu Coma Scale  Eye Opening: Spontaneous  Best Verbal Response: Oriented  Best Motor Response: Obeys commands  Lulu Coma Scale Score: 15      PHYSICAL EXAM    (up to 7 forlevel 4, 8 or more for level 5)     ED Triage Vitals [01/12/23 1615]   BP Temp Temp Source Heart Rate Resp SpO2 Height Weight   138/87 98.4 °F (36.9 °C) Oral 77 18 98 % -- --       Physical Exam  Vitals and nursing note reviewed. Constitutional:       General: She is not in acute distress. Appearance: She is well-developed. She is not diaphoretic. HENT:      Head: Normocephalic and atraumatic.       Mouth/Throat:      Mouth: Mucous membranes are moist.      Pharynx: No oropharyngeal exudate. Eyes:      General: No scleral icterus. Conjunctiva/sclera: Conjunctivae normal.      Pupils: Pupils are equal, round, and reactive to light. Neck:      Trachea: No tracheal deviation. Cardiovascular:      Rate and Rhythm: Normal rate. Heart sounds: Normal heart sounds. Pulmonary:      Effort: Pulmonary effort is normal. No respiratory distress. Breath sounds: Normal breath sounds. Abdominal:      General: Bowel sounds are normal. There is no distension. Palpations: Abdomen is soft. Tenderness: There is abdominal tenderness in the right lower quadrant and left lower quadrant. There is no guarding or rebound. Musculoskeletal:         General: Normal range of motion. Cervical back: Normal range of motion and neck supple. No rigidity or tenderness. Skin:     General: Skin is warm and dry. Findings: No erythema or rash. Neurological:      Mental Status: She is alert and oriented to person, place, and time. Cranial Nerves: No cranial nerve deficit. Motor: No abnormal muscle tone. Psychiatric:         Behavior: Behavior normal.         Thought Content: Thought content normal.         Judgment: Judgment normal.         DIAGNOSTIC RESULTS     RADIOLOGY:   Non-plain film images such as CT, Ultrasound and MRI are read by the radiologist. Plain radiographic images are visualized and preliminarilyinterpreted by Karlie Escamilla PA-C with the below findings:    CT read by me is negative    Interpretation per the Radiologist below, if available at the time of this note:    CT ABDOMEN PELVIS WO CONTRAST Additional Contrast? None   Final Result   No acute process in the abdomen and pelvis. Specifically, no evidence of renal, ureteral or bladder calculi.              LABS:  Labs Reviewed   CBC WITH AUTO DIFFERENTIAL - Abnormal; Notable for the following components:       Result Value    Neutrophils Absolute 7.5 (*)     All other components within normal limits   COMPREHENSIVE METABOLIC PANEL - Abnormal; Notable for the following components:    Glucose 292 (*)     Total Protein 6.2 (*)     Albumin 3.3 (*)     Total Bilirubin 1.0 (*)     All other components within normal limits   URINALYSIS WITH REFLEX TO CULTURE - Abnormal; Notable for the following components:    Glucose, Ur 100 (*)     Blood, Urine TRACE (*)     Protein, UA TRACE (*)     All other components within normal limits   MICROSCOPIC URINALYSIS - Abnormal; Notable for the following components:    Bacteria, UA FEW (*)     WBC, UA 6-9 (*)     RBC, UA 3-5 (*)     All other components within normal limits   RESPIRATORY PANEL, MOLECULAR, WITH COVID-19   LACTIC ACID   LIPASE   POC PREGNANCY UR-QUAL       All other labs were within normal range or not returnedas of this dictation. EMERGENCYDEPARTMENT COURSE and DIFFERENTIAL DIAGNOSIS/MDM:   Vitals:    Vitals:    01/12/23 1615 01/12/23 1730   BP: 138/87    Pulse: 77    Resp: 18    Temp: 98.4 °F (36.9 °C)    TempSrc: Oral    SpO2: 98%    Weight:  280 lb (127 kg)   Height:  5' 8\" (1.727 m)       REASSESSMENT        Patient presented the emergency department with a repeat visit due to bilateral CVA back pain and abdominal pain. CT and laboratory testing obtained with no clear identifiable bowel source of pain. I explained this to the patient. Given the nature of her symptoms I will refer her to her PCP as well as to gastroenterology for outpatient follow-up    MDM      PROCEDURES:    Procedures      FINAL IMPRESSION      1. Bilateral flank pain    2.  Generalized abdominal pain          DISPOSITION/PLAN   DISPOSITION Decision To Discharge 01/12/2023 06:09:28 PM      PATIENT REFERRED TO:  Alexa Valles  [address] [facility] [phone]    Call in 2 days      DISCHARGE MEDICATIONS:  Discharge Medication List as of 1/12/2023  6:16 PM        START taking these medications    Details   traMADol (ULTRAM) 50 MG tablet Take 1 tablet by mouth every 8 hours as needed for Pain for up to 3 days. Intended supply: 3 days.  Take lowest dose possible to manage pain Max Daily Amount: 150 mg, Disp-8 tablet, R-0Print             (Please note that portions of this note were completed with a voice recognition program.  Efforts were made to edit the dictations but occasionally words are mis-transcribed.)    DIGNA Qiuspe PA-C  01/12/23 3752

## 2023-01-12 NOTE — ED TRIAGE NOTES
Pt presents with multple complaints. Pt was seen at this facility on 1/10/2023 for similar s/s. Pt reports nilat. Lower back pain. Pt reports bilat. Lower abd pain. Pt reports headache. Pt afebrile. Pt ambulatory, A&Ox4, ABCs intact, GCS15, skin, appropriate for ethnicity, warm, and dry.

## 2023-01-17 ENCOUNTER — HOSPITAL ENCOUNTER (EMERGENCY)
Age: 23
Discharge: HOME | End: 2023-01-17
Attending: FAMILY MEDICINE
Payer: COMMERCIAL

## 2023-01-17 ENCOUNTER — APPOINTMENT (OUTPATIENT)
Dept: CT IMAGING | Age: 23
End: 2023-01-17
Payer: COMMERCIAL

## 2023-01-17 VITALS
RESPIRATION RATE: 18 BRPM | BODY MASS INDEX: 42.44 KG/M2 | HEART RATE: 74 BPM | DIASTOLIC BLOOD PRESSURE: 98 MMHG | SYSTOLIC BLOOD PRESSURE: 160 MMHG | HEIGHT: 68 IN | TEMPERATURE: 99.1 F | OXYGEN SATURATION: 100 % | WEIGHT: 280 LBS

## 2023-01-17 DIAGNOSIS — G44.209 TENSION HEADACHE: Primary | ICD-10-CM

## 2023-01-17 LAB
ALBUMIN SERPL-MCNC: 3.7 G/DL (ref 3.5–4.6)
ALP BLD-CCNC: 79 U/L (ref 40–130)
ALT SERPL-CCNC: 26 U/L (ref 0–33)
ANION GAP SERPL CALCULATED.3IONS-SCNC: 14 MEQ/L (ref 9–15)
AST SERPL-CCNC: 17 U/L (ref 0–35)
BASOPHILS ABSOLUTE: 0.1 K/UL (ref 0–0.2)
BASOPHILS RELATIVE PERCENT: 0.9 %
BILIRUB SERPL-MCNC: 0.4 MG/DL (ref 0.2–0.7)
BUN BLDV-MCNC: 9 MG/DL (ref 6–20)
C-REACTIVE PROTEIN: 3.4 MG/L (ref 0–5)
CALCIUM SERPL-MCNC: 9.2 MG/DL (ref 8.5–9.9)
CHLORIDE BLD-SCNC: 97 MEQ/L (ref 95–107)
CO2: 23 MEQ/L (ref 20–31)
CREAT SERPL-MCNC: 0.64 MG/DL (ref 0.5–0.9)
EOSINOPHILS ABSOLUTE: 0.2 K/UL (ref 0–0.7)
EOSINOPHILS RELATIVE PERCENT: 2.5 %
GFR SERPL CREATININE-BSD FRML MDRD: >60 ML/MIN/{1.73_M2}
GLOBULIN: 3.2 G/DL (ref 2.3–3.5)
GLUCOSE BLD-MCNC: 349 MG/DL (ref 70–99)
HCG, URINE, POC: NEGATIVE
HCT VFR BLD CALC: 40.5 % (ref 37–47)
HEMOGLOBIN: 13.8 G/DL (ref 12–16)
LYMPHOCYTES ABSOLUTE: 2.7 K/UL (ref 1–4.8)
LYMPHOCYTES RELATIVE PERCENT: 28.9 %
Lab: NORMAL
MCH RBC QN AUTO: 29.2 PG (ref 27–31.3)
MCHC RBC AUTO-ENTMCNC: 34.1 % (ref 33–37)
MCV RBC AUTO: 85.6 FL (ref 79.4–94.8)
MONOCYTES ABSOLUTE: 0.6 K/UL (ref 0.2–0.8)
MONOCYTES RELATIVE PERCENT: 6.9 %
NEGATIVE QC PASS/FAIL: NORMAL
NEUTROPHILS ABSOLUTE: 5.6 K/UL (ref 1.4–6.5)
NEUTROPHILS RELATIVE PERCENT: 60.8 %
PDW BLD-RTO: 12.8 % (ref 11.5–14.5)
PLATELET # BLD: 351 K/UL (ref 130–400)
POC CREATININE WHOLE BLOOD: 0.64
POSITIVE QC PASS/FAIL: NORMAL
POTASSIUM SERPL-SCNC: 4.1 MEQ/L (ref 3.4–4.9)
RBC # BLD: 4.73 M/UL (ref 4.2–5.4)
SODIUM BLD-SCNC: 134 MEQ/L (ref 135–144)
TOTAL PROTEIN: 6.9 G/DL (ref 6.3–8)
WBC # BLD: 9.2 K/UL (ref 4.8–10.8)

## 2023-01-17 PROCEDURE — 85025 COMPLETE CBC W/AUTO DIFF WBC: CPT

## 2023-01-17 PROCEDURE — 86140 C-REACTIVE PROTEIN: CPT

## 2023-01-17 PROCEDURE — 96374 THER/PROPH/DIAG INJ IV PUSH: CPT

## 2023-01-17 PROCEDURE — 70450 CT HEAD/BRAIN W/O DYE: CPT

## 2023-01-17 PROCEDURE — 85652 RBC SED RATE AUTOMATED: CPT

## 2023-01-17 PROCEDURE — 80053 COMPREHEN METABOLIC PANEL: CPT

## 2023-01-17 PROCEDURE — 99284 EMERGENCY DEPT VISIT MOD MDM: CPT

## 2023-01-17 PROCEDURE — 96372 THER/PROPH/DIAG INJ SC/IM: CPT

## 2023-01-17 PROCEDURE — 6360000002 HC RX W HCPCS: Performed by: FAMILY MEDICINE

## 2023-01-17 PROCEDURE — 36415 COLL VENOUS BLD VENIPUNCTURE: CPT

## 2023-01-17 PROCEDURE — 81003 URINALYSIS AUTO W/O SCOPE: CPT

## 2023-01-17 RX ORDER — TIZANIDINE 2 MG/1
2 TABLET ORAL EVERY 6 HOURS PRN
Qty: 40 TABLET | Refills: 0 | Status: SHIPPED | OUTPATIENT
Start: 2023-01-17 | End: 2023-01-27

## 2023-01-17 RX ORDER — ORPHENADRINE CITRATE 30 MG/ML
60 INJECTION INTRAMUSCULAR; INTRAVENOUS ONCE
Status: COMPLETED | OUTPATIENT
Start: 2023-01-17 | End: 2023-01-17

## 2023-01-17 RX ORDER — MORPHINE SULFATE 4 MG/ML
4 INJECTION, SOLUTION INTRAMUSCULAR; INTRAVENOUS ONCE
Status: COMPLETED | OUTPATIENT
Start: 2023-01-17 | End: 2023-01-17

## 2023-01-17 RX ADMIN — ORPHENADRINE CITRATE 60 MG: 30 INJECTION INTRAMUSCULAR; INTRAVENOUS at 22:37

## 2023-01-17 RX ADMIN — MORPHINE SULFATE 4 MG: 4 INJECTION INTRAVENOUS at 21:37

## 2023-01-17 ASSESSMENT — ENCOUNTER SYMPTOMS
VOMITING: 0
ABDOMINAL PAIN: 0
BLOOD IN STOOL: 0
SHORTNESS OF BREATH: 0
RHINORRHEA: 0
COLOR CHANGE: 0
COUGH: 0
SORE THROAT: 0
DIARRHEA: 0
NAUSEA: 0

## 2023-01-17 ASSESSMENT — PAIN DESCRIPTION - PAIN TYPE: TYPE: ACUTE PAIN

## 2023-01-17 ASSESSMENT — PAIN DESCRIPTION - ONSET: ONSET: ON-GOING

## 2023-01-17 ASSESSMENT — PAIN - FUNCTIONAL ASSESSMENT: PAIN_FUNCTIONAL_ASSESSMENT: 0-10

## 2023-01-17 ASSESSMENT — PAIN DESCRIPTION - LOCATION
LOCATION: HEAD;NECK
LOCATION: HEAD;NECK

## 2023-01-17 ASSESSMENT — PAIN SCALES - GENERAL
PAINLEVEL_OUTOF10: 10

## 2023-01-17 ASSESSMENT — PAIN DESCRIPTION - FREQUENCY: FREQUENCY: CONTINUOUS

## 2023-01-17 ASSESSMENT — PAIN DESCRIPTION - DESCRIPTORS: DESCRIPTORS: ACHING

## 2023-01-17 NOTE — ED TRIAGE NOTES
The patient came to the ED for HA and neck pain x2 weeks. Pt denies any recent injury or trauma. Respirations even and unlabored.

## 2023-01-17 NOTE — ED PROVIDER NOTES
[address] [facility] ED  eMERGENCY dEPARTMENT eNCOUnter      Pt Name: Evy Soulier  MRN: [id number]  [place] 2000  Date of evaluation: 1/17/2023  Provider: Mark David MD      HISTORY OF PRESENT ILLNESS    Evy Soulier is a 25 y.o. female with PMHx of DM, GERD, HTN presents to the emergency department with head and neck pain. Patient for 2 weeks has been having right sided neck pain, radiating behind her right ear, to her temple and across forehead. 3 weeks ago felt like she had to strain with bilateral eyes but this subsided. At times worse with movement. No injuries or activities causing symptoms. Taking motrin, tylenol and flexeril at home without relief. She has been nauseous but getting over GI bug. She has never had this pain before. She denies fevers, neck stiffness, dizziness, cough, CP, SOB, abdominal pain, vomiting, diarrhea, urinary sx. No extremity weakness, numbness or paresthesias. No leg swelling. No drugs or ETOH. The history is provided by the patient. Nursing Notes were reviewed. REVIEW OF SYSTEMS       Review of Systems   Constitutional:  Negative for appetite change, chills and fever. HENT:  Negative for congestion, rhinorrhea and sore throat. Eyes:  Positive for visual disturbance. Respiratory:  Negative for cough and shortness of breath. Cardiovascular:  Negative for chest pain. Gastrointestinal:  Negative for abdominal pain, blood in stool, diarrhea, nausea and vomiting. Genitourinary:  Negative for difficulty urinating. Musculoskeletal:  Positive for neck pain. Negative for neck stiffness. Skin:  Negative for color change and rash. Neurological:  Positive for headaches. Negative for dizziness, syncope, weakness, light-headedness and numbness. All other systems reviewed and are negative.           PAST MEDICAL HISTORY     Past Medical History:   Diagnosis Date    Diabetes mellitus ([address]     Genital herpes     GERD (gastroesophageal reflux disease)     Hidradenitis suppurativa     Hypertension     Obesity affecting pregnancy, antepartum, third trimester          SURGICAL HISTORY       Past Surgical History:   Procedure Laterality Date    UPPER GASTROINTESTINAL ENDOSCOPY           CURRENT MEDICATIONS       Discharge Medication List as of 1/17/2023 10:35 PM        CONTINUE these medications which have NOT CHANGED    Details   ondansetron (ZOFRAN-ODT) 4 MG disintegrating tablet Take 1 tablet by mouth 3 times daily as needed for Nausea or Vomiting, Disp-12 tablet, R-0Print      ibuprofen (ADVIL;MOTRIN) 800 MG tablet Take 1 tablet by mouth every 6 hours as needed for Pain, Disp-20 tablet, R-0Normal      blood glucose monitor strips Test 3 times a day & as needed for symptoms of irregular blood glucose., Disp-100 strip, R-0, Print      glucose monitoring (FREESTYLE) kit DAILY Starting Sat 6/25/2022, Disp-1 kit, R-0, Print      Insulin Pen Needle 30G X 8 MM MISC DAILY Starting Sat 6/25/2022, Disp-100 each, R-0, Print      insulin glargine (LANTUS SOLOSTAR) 100 UNIT/ML injection pen Inject 10 Units into the skin nightly, Disp-5 pen, R-0Print      insulin lispro, 1 Unit Dial, (HUMALOG KWIKPEN) 100 UNIT/ML SOPN Inject 5 Units into the skin 3 times daily (before meals), Disp-1 pen, R-5Print      albuterol sulfate  (90 Base) MCG/ACT inhaler Inhale 2 puffs into the lungs every 4 hours as needed for Wheezing, Disp-18 g, R-1Normal      bacitracin-polymyxin b (POLYSPORIN) 500-53066 UNIT/GM ointment Apply topically 2 times daily. , Disp-1 Tube,R-0, Print      albuterol (PROVENTIL) (2.5 MG/3ML) 0.083% nebulizer solution Take 3 mLs by nebulization every 6 hours as needed for Wheezing, Disp-120 each,R-1Print      clotrimazole (LOTRIMIN) 1 % cream Apply topically 2 times daily to lesions on skin for 3-4 weeks or for 1 week after lesion clears, Disp-1 Tube, R-0, Print      Lancets MISC Disp-1 each, R-5, NormalDX: diabetes mellitus.  Use 1-3 time(s) daily - Ok to substitute per insurance (1 each = 1 box)      buPROPion (WELLBUTRIN XL) 150 MG extended release tablet TAKE 1 TABLET BY MOUTH ONCE DAILY IN THE MORNINGHistorical Med             ALLERGIES     Shellfish-derived products and Benzoyl peroxide    FAMILY HISTORY     No family history on file. SOCIAL HISTORY       Social History     Socioeconomic History    Marital status: Single   Tobacco Use    Smoking status: Every Day     Packs/day: 0.50     Types: Cigarettes, Cigars    Smokeless tobacco: Never   Vaping Use    Vaping Use: Never used   Substance and Sexual Activity    Alcohol use: Yes     Comment: occa    Drug use: No    Sexual activity: Yes     Partners: Male         PHYSICAL EXAM         ED Triage Vitals [01/17/23 1832]   BP Temp Temp Source Heart Rate Resp SpO2 Height Weight   (!) 152/106 99.1 °F (37.3 °C) Oral 88 17 98 % 5' 8\" (1.727 m) 280 lb (127 kg)       Physical Exam  Vitals and nursing note reviewed. Constitutional:       Appearance: Normal appearance. She is well-developed. HENT:      Head: Normocephalic and atraumatic. Right Ear: Tympanic membrane and external ear normal.      Left Ear: Tympanic membrane and external ear normal.      Nose: Nose normal.   Eyes:      Pupils: Pupils are equal, round, and reactive to light. Cardiovascular:      Rate and Rhythm: Normal rate and regular rhythm. Heart sounds: Normal heart sounds. Pulmonary:      Effort: Pulmonary effort is normal. No respiratory distress. Breath sounds: Normal breath sounds. No wheezing or rales. Chest:      Chest wall: No tenderness. Abdominal:      General: Bowel sounds are normal.      Palpations: Abdomen is soft. Musculoskeletal:         General: Normal range of motion. Cervical back: Normal range of motion and neck supple. Skin:     General: Skin is warm and dry. Neurological:      General: No focal deficit present. Mental Status: She is alert and oriented to person, place, and time. Cranial Nerves: No cranial nerve deficit. Sensory: No sensory deficit. Motor: No abnormal muscle tone. Coordination: Coordination normal.      Deep Tendon Reflexes: Reflexes normal.   Psychiatric:         Mood and Affect: Mood normal.         Behavior: Behavior normal.         Thought Content: Thought content normal.         Judgment: Judgment normal.       DIAGNOSTIC RESULTS     EKG:All EKG's are interpreted by the Emergency Department Physician who either signs or Co-signs this chart in the absence of a cardiologist.        RADIOLOGY:   Non-plain film images such as CT, Ultrasound and MRI are read by theradiologist. Plain radiographic images are visualized and preliminarily interpreted by the emergency physician with the below findings:    Interpretation per theRadiologist below, if available at the time of this note:    CT HEAD WO CONTRAST   Final Result   No acute intracranial abnormality. LABS:  Labs Reviewed   COMPREHENSIVE METABOLIC PANEL - Abnormal; Notable for the following components:       Result Value    Sodium 134 (*)     Glucose 349 (*)     All other components within normal limits   URINALYSIS WITH REFLEX TO CULTURE - Abnormal; Notable for the following components:    Glucose, Ur 500 (*)     Protein, UA TRACE (*)     All other components within normal limits   POCT CREATININE - Normal   CBC WITH AUTO DIFFERENTIAL   SEDIMENTATION RATE   C-REACTIVE PROTEIN   POC PREGNANCY UR-QUAL       All other labs were within normal range or not returned as of this dictation.     EMERGENCY DEPARTMENT COURSE and DIFFERENTIAL DIAGNOSIS/MDM:   Vitals:    Vitals:    01/17/23 1832 01/17/23 2115 01/17/23 2137   BP: (!) 152/106 (!) 160/98    Pulse: 88 74    Resp: 17 18 18   Temp: 99.1 °F (37.3 °C)     TempSrc: Oral     SpO2: 98% 100%    Weight: 280 lb (127 kg)     Height: 5' 8\" (1.727 m)           MDM  Number of Diagnoses or Management Options  Tension headache  Diagnosis management comments: Summary of ED course:  25years old with chronic intermittent headache multiple ER visits for the same presented today for headache for the last few days was given IV fluid seen and Yadira had a CT of the head that was negative patient felt better her pain resolved was discharged home to follow-up with her primary care and your     Imaging results pre my interpretation:  Ct with no acute process   Tests considered :  CTA but patient symptoms have completely resolved after pain medication stable with no neurological deficit       Amount and/or Complexity of Data Reviewed  Tests in the radiology section of CPT®: ordered and reviewed                 Procedures    FINAL IMPRESSION      1. Tension headache          DISPOSITION/PLAN   DISPOSITION Decision To Discharge 01/17/2023 10:34:50 PM      PATIENT REFERRED TO:  No follow-up provider specified.     DISCHARGE MEDICATIONS:  Discharge Medication List as of 1/17/2023 10:35 PM        START taking these medications    Details   tiZANidine (ZANAFLEX) 2 MG tablet Take 1 tablet by mouth every 6 hours as needed (PAIN), Disp-40 tablet, R-0Normal                (Please notethat portions of this note were completed with a voice recognition program.  Efforts were made to edit the dictations but occasionally words are mis-transcribed.)    Charles Candelario MD (electronically signed)  Emergency Physician Assistant         Cristhian Chaudhary MD  01/22/23 7278

## 2023-01-18 LAB
BILIRUBIN URINE: NEGATIVE
BLOOD, URINE: NEGATIVE
CLARITY: CLEAR
COLOR: YELLOW
GLUCOSE URINE: 500 MG/DL
KETONES, URINE: NEGATIVE MG/DL
LEUKOCYTE ESTERASE, URINE: NEGATIVE
NITRITE, URINE: NEGATIVE
PH UA: 7 (ref 5–9)
PROTEIN UA: ABNORMAL MG/DL
SEDIMENTATION RATE, ERYTHROCYTE: 4 MM (ref 0–20)
SPECIFIC GRAVITY UA: 1.01 (ref 1–1.03)
URINE REFLEX TO CULTURE: ABNORMAL
UROBILINOGEN, URINE: 0.2 E.U./DL

## 2023-03-04 ENCOUNTER — HOSPITAL ENCOUNTER (EMERGENCY)
Age: 23
Discharge: HOME | End: 2023-03-05
Payer: COMMERCIAL

## 2023-03-04 ENCOUNTER — HOSPITAL ENCOUNTER (EMERGENCY)
Age: 23
Discharge: HOME | End: 2023-03-04
Attending: EMERGENCY MEDICINE
Payer: COMMERCIAL

## 2023-03-04 VITALS
WEIGHT: 265 LBS | BODY MASS INDEX: 40.16 KG/M2 | OXYGEN SATURATION: 98 % | TEMPERATURE: 98 F | HEIGHT: 68 IN | HEART RATE: 79 BPM | RESPIRATION RATE: 18 BRPM | SYSTOLIC BLOOD PRESSURE: 136 MMHG | DIASTOLIC BLOOD PRESSURE: 82 MMHG

## 2023-03-04 VITALS
WEIGHT: 265 LBS | OXYGEN SATURATION: 100 % | HEIGHT: 68 IN | HEART RATE: 78 BPM | BODY MASS INDEX: 40.16 KG/M2 | DIASTOLIC BLOOD PRESSURE: 84 MMHG | SYSTOLIC BLOOD PRESSURE: 150 MMHG | TEMPERATURE: 98.1 F | RESPIRATION RATE: 18 BRPM

## 2023-03-04 DIAGNOSIS — R07.89 CHEST WALL PAIN: Primary | ICD-10-CM

## 2023-03-04 DIAGNOSIS — K29.90 GASTRITIS AND DUODENITIS: ICD-10-CM

## 2023-03-04 DIAGNOSIS — R07.89 ATYPICAL CHEST PAIN: Primary | ICD-10-CM

## 2023-03-04 LAB
ALBUMIN SERPL-MCNC: 3.5 G/DL (ref 3.5–4.6)
ALP BLD-CCNC: 82 U/L (ref 40–130)
ALT SERPL-CCNC: 12 U/L (ref 0–33)
ANION GAP SERPL CALCULATED.3IONS-SCNC: 11 MEQ/L (ref 9–15)
AST SERPL-CCNC: 12 U/L (ref 0–35)
BASOPHILS ABSOLUTE: 0.1 K/UL (ref 0–0.2)
BASOPHILS RELATIVE PERCENT: 1.3 %
BILIRUB SERPL-MCNC: 0.3 MG/DL (ref 0.2–0.7)
BUN BLDV-MCNC: 11 MG/DL (ref 6–20)
CALCIUM SERPL-MCNC: 8.5 MG/DL (ref 8.5–9.9)
CHLORIDE BLD-SCNC: 98 MEQ/L (ref 95–107)
CO2: 22 MEQ/L (ref 20–31)
CREAT SERPL-MCNC: 0.59 MG/DL (ref 0.5–0.9)
D DIMER: 0.32 MG/L FEU (ref 0–0.5)
EKG ATRIAL RATE: 70 BPM
EKG ATRIAL RATE: 79 BPM
EKG P AXIS: 27 DEGREES
EKG P AXIS: 59 DEGREES
EKG P-R INTERVAL: 168 MS
EKG P-R INTERVAL: 180 MS
EKG Q-T INTERVAL: 368 MS
EKG Q-T INTERVAL: 384 MS
EKG QRS DURATION: 92 MS
EKG QRS DURATION: 92 MS
EKG QTC CALCULATION (BAZETT): 414 MS
EKG QTC CALCULATION (BAZETT): 421 MS
EKG R AXIS: 67 DEGREES
EKG R AXIS: 68 DEGREES
EKG T AXIS: 18 DEGREES
EKG T AXIS: 21 DEGREES
EKG VENTRICULAR RATE: 70 BPM
EKG VENTRICULAR RATE: 79 BPM
EOSINOPHILS ABSOLUTE: 0.2 K/UL (ref 0–0.7)
EOSINOPHILS RELATIVE PERCENT: 3.1 %
GFR SERPL CREATININE-BSD FRML MDRD: >60 ML/MIN/{1.73_M2}
GLOBULIN: 2.4 G/DL (ref 2.3–3.5)
GLUCOSE BLD-MCNC: 367 MG/DL
GLUCOSE BLD-MCNC: 367 MG/DL (ref 70–99)
GLUCOSE BLD-MCNC: 410 MG/DL (ref 70–99)
HCT VFR BLD CALC: 38.1 % (ref 37–47)
HEMOGLOBIN: 13 G/DL (ref 12–16)
LYMPHOCYTES ABSOLUTE: 2.7 K/UL (ref 1–4.8)
LYMPHOCYTES RELATIVE PERCENT: 33.6 %
MCH RBC QN AUTO: 29.7 PG (ref 27–31.3)
MCHC RBC AUTO-ENTMCNC: 34.2 % (ref 33–37)
MCV RBC AUTO: 87 FL (ref 79.4–94.8)
MONOCYTES ABSOLUTE: 0.6 K/UL (ref 0.2–0.8)
MONOCYTES RELATIVE PERCENT: 7.6 %
NEUTROPHILS ABSOLUTE: 4.3 K/UL (ref 1.4–6.5)
NEUTROPHILS RELATIVE PERCENT: 54.4 %
PDW BLD-RTO: 13 % (ref 11.5–14.5)
PERFORMED ON: ABNORMAL
PLATELET # BLD: 269 K/UL (ref 130–400)
POTASSIUM SERPL-SCNC: 3.8 MEQ/L (ref 3.4–4.9)
RBC # BLD: 4.38 M/UL (ref 4.2–5.4)
SODIUM BLD-SCNC: 131 MEQ/L (ref 135–144)
TOTAL PROTEIN: 5.9 G/DL (ref 6.3–8)
TROPONIN: <0.01 NG/ML (ref 0–0.01)
WBC # BLD: 7.9 K/UL (ref 4.8–10.8)

## 2023-03-04 PROCEDURE — 80053 COMPREHEN METABOLIC PANEL: CPT

## 2023-03-04 PROCEDURE — 6360000002 HC RX W HCPCS: Performed by: FAMILY MEDICINE

## 2023-03-04 PROCEDURE — 99284 EMERGENCY DEPT VISIT MOD MDM: CPT

## 2023-03-04 PROCEDURE — 2580000003 HC RX 258

## 2023-03-04 PROCEDURE — 96374 THER/PROPH/DIAG INJ IV PUSH: CPT

## 2023-03-04 PROCEDURE — 36415 COLL VENOUS BLD VENIPUNCTURE: CPT

## 2023-03-04 PROCEDURE — 84484 ASSAY OF TROPONIN QUANT: CPT

## 2023-03-04 PROCEDURE — 85379 FIBRIN DEGRADATION QUANT: CPT

## 2023-03-04 PROCEDURE — 6360000002 HC RX W HCPCS

## 2023-03-04 PROCEDURE — 6370000000 HC RX 637 (ALT 250 FOR IP): Performed by: FAMILY MEDICINE

## 2023-03-04 PROCEDURE — 96375 TX/PRO/DX INJ NEW DRUG ADDON: CPT

## 2023-03-04 PROCEDURE — 85025 COMPLETE CBC W/AUTO DIFF WBC: CPT

## 2023-03-04 RX ORDER — KETOROLAC TROMETHAMINE 15 MG/ML
15 INJECTION, SOLUTION INTRAMUSCULAR; INTRAVENOUS ONCE
Status: COMPLETED | OUTPATIENT
Start: 2023-03-04 | End: 2023-03-04

## 2023-03-04 RX ORDER — MORPHINE SULFATE 4 MG/ML
4 INJECTION, SOLUTION INTRAMUSCULAR; INTRAVENOUS ONCE
Status: COMPLETED | OUTPATIENT
Start: 2023-03-04 | End: 2023-03-04

## 2023-03-04 RX ORDER — 0.9 % SODIUM CHLORIDE 0.9 %
1000 INTRAVENOUS SOLUTION INTRAVENOUS ONCE
Status: COMPLETED | OUTPATIENT
Start: 2023-03-04 | End: 2023-03-04

## 2023-03-04 RX ORDER — KETOROLAC TROMETHAMINE 30 MG/ML
60 INJECTION, SOLUTION INTRAMUSCULAR; INTRAVENOUS ONCE
Status: COMPLETED | OUTPATIENT
Start: 2023-03-04 | End: 2023-03-04

## 2023-03-04 RX ORDER — IBUPROFEN 600 MG/1
600 TABLET ORAL 4 TIMES DAILY PRN
Qty: 120 TABLET | Refills: 0 | Status: SHIPPED | OUTPATIENT
Start: 2023-03-04 | End: 2023-04-03

## 2023-03-04 RX ADMIN — KETOROLAC TROMETHAMINE 60 MG: 30 INJECTION, SOLUTION INTRAMUSCULAR at 11:06

## 2023-03-04 RX ADMIN — SODIUM CHLORIDE 1000 ML: 9 INJECTION, SOLUTION INTRAVENOUS at 21:48

## 2023-03-04 RX ADMIN — KETOROLAC TROMETHAMINE 15 MG: 15 INJECTION, SOLUTION INTRAMUSCULAR; INTRAVENOUS at 21:48

## 2023-03-04 RX ADMIN — MORPHINE SULFATE 4 MG: 4 INJECTION, SOLUTION INTRAMUSCULAR; INTRAVENOUS at 23:37

## 2023-03-04 RX ADMIN — Medication: at 10:22

## 2023-03-04 ASSESSMENT — PAIN DESCRIPTION - LOCATION
LOCATION: CHEST

## 2023-03-04 ASSESSMENT — PAIN - FUNCTIONAL ASSESSMENT
PAIN_FUNCTIONAL_ASSESSMENT: 0-10
PAIN_FUNCTIONAL_ASSESSMENT: 0-10

## 2023-03-04 ASSESSMENT — PAIN DESCRIPTION - DESCRIPTORS
DESCRIPTORS: ACHING
DESCRIPTORS: TIGHTNESS
DESCRIPTORS: TIGHTNESS

## 2023-03-04 ASSESSMENT — PAIN DESCRIPTION - PAIN TYPE: TYPE: ACUTE PAIN

## 2023-03-04 ASSESSMENT — ENCOUNTER SYMPTOMS
COUGH: 0
ABDOMINAL PAIN: 0
PHOTOPHOBIA: 0
DIARRHEA: 0
VOMITING: 0
NAUSEA: 0
SHORTNESS OF BREATH: 0
RESPIRATORY NEGATIVE: 1

## 2023-03-04 ASSESSMENT — PAIN DESCRIPTION - ORIENTATION: ORIENTATION: LEFT

## 2023-03-04 ASSESSMENT — PAIN SCALES - GENERAL
PAINLEVEL_OUTOF10: 10
PAINLEVEL_OUTOF10: 9
PAINLEVEL_OUTOF10: 9

## 2023-03-04 ASSESSMENT — HEART SCORE: ECG: 0

## 2023-03-04 ASSESSMENT — PAIN DESCRIPTION - FREQUENCY: FREQUENCY: CONTINUOUS

## 2023-03-04 NOTE — ED PROVIDER NOTES
[address] [facility] ED  eMERGENCY dEPARTMENT eNCOUnter      Pt Name: Doroteo Cohn  MRN: [id number]  [place] [address]  Date of evaluation: 3/4/2023  Provider: Hussain Donis MD    CHIEF COMPLAINT       Chief Complaint   Patient presents with    Chest Pain     Since last night          HISTORY OF PRESENT ILLNESS   (Location/Symptom, Timing/Onset,Context/Setting, Quality, Duration, Modifying Factors, Severity)  Note limiting factors. Doroteo Cohn is a 25 y.o. female who presents to the emergency department  chest pain      The history is provided by the patient. Chest Pain  Pain location:  Substernal area and epigastric  Pain quality: aching    Pain quality: not burning    Pain radiates to:  Epigastrium  Pain severity:  Moderate  Onset quality:  Gradual  Duration:  1 day  Timing:  Constant  Progression:  Unchanged  Chronicity:  Recurrent  Context: lifting and movement    Context: not breathing, not drug use, not eating, not intercourse, not raising an arm, not at rest, not stress and not trauma      NursingNotes were reviewed. REVIEW OF SYSTEMS    (2-9 systems for level 4, 10 or more for level 5)     Review of Systems   Constitutional: Negative. HENT: Negative. Respiratory: Negative. Cardiovascular:  Positive for chest pain. Skin: Negative. Psychiatric/Behavioral: Negative. Except as noted above the remainder of the review of systems was reviewed and negative.        PAST MEDICAL HISTORY     Past Medical History:   Diagnosis Date    Diabetes mellitus ([address]     Genital herpes     GERD (gastroesophageal reflux disease)     Hidradenitis suppurativa     Hypertension     Obesity affecting pregnancy, antepartum, third trimester          SURGICALHISTORY       Past Surgical History:   Procedure Laterality Date    UPPER GASTROINTESTINAL ENDOSCOPY           CURRENT MEDICATIONS       Previous Medications    ALBUTEROL (PROVENTIL) (2.5 MG/3ML) 0.083% NEBULIZER SOLUTION    Take 3 mLs by nebulization every 6 hours as needed for Wheezing    ALBUTEROL SULFATE  (90 BASE) MCG/ACT INHALER    Inhale 2 puffs into the lungs every 4 hours as needed for Wheezing    BACITRACIN-POLYMYXIN B (POLYSPORIN) [id number] UNIT/GM OINTMENT    Apply topically 2 times daily. BLOOD GLUCOSE MONITOR STRIPS    Test 3 times a day & as needed for symptoms of irregular blood glucose. BUPROPION (WELLBUTRIN XL) 150 MG EXTENDED RELEASE TABLET    TAKE 1 TABLET BY MOUTH ONCE DAILY IN THE MORNING    CLOTRIMAZOLE (LOTRIMIN) 1 % CREAM    Apply topically 2 times daily to lesions on skin for 3-4 weeks or for 1 week after lesion clears    GLUCOSE MONITORING (FREESTYLE) KIT    1 kit by Does not apply route daily    IBUPROFEN (ADVIL;MOTRIN) 800 MG TABLET    Take 1 tablet by mouth every 6 hours as needed for Pain    INSULIN GLARGINE (LANTUS SOLOSTAR) 100 UNIT/ML INJECTION PEN    Inject 10 Units into the skin nightly    INSULIN LISPRO, 1 UNIT DIAL, (HUMALOG KWIKPEN) 100 UNIT/ML SOPN    Inject 5 Units into the skin 3 times daily (before meals)    INSULIN PEN NEEDLE 30G X 8 MM MISC    1 each by Does not apply route daily    LANCETS MISC    DX: diabetes mellitus. Use 1-3 time(s) daily - Ok to substitute per insurance (1 each = 1 box)    ONDANSETRON (ZOFRAN-ODT) 4 MG DISINTEGRATING TABLET    Take 1 tablet by mouth 3 times daily as needed for Nausea or Vomiting            Shellfish-derived products and Benzoyl peroxide    FAMILY HISTORY     History reviewed. No pertinent family history. SOCIAL HISTORY       Social History     Socioeconomic History    Marital status: Single     Spouse name: None    Number of children: None    Years of education: None    Highest education level: None   Tobacco Use    Smoking status: Every Day     Packs/day: 0.50     Types: Cigarettes, Cigars    Smokeless tobacco: Never   Vaping Use    Vaping Use: Never used   Substance and Sexual Activity    Alcohol use: Yes     Comment: occa    Drug use:  No Sexual activity: Yes     Partners: Male       SCREENINGS    Lulu Coma Scale  Eye Opening: Spontaneous  Best Verbal Response: Oriented  Best Motor Response: Obeys commands  Lulu Coma Scale Score: 15        PHYSICAL EXAM    (up to 7 for level 4, 8 or more for level 5)     ED Triage Vitals [03/04/23 0921]   BP Temp Temp Source Heart Rate Resp SpO2 Height Weight   136/82 98 °F (36.7 °C) Temporal 79 18 98 % 5' 8\" (1.727 m) 265 lb (120.2 kg)       Physical Exam  Vitals and nursing note reviewed. Constitutional:       Appearance: She is well-developed. HENT:      Head: Normocephalic and atraumatic. Right Ear: External ear normal.      Left Ear: External ear normal.      Nose: Nose normal.   Eyes:      Pupils: Pupils are equal, round, and reactive to light. Cardiovascular:      Rate and Rhythm: Normal rate and regular rhythm. Heart sounds: Normal heart sounds. Pulmonary:      Effort: Pulmonary effort is normal. No respiratory distress. Breath sounds: Normal breath sounds. No wheezing or rales. Chest:      Chest wall: Tenderness present. Abdominal:      General: Bowel sounds are normal.      Palpations: Abdomen is soft. Musculoskeletal:         General: Normal range of motion. Cervical back: Normal range of motion and neck supple. Skin:     General: Skin is warm and dry. Neurological:      Mental Status: She is alert and oriented to person, place, and time. Cranial Nerves: No cranial nerve deficit. Sensory: No sensory deficit. Motor: No abnormal muscle tone. Coordination: Coordination normal.      Deep Tendon Reflexes: Reflexes normal.   Psychiatric:         Behavior: Behavior normal.         Thought Content:  Thought content normal.         Judgment: Judgment normal.           RESULTS     EKG: All EKG's are interpreted by the Emergency Department Physician who either signs or Co-signsthis chart in the absence of a cardiologist.    EKG: normal EKG, normal sinus rhythm, rate 70 with no acute St or t wave changes . RADIOLOGY:   Non-plain filmimages such as CT, Ultrasound and MRI are read by the radiologist. Plain radiographic images are visualized and preliminarily interpreted by the emergency physician with the below findings:         Interpretation per the Radiologist below, if available at the time ofthis note:    No orders to display         ED BEDSIDE ULTRASOUND:   Performed by ED Physician - none    LABS:  Labs Reviewed - No data to display    All other labs were within normal range or not returned as of this dictation. Procedures      Medical Decision Making  25years old known history of reflux chronic intermittent chest wall pain presented to the ED today states she had epigastric and chest pain since last night took muscle relaxant and PPI this morning with no relief came in for evaluation in the ER patient hemodynamically stable EKG completely normal was given GI cocktail and Toradol IM felt better and was discharged home in stable condition    Problems Addressed:  Chest wall pain: chronic illness or injury  Gastritis and duodenitis: chronic illness or injury    Amount and/or Complexity of Data Reviewed  ECG/medicine tests: ordered. Risk  Prescription drug management. MDM  Reviewed: previous chart, nursing note and vitals  Reviewed previous: labs and ECG     FINAL IMPRESSION      1. Chest wall pain    2. Gastritis and duodenitis          DISPOSITION/PLAN   DISPOSITION Decision To Discharge 03/04/2023 10:56:45 AM      PATIENT REFERRED TO:  No follow-up provider specified.     DISCHARGE MEDICATIONS:  New Prescriptions    No medications on file          (Please note thatportions of this note were completed with a voice recognition program.  Efforts were made to edit the dictations but occasionally words are mis-transcribed.)    Hussain Donis MD (electronically signed)  Attending Emergency Physician         Cliff Park MD  03/04/23 [phone]

## 2023-03-04 NOTE — ED NOTES
Discharge instructions explained to patient. Patient verbalized understanding.       Macrina Guillen RN  03/04/23 0238

## 2023-03-05 NOTE — ED PROVIDER NOTES
[address] [facility] ED  eMERGENCY dEPARTMENT eNCOUnter      Pt Name: Dee Alanis  MRN: [id number]  [place] [address]  Date of evaluation: 3/4/2023  Provider: LOLY Miller        HISTORY OF PRESENT ILLNESS    Dee Alanis is a 25 y.o. female per chart review has ah/o anxiety, depressive disorder, obesity, PTSD, CKD, nicotine use, uncontrolled DM. Patient presents to the ED for ongoing 2-day history of chest pain.  was seen in this ED earlier today and given a GI cocktail thought it might have been acid but she is not feeling better.  did feel gassy and bloated and did agree it could have been acid but states it has never lasted this long. Patient states it feels like a pressure. Worse with inspiration. Tender to touch. No associated shortness of breath. No unilateral edema, peripheral edema, orthopnea, PND, fever, cough, chills, congestion, flulike symptoms. No cardiopulmonary history. REVIEW OF SYSTEMS       Review of Systems   Constitutional:  Negative for chills and fever. HENT:  Negative for congestion. Eyes:  Negative for photophobia. Respiratory:  Negative for cough and shortness of breath. Cardiovascular:  Positive for chest pain. Gastrointestinal:  Negative for abdominal pain, diarrhea, nausea and vomiting. Genitourinary:  Negative for difficulty urinating. Musculoskeletal:  Negative for myalgias. Neurological:  Negative for headaches. Psychiatric/Behavioral:  Negative for confusion. Except as noted above the remainder of the review of systems was reviewed and negative.        PAST MEDICAL HISTORY     Past Medical History:   Diagnosis Date    Diabetes mellitus ([address]     Genital herpes     GERD (gastroesophageal reflux disease)     Hidradenitis suppurativa     Hypertension     Obesity affecting pregnancy, antepartum, third trimester          SURGICAL HISTORY       Past Surgical History:   Procedure Laterality Date    UPPER GASTROINTESTINAL ENDOSCOPY           CURRENT MEDICATIONS       Previous Medications    ALBUTEROL (PROVENTIL) (2.5 MG/3ML) 0.083% NEBULIZER SOLUTION    Take 3 mLs by nebulization every 6 hours as needed for Wheezing    ALBUTEROL SULFATE  (90 BASE) MCG/ACT INHALER    Inhale 2 puffs into the lungs every 4 hours as needed for Wheezing    BACITRACIN-POLYMYXIN B (POLYSPORIN) [id number] UNIT/GM OINTMENT    Apply topically 2 times daily. BLOOD GLUCOSE MONITOR STRIPS    Test 3 times a day & as needed for symptoms of irregular blood glucose. BUPROPION (WELLBUTRIN XL) 150 MG EXTENDED RELEASE TABLET    TAKE 1 TABLET BY MOUTH ONCE DAILY IN THE MORNING    CLOTRIMAZOLE (LOTRIMIN) 1 % CREAM    Apply topically 2 times daily to lesions on skin for 3-4 weeks or for 1 week after lesion clears    GLUCOSE MONITORING (FREESTYLE) KIT    1 kit by Does not apply route daily    INSULIN GLARGINE (LANTUS SOLOSTAR) 100 UNIT/ML INJECTION PEN    Inject 10 Units into the skin nightly    INSULIN LISPRO, 1 UNIT DIAL, (HUMALOG KWIKPEN) 100 UNIT/ML SOPN    Inject 5 Units into the skin 3 times daily (before meals)    INSULIN PEN NEEDLE 30G X 8 MM MISC    1 each by Does not apply route daily    LANCETS MISC    DX: diabetes mellitus. Use 1-3 time(s) daily - Ok to substitute per insurance (1 each = 1 box)    ONDANSETRON (ZOFRAN-ODT) 4 MG DISINTEGRATING TABLET    Take 1 tablet by mouth 3 times daily as needed for Nausea or Vomiting       ALLERGIES     Shellfish-derived products and Benzoyl peroxide    FAMILY HISTORY     History reviewed. No pertinent family history.        SOCIAL HISTORY       Social History     Socioeconomic History    Marital status: Single     Spouse name: None    Number of children: None    Years of education: None    Highest education level: None   Tobacco Use    Smoking status: Every Day     Packs/day: 1.00     Types: Cigarettes, Cigars    Smokeless tobacco: Never   Vaping Use    Vaping Use: Never used   Substance and Sexual Activity Alcohol use: Yes     Comment: occa    Drug use: No    Sexual activity: Yes     Partners: Male         PHYSICAL EXAM        ED Triage Vitals [03/04/23 2100]   BP Temp Temp Source Heart Rate Resp SpO2 Height Weight   (!) 150/84 98.1 °F (36.7 °C) Oral 78 16 100 % 5' 8\" (1.727 m) 265 lb (120.2 kg)       Physical Exam  Constitutional:       General: She is not in acute distress. Appearance: Normal appearance. She is not ill-appearing, toxic-appearing or diaphoretic. HENT:      Head: Normocephalic and atraumatic. Right Ear: External ear normal.      Left Ear: External ear normal.      Nose: Nose normal.      Mouth/Throat:      Mouth: Mucous membranes are moist.      Pharynx: Oropharynx is clear. Eyes:      Extraocular Movements: Extraocular movements intact. Conjunctiva/sclera: Conjunctivae normal.   Cardiovascular:      Rate and Rhythm: Normal rate and regular rhythm. Pulmonary:      Effort: Pulmonary effort is normal. No respiratory distress. Breath sounds: Normal breath sounds. No stridor. No wheezing, rhonchi or rales. Comments: LCTAB. No increased work of breathing. Chest:      Chest wall: Tenderness present. Abdominal:      General: Bowel sounds are normal. There is no distension. Palpations: Abdomen is soft. There is no mass. Tenderness: There is no abdominal tenderness. There is no guarding or rebound. Hernia: No hernia is present. Musculoskeletal:         General: Normal range of motion. Cervical back: Normal range of motion. No rigidity. Right lower leg: No edema. Left lower leg: No edema. Skin:     General: Skin is warm. Capillary Refill: Capillary refill takes less than 2 seconds. Neurological:      Mental Status: She is alert and oriented to person, place, and time.    Psychiatric:         Mood and Affect: Mood normal.         Behavior: Behavior normal.         LABS:  Labs Reviewed   COMPREHENSIVE METABOLIC PANEL - Abnormal; Notable for the following components:       Result Value    Sodium 131 (*)     Glucose 410 (*)     Total Protein 5.9 (*)     All other components within normal limits    Narrative:     Antonio Gaines  [facility] tel. [id number],  Chemistry results called to and read back by Lita Jaimes, 03/04/2023 22:35, by  [address]   POCT GLUCOSE - Abnormal; Notable for the following components:    POC Glucose 367 (*)     All other components within normal limits   POCT GLUCOSE - Normal   CBC WITH AUTO DIFFERENTIAL   TROPONIN   D-DIMER, QUANTITATIVE         MDM:   Vitals:    Vitals:    03/04/23 2100 03/04/23 2337   BP: (!) 150/84    Pulse: 78    Resp: 16 18   Temp: 98.1 °F (36.7 °C)    TempSrc: Oral    SpO2: 100%    Weight: 265 lb (120.2 kg)    Height: 5' 8\" (1.727 m)        66-year-old female patient presents to the ED for ongoing mid-sternal chest pain complaints. No other associated complaints. Afebrile. VSS. Chest tender to palpation. Nontoxic nondistressed. Patient states family history of CAD as well as family history of pulmonary emboli. Troponin is negative. EKG nonspecific. Heart score is 2. With tenderness to palpation, duration of symptoms, less likely cardiac at this time. D-dimer is negative. Patient is educated that she is appropriate for discharge home, alternate Tylenol and ibuprofen for symptoms, see PCP within 2 days for follow-up. CRITICAL CARE TIME   Total CriticalCare time was 0 minutes, excluding separately reportable procedures. There was a high probability of clinically significant/life threatening deterioration in the patient's condition which required my urgent intervention. PROCEDURES:  Unlessotherwise noted below, none      Procedures      FINAL IMPRESSION      1.  Atypical chest pain          DISPOSITION/PLAN   DISPOSITION Discharge - Pending Orders Complete 03/04/2023 11:16:17 PM          LOLY Barros (electronically signed)  Attending Emergency Physician          LOLY Barros  03/04/23 [address] [facility]

## 2023-03-05 NOTE — ED NOTES
Pt presents to ER with chest pain. Pt states it has been ongoing for 2 days. Pt states she was seen earlier today with no relief.      Marcello Juan, EMT-P  03/04/23 [id number]

## 2023-03-10 LAB
EKG ATRIAL RATE: 70 BPM
EKG ATRIAL RATE: 79 BPM
EKG P AXIS: 27 DEGREES
EKG P AXIS: 59 DEGREES
EKG P-R INTERVAL: 168 MS
EKG P-R INTERVAL: 180 MS
EKG Q-T INTERVAL: 368 MS
EKG Q-T INTERVAL: 384 MS
EKG QRS DURATION: 92 MS
EKG QRS DURATION: 92 MS
EKG QTC CALCULATION (BAZETT): 414 MS
EKG QTC CALCULATION (BAZETT): 421 MS
EKG R AXIS: 67 DEGREES
EKG R AXIS: 68 DEGREES
EKG T AXIS: 18 DEGREES
EKG T AXIS: 21 DEGREES
EKG VENTRICULAR RATE: 70 BPM
EKG VENTRICULAR RATE: 79 BPM

## 2023-04-08 ENCOUNTER — APPOINTMENT (OUTPATIENT)
Dept: GENERAL RADIOLOGY | Age: 23
End: 2023-04-08
Payer: OTHER MISCELLANEOUS

## 2023-04-08 ENCOUNTER — HOSPITAL ENCOUNTER (EMERGENCY)
Age: 23
Discharge: HOME | End: 2023-04-08
Attending: EMERGENCY MEDICINE
Payer: OTHER MISCELLANEOUS

## 2023-04-08 VITALS
OXYGEN SATURATION: 100 % | RESPIRATION RATE: 18 BRPM | DIASTOLIC BLOOD PRESSURE: 92 MMHG | BODY MASS INDEX: 41.22 KG/M2 | WEIGHT: 272 LBS | TEMPERATURE: 98.2 F | HEIGHT: 68 IN | HEART RATE: 75 BPM | SYSTOLIC BLOOD PRESSURE: 134 MMHG
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
## 2023-04-08 PROCEDURE — 6370000000 HC RX 637 (ALT 250 FOR IP): Performed by: EMERGENCY MEDICINE

## 2023-04-08 PROCEDURE — 72040 X-RAY EXAM NECK SPINE 2-3 VW: CPT

## 2023-04-08 RX ORDER — IBUPROFEN 800 MG/1
800 TABLET ORAL ONCE
Status: COMPLETED | OUTPATIENT
Start: 2023-04-08 | End: 2023-04-08

## 2023-04-08 RX ADMIN — IBUPROFEN 800 MG: 800 TABLET, FILM COATED ORAL at 22:41

## 2023-04-08 ASSESSMENT — PAIN DESCRIPTION - ORIENTATION: ORIENTATION: RIGHT;LEFT

## 2023-04-08 ASSESSMENT — PAIN DESCRIPTION - FREQUENCY: FREQUENCY: CONTINUOUS

## 2023-04-08 ASSESSMENT — PAIN SCALES - GENERAL
PAINLEVEL_OUTOF10: 10
PAINLEVEL_OUTOF10: 9

## 2023-04-08 ASSESSMENT — PAIN DESCRIPTION - LOCATION
LOCATION: NECK
LOCATION: KNEE

## 2023-04-08 ASSESSMENT — ENCOUNTER SYMPTOMS
SHORTNESS OF BREATH: 0
CHEST TIGHTNESS: 0
EYE DISCHARGE: 0
ABDOMINAL PAIN: 0
ABDOMINAL DISTENTION: 0
BACK PAIN: 0
SORE THROAT: 0
WHEEZING: 0
COUGH: 0
VOMITING: 0
PHOTOPHOBIA: 0

## 2023-04-08 ASSESSMENT — PAIN - FUNCTIONAL ASSESSMENT: PAIN_FUNCTIONAL_ASSESSMENT: NONE - DENIES PAIN

## 2023-04-08 ASSESSMENT — PAIN DESCRIPTION - ONSET: ONSET: ON-GOING

## 2023-04-08 ASSESSMENT — PAIN DESCRIPTION - DESCRIPTORS: DESCRIPTORS: ACHING;DISCOMFORT

## 2023-04-09 NOTE — ED PROVIDER NOTES
patient is not nervous/anxious and is not hyperactive. All other systems reviewed and are negative. Except as noted above the remainder of the review of systems was reviewed and negative. PAST MEDICAL HISTORY     Past Medical History:   Diagnosis Date    Diabetes mellitus ([address]     Genital herpes     GERD (gastroesophageal reflux disease)     Hidradenitis suppurativa     Hypertension     Obesity affecting pregnancy, antepartum, third trimester          SURGICALHISTORY       Past Surgical History:   Procedure Laterality Date    UPPER GASTROINTESTINAL ENDOSCOPY           CURRENT MEDICATIONS       Previous Medications    ALBUTEROL (PROVENTIL) (2.5 MG/3ML) 0.083% NEBULIZER SOLUTION    Take 3 mLs by nebulization every 6 hours as needed for Wheezing    ALBUTEROL SULFATE  (90 BASE) MCG/ACT INHALER    Inhale 2 puffs into the lungs every 4 hours as needed for Wheezing    BACITRACIN-POLYMYXIN B (POLYSPORIN) [id number] UNIT/GM OINTMENT    Apply topically 2 times daily. BLOOD GLUCOSE MONITOR STRIPS    Test 3 times a day & as needed for symptoms of irregular blood glucose. BUPROPION (WELLBUTRIN XL) 150 MG EXTENDED RELEASE TABLET    TAKE 1 TABLET BY MOUTH ONCE DAILY IN THE MORNING    CLOTRIMAZOLE (LOTRIMIN) 1 % CREAM    Apply topically 2 times daily to lesions on skin for 3-4 weeks or for 1 week after lesion clears    GLUCOSE MONITORING (FREESTYLE) KIT    1 kit by Does not apply route daily    IBUPROFEN (ADVIL;MOTRIN) 600 MG TABLET    Take 1 tablet by mouth 4 times daily as needed for Pain    INSULIN GLARGINE (LANTUS SOLOSTAR) 100 UNIT/ML INJECTION PEN    Inject 10 Units into the skin nightly    INSULIN LISPRO, 1 UNIT DIAL, (HUMALOG KWIKPEN) 100 UNIT/ML SOPN    Inject 5 Units into the skin 3 times daily (before meals)    INSULIN PEN NEEDLE 30G X 8 MM MISC    1 each by Does not apply route daily    LANCETS MISC    DX: diabetes mellitus.  Use 1-3 time(s) daily - Ok to substitute per insurance (1 each = 1 box)
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
## 2023-06-16 LAB
ALANINE AMINOTRANSFERASE (SGPT) (U/L) IN SER/PLAS: 15 U/L (ref 7–45)
ALBUMIN (G/DL) IN SER/PLAS: 4.1 G/DL (ref 3.4–5)
ALBUMIN (MG/L) IN URINE: 811.3 MG/L
ALBUMIN/CREATININE (UG/MG) IN URINE: 610 UG/MG CRT (ref 0–30)
ALKALINE PHOSPHATASE (U/L) IN SER/PLAS: 61 U/L (ref 33–110)
ANION GAP IN SER/PLAS: 11 MMOL/L (ref 10–20)
APPEARANCE, URINE: ABNORMAL
ASPARTATE AMINOTRANSFERASE (SGOT) (U/L) IN SER/PLAS: 11 U/L (ref 9–39)
BACTERIA, URINE: ABNORMAL /HPF
BASOPHILS (10*3/UL) IN BLOOD BY AUTOMATED COUNT: 0.04 X10E9/L (ref 0–0.1)
BASOPHILS/100 LEUKOCYTES IN BLOOD BY AUTOMATED COUNT: 0.7 % (ref 0–2)
BILIRUBIN TOTAL (MG/DL) IN SER/PLAS: 0.9 MG/DL (ref 0–1.2)
BILIRUBIN, URINE: NEGATIVE
BLOOD, URINE: NEGATIVE
CALCIDIOL (25 OH VITAMIN D3) (NG/ML) IN SER/PLAS: 8 NG/ML
CALCIUM (MG/DL) IN SER/PLAS: 9.3 MG/DL (ref 8.6–10.3)
CARBON DIOXIDE, TOTAL (MMOL/L) IN SER/PLAS: 29 MMOL/L (ref 21–32)
CHLORIDE (MMOL/L) IN SER/PLAS: 100 MMOL/L (ref 98–107)
CHOLESTEROL (MG/DL) IN SER/PLAS: 151 MG/DL (ref 0–199)
CHOLESTEROL IN HDL (MG/DL) IN SER/PLAS: 40.1 MG/DL
CHOLESTEROL/HDL RATIO: 3.8
COBALAMIN (VITAMIN B12) (PG/ML) IN SER/PLAS: 389 PG/ML (ref 211–911)
COLOR, URINE: YELLOW
CREATININE (MG/DL) IN SER/PLAS: 0.67 MG/DL (ref 0.5–1.05)
CREATININE (MG/DL) IN URINE: 133 MG/DL (ref 20–320)
CREATININE (MG/DL) IN URINE: 133 MG/DL (ref 20–320)
EOSINOPHILS (10*3/UL) IN BLOOD BY AUTOMATED COUNT: 0.26 X10E9/L (ref 0–0.7)
EOSINOPHILS/100 LEUKOCYTES IN BLOOD BY AUTOMATED COUNT: 4.3 % (ref 0–6)
ERYTHROCYTE DISTRIBUTION WIDTH (RATIO) BY AUTOMATED COUNT: 11.9 % (ref 11.5–14.5)
ERYTHROCYTE MEAN CORPUSCULAR HEMOGLOBIN CONCENTRATION (G/DL) BY AUTOMATED: 34.7 G/DL (ref 32–36)
ERYTHROCYTE MEAN CORPUSCULAR VOLUME (FL) BY AUTOMATED COUNT: 85 FL (ref 80–100)
ERYTHROCYTES (10*6/UL) IN BLOOD BY AUTOMATED COUNT: 4.6 X10E12/L (ref 4–5.2)
GFR FEMALE: >90 ML/MIN/1.73M2
GLUCOSE (MG/DL) IN SER/PLAS: 299 MG/DL (ref 74–99)
GLUCOSE, URINE: 100 MG/DL
HEMATOCRIT (%) IN BLOOD BY AUTOMATED COUNT: 38.9 % (ref 36–46)
HEMOGLOBIN (G/DL) IN BLOOD: 13.5 G/DL (ref 12–16)
IMMATURE GRANULOCYTES/100 LEUKOCYTES IN BLOOD BY AUTOMATED COUNT: 0.3 % (ref 0–0.9)
KETONES, URINE: NEGATIVE MG/DL
LDL: 86 MG/DL (ref 0–119)
LEUKOCYTE ESTERASE, URINE: NEGATIVE
LEUKOCYTES (10*3/UL) IN BLOOD BY AUTOMATED COUNT: 6 X10E9/L (ref 4.4–11.3)
LYMPHOCYTES (10*3/UL) IN BLOOD BY AUTOMATED COUNT: 2.07 X10E9/L (ref 1.2–4.8)
LYMPHOCYTES/100 LEUKOCYTES IN BLOOD BY AUTOMATED COUNT: 34.4 % (ref 13–44)
MAGNESIUM (MG/DL) IN SER/PLAS: 1.53 MG/DL (ref 1.6–2.4)
MONOCYTES (10*3/UL) IN BLOOD BY AUTOMATED COUNT: 0.42 X10E9/L (ref 0.1–1)
MONOCYTES/100 LEUKOCYTES IN BLOOD BY AUTOMATED COUNT: 7 % (ref 2–10)
MUCUS, URINE: ABNORMAL /LPF
NEUTROPHILS (10*3/UL) IN BLOOD BY AUTOMATED COUNT: 3.2 X10E9/L (ref 1.2–7.7)
NEUTROPHILS/100 LEUKOCYTES IN BLOOD BY AUTOMATED COUNT: 53.3 % (ref 40–80)
NITRITE, URINE: NEGATIVE
NON HDL CHOLESTEROL: 111 MG/DL (ref 0–149)
PH, URINE: 6 (ref 5–8)
PLATELETS (10*3/UL) IN BLOOD AUTOMATED COUNT: 334 X10E9/L (ref 150–450)
POTASSIUM (MMOL/L) IN SER/PLAS: 3.6 MMOL/L (ref 3.5–5.3)
PROTEIN (MG/DL) IN URINE: 124 MG/DL (ref 5–24)
PROTEIN TOTAL: 7.2 G/DL (ref 6.4–8.2)
PROTEIN, URINE: ABNORMAL MG/DL
PROTEIN/CREATININE (MG/MG) IN URINE: 0.93 MG/MG CREAT (ref 0–0.17)
RBC, URINE: 1 /HPF (ref 0–5)
SODIUM (MMOL/L) IN SER/PLAS: 136 MMOL/L (ref 136–145)
SPECIFIC GRAVITY, URINE: 1.02 (ref 1–1.03)
SQUAMOUS EPITHELIAL CELLS, URINE: 13 /HPF
TRIGLYCERIDE (MG/DL) IN SER/PLAS: 125 MG/DL (ref 0–149)
UREA NITROGEN (MG/DL) IN SER/PLAS: 6 MG/DL (ref 6–23)
UROBILINOGEN, URINE: 2 MG/DL (ref 0–1.9)
VLDL: 25 MG/DL (ref 0–40)
WBC, URINE: 3 /HPF (ref 0–5)

## 2023-06-18 LAB
ESTIMATED AVERAGE GLUCOSE FOR HBA1C: 278 MG/DL
HEMOGLOBIN A1C/HEMOGLOBIN TOTAL IN BLOOD: 11.3 %

## 2023-10-25 PROBLEM — R12 HEARTBURN IN PREGNANCY (HHS-HCC): Status: ACTIVE | Noted: 2023-10-25

## 2023-10-25 PROBLEM — B37.31 VULVOVAGINITIS CANDIDA ALBICANS: Status: ACTIVE | Noted: 2023-10-25

## 2023-10-25 PROBLEM — G56.00 CARPAL TUNNEL SYNDROME DURING PREGNANCY (HHS-HCC): Status: ACTIVE | Noted: 2023-10-25

## 2023-10-25 PROBLEM — L73.2 HIDRADENITIS SUPPURATIVA: Status: ACTIVE | Noted: 2023-10-25

## 2023-10-25 PROBLEM — E55.9 VITAMIN D DEFICIENCY: Status: ACTIVE | Noted: 2023-10-25

## 2023-10-25 PROBLEM — E11.9 DIABETES MELLITUS TYPE 2 WITHOUT RETINOPATHY (MULTI): Status: ACTIVE | Noted: 2023-10-25

## 2023-10-25 PROBLEM — A60.00 GENITAL HERPES SIMPLEX: Status: ACTIVE | Noted: 2023-10-25

## 2023-10-25 PROBLEM — O99.210 OBESITY IN PREGNANCY, ANTEPARTUM (HHS-HCC): Status: ACTIVE | Noted: 2023-10-25

## 2023-10-25 PROBLEM — A74.9 CHLAMYDIA INFECTION, CURRENT PREGNANCY (HHS-HCC): Status: ACTIVE | Noted: 2023-10-25

## 2023-10-25 PROBLEM — G89.29 CHRONIC PAIN: Status: ACTIVE | Noted: 2023-10-25

## 2023-10-25 PROBLEM — R05.9 COUGH: Status: ACTIVE | Noted: 2023-10-25

## 2023-10-25 PROBLEM — O21.9 NAUSEA AND VOMITING IN PREGNANCY (HHS-HCC): Status: ACTIVE | Noted: 2023-10-25

## 2023-10-25 PROBLEM — B37.31 YEAST INFECTION OF THE VAGINA: Status: ACTIVE | Noted: 2023-10-25

## 2023-10-25 PROBLEM — F90.9 ADHD (ATTENTION DEFICIT HYPERACTIVITY DISORDER): Status: ACTIVE | Noted: 2023-10-25

## 2023-10-25 PROBLEM — S63.509A WRIST SPRAIN: Status: ACTIVE | Noted: 2023-10-25

## 2023-10-25 PROBLEM — R51.9 HEADACHE IN PREGNANCY, ANTEPARTUM (HHS-HCC): Status: ACTIVE | Noted: 2023-10-25

## 2023-10-25 PROBLEM — O26.899 CARPAL TUNNEL SYNDROME DURING PREGNANCY (HHS-HCC): Status: ACTIVE | Noted: 2023-10-25

## 2023-10-25 PROBLEM — O26.899 HEARTBURN IN PREGNANCY (HHS-HCC): Status: ACTIVE | Noted: 2023-10-25

## 2023-10-25 PROBLEM — O26.899 HEADACHE IN PREGNANCY, ANTEPARTUM (HHS-HCC): Status: ACTIVE | Noted: 2023-10-25

## 2023-10-25 PROBLEM — I51.7 LEFT VENTRICULAR HYPERTROPHY: Status: ACTIVE | Noted: 2023-10-25

## 2023-10-25 PROBLEM — F31.10 BIPOLAR AFFECTIVE, MANIC (MULTI): Status: ACTIVE | Noted: 2023-10-25

## 2023-10-25 PROBLEM — N18.1 CHRONIC KIDNEY DISEASE (CKD), STAGE I: Status: ACTIVE | Noted: 2023-10-25

## 2023-10-25 PROBLEM — O10.919 CHRONIC HYPERTENSION IN PREGNANCY (HHS-HCC): Status: ACTIVE | Noted: 2023-10-25

## 2023-10-25 PROBLEM — O24.119 TYPE 2 DIABETES MELLITUS IN PREGNANCY (HHS-HCC): Status: ACTIVE | Noted: 2023-10-25

## 2023-10-25 PROBLEM — A60.04 HERPES SIMPLEX VULVOVAGINITIS: Status: ACTIVE | Noted: 2023-10-25

## 2023-10-25 PROBLEM — O98.819 CHLAMYDIA INFECTION, CURRENT PREGNANCY (HHS-HCC): Status: ACTIVE | Noted: 2023-10-25

## 2023-10-25 PROBLEM — J01.90 ACUTE SINUSITIS: Status: ACTIVE | Noted: 2023-10-25
